# Patient Record
Sex: FEMALE | Race: WHITE | NOT HISPANIC OR LATINO | Employment: OTHER | URBAN - METROPOLITAN AREA
[De-identification: names, ages, dates, MRNs, and addresses within clinical notes are randomized per-mention and may not be internally consistent; named-entity substitution may affect disease eponyms.]

---

## 2017-01-25 ENCOUNTER — OFFICE VISIT (OUTPATIENT)
Dept: FAMILY MEDICINE | Facility: CLINIC | Age: 62
End: 2017-01-25
Payer: COMMERCIAL

## 2017-01-25 VITALS
WEIGHT: 191.81 LBS | HEIGHT: 62 IN | BODY MASS INDEX: 35.3 KG/M2 | SYSTOLIC BLOOD PRESSURE: 129 MMHG | HEART RATE: 73 BPM | DIASTOLIC BLOOD PRESSURE: 79 MMHG

## 2017-01-25 DIAGNOSIS — B02.9 HERPES ZOSTER WITHOUT COMPLICATION: Primary | ICD-10-CM

## 2017-01-25 PROCEDURE — 99213 OFFICE O/P EST LOW 20 MIN: CPT | Mod: S$GLB,,, | Performed by: FAMILY MEDICINE

## 2017-01-25 PROCEDURE — 1159F MED LIST DOCD IN RCRD: CPT | Mod: S$GLB,,, | Performed by: FAMILY MEDICINE

## 2017-01-25 PROCEDURE — 99999 PR PBB SHADOW E&M-EST. PATIENT-LVL II: CPT | Mod: PBBFAC,,, | Performed by: FAMILY MEDICINE

## 2017-01-25 PROCEDURE — 3074F SYST BP LT 130 MM HG: CPT | Mod: S$GLB,,, | Performed by: FAMILY MEDICINE

## 2017-01-25 PROCEDURE — 3078F DIAST BP <80 MM HG: CPT | Mod: S$GLB,,, | Performed by: FAMILY MEDICINE

## 2017-01-25 RX ORDER — FAMCICLOVIR 500 MG/1
500 TABLET ORAL 3 TIMES DAILY
Qty: 21 TABLET | Refills: 0 | Status: SHIPPED | OUTPATIENT
Start: 2017-01-25 | End: 2017-02-01

## 2017-01-25 NOTE — PROGRESS NOTES
The patient presents with tender painful rash rt lateral cw  for the past 3 days    but no fever.  ROS:  General: No fever or sig wt change  HEENT:No other PND eye pain or dc  Respiratory: No cough wheezing  PE: vital signs noted  HEENT: Normocephalic,with no recent trauma,PERRLA,EOMI,conjunctiva injected with no exudate.Nasopharynx is injected and edematous.External otic canal edematous and injected TM dull  Neck:Supple without adenopathy  Chest:Clear bilateral breath sounds, 15cm red rash sl urticarial but pt reports some prev blisters     Heart:Regular rhthym without murmer  Abdomen:Soft, non tender,no masses, no hepatosplenomegaly  Extremeties and Neurologic:Grossly within normal limits     Impression:Poss shingles   Plan: Acyclovir/famvir

## 2017-01-27 DIAGNOSIS — E11.9 TYPE 2 DIABETES MELLITUS WITHOUT COMPLICATION: ICD-10-CM

## 2017-02-20 RX ORDER — ALBUTEROL SULFATE 90 UG/1
2 AEROSOL, METERED RESPIRATORY (INHALATION) EVERY 6 HOURS PRN
Qty: 18 G | Refills: 0 | Status: SHIPPED | OUTPATIENT
Start: 2017-02-20 | End: 2017-11-29 | Stop reason: SDUPTHER

## 2017-03-22 RX ORDER — AMLODIPINE BESYLATE 10 MG/1
TABLET ORAL
Qty: 30 TABLET | Refills: 11 | Status: SHIPPED | OUTPATIENT
Start: 2017-03-22 | End: 2018-04-01 | Stop reason: SDUPTHER

## 2017-03-31 ENCOUNTER — OFFICE VISIT (OUTPATIENT)
Dept: FAMILY MEDICINE | Facility: CLINIC | Age: 62
End: 2017-03-31
Payer: COMMERCIAL

## 2017-03-31 VITALS
DIASTOLIC BLOOD PRESSURE: 60 MMHG | BODY MASS INDEX: 35.3 KG/M2 | SYSTOLIC BLOOD PRESSURE: 101 MMHG | WEIGHT: 191.81 LBS | HEIGHT: 62 IN | HEART RATE: 75 BPM

## 2017-03-31 DIAGNOSIS — M76.61 ACHILLES TENDINITIS OF RIGHT LOWER EXTREMITY: ICD-10-CM

## 2017-03-31 DIAGNOSIS — S83.91XA SPRAIN OF RIGHT KNEE, UNSPECIFIED LIGAMENT, INITIAL ENCOUNTER: ICD-10-CM

## 2017-03-31 DIAGNOSIS — E11.9 TYPE 2 DIABETES MELLITUS WITHOUT COMPLICATION, WITHOUT LONG-TERM CURRENT USE OF INSULIN: ICD-10-CM

## 2017-03-31 DIAGNOSIS — M24.551 HIP CONTRACTURE, RIGHT: Primary | ICD-10-CM

## 2017-03-31 PROCEDURE — 3045F PR MOST RECENT HEMOGLOBIN A1C LEVEL 7.0-9.0%: CPT | Mod: S$GLB,,, | Performed by: FAMILY MEDICINE

## 2017-03-31 PROCEDURE — 99214 OFFICE O/P EST MOD 30 MIN: CPT | Mod: S$GLB,,, | Performed by: FAMILY MEDICINE

## 2017-03-31 PROCEDURE — 3074F SYST BP LT 130 MM HG: CPT | Mod: S$GLB,,, | Performed by: FAMILY MEDICINE

## 2017-03-31 PROCEDURE — 1160F RVW MEDS BY RX/DR IN RCRD: CPT | Mod: S$GLB,,, | Performed by: FAMILY MEDICINE

## 2017-03-31 PROCEDURE — 3060F POS MICROALBUMINURIA REV: CPT | Mod: S$GLB,,, | Performed by: FAMILY MEDICINE

## 2017-03-31 PROCEDURE — 2022F DILAT RTA XM EVC RTNOPTHY: CPT | Mod: S$GLB,,, | Performed by: FAMILY MEDICINE

## 2017-03-31 PROCEDURE — 3078F DIAST BP <80 MM HG: CPT | Mod: S$GLB,,, | Performed by: FAMILY MEDICINE

## 2017-03-31 PROCEDURE — 99999 PR PBB SHADOW E&M-EST. PATIENT-LVL II: CPT | Mod: PBBFAC,,, | Performed by: FAMILY MEDICINE

## 2017-03-31 RX ORDER — GABAPENTIN 300 MG/1
300 CAPSULE ORAL NIGHTLY
Qty: 30 CAPSULE | Refills: 1 | Status: SHIPPED | OUTPATIENT
Start: 2017-03-31 | End: 2017-06-08

## 2017-03-31 RX ORDER — DICLOFENAC SODIUM 75 MG/1
75 TABLET, DELAYED RELEASE ORAL 2 TIMES DAILY
Qty: 60 TABLET | Refills: 2 | Status: SHIPPED | OUTPATIENT
Start: 2017-03-31 | End: 2017-06-08

## 2017-03-31 NOTE — PROGRESS NOTES
The patient presents today to eval recent fall w pain lateral rt hip medial ankle med rt knee and achilles Mobic not helping Also, she has additional complaints of  Fu DM last a1c 7.0    Past Medical History:  Past Medical History:   Diagnosis Date    Allergy     Bronchitis     Hallux abductovalgus     Herniated lumbar intervertebral disc     Hyperlipidemia     Diet controlled    Hypertension     Smoker      Past Surgical History:   Procedure Laterality Date    BACK SURGERY      L4-L5    CARPAL TUNNEL RELEASE      bilateral     SECTION      times 1    CHOLECYSTECTOMY  2010    CYST REMOVAL      right hand    ENDOMETRIAL ABLATION  2009    FOOT FRACTURE SURGERY       Review of patient's allergies indicates:   Allergen Reactions    Latex, natural rubber      Current Outpatient Prescriptions on File Prior to Visit   Medication Sig Dispense Refill    albuterol 90 mcg/actuation inhaler Inhale 2 puffs into the lungs every 6 (six) hours as needed for Wheezing. Rescue 18 g 0    amlodipine (NORVASC) 10 MG tablet TAKE 1 TABLET EVERY DAY 30 tablet 11    meloxicam (MOBIC) 15 MG tablet TAKE 1 TABLET BY MOUTH EVERY DAY 30 tablet 2    metformin (GLUCOPHAGE) 500 MG tablet TAKE 1 TABLET TWICE A DAYWITH FOOD 60 tablet 5     No current facility-administered medications on file prior to visit.      Social History     Social History    Marital status: Single     Spouse name: N/A    Number of children: N/A    Years of education: N/A     Occupational History    Not on file.     Social History Main Topics    Smoking status: Current Every Day Smoker     Packs/day: 0.75     Years: 39.00    Smokeless tobacco: Not on file    Alcohol use No    Drug use: No    Sexual activity: Not on file     Other Topics Concern    Not on file     Social History Narrative     Family History   Problem Relation Age of Onset    COPD Mother     Heart disease Mother     Heart disease Father      MI          ROS:GENERAL: No fever, chills, fatigability or weight loss.  SKIN: No rashes, itching or changes in color or texture of skin.  HEAD: No headaches or recent head trauma.EYES: Visual acuity fine. No photophobia, ocular pain or diplopia.EARS: Denies ear pain, discharge or vertigo.NOSE: No loss of smell, no epistaxis or postnasal drip.MOUTH & THROAT: No hoarseness or change in voice. No excessive gum bleeding.NODES: Denies swollen glands.  CHEST: Denies WHITNEY, cyanosis, wheezing, cough and sputum production.  CARDIOVASCULAR: Denies chest pain, PND, orthopnea or reduced exercise tolerance.  ABDOMEN: Appetite fine. No weight loss. Denies diarrhea, abdominal pain, hematemesis or blood in stool.  URINARY: No flank pain, dysuria or hematuria.  PERIPHERAL VASCULAR: No claudication or cyanosis.  MUSCULOSKELETAL: See above.  NEUROLOGIC: No history of seizures, paralysis, alteration of gait or coordination.  PE:   HEAD: Normocephalic, atraumatic.EYES: PERRL. EOMI.   EARS: TM's intact. Light reflex normal. No retraction or perforation.   NOSE: Mucosa pink. Airway clear.MOUTH & THROAT: No tonsillar enlargement. No pharyngeal erythema or exudate. No stridor.  NODES: No cervical, axillary or inguinal lymph node enlargement.  CHEST: Lungs clear to auscultation.  CARDIOVASCULAR: Normal S1, S2. No rubs, murmurs or gallops.  ABDOMEN: Bowel sounds normal. Not distended. Soft. No tenderness or masses.  MUSCULOSKELETAL: Tender lateral hip medial kneen and achilles     NEUROLOGIC: Cranial Nerves: II-XII grossly intact.  Motor: 5/5 strength major flexors/extensors.  DTR's: Knees, Ankles 2+ and equal bilaterally; downgoing toes.  Sensory: Intact to light touch distally.  Gait & Posture: Normal gait and fine motion. No cerebellar signs.   Protective Sensation (w/ 10 gram monofilament): Intact  Visual Inspection (Evidence of Foot Deformity, Ulceration, Nails Intact, Skin Intact):Normal  Pedal Pulses: Present    Impression:Hip strain  contusion  Achilles strain  DM  HBP  Plan:Lab eval  Rec diet and ex recs  Rev risk elier abd rec Diclofenac  Hold mobic  If ftp add skyla 300 hs   a1c MAB

## 2017-05-16 ENCOUNTER — PATIENT OUTREACH (OUTPATIENT)
Dept: ADMINISTRATIVE | Facility: HOSPITAL | Age: 62
End: 2017-05-16
Payer: COMMERCIAL

## 2017-05-16 NOTE — LETTER
May 16, 2017    Crystal KELLER Armand  [address] [facility] [address]           [facility]  [address]  [facility] [address]  Phone: [phone] Dear Mrs. Acuna:    [facility] is committed to your overall health.  To help you get the most out of each of your visits, we will review your information to make sure you are up to date on all of your recommended tests and/or procedures.      Jose Rubio MD has found that you may be due for   Health Maintenance Due   Topic    TETANUS VACCINE     Pneumococcal PPSV23 (Medium Risk) (1)    Pap Smear     Colonoscopy     Eye Exam       If you have had any of the above done at another facility, please bring the records or information with you so that your record at [facility] will be complete.    If you are currently taking medication, please bring it with you to your appointment for review.    We will be happy to assist you with scheduling any necessary appointments or you may contact the [facility] appointment desk at [phone] to schedule at your convenience.     Thank you for choosing [facility] for your healthcare needs,      If you have any questions or concerns, please don't hesitate to call.    Sincerely,    Zully BUI LPN  Care Coordination Department  [facility]

## 2017-05-25 ENCOUNTER — LAB VISIT (OUTPATIENT)
Dept: LAB | Facility: HOSPITAL | Age: 62
End: 2017-05-25
Attending: FAMILY MEDICINE
Payer: COMMERCIAL

## 2017-05-25 DIAGNOSIS — E11.9 TYPE 2 DIABETES MELLITUS WITHOUT COMPLICATION: ICD-10-CM

## 2017-05-25 LAB
CREAT UR-MCNC: 105 MG/DL
MICROALBUMIN UR DL<=1MG/L-MCNC: 7 UG/ML
MICROALBUMIN/CREATININE RATIO: 6.7 UG/MG

## 2017-05-25 PROCEDURE — 82570 ASSAY OF URINE CREATININE: CPT

## 2017-06-08 ENCOUNTER — OFFICE VISIT (OUTPATIENT)
Dept: FAMILY MEDICINE | Facility: CLINIC | Age: 62
End: 2017-06-08
Payer: COMMERCIAL

## 2017-06-08 VITALS
SYSTOLIC BLOOD PRESSURE: 112 MMHG | BODY MASS INDEX: 34.87 KG/M2 | HEART RATE: 68 BPM | WEIGHT: 189.5 LBS | HEIGHT: 62 IN | DIASTOLIC BLOOD PRESSURE: 62 MMHG

## 2017-06-08 DIAGNOSIS — E78.5 HYPERLIPIDEMIA ASSOCIATED WITH TYPE 2 DIABETES MELLITUS: ICD-10-CM

## 2017-06-08 DIAGNOSIS — E11.69 HYPERLIPIDEMIA ASSOCIATED WITH TYPE 2 DIABETES MELLITUS: ICD-10-CM

## 2017-06-08 DIAGNOSIS — E11.9 TYPE 2 DIABETES MELLITUS WITHOUT COMPLICATION, WITHOUT LONG-TERM CURRENT USE OF INSULIN: Primary | ICD-10-CM

## 2017-06-08 PROCEDURE — 3045F PR MOST RECENT HEMOGLOBIN A1C LEVEL 7.0-9.0%: CPT | Mod: S$GLB,,, | Performed by: FAMILY MEDICINE

## 2017-06-08 PROCEDURE — 99999 PR PBB SHADOW E&M-EST. PATIENT-LVL III: CPT | Mod: PBBFAC,,, | Performed by: FAMILY MEDICINE

## 2017-06-08 PROCEDURE — 99214 OFFICE O/P EST MOD 30 MIN: CPT | Mod: S$GLB,,, | Performed by: FAMILY MEDICINE

## 2017-06-08 RX ORDER — METFORMIN HYDROCHLORIDE 500 MG/1
500 TABLET, EXTENDED RELEASE ORAL
Qty: 30 TABLET | Refills: 11 | Status: SHIPPED | OUTPATIENT
Start: 2017-06-08 | End: 2018-04-05 | Stop reason: SDUPTHER

## 2017-06-08 RX ORDER — ATORVASTATIN CALCIUM 10 MG/1
10 TABLET, FILM COATED ORAL DAILY
Qty: 30 TABLET | Refills: 11 | Status: SHIPPED | OUTPATIENT
Start: 2017-06-08 | End: 2018-04-05 | Stop reason: SDUPTHER

## 2017-06-08 NOTE — PROGRESS NOTES
The patient presents today to Fu DM last a1c 7.9 Had GI side effects w 500 qd and has been taking qod x 2 y.    Past Medical History:  Past Medical History:   Diagnosis Date    Allergy     Bronchitis     Hallux abductovalgus     Herniated lumbar intervertebral disc     Hyperlipidemia     Diet controlled    Hypertension     Smoker      Past Surgical History:   Procedure Laterality Date    BACK SURGERY      L4-L5    CARPAL TUNNEL RELEASE      bilateral     SECTION      times 1    CHOLECYSTECTOMY  2010    CYST REMOVAL      right hand    ENDOMETRIAL ABLATION  2009    FOOT FRACTURE SURGERY       Review of patient's allergies indicates:   Allergen Reactions    Latex, natural rubber      Current Outpatient Prescriptions on File Prior to Visit   Medication Sig Dispense Refill    albuterol 90 mcg/actuation inhaler Inhale 2 puffs into the lungs every 6 (six) hours as needed for Wheezing. Rescue 18 g 0    amlodipine (NORVASC) 10 MG tablet TAKE 1 TABLET EVERY DAY 30 tablet 11    meloxicam (MOBIC) 15 MG tablet TAKE 1 TABLET BY MOUTH EVERY DAY 30 tablet 2    metformin (GLUCOPHAGE) 500 MG tablet TAKE 1 TABLET TWICE A DAYWITH FOOD 60 tablet 5    [DISCONTINUED] diclofenac (VOLTAREN) 75 MG EC tablet Take 1 tablet (75 mg total) by mouth 2 (two) times daily. 60 tablet 2    [DISCONTINUED] gabapentin (NEURONTIN) 300 MG capsule Take 1 capsule (300 mg total) by mouth every evening. 30 capsule 1     No current facility-administered medications on file prior to visit.      Social History     Social History    Marital status: Single     Spouse name: N/A    Number of children: N/A    Years of education: N/A     Occupational History    Not on file.     Social History Main Topics    Smoking status: Current Every Day Smoker     Packs/day: 0.75     Years: 39.00    Smokeless tobacco: Not on file    Alcohol use No    Drug use: No    Sexual activity: Not on file     Other Topics Concern    Not on  file     Social History Narrative    No narrative on file     Family History   Problem Relation Age of Onset    COPD Mother     Heart disease Mother     Heart disease Father      MI         ROS:GENERAL: No fever, chills, fatigability or weight loss.  SKIN: No rashes, itching or changes in color or texture of skin.  HEAD: No headaches or recent head trauma.EYES: Visual acuity fine. No photophobia, ocular pain or diplopia.EARS: Denies ear pain, discharge or vertigo.NOSE: No loss of smell, no epistaxis or postnasal drip.MOUTH & THROAT: No hoarseness or change in voice. No excessive gum bleeding.NODES: Denies swollen glands.  CHEST: Denies WHITNEY, cyanosis, wheezing, cough and sputum production.  CARDIOVASCULAR: Denies chest pain, PND, orthopnea or reduced exercise tolerance.  ABDOMEN: Appetite fine. No weight loss. Denies diarrhea, abdominal pain, hematemesis or blood in stool.  URINARY: No flank pain, dysuria or hematuria.  PERIPHERAL VASCULAR: No claudication or cyanosis.  MUSCULOSKELETAL: See above.  NEUROLOGIC: No history of seizures, paralysis, alteration of gait or coordination.  PE:   HEAD: Normocephalic, atraumatic.EYES: PERRL. EOMI.   EARS: TM's intact. Light reflex normal. No retraction or perforation.   NOSE: Mucosa pink. Airway clear.MOUTH & THROAT: No tonsillar enlargement. No pharyngeal erythema or exudate. No stridor.  NODES: No cervical, axillary or inguinal lymph node enlargement.  CHEST: Lungs clear to auscultation.  CARDIOVASCULAR: Normal S1, S2. No rubs, murmurs or gallops.  ABDOMEN: Bowel sounds normal. Not distended. Soft. No tenderness or masses.  MUSCULOSKELETAL: Tender lateral hip medial kneen and achilles     NEUROLOGIC: Cranial Nerves: II-XII grossly intact.  Motor: 5/5 strength major flexors/extensors.  DTR's: Knees, Ankles 2+ and equal bilaterally; downgoing toes.  Sensory: Intact to light touch distally.  Gait & Posture: Normal gait and fine motion. No cerebellar signs.   Protective  Sensation (w/ 10 gram monofilament): Intact  Visual Inspection (Evidence of Foot Deformity, Ulceration, Nails Intact, Skin Intact):Normal  Pedal Pulses: Present    Impression:   DM  HBP  Plan:Lab eval rev  45 min OV >50% counseling strategy for D.Rev risk ASCVD  Risk >14 % rec statin rev risks/benifits  Rec diet and ex recs     a1c 3m

## 2017-10-05 RX ORDER — MELOXICAM 15 MG/1
TABLET ORAL
Qty: 30 TABLET | Refills: 0 | Status: SHIPPED | OUTPATIENT
Start: 2017-10-05 | End: 2017-11-17 | Stop reason: SDUPTHER

## 2017-10-13 ENCOUNTER — HOSPITAL ENCOUNTER (OUTPATIENT)
Dept: RADIOLOGY | Facility: HOSPITAL | Age: 62
Discharge: HOME | End: 2017-10-13
Attending: FAMILY MEDICINE
Payer: COMMERCIAL

## 2017-10-13 ENCOUNTER — IMMUNIZATION (OUTPATIENT)
Dept: FAMILY MEDICINE | Facility: CLINIC | Age: 62
End: 2017-10-13
Payer: COMMERCIAL

## 2017-10-13 VITALS — BODY MASS INDEX: 34.89 KG/M2 | WEIGHT: 189.63 LBS | HEIGHT: 62 IN

## 2017-10-13 DIAGNOSIS — Z12.31 SCREENING MAMMOGRAM, ENCOUNTER FOR: ICD-10-CM

## 2017-10-13 PROCEDURE — 77063 BREAST TOMOSYNTHESIS BI: CPT | Mod: 26,,, | Performed by: RADIOLOGY

## 2017-10-13 PROCEDURE — 77067 SCR MAMMO BI INCL CAD: CPT | Mod: 26,,, | Performed by: RADIOLOGY

## 2017-10-13 PROCEDURE — 77067 SCR MAMMO BI INCL CAD: CPT | Mod: TC

## 2017-10-13 PROCEDURE — 90686 IIV4 VACC NO PRSV 0.5 ML IM: CPT | Mod: S$GLB,,, | Performed by: FAMILY MEDICINE

## 2017-10-13 PROCEDURE — 90471 IMMUNIZATION ADMIN: CPT | Mod: S$GLB,,, | Performed by: FAMILY MEDICINE

## 2017-11-17 RX ORDER — MELOXICAM 15 MG/1
15 TABLET ORAL DAILY
Qty: 30 TABLET | Refills: 11 | Status: SHIPPED | OUTPATIENT
Start: 2017-11-17 | End: 2018-04-05 | Stop reason: SDUPTHER

## 2017-11-29 ENCOUNTER — OFFICE VISIT (OUTPATIENT)
Dept: FAMILY MEDICINE | Facility: CLINIC | Age: 62
End: 2017-11-29
Payer: COMMERCIAL

## 2017-11-29 VITALS
BODY MASS INDEX: 34.93 KG/M2 | TEMPERATURE: 98 F | HEIGHT: 62 IN | HEART RATE: 74 BPM | DIASTOLIC BLOOD PRESSURE: 73 MMHG | WEIGHT: 189.81 LBS | SYSTOLIC BLOOD PRESSURE: 137 MMHG

## 2017-11-29 DIAGNOSIS — N39.0 ACUTE LOWER UTI (URINARY TRACT INFECTION): Primary | ICD-10-CM

## 2017-11-29 DIAGNOSIS — R30.0 DYSURIA: ICD-10-CM

## 2017-11-29 LAB
BACTERIA #/AREA URNS HPF: ABNORMAL /HPF
BILIRUB UR QL STRIP: NEGATIVE
CLARITY UR: ABNORMAL
COLOR UR: YELLOW
GLUCOSE UR QL STRIP: NEGATIVE
HGB UR QL STRIP: ABNORMAL
KETONES UR QL STRIP: ABNORMAL
LEUKOCYTE ESTERASE UR QL STRIP: ABNORMAL
MICROSCOPIC COMMENT: ABNORMAL
NITRITE UR QL STRIP: POSITIVE
PH UR STRIP: 6 [PH] (ref 5–8)
PROT UR QL STRIP: ABNORMAL
RBC #/AREA URNS HPF: 2 /HPF (ref 0–4)
SP GR UR STRIP: 1.01 (ref 1–1.03)
SQUAMOUS #/AREA URNS HPF: 5 /HPF
URN SPEC COLLECT METH UR: ABNORMAL
WBC #/AREA URNS HPF: >100 /HPF (ref 0–5)

## 2017-11-29 PROCEDURE — 87088 URINE BACTERIA CULTURE: CPT

## 2017-11-29 PROCEDURE — 87186 SC STD MICRODIL/AGAR DIL: CPT

## 2017-11-29 PROCEDURE — 99999 PR PBB SHADOW E&M-EST. PATIENT-LVL IV: CPT | Mod: PBBFAC,,, | Performed by: NURSE PRACTITIONER

## 2017-11-29 PROCEDURE — 81000 URINALYSIS NONAUTO W/SCOPE: CPT | Mod: PO

## 2017-11-29 PROCEDURE — 87077 CULTURE AEROBIC IDENTIFY: CPT

## 2017-11-29 PROCEDURE — 87086 URINE CULTURE/COLONY COUNT: CPT

## 2017-11-29 PROCEDURE — 99213 OFFICE O/P EST LOW 20 MIN: CPT | Mod: S$GLB,,, | Performed by: NURSE PRACTITIONER

## 2017-11-29 RX ORDER — ALBUTEROL SULFATE 90 UG/1
2 AEROSOL, METERED RESPIRATORY (INHALATION) EVERY 6 HOURS PRN
Qty: 18 G | Refills: 0 | Status: SHIPPED | OUTPATIENT
Start: 2017-11-29 | End: 2020-05-01 | Stop reason: SDUPTHER

## 2017-11-29 RX ORDER — SULFAMETHOXAZOLE AND TRIMETHOPRIM 800; 160 MG/1; MG/1
1 TABLET ORAL 2 TIMES DAILY
Qty: 20 TABLET | Refills: 0 | Status: SHIPPED | OUTPATIENT
Start: 2017-11-29 | End: 2017-11-30 | Stop reason: ALTCHOICE

## 2017-11-29 RX ORDER — PHENAZOPYRIDINE HYDROCHLORIDE 100 MG/1
100 TABLET, FILM COATED ORAL 3 TIMES DAILY PRN
Qty: 9 TABLET | Refills: 0 | Status: SHIPPED | OUTPATIENT
Start: 2017-11-29 | End: 2017-12-04 | Stop reason: SDUPTHER

## 2017-11-29 NOTE — PROGRESS NOTES
"Subjective:      Patient ID: Crystal Acuna is a 62 y.o. female.    Chief Complaint: Dysuria    Dysuria    This is a new problem. The current episode started in the past 7 days (4 days). The problem occurs every urination. The problem has been gradually worsening. The quality of the pain is described as burning. The pain is moderate. There has been no fever. Associated symptoms include flank pain, frequency and urgency. Pertinent negatives include no behavior changes, chills, hematuria, hesitancy, bubble bath use, constipation or rash. She has tried nothing for the symptoms. The treatment provided no relief.   Patient also requesting refill of albuterol which she takes as needed for wheezing and shortness of breath.  Review of Systems   Constitutional: Negative for chills, fatigue and fever.   HENT: Negative.    Eyes: Negative.    Respiratory: Negative for cough, shortness of breath and wheezing.    Cardiovascular: Negative for chest pain, palpitations and leg swelling.   Gastrointestinal: Negative.  Negative for constipation.   Endocrine: Negative.    Genitourinary: Positive for dysuria, flank pain, frequency and urgency. Negative for hematuria and hesitancy.   Skin: Negative for rash and wound.   Neurological: Negative for weakness and numbness.   Psychiatric/Behavioral: The patient is not nervous/anxious.        Objective:     /73   Pulse 74   Temp 98.1 °F (36.7 °C) (Oral)   Ht 5' 2" (1.575 m)   Wt 86.1 kg (189 lb 13.1 oz)   BMI 34.72 kg/m²     Physical Exam   Constitutional: She is oriented to person, place, and time. She appears well-developed and well-nourished.   HENT:   Head: Normocephalic.   Eyes: Pupils are equal, round, and reactive to light.   Neck: Normal range of motion.   Cardiovascular: Normal rate, regular rhythm and normal heart sounds.    Pulmonary/Chest: Effort normal and breath sounds normal. No respiratory distress. She has no wheezes. She has no rales.   Abdominal: Soft. Normal " appearance and bowel sounds are normal. She exhibits no distension and no mass. There is no tenderness. There is no rebound, no guarding and no CVA tenderness.   Musculoskeletal: Normal range of motion.   Lymphadenopathy:     She has no cervical adenopathy.   Neurological: She is alert and oriented to person, place, and time.   Skin: Skin is warm and dry. No rash noted.   Psychiatric: She has a normal mood and affect. Her behavior is normal. Judgment and thought content normal.   Vitals reviewed.  U/a in clinic abnormal    Assessment:     1. Acute lower UTI (urinary tract infection)    2. Dysuria        Plan:   Acute lower UTI (urinary tract infection)  -     Urinalysis  -     Urine culture  -     Urinalysis Microscopic  -     sulfamethoxazole-trimethoprim 800-160mg (BACTRIM DS) 800-160 mg Tab; Take 1 tablet by mouth 2 (two) times daily.  Dispense: 20 tablet; Refill: 0    Dysuria  -     Urinalysis  -     Urine culture  -     Urinalysis Microscopic  -     phenazopyridine (PYRIDIUM) 100 MG tablet; Take 1 tablet (100 mg total) by mouth 3 (three) times daily as needed for Pain.  Dispense: 9 tablet; Refill: 0    Other orders  -     albuterol 90 mcg/actuation inhaler; Inhale 2 puffs into the lungs every 6 (six) hours as needed for Wheezing. Rescue  Dispense: 18 g; Refill: 0    Educational handout provided  Report to ER if symptoms worsen  Return if symptoms worsen or fail to improve.

## 2017-11-29 NOTE — PATIENT INSTRUCTIONS

## 2017-11-30 ENCOUNTER — OFFICE VISIT (OUTPATIENT)
Dept: FAMILY MEDICINE | Facility: CLINIC | Age: 62
End: 2017-11-30
Payer: COMMERCIAL

## 2017-11-30 VITALS
SYSTOLIC BLOOD PRESSURE: 114 MMHG | HEART RATE: 85 BPM | WEIGHT: 190.06 LBS | BODY MASS INDEX: 34.98 KG/M2 | HEIGHT: 62 IN | TEMPERATURE: 99 F | DIASTOLIC BLOOD PRESSURE: 65 MMHG

## 2017-11-30 DIAGNOSIS — R50.9 FEVER, UNSPECIFIED FEVER CAUSE: ICD-10-CM

## 2017-11-30 DIAGNOSIS — N39.0 ACUTE UTI (URINARY TRACT INFECTION): Primary | ICD-10-CM

## 2017-11-30 LAB
CTP QC/QA: YES
FLUAV AG NPH QL: NEGATIVE
FLUBV AG NPH QL: NEGATIVE

## 2017-11-30 PROCEDURE — 99213 OFFICE O/P EST LOW 20 MIN: CPT | Mod: 25,S$GLB,, | Performed by: NURSE PRACTITIONER

## 2017-11-30 PROCEDURE — 99999 PR PBB SHADOW E&M-EST. PATIENT-LVL III: CPT | Mod: PBBFAC,,, | Performed by: NURSE PRACTITIONER

## 2017-11-30 PROCEDURE — 87804 INFLUENZA ASSAY W/OPTIC: CPT | Mod: QW,S$GLB,, | Performed by: NURSE PRACTITIONER

## 2017-11-30 RX ORDER — CIPROFLOXACIN 500 MG/1
500 TABLET ORAL 2 TIMES DAILY
Qty: 14 TABLET | Refills: 0 | Status: SHIPPED | OUTPATIENT
Start: 2017-11-30 | End: 2017-12-07

## 2017-11-30 NOTE — PROGRESS NOTES
"Subjective:      Patient ID: Crystal Acuna is a 62 y.o. female.    Chief Complaint: Generalized Body Aches and Fever    Patient was seen and treated for UTI yesterday.  Had sudden onset of fever and body aches in the afternoon after getting home from appointment.  She reports her dysuria, frequency, and urgency has improved some.      Fever    This is a new problem. The current episode started yesterday. The problem has been waxing and waning. The maximum temperature noted was 102 to 102.9 F. The temperature was taken using an oral thermometer. Associated symptoms include coughing (chronic cough, no changes), headaches, muscle aches and urinary pain. Pertinent negatives include no abdominal pain, chest pain, congestion, ear pain, nausea, rash, sore throat or wheezing. She has tried acetaminophen for the symptoms. The treatment provided mild relief.   Risk factors comment:  UTI    Review of Systems   Constitutional: Positive for fatigue and fever. Negative for chills.   HENT: Negative for congestion, ear pain, sinus pain, sinus pressure and sore throat.    Respiratory: Positive for cough (chronic cough, no changes). Negative for shortness of breath and wheezing.    Cardiovascular: Negative for chest pain, palpitations and leg swelling.   Gastrointestinal: Negative for abdominal pain and nausea.   Genitourinary: Positive for dysuria. Negative for difficulty urinating, frequency and urgency.   Musculoskeletal: Positive for myalgias.   Skin: Negative for rash and wound.   Neurological: Positive for headaches. Negative for weakness and numbness.   Psychiatric/Behavioral: The patient is not nervous/anxious.        Objective:     /65   Pulse 85   Temp 99 °F (37.2 °C) (Oral)   Ht 5' 2" (1.575 m)   Wt 86.2 kg (190 lb 0.6 oz)   BMI 34.76 kg/m²     Physical Exam   Constitutional: She is oriented to person, place, and time. She appears well-developed and well-nourished.   HENT:   Head: Normocephalic.   Right " Ear: Tympanic membrane, external ear and ear canal normal.   Left Ear: Tympanic membrane, external ear and ear canal normal.   Nose: Rhinorrhea present. No mucosal edema. Right sinus exhibits frontal sinus tenderness. Right sinus exhibits no maxillary sinus tenderness. Left sinus exhibits frontal sinus tenderness. Left sinus exhibits no maxillary sinus tenderness.   Mouth/Throat: No oropharyngeal exudate, posterior oropharyngeal edema or posterior oropharyngeal erythema.   Eyes: Pupils are equal, round, and reactive to light.   Neck: Normal range of motion. Neck supple.   Cardiovascular: Normal rate, regular rhythm and normal heart sounds.    Pulmonary/Chest: Effort normal and breath sounds normal. No respiratory distress. She has no wheezes. She has no rales.   Abdominal: Soft. Bowel sounds are normal. She exhibits no distension and no mass. There is no tenderness. There is no rebound and no guarding.   Lymphadenopathy:     She has no cervical adenopathy.   Neurological: She is alert and oriented to person, place, and time.   Skin: Skin is warm and dry. No rash noted.   Psychiatric: She has a normal mood and affect. Her behavior is normal. Judgment and thought content normal.   Vitals reviewed.  POCT Influenza A/B negative  Assessment:     1. Acute UTI (urinary tract infection)    2. Fever, unspecified fever cause        Plan:   Acute UTI (urinary tract infection)  -     ciprofloxacin HCl (CIPRO) 500 MG tablet; Take 1 tablet (500 mg total) by mouth 2 (two) times daily.  Dispense: 14 tablet; Refill: 0    Fever, unspecified fever cause  -     POCT Influenza A/B    Increase fluid intake  Report to ER if symptoms worsen    Return if symptoms worsen or fail to improve.

## 2017-11-30 NOTE — LETTER
November 30, 2017      [facility]  [address] [facility] [address]  Phone: [phone]  Fax: [phone]       Patient: Crystal Acuna   YOB: 1955  Date of Visit: 11/30/2017    To Whom It May Concern:    Angelo Acuna  was at [facility] on 11/30/2017. She may return to work/school on 12/3/2017 with no restrictions. If you have any questions or concerns, or if I can be of further assistance, please do not hesitate to contact me.    Sincerely,      Aleksey Cantrell, NP

## 2017-11-30 NOTE — PATIENT INSTRUCTIONS

## 2017-11-30 NOTE — LETTER
November 30, 2017      [facility]  [address] [facility] [address]  Phone: [phone]  Fax: [phone]       Patient: Crystal Acuna   YOB: 1955  Date of Visit: 11/30/2017    To Whom It May Concern:    Angelo Acuna  was at [facility] on 11/29/2017 and 11/30/2017. She may return to work/school on 12/3/2017 with no restrictions. If you have any questions or concerns, or if I can be of further assistance, please do not hesitate to contact me.    Sincerely,      Aleksey Cantrell, NP

## 2017-11-30 NOTE — LETTER
November 30, 2017      [facility]  [address] [facility] [address]  Phone: [phone]  Fax: [phone]       Patient: Crystal Acuna   YOB: 1955  Date of Visit: 11/30/2017    To Whom It May Concern:    Angelo Acuna  was at [facility] on 11/29/2017 and 11/30/2017. She may return to work/school on 11/3/2017 with no restrictions. If you have any questions or concerns, or if I can be of further assistance, please do not hesitate to contact me.    Sincerely,        Aleksey Cantrell, NP

## 2017-12-01 LAB — BACTERIA UR CULT: NORMAL

## 2017-12-04 DIAGNOSIS — R30.0 DYSURIA: ICD-10-CM

## 2017-12-04 RX ORDER — PHENAZOPYRIDINE HYDROCHLORIDE 100 MG/1
100 TABLET, FILM COATED ORAL 3 TIMES DAILY PRN
Qty: 9 TABLET | Refills: 0 | Status: SHIPPED | OUTPATIENT
Start: 2017-12-04 | End: 2017-12-07

## 2017-12-08 DIAGNOSIS — E11.9 TYPE 2 DIABETES MELLITUS WITHOUT COMPLICATION: ICD-10-CM

## 2018-02-26 ENCOUNTER — PATIENT OUTREACH (OUTPATIENT)
Dept: ADMINISTRATIVE | Facility: HOSPITAL | Age: 63
End: 2018-02-26

## 2018-02-26 NOTE — LETTER
February 26, 2018    Crystal Vuongburn  [address]  [facility] [address]           [facility]  [address]  [facility] [address]  Phone: [phone] Dear Mrs. Acuna:    [facility] is committed to your overall health.  To help you get the most out of each of your visits, we will review your information to make sure you are up to date on all of your recommended tests and/or procedures.      Jose Rubio MD has found that you may be due for   Health Maintenance Due   Topic    TETANUS VACCINE     Pneumococcal PPSV23 (Medium Risk) (1)    Pap Smear with HPV Cotest     Colonoscopy     Eye Exam     Zoster Vaccine     Hemoglobin A1c       If you have had any of the above done at another facility, please bring the records or information with you so that your record at [facility] will be complete.    If you are currently taking medication, please bring it with you to your appointment for review.    We will be happy to assist you with scheduling any necessary appointments or you may contact the [facility] appointment desk at [phone] to schedule at your convenience.     Thank you for choosing [facility] for your healthcare needs,    If you have any questions or concerns, please don't hesitate to call.    Sincerely,    ANDRIY Andrea  Care Coordination Department  [facility]  [phone]

## 2018-04-02 RX ORDER — AMLODIPINE BESYLATE 10 MG/1
TABLET ORAL
Qty: 30 TABLET | Refills: 1 | Status: SHIPPED | OUTPATIENT
Start: 2018-04-02 | End: 2018-04-05 | Stop reason: SDUPTHER

## 2018-04-02 NOTE — TELEPHONE ENCOUNTER
I am refilling a requested medication x 1 for the patient and reviewed the chart.  The patient is due for a physical and DM fu .  Please book the patient with NP 2-6 weeks

## 2018-04-04 ENCOUNTER — PATIENT OUTREACH (OUTPATIENT)
Dept: ADMINISTRATIVE | Facility: HOSPITAL | Age: 63
End: 2018-04-04

## 2018-04-05 ENCOUNTER — TELEPHONE (OUTPATIENT)
Dept: FAMILY MEDICINE | Facility: CLINIC | Age: 63
End: 2018-04-05

## 2018-04-05 ENCOUNTER — HOSPITAL ENCOUNTER (OUTPATIENT)
Dept: RADIOLOGY | Facility: HOSPITAL | Age: 63
Discharge: HOME | End: 2018-04-05
Attending: NURSE PRACTITIONER
Payer: COMMERCIAL

## 2018-04-05 ENCOUNTER — OFFICE VISIT (OUTPATIENT)
Dept: FAMILY MEDICINE | Facility: CLINIC | Age: 63
End: 2018-04-05
Payer: COMMERCIAL

## 2018-04-05 VITALS
TEMPERATURE: 98 F | BODY MASS INDEX: 33.98 KG/M2 | SYSTOLIC BLOOD PRESSURE: 118 MMHG | WEIGHT: 184.63 LBS | HEIGHT: 62 IN | HEART RATE: 71 BPM | DIASTOLIC BLOOD PRESSURE: 68 MMHG

## 2018-04-05 DIAGNOSIS — I65.22 STENOSIS OF LEFT CAROTID ARTERY: Primary | ICD-10-CM

## 2018-04-05 DIAGNOSIS — F17.200 SMOKER: ICD-10-CM

## 2018-04-05 DIAGNOSIS — Z76.0 MEDICATION REFILL: ICD-10-CM

## 2018-04-05 DIAGNOSIS — R41.3 MEMORY DISTURBANCE: ICD-10-CM

## 2018-04-05 DIAGNOSIS — N39.0 RECURRENT UTI: ICD-10-CM

## 2018-04-05 DIAGNOSIS — I65.23 ATHEROSCLEROSIS OF BOTH CAROTID ARTERIES: ICD-10-CM

## 2018-04-05 DIAGNOSIS — R01.1 CARDIAC MURMUR: ICD-10-CM

## 2018-04-05 DIAGNOSIS — E78.5 HYPERLIPIDEMIA ASSOCIATED WITH TYPE 2 DIABETES MELLITUS: ICD-10-CM

## 2018-04-05 DIAGNOSIS — E11.69 HYPERLIPIDEMIA ASSOCIATED WITH TYPE 2 DIABETES MELLITUS: ICD-10-CM

## 2018-04-05 DIAGNOSIS — R41.9 DECREASED ALERTNESS: ICD-10-CM

## 2018-04-05 DIAGNOSIS — T14.8XXA MUSCLE STRAIN: ICD-10-CM

## 2018-04-05 DIAGNOSIS — I15.2 HYPERTENSION ASSOCIATED WITH DIABETES: ICD-10-CM

## 2018-04-05 DIAGNOSIS — Z00.00 ANNUAL PHYSICAL EXAM: Primary | ICD-10-CM

## 2018-04-05 DIAGNOSIS — E11.59 HYPERTENSION ASSOCIATED WITH DIABETES: ICD-10-CM

## 2018-04-05 DIAGNOSIS — E11.9 TYPE 2 DIABETES MELLITUS WITHOUT COMPLICATION, WITHOUT LONG-TERM CURRENT USE OF INSULIN: ICD-10-CM

## 2018-04-05 LAB
BACTERIA #/AREA URNS HPF: ABNORMAL /HPF
BILIRUB UR QL STRIP: NEGATIVE
CLARITY UR: CLEAR
COLOR UR: YELLOW
CREAT UR-MCNC: 73 MG/DL
GLUCOSE UR QL STRIP: NEGATIVE
HGB UR QL STRIP: ABNORMAL
KETONES UR QL STRIP: NEGATIVE
LEUKOCYTE ESTERASE UR QL STRIP: ABNORMAL
MICROALBUMIN UR DL<=1MG/L-MCNC: 10 UG/ML
MICROALBUMIN/CREATININE RATIO: 13.7 UG/MG
MICROSCOPIC COMMENT: ABNORMAL
NITRITE UR QL STRIP: NEGATIVE
PH UR STRIP: 6 [PH] (ref 5–8)
PROT UR QL STRIP: NEGATIVE
RBC #/AREA URNS HPF: 2 /HPF (ref 0–4)
SP GR UR STRIP: 1.01 (ref 1–1.03)
SQUAMOUS #/AREA URNS HPF: 15 /HPF
URN SPEC COLLECT METH UR: ABNORMAL
WBC #/AREA URNS HPF: 40 /HPF (ref 0–5)
WBC CLUMPS URNS QL MICRO: ABNORMAL

## 2018-04-05 PROCEDURE — 93880 EXTRACRANIAL BILAT STUDY: CPT | Mod: TC,PO

## 2018-04-05 PROCEDURE — 81000 URINALYSIS NONAUTO W/SCOPE: CPT | Mod: PO

## 2018-04-05 PROCEDURE — 99396 PREV VISIT EST AGE 40-64: CPT | Mod: S$GLB,,, | Performed by: NURSE PRACTITIONER

## 2018-04-05 PROCEDURE — 70450 CT HEAD/BRAIN W/O DYE: CPT | Mod: 26,,, | Performed by: RADIOLOGY

## 2018-04-05 PROCEDURE — 99999 PR PBB SHADOW E&M-EST. PATIENT-LVL V: CPT | Mod: PBBFAC,,, | Performed by: NURSE PRACTITIONER

## 2018-04-05 PROCEDURE — 93880 EXTRACRANIAL BILAT STUDY: CPT | Mod: 26,,, | Performed by: RADIOLOGY

## 2018-04-05 PROCEDURE — 70450 CT HEAD/BRAIN W/O DYE: CPT | Mod: TC,PO

## 2018-04-05 PROCEDURE — 87086 URINE CULTURE/COLONY COUNT: CPT

## 2018-04-05 PROCEDURE — 82043 UR ALBUMIN QUANTITATIVE: CPT

## 2018-04-05 RX ORDER — ATORVASTATIN CALCIUM 10 MG/1
10 TABLET, FILM COATED ORAL DAILY
Qty: 90 TABLET | Refills: 3 | Status: SHIPPED | OUTPATIENT
Start: 2018-04-05 | End: 2019-05-21 | Stop reason: SDUPTHER

## 2018-04-05 RX ORDER — MELOXICAM 15 MG/1
15 TABLET ORAL DAILY
Qty: 90 TABLET | Refills: 3 | Status: SHIPPED | OUTPATIENT
Start: 2018-04-05 | End: 2019-05-07 | Stop reason: SDUPTHER

## 2018-04-05 RX ORDER — NITROFURANTOIN 25; 75 MG/1; MG/1
100 CAPSULE ORAL 2 TIMES DAILY
Qty: 20 CAPSULE | Refills: 0 | Status: SHIPPED | OUTPATIENT
Start: 2018-04-05 | End: 2018-04-15

## 2018-04-05 RX ORDER — METFORMIN HYDROCHLORIDE 500 MG/1
500 TABLET, EXTENDED RELEASE ORAL
Qty: 90 TABLET | Refills: 3 | Status: SHIPPED | OUTPATIENT
Start: 2018-04-05 | End: 2018-09-29 | Stop reason: SDUPTHER

## 2018-04-05 RX ORDER — AMLODIPINE BESYLATE 10 MG/1
10 TABLET ORAL DAILY
Qty: 90 TABLET | Refills: 3 | Status: SHIPPED | OUTPATIENT
Start: 2018-04-05 | End: 2019-04-30 | Stop reason: SDUPTHER

## 2018-04-05 NOTE — PROGRESS NOTES
Subjective:       Patient ID: Crystal Acuna is a 62 y.o. female.    Chief Complaint: Annual Exam  Pt in today for annual exam. HTN well controlled with medication. Type 2 diabetes uncontrolled; states compliance with medication; states BG have been WNL since last hgb A1C. Hyperlipidemia managed with medication. Pt is a smoker; states has tried to quit multiple times (patches, chantix) without success. Pt states allergic to patches and chantix caused nightmares. Declines smoking cessation at present. Pts daughter informed provider that the pt has been experiencing memory disturbances, decreased alertness over the past 2 w; has had recurrent UTIs. Denies any HA, nausea, dizziness, injury, LOC. Labs, eye exam, foot exam due; states will schedule eye exam. Foot exam performed today. Mammogram due in October. Pt has no other complaints today.  Muscle Pain   This is a new (States lifts heavy objects for work) problem. The current episode started 1 to 4 weeks ago. The problem occurs intermittently. The problem has been gradually improving. Pertinent negatives include no abdominal pain, anorexia, arthralgias, change in bowel habit, chest pain, chills, congestion, coughing, diaphoresis, fatigue, fever, headaches, joint swelling, myalgias, nausea, neck pain, numbness, rash, sore throat, swollen glands, urinary symptoms, vertigo, visual change, vomiting or weakness. Associated symptoms comments: Lower chest; recreates pain with movement. The symptoms are aggravated by twisting (lifting). She has tried nothing for the symptoms. The treatment provided no relief.   Urinary Frequency    This is a recurrent problem. The current episode started more than 1 month ago. The problem occurs every urination. The problem has been waxing and waning. The pain is mild. There has been no fever. There is no history of pyelonephritis. Associated symptoms include frequency and urgency. Pertinent negatives include no behavior changes, chills,  discharge, flank pain, hematuria, hesitancy, nausea, possible pregnancy, sweats, vomiting, weight loss, bubble bath use, constipation, rash or withholding. She has tried antibiotics for the symptoms. The treatment provided moderate relief. Her past medical history is significant for diabetes mellitus, hypertension and recurrent UTIs. There is no history of catheterization, diabetes insipidus, genitourinary reflux, kidney stones, a single kidney, STD, urinary stasis or a urological procedure. Requests urology referral     Past Medical History:   Diagnosis Date    Allergy     Bronchitis     Hallux abductovalgus     Herniated lumbar intervertebral disc     Hyperlipidemia     Diet controlled    Hypertension     Smoker      Past Surgical History:   Procedure Laterality Date    BACK SURGERY      L4-L5    CARPAL TUNNEL RELEASE      bilateral     SECTION      times 1    CHOLECYSTECTOMY  2010    CYST REMOVAL      right hand    ENDOMETRIAL ABLATION  2009    FOOT FRACTURE SURGERY       Social History     Social History    Marital status: Single     Spouse name: N/A    Number of children: N/A    Years of education: N/A     Occupational History    Not on file.     Social History Main Topics    Smoking status: Current Every Day Smoker     Packs/day: 0.75     Years: 39.00    Smokeless tobacco: Not on file    Alcohol use No    Drug use: No    Sexual activity: Not on file     Social History Narrative    No narrative on file       Review of Systems   Constitutional: Negative.  Negative for chills, diaphoresis, fatigue, fever and weight loss.   HENT: Negative.  Negative for congestion and sore throat.    Eyes: Negative.    Respiratory: Negative.  Negative for cough.    Cardiovascular: Negative.  Negative for chest pain.   Gastrointestinal: Negative.  Negative for abdominal pain, anorexia, change in bowel habit, constipation, nausea and vomiting.   Endocrine: Negative.    Genitourinary:  Positive for frequency and urgency. Negative for flank pain, hematuria and hesitancy.   Musculoskeletal: Negative.  Negative for arthralgias, joint swelling, myalgias and neck pain.   Skin: Negative.  Negative for rash.   Allergic/Immunologic: Negative.    Neurological: Negative.  Negative for vertigo, weakness, numbness and headaches.   Psychiatric/Behavioral: Negative.        Objective:      Physical Exam   Constitutional: She is oriented to person, place, and time. She appears well-developed and well-nourished.   HENT:   Head: Normocephalic.   Right Ear: External ear normal.   Left Ear: External ear normal.   Nose: Nose normal.   Mouth/Throat: Oropharynx is clear and moist.   Eyes: Conjunctivae are normal. Pupils are equal, round, and reactive to light.   Neck: Normal range of motion. Neck supple. Carotid bruit is present.   Cardiovascular: Normal rate and regular rhythm.    Murmur (Pt unaware) heard.  Pulmonary/Chest: Effort normal and breath sounds normal.   Abdominal: Soft. Bowel sounds are normal. There is no CVA tenderness.   Musculoskeletal: Normal range of motion.        Arms:  Neurological: She is alert and oriented to person, place, and time.   Skin: Skin is warm and dry. Capillary refill takes 2 to 3 seconds.   Psychiatric: She has a normal mood and affect. Her behavior is normal. Judgment and thought content normal.   Nursing note and vitals reviewed.      Assessment:       1. Annual physical exam    2. Type 2 diabetes mellitus without complication, without long-term current use of insulin    3. Hypertension associated with diabetes    4. Hyperlipidemia associated with type 2 diabetes mellitus    5. Recurrent UTI    6. Cardiac murmur    7. Smoker    8. Memory disturbance    9. Decreased alertness    10. Medication refill    11. Muscle strain        Plan:           Crystal was seen today for annual exam.    Diagnoses and all orders for this visit:    Annual physical exam  Type 2 diabetes mellitus  without complication, without long-term current use of insulin  Hypertension associated with diabetes  Hyperlipidemia associated with type 2 diabetes mellitus  -     Lipid panel; Future  -     Comprehensive metabolic panel; Future  -     TSH; Future  -     CBC auto differential; Future  -     MICROALBUMIN / CREATININE RATIO URINE  -     pneumococcal vaccine (PNEUMOVAX 23) 25 mcg/0.5 mL injection; Inject 0.5 mLs into the muscle once.        ASA 81 mg daily        Continue current medications    Recurrent UTI  -     Urinalysis  -     Urine culture  -     Ambulatory referral to Urology  -     Urinalysis Microscopic    Cardiac murmur  -     2D Echo w/ Color Flow Doppler; Future  -     US Carotid Bilateral; Future    Smoker  Memory disturbance  Decreased alertness  -     US Carotid Bilateral; Future  -     CT Head Without Contrast; Future    Muscle strain  Heat to affected area 2-3 x/d  Rest  Avoid lifting/strenuous activity  Declines medication    Medication refill  -     metFORMIN (GLUCOPHAGE-XR) 500 MG 24 hr tablet; Take 1 tablet (500 mg total) by mouth daily with breakfast.  -     meloxicam (MOBIC) 15 MG tablet; Take 1 tablet (15 mg total) by mouth once daily.  -     atorvastatin (LIPITOR) 10 MG tablet; Take 1 tablet (10 mg total) by mouth once daily.  -     amLODIPine (NORVASC) 10 MG tablet; Take 1 tablet (10 mg total) by mouth once daily.    Diabetic foot exam:   Left: Reflexes 2+    Vibratory sensation normal   Proprioception normal   Sharp/dull discrimination normal   Filament test present  Right: Reflexes 2+    Vibratory sensation normal   Proprioception normal   Sharp/dull discrimination normal   Filament test present

## 2018-04-05 NOTE — TELEPHONE ENCOUNTER
----- Message from Ambar Chowdary NP sent at 4/5/2018  4:10 PM CDT -----  Reviewed; moderate atherosclerosis noted to bilateral carotid arteries; left worse than right. Refer to CV for further evaluation.

## 2018-04-06 ENCOUNTER — TELEPHONE (OUTPATIENT)
Dept: FAMILY MEDICINE | Facility: CLINIC | Age: 63
End: 2018-04-06

## 2018-04-06 LAB — BACTERIA UR CULT: NORMAL

## 2018-04-06 NOTE — TELEPHONE ENCOUNTER
"Atherosclerosis information that was supposed to be attached to CT note:    Patient education: Atherosclerosis (The Basics)  View in Turkmen  Written by the doctors and editors at [facility]  What is atherosclerosis? -- Atherosclerosis is a condition in which fatty deposits called "plaques" build up inside the arteries in the body. (Arteries are the blood vessels that carry blood away from the heart out to the body.) Atherosclerosis is the reason most people have a heart attack or a stroke.  Atherosclerosis can affect arteries all over the body. There are different names for atherosclerosis depending on which arteries it affects.  ?Carotid artery disease is a form of atherosclerosis that affects the carotid arteries, which bring blood to the brain (figure 1). This form of atherosclerosis can lead to stroke.  ?Coronary heart disease, also called coronary artery disease, is a form of atherosclerosis that affects the coronary arteries, which bring blood to the heart muscle. This form of atherosclerosis can cause chest pain and lead to heart attack (figure 2).  ?Renal artery stenosis is a form of atherosclerosis that affects the renal arteries, which bring blood to the kidneys. This form of atherosclerosis can cause high blood pressure or lead to kidney disease.  ?Peripheral artery disease is a form of atherosclerosis that affects the arteries that bring blood to the arms and legs (figure 3). People with this condition sometimes have pain, tingling, or numbness in their legs when they walk.  How does atherosclerosis cause heart attacks, strokes, and other problems? -- Atherosclerosis-related plaques can cause problems in 2 ways:  ?Plaques can get too big and reduce blood flow to certain body parts (figure 4). This can cause symptoms (such as pain) in the part of the body that is not getting enough blood.  ?Plaques can break open, or rupture. When that happens, blood clots form inside the artery and block the blood supply " "to tissues past the clot. This is what happens during a stroke or a heart attack (figure 2).  Who is at risk for atherosclerosis? -- A person has a higher chance of getting atherosclerosis if he or she:  ?Has a high cholesterol or triglycerides (triglycerides are a type of fat found in blood)  ?Has high blood pressure   ?Has diabetes   ?Smokes  ?Has an unhealthy diet   ?Is overweight or does very little physical activity   ?Has a mother or father who got atherosclerosis before the age of 50 years   Will I need tests? -- Maybe. Aside from a physical exam, doctors do not typically order tests to check for atherosclerosis in general. Instead, they order tests if they think a patient might have a specific form of atherosclerosis, such as coronary heart disease or peripheral artery disease. The tests for each of these conditions are all very different.  A general test that is often done in people who might have atherosclerosis is called a "lipid profile." This is a blood test that measures the amounts of different forms of fat and cholesterol.  Can the problems caused by atherosclerosis be prevented? -- Yes. To reduce your chances of having a heart attack, stroke, or related problem, do the following:  ?Take the medicines your doctor prescribes to treat high blood pressure, high cholesterol, and to prevent clots.  ?Lose weight (if you are overweight).  ?Choose a diet rich in fruits, vegetables, and low-fat dairy products. Don't eat a lot of meats, sweets, or refined grains.  ?Do something active for at least 30 minutes a day on most days of the week.  ?Quit smoking (if you smoke). Ask your doctor for help.  ?Limit the amount of alcohol you drink. Have no more than 2 drinks a day if you are a man. Have no more than 1 drink a day if you are a woman.    "

## 2018-04-06 NOTE — TELEPHONE ENCOUNTER
----- Message from Ambar Chowdary NP sent at 4/6/2018 12:33 PM CDT -----  Reviewed; CT shows likely chronic microvascular ischemic disease; likely associated with the atherosclerosis. Atherosclerosis information below. Pt started on ASA 81 mg at visit; also taking lipitor. Smoking cessation also recommended. CV referral already in for carotid stenosis/atherosclerosis to bilateral carotid arteries. Mild white matter changes noted; usually occur with age. Mild chronic sinus disease, small left mastoid effusion noted; can see ENT if symptomatic. F/U with PCP as needed.

## 2018-04-09 ENCOUNTER — PATIENT MESSAGE (OUTPATIENT)
Dept: FAMILY MEDICINE | Facility: CLINIC | Age: 63
End: 2018-04-09

## 2018-04-19 ENCOUNTER — OFFICE VISIT (OUTPATIENT)
Dept: VASCULAR SURGERY | Facility: CLINIC | Age: 63
End: 2018-04-19
Payer: COMMERCIAL

## 2018-04-19 VITALS
SYSTOLIC BLOOD PRESSURE: 115 MMHG | HEART RATE: 59 BPM | HEIGHT: 62 IN | BODY MASS INDEX: 34.2 KG/M2 | DIASTOLIC BLOOD PRESSURE: 68 MMHG | WEIGHT: 185.88 LBS

## 2018-04-19 DIAGNOSIS — I65.23 BILATERAL CAROTID ARTERY STENOSIS: Primary | ICD-10-CM

## 2018-04-19 DIAGNOSIS — I77.9 CAROTID DISEASE, BILATERAL: ICD-10-CM

## 2018-04-19 PROCEDURE — 3074F SYST BP LT 130 MM HG: CPT | Mod: CPTII,S$GLB,, | Performed by: THORACIC SURGERY (CARDIOTHORACIC VASCULAR SURGERY)

## 2018-04-19 PROCEDURE — 99203 OFFICE O/P NEW LOW 30 MIN: CPT | Mod: S$GLB,,, | Performed by: THORACIC SURGERY (CARDIOTHORACIC VASCULAR SURGERY)

## 2018-04-19 PROCEDURE — 99999 PR PBB SHADOW E&M-EST. PATIENT-LVL III: CPT | Mod: PBBFAC,,, | Performed by: THORACIC SURGERY (CARDIOTHORACIC VASCULAR SURGERY)

## 2018-04-19 PROCEDURE — 3078F DIAST BP <80 MM HG: CPT | Mod: CPTII,S$GLB,, | Performed by: THORACIC SURGERY (CARDIOTHORACIC VASCULAR SURGERY)

## 2018-04-19 RX ORDER — NAPROXEN SODIUM 220 MG/1
81 TABLET, FILM COATED ORAL DAILY
COMMUNITY
End: 2023-04-21

## 2018-04-19 NOTE — LETTER
April 19, 2018      [place]  [address] [facility] [address]           [facility]  [address] [facility] [address]  Phone: [phone]          Patient: Crystal Acuna   MR Number: [id number]   YOB: 1955   Date of Visit: 4/19/2018       Dear Ambar Chowdary:    Thank you for referring Crystal Acuna to me for evaluation. Attached you will find relevant portions of my assessment and plan of care.    If you have questions, please do not hesitate to call me. I look forward to following Crystal Acuna along with you.    Sincerely,    Joshua Huerta MD    Enclosure  CC:  No Recipients    If you would like to receive this communication electronically, please contact [email or web address] or [phone] to request more information on [employer] access.    For providers and/or their staff who would like to refer a patient to [facility], please contact us through our one-stop-shop provider referral line, Miryam Tapia, at [phone].    If you feel you have received this communication in error or would no longer like to receive these types of communications, please e-mail [email or web address]

## 2018-04-20 NOTE — PROGRESS NOTES
OFFICE NOTE    HISTORY OF PRESENT ILLNESS:  This is a 62-year-old lady with a recent carotid   ultrasound showing 50-75% stenosis in the carotid arteries.  She initially had   confusion and this was ultimately felt to be related to UTI that has improved   and her confusion is largely resolved.  She has diabetes and hypertension.  She   has been a smoker over decades.  She has been trying to quit smoking.  Medicines   are noted.  They are part of the EPIC record.  She has no other pertinent   family or social history at this time.  She denies any coronary artery disease.    PHYSICAL EXAMINATION:  VITAL SIGNS:  Stable.  HEENT:  Pupils are equal, round, reactive to light.  Nose and throat are clear.  NECK:  Supple.  CHEST:  Clear to auscultation.  HEART:  Regular rate and rhythm.  ABDOMEN:  Benign.  EXTREMITIES:  Femoral pulses are equal.  Perfusion to the legs and feet seems to   be satisfactory.    The recent ultrasound is noted with a 50-75% stenosis bilaterally.    I think at this point, she should have a CTA of the neck just to determine the   exact degree of stenoses, and then based on this, make final recommendations.    She seems to be understanding.  We will try to arrange for this in the near   future.      KAREN  dd: 04/19/2018 09:40:40 (CDT)  td: 04/20/2018 00:38:45 (CDT)  Doc ID   #[id number]  Job ID #[id number]    CC:

## 2018-04-26 ENCOUNTER — HOSPITAL ENCOUNTER (OUTPATIENT)
Dept: RADIOLOGY | Facility: HOSPITAL | Age: 63
Discharge: HOME | End: 2018-04-26
Attending: THORACIC SURGERY (CARDIOTHORACIC VASCULAR SURGERY)
Payer: COMMERCIAL

## 2018-04-26 DIAGNOSIS — I65.23 BILATERAL CAROTID ARTERY STENOSIS: ICD-10-CM

## 2018-04-26 PROCEDURE — 70498 CT ANGIOGRAPHY NECK: CPT | Mod: TC,PO

## 2018-04-26 PROCEDURE — 25500020 PHARM REV CODE 255: Mod: PO | Performed by: THORACIC SURGERY (CARDIOTHORACIC VASCULAR SURGERY)

## 2018-04-26 PROCEDURE — 70498 CT ANGIOGRAPHY NECK: CPT | Mod: 26,,, | Performed by: RADIOLOGY

## 2018-04-26 RX ADMIN — IOHEXOL 100 ML: 350 INJECTION, SOLUTION INTRAVENOUS at 09:04

## 2018-07-05 ENCOUNTER — HOSPITAL ENCOUNTER (OUTPATIENT)
Dept: RADIOLOGY | Facility: HOSPITAL | Age: 63
Discharge: HOME | End: 2018-07-05
Attending: ORTHOPAEDIC SURGERY
Payer: COMMERCIAL

## 2018-07-05 DIAGNOSIS — M48.061 LUMBAR SPINAL STENOSIS: ICD-10-CM

## 2018-07-05 PROCEDURE — 72148 MRI LUMBAR SPINE W/O DYE: CPT | Mod: 26,,, | Performed by: RADIOLOGY

## 2018-07-05 PROCEDURE — 72148 MRI LUMBAR SPINE W/O DYE: CPT | Mod: TC,PO

## 2018-08-06 ENCOUNTER — PATIENT MESSAGE (OUTPATIENT)
Dept: FAMILY MEDICINE | Facility: CLINIC | Age: 63
End: 2018-08-06

## 2018-08-06 ENCOUNTER — TELEPHONE (OUTPATIENT)
Dept: FAMILY MEDICINE | Facility: CLINIC | Age: 63
End: 2018-08-06

## 2018-08-06 DIAGNOSIS — R30.0 DYSURIA: ICD-10-CM

## 2018-08-06 DIAGNOSIS — E11.8 TYPE 2 DIABETES MELLITUS WITH COMPLICATION, UNSPECIFIED WHETHER LONG TERM INSULIN USE: Primary | ICD-10-CM

## 2018-08-09 ENCOUNTER — PATIENT MESSAGE (OUTPATIENT)
Dept: FAMILY MEDICINE | Facility: CLINIC | Age: 63
End: 2018-08-09

## 2018-08-10 ENCOUNTER — PATIENT MESSAGE (OUTPATIENT)
Dept: FAMILY MEDICINE | Facility: CLINIC | Age: 63
End: 2018-08-10

## 2018-08-10 RX ORDER — BUPROPION HYDROCHLORIDE 75 MG/1
75 TABLET ORAL 2 TIMES DAILY
Qty: 60 TABLET | Refills: 1 | Status: SHIPPED | OUTPATIENT
Start: 2018-08-10 | End: 2018-11-20

## 2018-08-13 ENCOUNTER — PATIENT MESSAGE (OUTPATIENT)
Dept: FAMILY MEDICINE | Facility: CLINIC | Age: 63
End: 2018-08-13

## 2018-08-15 ENCOUNTER — PATIENT MESSAGE (OUTPATIENT)
Dept: FAMILY MEDICINE | Facility: CLINIC | Age: 63
End: 2018-08-15

## 2018-08-15 NOTE — TELEPHONE ENCOUNTER
RICHARD about this company or their programs. It would not be appropriate for her fu to be monitered by anyone other than a Neurosurgeon or Orthopedic that does the surgery. Before she leaves the state , She might want to consider NS consult w our spine team.

## 2018-09-28 ENCOUNTER — OFFICE VISIT (OUTPATIENT)
Dept: FAMILY MEDICINE | Facility: CLINIC | Age: 63
End: 2018-09-28
Payer: COMMERCIAL

## 2018-09-28 ENCOUNTER — PATIENT MESSAGE (OUTPATIENT)
Dept: FAMILY MEDICINE | Facility: CLINIC | Age: 63
End: 2018-09-28

## 2018-09-28 ENCOUNTER — HOSPITAL ENCOUNTER (OUTPATIENT)
Dept: RADIOLOGY | Facility: HOSPITAL | Age: 63
Discharge: HOME | End: 2018-09-28
Attending: NURSE PRACTITIONER
Payer: COMMERCIAL

## 2018-09-28 VITALS
WEIGHT: 193.63 LBS | BODY MASS INDEX: 35.63 KG/M2 | DIASTOLIC BLOOD PRESSURE: 74 MMHG | TEMPERATURE: 98 F | SYSTOLIC BLOOD PRESSURE: 120 MMHG | HEART RATE: 65 BPM | HEIGHT: 62 IN

## 2018-09-28 DIAGNOSIS — Z01.818 PREOPERATIVE CLEARANCE: Primary | ICD-10-CM

## 2018-09-28 DIAGNOSIS — N39.0 URINARY TRACT INFECTION WITH HEMATURIA, SITE UNSPECIFIED: ICD-10-CM

## 2018-09-28 DIAGNOSIS — Z01.818 PREOPERATIVE CLEARANCE: ICD-10-CM

## 2018-09-28 DIAGNOSIS — R31.9 URINARY TRACT INFECTION WITH HEMATURIA, SITE UNSPECIFIED: ICD-10-CM

## 2018-09-28 DIAGNOSIS — M48.061 SPINAL STENOSIS OF LUMBAR REGION, UNSPECIFIED WHETHER NEUROGENIC CLAUDICATION PRESENT: ICD-10-CM

## 2018-09-28 PROCEDURE — 3008F BODY MASS INDEX DOCD: CPT | Mod: CPTII,S$GLB,, | Performed by: NURSE PRACTITIONER

## 2018-09-28 PROCEDURE — 99999 PR PBB SHADOW E&M-EST. PATIENT-LVL III: CPT | Mod: PBBFAC,,, | Performed by: NURSE PRACTITIONER

## 2018-09-28 PROCEDURE — 71046 X-RAY EXAM CHEST 2 VIEWS: CPT | Mod: TC,PO

## 2018-09-28 PROCEDURE — 3074F SYST BP LT 130 MM HG: CPT | Mod: CPTII,S$GLB,, | Performed by: NURSE PRACTITIONER

## 2018-09-28 PROCEDURE — 3078F DIAST BP <80 MM HG: CPT | Mod: CPTII,S$GLB,, | Performed by: NURSE PRACTITIONER

## 2018-09-28 PROCEDURE — 93005 ELECTROCARDIOGRAM TRACING: CPT | Mod: S$GLB,,, | Performed by: NURSE PRACTITIONER

## 2018-09-28 PROCEDURE — 71046 X-RAY EXAM CHEST 2 VIEWS: CPT | Mod: 26,,, | Performed by: RADIOLOGY

## 2018-09-28 PROCEDURE — 99214 OFFICE O/P EST MOD 30 MIN: CPT | Mod: S$GLB,,, | Performed by: NURSE PRACTITIONER

## 2018-09-28 PROCEDURE — 93010 ELECTROCARDIOGRAM REPORT: CPT | Mod: S$GLB,,, | Performed by: INTERNAL MEDICINE

## 2018-09-28 RX ORDER — CYCLOBENZAPRINE HCL 10 MG
TABLET ORAL
COMMUNITY
Start: 2018-06-27 | End: 2019-05-06 | Stop reason: SDUPTHER

## 2018-09-29 RX ORDER — METFORMIN HYDROCHLORIDE 750 MG/1
750 TABLET, EXTENDED RELEASE ORAL
Qty: 90 TABLET | Refills: 3 | Status: SHIPPED | OUTPATIENT
Start: 2018-09-29 | End: 2019-09-24 | Stop reason: SDUPTHER

## 2018-09-29 NOTE — PROGRESS NOTES
Subjective:       Patient ID: Crystal Acuna is a 63 y.o. female.    Chief Complaint: Pre-op Exam    HPI     She comes into the office for preop clearance.  She is scheduled for L3, L4 laminectomy that will be done by Dr. Titus Whitmore on October 18, 2018.  She is employed by [employer], they require that she see specific physicians for surgical procedures.  She is going to have her surgery performed at [facility] in [place].  Medical history includes hyperlipidemia type 2 diabetes and carotid artery disease.    Preop paperwork is requiring chemistries, CBC, PT, PTT, A1c, type and screen, EKG, chest x-ray     Review of Systems   Constitutional: Negative for fatigue, fever and unexpected weight change.   HENT: Negative for ear pain and sore throat.    Eyes: Negative for pain and visual disturbance.   Respiratory: Negative for cough and shortness of breath.    Cardiovascular: Negative for chest pain and palpitations.   Gastrointestinal: Negative for abdominal pain, diarrhea and vomiting.   Musculoskeletal: Negative for arthralgias and myalgias.   Skin: Negative for color change and rash.   Neurological: Negative for dizziness and headaches.   Psychiatric/Behavioral: Negative for dysphoric mood and sleep disturbance. The patient is not nervous/anxious.        Vitals:    09/28/18 0933   BP: 120/74   Pulse:    Temp:        Objective:     Current Outpatient Medications   Medication Sig Dispense Refill    albuterol 90 mcg/actuation inhaler Inhale 2 puffs into the lungs every 6 (six) hours as needed for Wheezing. Rescue 18 g 0    amLODIPine (NORVASC) 10 MG tablet Take 1 tablet (10 mg total) by mouth once daily. 90 tablet 3    aspirin 81 MG Chew Take 81 mg by mouth once daily.      atorvastatin (LIPITOR) 10 MG tablet Take 1 tablet (10 mg total) by mouth once daily. 90 tablet 3    cyclobenzaprine (FLEXERIL) 10 MG tablet       meloxicam (MOBIC) 15 MG tablet Take 1 tablet (15 mg total) by mouth once daily. 90  tablet 3    metFORMIN (GLUCOPHAGE-XR) 500 MG 24 hr tablet Take 1 tablet (500 mg total) by mouth daily with breakfast. 90 tablet 3    buPROPion (WELLBUTRIN) 75 MG tablet Take 1 tablet (75 mg total) by mouth 2 (two) times daily. 60 tablet 1     No current facility-administered medications for this visit.        Physical Exam   Constitutional: She is oriented to person, place, and time. She appears well-developed and well-nourished. No distress.   HENT:   Head: Normocephalic and atraumatic.   Eyes: EOM are normal. Pupils are equal, round, and reactive to light.   Neck: Normal range of motion. Neck supple.   Cardiovascular: Normal rate and regular rhythm.   Pulmonary/Chest: Effort normal and breath sounds normal.   Musculoskeletal: Normal range of motion.   Neurological: She is alert and oriented to person, place, and time.   Skin: Skin is warm and dry. No rash noted.   Psychiatric: She has a normal mood and affect. Judgment normal.   Nursing note and vitals reviewed.        EKG shows sinus bradycardia with sinus arrhythmia, heart rate 56.  No ST elevation.    Lab Results   Component Value Date    WBC 7.60 09/28/2018    HGB 15.5 09/28/2018    HCT 47.3 09/28/2018     09/28/2018    CHOL 178 04/05/2018    TRIG 111 04/05/2018    HDL 52 04/05/2018    ALT 22 09/28/2018    AST 18 09/28/2018     09/28/2018    K 4.2 09/28/2018     09/28/2018    CREATININE 0.8 09/28/2018    BUN 13 09/28/2018    CO2 29 09/28/2018    TSH 0.753 04/05/2018    INR 1.0 09/28/2018    GLUF 76 01/11/2010    HGBA1C 7.9 (H) 09/28/2018         Assessment:       1. Preoperative clearance    2. Spinal stenosis of lumbar region, unspecified whether neurogenic claudication present        Plan:   Preoperative clearance  -     EKG 12-lead  -     CBC auto differential; Future; Expected date: 09/28/2018  -     Comprehensive metabolic panel; Future; Expected date: 09/28/2018  -     APTT; Future; Expected date: 09/28/2018  -     Protime-INR;  Future; Expected date: 09/28/2018  -     TYPE & SCREEN; Future; Expected date: 09/28/2018  -     X-Ray Chest PA And Lateral; Future; Expected date: 09/28/2018  -     Hemoglobin A1c; Future; Expected date: 09/28/2018  -     Urinalysis    Spinal stenosis of lumbar region, unspecified whether neurogenic claudication present      EKG, chest x-ray, and labs have been reviewed.  Copies will be per minute and fax to Dr. henderson.  Her A1c is elevated, I am adding Januvia and increasing her metformin to 750 mg once daily    I have recommended Accu-Cheks AC and HS along with sliding scale insulin while she is inpatient to supplement elevated glucose readings    She will follow up in 4 weeks along with her glucose readings after starting new medications I have recommended strict diabetic diet      No Follow-up on file.    There are no Patient Instructions on file for this visit.

## 2018-09-30 ENCOUNTER — PATIENT MESSAGE (OUTPATIENT)
Dept: FAMILY MEDICINE | Facility: CLINIC | Age: 63
End: 2018-09-30

## 2018-10-01 RX ORDER — SULFAMETHOXAZOLE AND TRIMETHOPRIM 800; 160 MG/1; MG/1
1 TABLET ORAL 2 TIMES DAILY
Qty: 10 TABLET | Refills: 0 | Status: SHIPPED | OUTPATIENT
Start: 2018-10-01 | End: 2018-10-06

## 2018-10-02 ENCOUNTER — PATIENT MESSAGE (OUTPATIENT)
Dept: FAMILY MEDICINE | Facility: CLINIC | Age: 63
End: 2018-10-02

## 2018-10-09 ENCOUNTER — TELEPHONE (OUTPATIENT)
Dept: FAMILY MEDICINE | Facility: CLINIC | Age: 63
End: 2018-10-09

## 2018-10-09 DIAGNOSIS — N39.0 RECURRENT UTI: Primary | ICD-10-CM

## 2018-10-10 ENCOUNTER — LAB VISIT (OUTPATIENT)
Dept: LAB | Facility: HOSPITAL | Age: 63
End: 2018-10-10
Attending: NURSE PRACTITIONER
Payer: COMMERCIAL

## 2018-10-10 DIAGNOSIS — N39.0 RECURRENT UTI: ICD-10-CM

## 2018-10-10 LAB
BILIRUB UR QL STRIP: NEGATIVE
CLARITY UR: CLEAR
COLOR UR: YELLOW
GLUCOSE UR QL STRIP: NEGATIVE
HGB UR QL STRIP: NEGATIVE
KETONES UR QL STRIP: NEGATIVE
LEUKOCYTE ESTERASE UR QL STRIP: NEGATIVE
NITRITE UR QL STRIP: NEGATIVE
PH UR STRIP: 6 [PH] (ref 5–8)
PROT UR QL STRIP: NEGATIVE
SP GR UR STRIP: 1.02 (ref 1–1.03)
URN SPEC COLLECT METH UR: NORMAL

## 2018-10-10 PROCEDURE — 81002 URINALYSIS NONAUTO W/O SCOPE: CPT | Mod: PO

## 2018-10-15 ENCOUNTER — PATIENT MESSAGE (OUTPATIENT)
Dept: FAMILY MEDICINE | Facility: CLINIC | Age: 63
End: 2018-10-15

## 2018-10-16 ENCOUNTER — PATIENT OUTREACH (OUTPATIENT)
Dept: ADMINISTRATIVE | Facility: HOSPITAL | Age: 63
End: 2018-10-16

## 2018-10-30 ENCOUNTER — HOSPITAL ENCOUNTER (OUTPATIENT)
Dept: RADIOLOGY | Facility: HOSPITAL | Age: 63
Discharge: HOME | End: 2018-10-30
Attending: FAMILY MEDICINE
Payer: COMMERCIAL

## 2018-10-30 ENCOUNTER — OFFICE VISIT (OUTPATIENT)
Dept: FAMILY MEDICINE | Facility: CLINIC | Age: 63
End: 2018-10-30
Payer: COMMERCIAL

## 2018-10-30 VITALS
DIASTOLIC BLOOD PRESSURE: 66 MMHG | HEART RATE: 71 BPM | SYSTOLIC BLOOD PRESSURE: 103 MMHG | WEIGHT: 186 LBS | BODY MASS INDEX: 34.23 KG/M2 | TEMPERATURE: 98 F | HEIGHT: 62 IN

## 2018-10-30 VITALS — BODY MASS INDEX: 35.62 KG/M2 | WEIGHT: 193.56 LBS | HEIGHT: 62 IN

## 2018-10-30 DIAGNOSIS — Z01.818 PREOPERATIVE CLEARANCE: Primary | ICD-10-CM

## 2018-10-30 DIAGNOSIS — M48.061 SPINAL STENOSIS OF LUMBAR REGION, UNSPECIFIED WHETHER NEUROGENIC CLAUDICATION PRESENT: ICD-10-CM

## 2018-10-30 DIAGNOSIS — Z12.31 SCREENING MAMMOGRAM, ENCOUNTER FOR: ICD-10-CM

## 2018-10-30 DIAGNOSIS — E11.9 TYPE 2 DIABETES MELLITUS WITHOUT COMPLICATION, WITHOUT LONG-TERM CURRENT USE OF INSULIN: ICD-10-CM

## 2018-10-30 LAB
BILIRUB UR QL STRIP: NEGATIVE
CLARITY UR: CLEAR
COLOR UR: YELLOW
GLUCOSE UR QL STRIP: NEGATIVE
HGB UR QL STRIP: ABNORMAL
KETONES UR QL STRIP: ABNORMAL
LEUKOCYTE ESTERASE UR QL STRIP: NEGATIVE
NITRITE UR QL STRIP: NEGATIVE
PH UR STRIP: 6 [PH] (ref 5–8)
PROT UR QL STRIP: NEGATIVE
SP GR UR STRIP: 1.02 (ref 1–1.03)
URN SPEC COLLECT METH UR: ABNORMAL

## 2018-10-30 PROCEDURE — 99214 OFFICE O/P EST MOD 30 MIN: CPT | Mod: S$GLB,,, | Performed by: NURSE PRACTITIONER

## 2018-10-30 PROCEDURE — 77063 BREAST TOMOSYNTHESIS BI: CPT | Mod: 26,,, | Performed by: RADIOLOGY

## 2018-10-30 PROCEDURE — 3078F DIAST BP <80 MM HG: CPT | Mod: CPTII,S$GLB,, | Performed by: NURSE PRACTITIONER

## 2018-10-30 PROCEDURE — 3074F SYST BP LT 130 MM HG: CPT | Mod: CPTII,S$GLB,, | Performed by: NURSE PRACTITIONER

## 2018-10-30 PROCEDURE — 81002 URINALYSIS NONAUTO W/O SCOPE: CPT | Mod: PO

## 2018-10-30 PROCEDURE — 99999 PR PBB SHADOW E&M-EST. PATIENT-LVL III: CPT | Mod: PBBFAC,,, | Performed by: NURSE PRACTITIONER

## 2018-10-30 PROCEDURE — 3045F PR MOST RECENT HEMOGLOBIN A1C LEVEL 7.0-9.0%: CPT | Mod: CPTII,S$GLB,, | Performed by: NURSE PRACTITIONER

## 2018-10-30 PROCEDURE — 77063 BREAST TOMOSYNTHESIS BI: CPT | Mod: TC,PO

## 2018-10-30 PROCEDURE — 3008F BODY MASS INDEX DOCD: CPT | Mod: CPTII,S$GLB,, | Performed by: NURSE PRACTITIONER

## 2018-10-30 PROCEDURE — 77067 SCR MAMMO BI INCL CAD: CPT | Mod: TC,PO

## 2018-10-30 PROCEDURE — 77067 SCR MAMMO BI INCL CAD: CPT | Mod: 26,,, | Performed by: RADIOLOGY

## 2018-10-30 NOTE — PROGRESS NOTES
Subjective:       Patient ID: Crystal Acuna is a 63 y.o. female.    Chief Complaint: Follow-up and Pre-op Exam    She comes into the office for diabetic follow-up and preop clearance.  She is scheduled to have L3, L4 laminectomy that will be done by Dr. Titus Whitmore.  She is employed by [employer], they require that she see specific physicians for surgical procedures.  She is going to have her surgery performed at [facility] in [place].      Diabetes   She presents for her follow-up diabetic visit. She has type 2 diabetes mellitus. Her disease course has been improving. There are no hypoglycemic associated symptoms. Pertinent negatives for hypoglycemia include no dizziness, headaches or nervousness/anxiousness. There are no diabetic associated symptoms. Pertinent negatives for diabetes include no chest pain and no fatigue. There are no hypoglycemic complications. Symptoms are stable. There are no diabetic complications. Risk factors for coronary artery disease include diabetes mellitus, post-menopausal, hypertension and dyslipidemia. Current diabetic treatment includes oral agent (dual therapy). She is compliant with treatment all of the time. Her weight is decreasing steadily (7 lb weight loss in the last month). She is following a diabetic diet. She rarely (Due to severe back pain) participates in exercise. Her home blood glucose trend is decreasing rapidly. Her overall blood glucose range is  mg/dl. An ACE inhibitor/angiotensin II receptor blocker is being taken.       Review of Systems   Constitutional: Negative for fatigue, fever and unexpected weight change.   HENT: Negative for ear pain and sore throat.    Eyes: Negative for pain and visual disturbance.   Respiratory: Negative for cough and shortness of breath.    Cardiovascular: Negative for chest pain and palpitations.   Gastrointestinal: Negative for abdominal pain, diarrhea and vomiting.   Musculoskeletal: Negative for arthralgias and  myalgias.   Skin: Negative for color change and rash.   Neurological: Negative for dizziness and headaches.   Psychiatric/Behavioral: Negative for dysphoric mood and sleep disturbance. The patient is not nervous/anxious.        Vitals:    10/30/18 0800   BP: 103/66   Pulse: 71   Temp: 97.8 °F (36.6 °C)       Objective:     Current Outpatient Medications   Medication Sig Dispense Refill    albuterol 90 mcg/actuation inhaler Inhale 2 puffs into the lungs every 6 (six) hours as needed for Wheezing. Rescue 18 g 0    amLODIPine (NORVASC) 10 MG tablet Take 1 tablet (10 mg total) by mouth once daily. 90 tablet 3    aspirin 81 MG Chew Take 81 mg by mouth once daily.      atorvastatin (LIPITOR) 10 MG tablet Take 1 tablet (10 mg total) by mouth once daily. 90 tablet 3    metFORMIN (GLUCOPHAGE-XR) 750 MG 24 hr tablet Take 1 tablet (750 mg total) by mouth daily with breakfast. 90 tablet 3    SITagliptin (JANUVIA) 100 MG Tab Take 1 tablet (100 mg total) by mouth once daily. 90 tablet 3    buPROPion (WELLBUTRIN) 75 MG tablet Take 1 tablet (75 mg total) by mouth 2 (two) times daily. 60 tablet 1    cyclobenzaprine (FLEXERIL) 10 MG tablet       meloxicam (MOBIC) 15 MG tablet Take 1 tablet (15 mg total) by mouth once daily. 90 tablet 3     No current facility-administered medications for this visit.        Physical Exam   Constitutional: She is oriented to person, place, and time. She appears well-developed and well-nourished. No distress.   HENT:   Head: Normocephalic and atraumatic.   Eyes: EOM are normal. Pupils are equal, round, and reactive to light.   Neck: Normal range of motion. Neck supple.   Cardiovascular: Normal rate and regular rhythm.   Pulmonary/Chest: Effort normal and breath sounds normal.   Musculoskeletal: Normal range of motion.   Neurological: She is alert and oriented to person, place, and time.   Skin: Skin is warm and dry. No rash noted.   Psychiatric: She has a normal mood and affect. Judgment  normal.   Nursing note and vitals reviewed.      Assessment:       1. Preoperative clearance    2. Type 2 diabetes mellitus without complication, without long-term current use of insulin    3. Spinal stenosis of lumbar region, unspecified whether neurogenic claudication present        Plan:   Preoperative clearance  -     Urinalysis    Type 2 diabetes mellitus without complication, without long-term current use of insulin  -     Hemoglobin A1c; Future; Expected date: 10/30/2018    Spinal stenosis of lumbar region, unspecified whether neurogenic claudication present        No Follow-up on file.    There are no Patient Instructions on file for this visit.

## 2018-11-02 ENCOUNTER — TELEPHONE (OUTPATIENT)
Dept: FAMILY MEDICINE | Facility: CLINIC | Age: 63
End: 2018-11-02

## 2018-11-02 NOTE — TELEPHONE ENCOUNTER
----- Message from Genie Simpson sent at 11/2/2018  4:07 PM CDT -----  Contact: Dr.Day Pablo Collier  Only one sheet of the pt pre op clearance was received, please refax the complete pre op clearance to [phone], the pt surgery is on 11/8/18, can be reached at [phone]///[id number]

## 2018-11-02 NOTE — TELEPHONE ENCOUNTER
----- Message from Linda Mason NP sent at 11/2/2018  1:45 PM CDT -----  faxed to [phone] Mayra Rangel.

## 2018-11-15 ENCOUNTER — PATIENT MESSAGE (OUTPATIENT)
Dept: FAMILY MEDICINE | Facility: CLINIC | Age: 63
End: 2018-11-15

## 2018-11-20 ENCOUNTER — OFFICE VISIT (OUTPATIENT)
Dept: FAMILY MEDICINE | Facility: CLINIC | Age: 63
End: 2018-11-20
Payer: COMMERCIAL

## 2018-11-20 VITALS
SYSTOLIC BLOOD PRESSURE: 100 MMHG | DIASTOLIC BLOOD PRESSURE: 57 MMHG | TEMPERATURE: 98 F | HEIGHT: 62 IN | HEART RATE: 78 BPM | BODY MASS INDEX: 34.38 KG/M2 | WEIGHT: 186.81 LBS

## 2018-11-20 DIAGNOSIS — Z48.02 VISIT FOR SUTURE REMOVAL: Primary | ICD-10-CM

## 2018-11-20 DIAGNOSIS — Z98.890 STATUS POST LAMINECTOMY: ICD-10-CM

## 2018-11-20 PROCEDURE — 3008F BODY MASS INDEX DOCD: CPT | Mod: CPTII,S$GLB,, | Performed by: NURSE PRACTITIONER

## 2018-11-20 PROCEDURE — 3074F SYST BP LT 130 MM HG: CPT | Mod: CPTII,S$GLB,, | Performed by: NURSE PRACTITIONER

## 2018-11-20 PROCEDURE — 99999 PR PBB SHADOW E&M-EST. PATIENT-LVL III: CPT | Mod: PBBFAC,,, | Performed by: NURSE PRACTITIONER

## 2018-11-20 PROCEDURE — 3078F DIAST BP <80 MM HG: CPT | Mod: CPTII,S$GLB,, | Performed by: NURSE PRACTITIONER

## 2018-11-20 PROCEDURE — 99213 OFFICE O/P EST LOW 20 MIN: CPT | Mod: S$GLB,,, | Performed by: NURSE PRACTITIONER

## 2018-11-20 RX ORDER — DOCUSATE SODIUM 100 MG
CAPSULE ORAL
Refills: 0 | COMMUNITY
Start: 2018-11-09 | End: 2019-01-21

## 2018-11-20 RX ORDER — CYCLOBENZAPRINE HCL 5 MG
TABLET ORAL
Refills: 0 | COMMUNITY
Start: 2018-11-09 | End: 2019-03-25

## 2018-12-03 ENCOUNTER — PATIENT MESSAGE (OUTPATIENT)
Dept: FAMILY MEDICINE | Facility: CLINIC | Age: 63
End: 2018-12-03

## 2018-12-03 DIAGNOSIS — Z98.890 STATUS POST LAMINECTOMY: Primary | ICD-10-CM

## 2018-12-03 NOTE — PROGRESS NOTES
Subjective:       Patient ID: Crystal Acuna is a 63 y.o. female.    Chief Complaint: Suture / Staple Removal    Suture / Staple Removal   Treated in ED: 11/12/18 placed during laminectomy surgery done at [facility] in [facility]. She tried nothing since the wound repair. The maximum temperature noted was less than 100.4 F. The temperature was taken using an oral thermometer. There has been no drainage from the wound. There is no redness present. There is no swelling present. The pain has improved.       Review of Systems   Constitutional: Negative for fatigue, fever and unexpected weight change.   HENT: Negative for ear pain and sore throat.    Eyes: Negative for pain and visual disturbance.   Respiratory: Negative for cough and shortness of breath.    Cardiovascular: Negative for chest pain and palpitations.   Gastrointestinal: Negative for abdominal pain, diarrhea and vomiting.   Musculoskeletal: Negative for arthralgias and myalgias.   Skin: Negative for color change and rash.   Neurological: Negative for dizziness and headaches.   Psychiatric/Behavioral: Negative for dysphoric mood and sleep disturbance. The patient is not nervous/anxious.        Vitals:    11/20/18 0935   BP: (!) 100/57   Pulse: 78   Temp: 97.6 °F (36.4 °C)       Objective:     Current Outpatient Medications   Medication Sig Dispense Refill    albuterol 90 mcg/actuation inhaler Inhale 2 puffs into the lungs every 6 (six) hours as needed for Wheezing. Rescue 18 g 0    amLODIPine (NORVASC) 10 MG tablet Take 1 tablet (10 mg total) by mouth once daily. 90 tablet 3    aspirin 81 MG Chew Take 81 mg by mouth once daily.      atorvastatin (LIPITOR) 10 MG tablet Take 1 tablet (10 mg total) by mouth once daily. 90 tablet 3    cyclobenzaprine (FLEXERIL) 10 MG tablet       cyclobenzaprine (FLEXERIL) 5 MG tablet TK 1 T PO TID FOR 7 DAYS  0    metFORMIN (GLUCOPHAGE-XR) 750 MG 24 hr tablet Take 1 tablet (750 mg total) by mouth daily  with breakfast. 90 tablet 3    SITagliptin (JANUVIA) 100 MG Tab Take 1 tablet (100 mg total) by mouth once daily. 90 tablet 3    STOOL SOFTENER 100 mg capsule TK ONE C PO BID FOR 14 DAYS  0    meloxicam (MOBIC) 15 MG tablet Take 1 tablet (15 mg total) by mouth once daily. 90 tablet 3     No current facility-administered medications for this visit.        Physical Exam   Constitutional: She is oriented to person, place, and time. She appears well-developed and well-nourished. No distress.   HENT:   Head: Normocephalic and atraumatic.   Eyes: EOM are normal. Pupils are equal, round, and reactive to light.   Neck: Normal range of motion. Neck supple.   Cardiovascular: Normal rate and regular rhythm.   Pulmonary/Chest: Effort normal and breath sounds normal.   Musculoskeletal: Normal range of motion.        Back:    Healing midline incision, edges are well-approximated, mild erythema.  No sign of infection, no drainage. There is 1 continuous suture noted.   Neurological: She is alert and oriented to person, place, and time.   Skin: Skin is warm and dry. No rash noted.   Psychiatric: She has a normal mood and affect. Judgment normal.   Nursing note and vitals reviewed.      Sutures removed, patient tolerated well.  Area is healing without sign of infection.    Assessment:       1. Visit for suture removal    2. Status post laminectomy        Plan:   Visit for suture removal    Status post laminectomy     She will continue light activity until 4-6 weeks after she is postop then she will start physical therapy.    No Follow-up on file.    There are no Patient Instructions on file for this visit.

## 2018-12-03 NOTE — TELEPHONE ENCOUNTER
Pt is requesting Physical therapy referral at [facility], [address] [place] [id number]. Phone #[phone]. Pending and routing orders as requested.

## 2018-12-17 ENCOUNTER — PATIENT OUTREACH (OUTPATIENT)
Dept: ADMINISTRATIVE | Facility: HOSPITAL | Age: 63
End: 2018-12-17

## 2018-12-17 NOTE — PROGRESS NOTES
Spoke w/ pt regarding scheduling Pap smear, pt states she just had back surgery and is recuperating from that, she will call back sometime in the future to schedule.

## 2019-01-21 ENCOUNTER — OFFICE VISIT (OUTPATIENT)
Dept: FAMILY MEDICINE | Facility: CLINIC | Age: 64
End: 2019-01-21
Payer: COMMERCIAL

## 2019-01-21 VITALS
WEIGHT: 194.63 LBS | HEART RATE: 62 BPM | TEMPERATURE: 98 F | DIASTOLIC BLOOD PRESSURE: 70 MMHG | HEIGHT: 62 IN | SYSTOLIC BLOOD PRESSURE: 125 MMHG | BODY MASS INDEX: 35.81 KG/M2

## 2019-01-21 DIAGNOSIS — M62.81 MUSCLE WEAKNESS: ICD-10-CM

## 2019-01-21 DIAGNOSIS — Z98.890 STATUS POST LUMBAR LAMINECTOMY: Primary | ICD-10-CM

## 2019-01-21 DIAGNOSIS — G89.29 CHRONIC MIDLINE LOW BACK PAIN WITHOUT SCIATICA: ICD-10-CM

## 2019-01-21 DIAGNOSIS — M54.50 CHRONIC MIDLINE LOW BACK PAIN WITHOUT SCIATICA: ICD-10-CM

## 2019-01-21 PROCEDURE — 3078F DIAST BP <80 MM HG: CPT | Mod: CPTII,S$GLB,, | Performed by: NURSE PRACTITIONER

## 2019-01-21 PROCEDURE — 3008F PR BODY MASS INDEX (BMI) DOCUMENTED: ICD-10-PCS | Mod: CPTII,S$GLB,, | Performed by: NURSE PRACTITIONER

## 2019-01-21 PROCEDURE — 3074F PR MOST RECENT SYSTOLIC BLOOD PRESSURE < 130 MM HG: ICD-10-PCS | Mod: CPTII,S$GLB,, | Performed by: NURSE PRACTITIONER

## 2019-01-21 PROCEDURE — 3008F BODY MASS INDEX DOCD: CPT | Mod: CPTII,S$GLB,, | Performed by: NURSE PRACTITIONER

## 2019-01-21 PROCEDURE — 3078F PR MOST RECENT DIASTOLIC BLOOD PRESSURE < 80 MM HG: ICD-10-PCS | Mod: CPTII,S$GLB,, | Performed by: NURSE PRACTITIONER

## 2019-01-21 PROCEDURE — 99999 PR PBB SHADOW E&M-EST. PATIENT-LVL III: ICD-10-PCS | Mod: PBBFAC,,, | Performed by: NURSE PRACTITIONER

## 2019-01-21 PROCEDURE — 99213 OFFICE O/P EST LOW 20 MIN: CPT | Mod: S$GLB,,, | Performed by: NURSE PRACTITIONER

## 2019-01-21 PROCEDURE — 99999 PR PBB SHADOW E&M-EST. PATIENT-LVL III: CPT | Mod: PBBFAC,,, | Performed by: NURSE PRACTITIONER

## 2019-01-21 PROCEDURE — 3074F SYST BP LT 130 MM HG: CPT | Mod: CPTII,S$GLB,, | Performed by: NURSE PRACTITIONER

## 2019-01-21 PROCEDURE — 99213 PR OFFICE/OUTPT VISIT, EST, LEVL III, 20-29 MIN: ICD-10-PCS | Mod: S$GLB,,, | Performed by: NURSE PRACTITIONER

## 2019-01-21 NOTE — PROGRESS NOTES
Subjective:       Patient ID: Crystal Acuna is a 63 y.o. female.    Chief Complaint: Discuss extended leave    HPI     She comes in for follow up of laminectomy. She has lumbar laminectomy in November 2018, she has been out of work and has completed physical therapy. She is employed at [employer] and job duties consist of lifting up to 50 lbs. She continues to have weakness, she is unable to walk long distances without having some pain and muscle weakness. She is not able to return to work because she cannot perform her job duties. She stopped physical therapy at the end of the year because her deductible started over in January.     Review of Systems   Constitutional: Negative for fatigue, fever and unexpected weight change.   HENT: Negative for ear pain and sore throat.    Eyes: Negative for pain and visual disturbance.   Respiratory: Negative for cough and shortness of breath.    Cardiovascular: Negative for chest pain and palpitations.   Gastrointestinal: Negative for abdominal pain, diarrhea and vomiting.   Musculoskeletal: Positive for back pain. Negative for arthralgias and myalgias.   Skin: Negative for color change and rash.   Neurological: Positive for weakness. Negative for dizziness and headaches.   Psychiatric/Behavioral: Negative for dysphoric mood and sleep disturbance. The patient is not nervous/anxious.        Vitals:    01/21/19 1012   BP: 125/70   Pulse: 62   Temp: 97.9 °F (36.6 °C)       Objective:     Current Outpatient Medications   Medication Sig Dispense Refill    albuterol 90 mcg/actuation inhaler Inhale 2 puffs into the lungs every 6 (six) hours as needed for Wheezing. Rescue 18 g 0    amLODIPine (NORVASC) 10 MG tablet Take 1 tablet (10 mg total) by mouth once daily. 90 tablet 3    aspirin 81 MG Chew Take 81 mg by mouth once daily.      atorvastatin (LIPITOR) 10 MG tablet Take 1 tablet (10 mg total) by mouth once daily. 90 tablet 3    meloxicam (MOBIC) 15 MG tablet Take 1 tablet (15  mg total) by mouth once daily. 90 tablet 3    metFORMIN (GLUCOPHAGE-XR) 750 MG 24 hr tablet Take 1 tablet (750 mg total) by mouth daily with breakfast. 90 tablet 3    SITagliptin (JANUVIA) 100 MG Tab Take 1 tablet (100 mg total) by mouth once daily. 90 tablet 3    cyclobenzaprine (FLEXERIL) 10 MG tablet       cyclobenzaprine (FLEXERIL) 5 MG tablet TK 1 T PO TID FOR 7 DAYS  0     No current facility-administered medications for this visit.        Physical Exam   Constitutional: She is oriented to person, place, and time. She appears well-developed and well-nourished. No distress.   HENT:   Head: Normocephalic and atraumatic.   Eyes: EOM are normal. Pupils are equal, round, and reactive to light.   Neck: Normal range of motion. Neck supple.   Cardiovascular: Normal rate and regular rhythm.   Pulmonary/Chest: Effort normal and breath sounds normal.   Musculoskeletal:        Lumbar back: She exhibits decreased range of motion. She exhibits no tenderness and no bony tenderness.   Neurological: She is alert and oriented to person, place, and time.   Skin: Skin is warm and dry. No rash noted.   Psychiatric: She has a normal mood and affect. Judgment normal.   Nursing note and vitals reviewed.      Assessment:       1. Status post lumbar laminectomy    2. Chronic midline low back pain without sciatica    3. 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 She will continue physical therapy and plan to return to work in 3 months.    Follow-up if symptoms worsen or fail to improve.    There are no Patient Instructions on file for this visit.

## 2019-01-28 ENCOUNTER — PATIENT MESSAGE (OUTPATIENT)
Dept: FAMILY MEDICINE | Facility: CLINIC | Age: 64
End: 2019-01-28

## 2019-02-19 ENCOUNTER — OFFICE VISIT (OUTPATIENT)
Dept: FAMILY MEDICINE | Facility: CLINIC | Age: 64
End: 2019-02-19
Payer: COMMERCIAL

## 2019-02-19 VITALS
HEIGHT: 62 IN | WEIGHT: 198.63 LBS | TEMPERATURE: 98 F | SYSTOLIC BLOOD PRESSURE: 110 MMHG | HEART RATE: 71 BPM | BODY MASS INDEX: 36.55 KG/M2 | DIASTOLIC BLOOD PRESSURE: 63 MMHG

## 2019-02-19 DIAGNOSIS — Z98.890 STATUS POST LAMINECTOMY: ICD-10-CM

## 2019-02-19 DIAGNOSIS — M54.50 CHRONIC MIDLINE LOW BACK PAIN WITHOUT SCIATICA: Primary | ICD-10-CM

## 2019-02-19 DIAGNOSIS — G89.29 CHRONIC MIDLINE LOW BACK PAIN WITHOUT SCIATICA: Primary | ICD-10-CM

## 2019-02-19 PROCEDURE — 3008F BODY MASS INDEX DOCD: CPT | Mod: CPTII,S$GLB,, | Performed by: NURSE PRACTITIONER

## 2019-02-19 PROCEDURE — 3008F PR BODY MASS INDEX (BMI) DOCUMENTED: ICD-10-PCS | Mod: CPTII,S$GLB,, | Performed by: NURSE PRACTITIONER

## 2019-02-19 PROCEDURE — 99214 PR OFFICE/OUTPT VISIT, EST, LEVL IV, 30-39 MIN: ICD-10-PCS | Mod: S$GLB,,, | Performed by: NURSE PRACTITIONER

## 2019-02-19 PROCEDURE — 3078F PR MOST RECENT DIASTOLIC BLOOD PRESSURE < 80 MM HG: ICD-10-PCS | Mod: CPTII,S$GLB,, | Performed by: NURSE PRACTITIONER

## 2019-02-19 PROCEDURE — 3074F SYST BP LT 130 MM HG: CPT | Mod: CPTII,S$GLB,, | Performed by: NURSE PRACTITIONER

## 2019-02-19 PROCEDURE — 3074F PR MOST RECENT SYSTOLIC BLOOD PRESSURE < 130 MM HG: ICD-10-PCS | Mod: CPTII,S$GLB,, | Performed by: NURSE PRACTITIONER

## 2019-02-19 PROCEDURE — 99999 PR PBB SHADOW E&M-EST. PATIENT-LVL III: CPT | Mod: PBBFAC,,, | Performed by: NURSE PRACTITIONER

## 2019-02-19 PROCEDURE — 99999 PR PBB SHADOW E&M-EST. PATIENT-LVL III: ICD-10-PCS | Mod: PBBFAC,,, | Performed by: NURSE PRACTITIONER

## 2019-02-19 PROCEDURE — 3078F DIAST BP <80 MM HG: CPT | Mod: CPTII,S$GLB,, | Performed by: NURSE PRACTITIONER

## 2019-02-19 PROCEDURE — 99214 OFFICE O/P EST MOD 30 MIN: CPT | Mod: S$GLB,,, | Performed by: NURSE PRACTITIONER

## 2019-02-19 NOTE — PROGRESS NOTES
Subjective:       Patient ID: Crystal Acuna is a 63 y.o. female.    Chief Complaint: Follow-up    HPI     She comes into the office today for re-evaluation low back pain.  She had laminectomy performed and has been having physical therapy since her last visit.  She reports that she has lost her job.  She is not regaining strength, flexibility, and she is not improving and pain. She is unable to perform her job duties which require lifting, twisting, bending, and standing for extended periods of time.  She states that she was told that she would be unable to be placed in a different position due to her chronic pain.    Review of Systems   Constitutional: Negative for fatigue, fever and unexpected weight change.   HENT: Negative for ear pain and sore throat.    Eyes: Negative for pain and visual disturbance.   Respiratory: Negative for cough and shortness of breath.    Cardiovascular: Negative for chest pain and palpitations.   Gastrointestinal: Negative for abdominal pain, diarrhea and vomiting.   Musculoskeletal: Positive for back pain and gait problem. Negative for arthralgias and myalgias.   Skin: Negative for color change and rash.   Neurological: Negative for dizziness and headaches.   Psychiatric/Behavioral: Negative for dysphoric mood and sleep disturbance. The patient is not nervous/anxious.        Vitals:    02/19/19 1000   BP: 110/63   Pulse: 71   Temp: 97.6 °F (36.4 °C)       Objective:     Current Outpatient Medications   Medication Sig Dispense Refill    albuterol 90 mcg/actuation inhaler Inhale 2 puffs into the lungs every 6 (six) hours as needed for Wheezing. Rescue 18 g 0    amLODIPine (NORVASC) 10 MG tablet Take 1 tablet (10 mg total) by mouth once daily. 90 tablet 3    aspirin 81 MG Chew Take 81 mg by mouth once daily.      atorvastatin (LIPITOR) 10 MG tablet Take 1 tablet (10 mg total) by mouth once daily. 90 tablet 3    cyclobenzaprine (FLEXERIL) 10 MG tablet       cyclobenzaprine  (FLEXERIL) 5 MG tablet TK 1 T PO TID FOR 7 DAYS  0    meloxicam (MOBIC) 15 MG tablet Take 1 tablet (15 mg total) by mouth once daily. 90 tablet 3    metFORMIN (GLUCOPHAGE-XR) 750 MG 24 hr tablet Take 1 tablet (750 mg total) by mouth daily with breakfast. 90 tablet 3    SITagliptin (JANUVIA) 100 MG Tab Take 1 tablet (100 mg total) by mouth once daily. 90 tablet 3     No current facility-administered medications for this visit.        Physical Exam   Constitutional: She is oriented to person, place, and time. She appears well-developed and well-nourished. No distress.   HENT:   Head: Normocephalic and atraumatic.   Eyes: EOM are normal. Pupils are equal, round, and reactive to light.   Neck: Normal range of motion. Neck supple.   Cardiovascular: Normal rate and regular rhythm.   Pulmonary/Chest: Effort normal and breath sounds normal.   Musculoskeletal:        Lumbar back: She exhibits decreased range of motion and bony tenderness.   Neurological: She is alert and oriented to person, place, and time.   Skin: Skin is warm and dry. No rash noted.   Psychiatric: She has a normal mood and affect. Judgment normal.   Nursing note and vitals reviewed.            Assessment:       1. Chronic midline low back pain without sciatica    2. Status post laminectomy        Plan:   Chronic midline low back pain without sciatica    Status post laminectomy    continue PT  Paperwork completed- see above    No Follow-up on file.    There are no Patient Instructions on file for this visit.

## 2019-03-25 ENCOUNTER — OFFICE VISIT (OUTPATIENT)
Dept: FAMILY MEDICINE | Facility: CLINIC | Age: 64
End: 2019-03-25
Payer: COMMERCIAL

## 2019-03-25 VITALS
TEMPERATURE: 98 F | HEART RATE: 82 BPM | SYSTOLIC BLOOD PRESSURE: 120 MMHG | HEIGHT: 62 IN | DIASTOLIC BLOOD PRESSURE: 74 MMHG | WEIGHT: 202.38 LBS | BODY MASS INDEX: 37.24 KG/M2

## 2019-03-25 DIAGNOSIS — Z98.890 STATUS POST LAMINECTOMY: ICD-10-CM

## 2019-03-25 DIAGNOSIS — M54.50 CHRONIC MIDLINE LOW BACK PAIN WITHOUT SCIATICA: Primary | ICD-10-CM

## 2019-03-25 DIAGNOSIS — R53.1 WEAKNESS: ICD-10-CM

## 2019-03-25 DIAGNOSIS — G89.29 CHRONIC MIDLINE LOW BACK PAIN WITHOUT SCIATICA: Primary | ICD-10-CM

## 2019-03-25 PROCEDURE — 3008F BODY MASS INDEX DOCD: CPT | Mod: CPTII,S$GLB,, | Performed by: NURSE PRACTITIONER

## 2019-03-25 PROCEDURE — 3078F DIAST BP <80 MM HG: CPT | Mod: CPTII,S$GLB,, | Performed by: NURSE PRACTITIONER

## 2019-03-25 PROCEDURE — 3078F PR MOST RECENT DIASTOLIC BLOOD PRESSURE < 80 MM HG: ICD-10-PCS | Mod: CPTII,S$GLB,, | Performed by: NURSE PRACTITIONER

## 2019-03-25 PROCEDURE — 3074F PR MOST RECENT SYSTOLIC BLOOD PRESSURE < 130 MM HG: ICD-10-PCS | Mod: CPTII,S$GLB,, | Performed by: NURSE PRACTITIONER

## 2019-03-25 PROCEDURE — 99999 PR PBB SHADOW E&M-EST. PATIENT-LVL IV: CPT | Mod: PBBFAC,,, | Performed by: NURSE PRACTITIONER

## 2019-03-25 PROCEDURE — 99999 PR PBB SHADOW E&M-EST. PATIENT-LVL IV: ICD-10-PCS | Mod: PBBFAC,,, | Performed by: NURSE PRACTITIONER

## 2019-03-25 PROCEDURE — 99214 PR OFFICE/OUTPT VISIT, EST, LEVL IV, 30-39 MIN: ICD-10-PCS | Mod: S$GLB,,, | Performed by: NURSE PRACTITIONER

## 2019-03-25 PROCEDURE — 99214 OFFICE O/P EST MOD 30 MIN: CPT | Mod: S$GLB,,, | Performed by: NURSE PRACTITIONER

## 2019-03-25 PROCEDURE — 3008F PR BODY MASS INDEX (BMI) DOCUMENTED: ICD-10-PCS | Mod: CPTII,S$GLB,, | Performed by: NURSE PRACTITIONER

## 2019-03-25 PROCEDURE — 3074F SYST BP LT 130 MM HG: CPT | Mod: CPTII,S$GLB,, | Performed by: NURSE PRACTITIONER

## 2019-03-25 NOTE — PROGRESS NOTES
Subjective:       Patient ID: Crystal Acuna is a 63 y.o. female.    Chief Complaint: Follow-up    HPI    She comes in for follow up of chronic back pain. She is status post laminectomy. Continues to have lower back pain with weakness. She has had little improvement with physical therapy. She does report improvement in numbness to legs following surgery. She is worried about not being able to return to work before running out of sick leave/ medical leave. She is on a fixed income and it has been a finacial burden to pay for physical therapy. She is able to stand or ambulate for up to 30 minutes, longer than that she has worsening pain. Pain increases with forward flexion. Unable to lift over 10 lbs, unable to climb. Limited twisting.       Review of Systems   Constitutional: Positive for activity change and fatigue. Negative for fever and unexpected weight change.   HENT: Negative for ear pain and sore throat.    Eyes: Negative for pain and visual disturbance.   Respiratory: Negative for cough and shortness of breath.    Cardiovascular: Negative for chest pain and palpitations.   Gastrointestinal: Negative for abdominal pain, diarrhea and vomiting.   Musculoskeletal: Positive for arthralgias, back pain, gait problem and myalgias.   Skin: Negative for color change and rash.   Neurological: Positive for weakness. Negative for dizziness and headaches.   Psychiatric/Behavioral: Negative for dysphoric mood and sleep disturbance. The patient is not nervous/anxious.        Vitals:    03/25/19 0955   BP: 120/74   Pulse: 82   Temp: 98 °F (36.7 °C)       Objective:     Current Outpatient Medications   Medication Sig Dispense Refill    albuterol 90 mcg/actuation inhaler Inhale 2 puffs into the lungs every 6 (six) hours as needed for Wheezing. Rescue 18 g 0    amLODIPine (NORVASC) 10 MG tablet Take 1 tablet (10 mg total) by mouth once daily. 90 tablet 3    aspirin 81 MG Chew Take 81 mg by mouth once daily.       atorvastatin (LIPITOR) 10 MG tablet Take 1 tablet (10 mg total) by mouth once daily. 90 tablet 3    cyclobenzaprine (FLEXERIL) 10 MG tablet       meloxicam (MOBIC) 15 MG tablet Take 1 tablet (15 mg total) by mouth once daily. 90 tablet 3    metFORMIN (GLUCOPHAGE-XR) 750 MG 24 hr tablet Take 1 tablet (750 mg total) by mouth daily with breakfast. 90 tablet 3    SITagliptin (JANUVIA) 100 MG Tab Take 1 tablet (100 mg total) by mouth once daily. 90 tablet 3     No current facility-administered medications for this visit.        Physical Exam   Constitutional: She is oriented to person, place, and time. She appears well-developed and well-nourished. No distress.   HENT:   Head: Normocephalic and atraumatic.   Eyes: Pupils are equal, round, and reactive to light. EOM are normal.   Neck: Normal range of motion. Neck supple.   Cardiovascular: Normal rate and regular rhythm.   Pulmonary/Chest: Effort normal and breath sounds normal.   Musculoskeletal:        Lumbar back: She exhibits decreased range of motion (limited twisting motion and forward flexion).   See PT notes for full evaluation of physical abilities   Neurological: She is alert and oriented to person, place, and time.   Skin: Skin is warm and dry. No rash noted.   Psychiatric: She has a normal mood and affect. Judgment normal.   Nursing note and vitals reviewed.                        Assessment:       1. Chronic midline low back pain without sciatica    2. Status post laminectomy    3. Weakness        Plan:   Chronic midline low back pain without sciatica  -     Ambulatory referral to Neurosurgery    Status post laminectomy  -     Ambulatory referral to Neurosurgery    Weakness    recommend follow up with neurosurgery, since she had the surgery done in [place] I am going to refer to someone local for evaluation to determine if she needs any additional treatment at this time due to limited improvement after surgery and PT. She is going to continue to go to PT  until she is out of approved visits. Encourage water therapy to improve strength.     She is not interested in pain management at this time.   Unable to approve return to work at this time, paperwork will be completed to reflect inability to perform job duties.       Follow up in about 3 months (around 6/25/2019), or if symptoms worsen or fail to improve.    There are no Patient Instructions on file for this visit.

## 2019-04-30 RX ORDER — AMLODIPINE BESYLATE 10 MG/1
10 TABLET ORAL DAILY
Qty: 90 TABLET | Refills: 3 | Status: SHIPPED | OUTPATIENT
Start: 2019-04-30 | End: 2020-01-23 | Stop reason: SDUPTHER

## 2019-04-30 RX ORDER — AMLODIPINE BESYLATE 10 MG/1
TABLET ORAL
Qty: 90 TABLET | Refills: 3 | OUTPATIENT
Start: 2019-04-30

## 2019-05-06 ENCOUNTER — PATIENT MESSAGE (OUTPATIENT)
Dept: FAMILY MEDICINE | Facility: CLINIC | Age: 64
End: 2019-05-06

## 2019-05-06 RX ORDER — CYCLOBENZAPRINE HCL 10 MG
10 TABLET ORAL 3 TIMES DAILY PRN
Qty: 60 TABLET | Refills: 5 | Status: SHIPPED | OUTPATIENT
Start: 2019-05-06 | End: 2021-04-14 | Stop reason: SDUPTHER

## 2019-05-06 NOTE — PROGRESS NOTES
She was referred to Dr. Barros, neuro surgery.  She was told that he would be unable to see her since she is within 1 year of her surgical procedure since it was performed by another surgeon. If symptoms do not improve, she will need to return to the surgeon that treated her in [place].

## 2019-05-08 RX ORDER — MELOXICAM 15 MG/1
TABLET ORAL
Qty: 90 TABLET | Refills: 3 | Status: SHIPPED | OUTPATIENT
Start: 2019-05-08 | End: 2020-07-14 | Stop reason: SDUPTHER

## 2019-05-14 DIAGNOSIS — Z12.11 COLON CANCER SCREENING: ICD-10-CM

## 2019-05-22 RX ORDER — ATORVASTATIN CALCIUM 10 MG/1
TABLET, FILM COATED ORAL
Qty: 90 TABLET | Refills: 3 | Status: SHIPPED | OUTPATIENT
Start: 2019-05-22 | End: 2020-05-01 | Stop reason: SDUPTHER

## 2019-05-23 NOTE — PROGRESS NOTES
Subjective:       Patient ID: Crystal Acuna is a 63 y.o. female.    Chief Complaint: Follow-up   She comes in for routine annual exam with fasting labs.  She has already had her diabetic eye exam done, we are going to get a copy from Dr. Maria A Tobin.    Diabetes   She presents for her follow-up diabetic visit. She has type 2 diabetes mellitus. Her disease course has been stable. There are no hypoglycemic associated symptoms. Pertinent negatives for hypoglycemia include no dizziness, headaches or nervousness/anxiousness. Associated symptoms include weakness. Pertinent negatives for diabetes include no chest pain and no fatigue. There are no hypoglycemic complications. Symptoms are stable. There are no diabetic complications. Current diabetic treatment includes oral agent (dual therapy). She is compliant with treatment all of the time. Her weight is stable. She is following a generally healthy diet. She participates in exercise intermittently. There is no change in her home blood glucose trend. An ACE inhibitor/angiotensin II receptor blocker is not being taken.   Hyperlipidemia   This is a chronic problem. The current episode started more than 1 year ago. The problem is controlled. Associated symptoms include leg pain. Pertinent negatives include no chest pain, myalgias or shortness of breath. Current antihyperlipidemic treatment includes statins.   Back Pain   This is a chronic problem. The current episode started more than 1 year ago. The problem occurs constantly. The problem is unchanged. The pain is present in the lumbar spine. The quality of the pain is described as aching and shooting. Associated symptoms include leg pain and weakness. Pertinent negatives include no abdominal pain, chest pain, fever or headaches. She has tried analgesics, heat, NSAIDs, muscle relaxant, walking, bed rest and home exercises (Lumbar fusion, physical therapy) for the symptoms. The treatment provided no relief.   Knee  Injury   This is a new problem. The current episode started in the past 7 days. The problem occurs constantly. The problem has been unchanged. Associated symptoms include arthralgias and weakness. Pertinent negatives include no abdominal pain, chest pain, coughing, fatigue, fever, headaches, myalgias, rash, sore throat or vomiting. She has tried acetaminophen for the symptoms. The treatment provided no relief.       Review of Systems   Constitutional: Negative for fatigue, fever and unexpected weight change.   HENT: Negative for ear pain and sore throat.    Eyes: Negative for pain and visual disturbance.   Respiratory: Negative for cough and shortness of breath.    Cardiovascular: Negative for chest pain and palpitations.   Gastrointestinal: Negative for abdominal pain, diarrhea and vomiting.   Musculoskeletal: Positive for arthralgias, back pain and gait problem. Negative for myalgias.   Skin: Negative for color change and rash.   Neurological: Positive for weakness. Negative for dizziness and headaches.   Psychiatric/Behavioral: Negative for dysphoric mood and sleep disturbance. The patient is not nervous/anxious.        Vitals:    05/24/19 0903   BP: 126/72   Pulse: 62   Temp: 97.7 °F (36.5 °C)       Objective:     Current Outpatient Medications   Medication Sig Dispense Refill    albuterol 90 mcg/actuation inhaler Inhale 2 puffs into the lungs every 6 (six) hours as needed for Wheezing. Rescue 18 g 0    amLODIPine (NORVASC) 10 MG tablet Take 1 tablet (10 mg total) by mouth once daily. 90 tablet 3    aspirin 81 MG Chew Take 81 mg by mouth once daily.      atorvastatin (LIPITOR) 10 MG tablet TAKE 1 TABLET BY MOUTH ONCE DAILY 90 tablet 3    cyclobenzaprine (FLEXERIL) 10 MG tablet Take 1 tablet (10 mg total) by mouth 3 (three) times daily as needed for Muscle spasms. 60 tablet 5    meloxicam (MOBIC) 15 MG tablet TAKE 1 TABLET BY MOUTH ONCE DAILY 90 tablet 3    metFORMIN (GLUCOPHAGE-XR) 750 MG 24 hr tablet  Take 1 tablet (750 mg total) by mouth daily with breakfast. 90 tablet 3    SITagliptin (JANUVIA) 100 MG Tab Take 1 tablet (100 mg total) by mouth once daily. 90 tablet 3     No current facility-administered medications for this visit.        Physical Exam   Constitutional: She is oriented to person, place, and time. She appears well-developed and well-nourished. No distress.   HENT:   Head: Normocephalic and atraumatic.   Eyes: Pupils are equal, round, and reactive to light. EOM are normal.   Neck: Normal range of motion. Neck supple.   Cardiovascular: Normal rate and regular rhythm.   Pulmonary/Chest: Effort normal and breath sounds normal.   Musculoskeletal:        Right knee: She exhibits decreased range of motion. Tenderness found. MCL tenderness noted.        Lumbar back: She exhibits decreased range of motion, tenderness and bony tenderness.   Neurological: She is alert and oriented to person, place, and time.   Skin: Skin is warm and dry. No rash noted.   Psychiatric: She has a normal mood and affect. Judgment normal.   Nursing note and vitals reviewed.      Assessment:       1. Annual physical exam    2. Type 2 diabetes mellitus without complication, without long-term current use of insulin    3. Hyperlipidemia associated with type 2 diabetes mellitus    4. Arthralgia, unspecified joint    5. Injury of right knee, initial encounter        Plan:   Annual physical exam  -     Comprehensive metabolic panel; Future; Expected date: 05/24/2019  -     Lipid panel; Future; Expected date: 05/24/2019  -     Hemoglobin A1c; Future; Expected date: 05/24/2019  -     Rheumatoid factor; Future; Expected date: 05/24/2019    Type 2 diabetes mellitus without complication, without long-term current use of insulin  -     Comprehensive metabolic panel; Future; Expected date: 05/24/2019  -     Lipid panel; Future; Expected date: 05/24/2019  -     Hemoglobin A1c; Future; Expected date: 05/24/2019  -     Rheumatoid factor; Future;  Expected date: 05/24/2019    Hyperlipidemia associated with type 2 diabetes mellitus  -     Comprehensive metabolic panel; Future; Expected date: 05/24/2019  -     Lipid panel; Future; Expected date: 05/24/2019  -     Hemoglobin A1c; Future; Expected date: 05/24/2019  -     Rheumatoid factor; Future; Expected date: 05/24/2019    Arthralgia, unspecified joint    Injury of right knee, initial encounter  -     X-Ray Knee Complete 4 Or More Views Right; Future; Expected date: 05/24/2019        No follow-ups on file.    There are no Patient Instructions on file for this visit.

## 2019-05-24 ENCOUNTER — HOSPITAL ENCOUNTER (OUTPATIENT)
Dept: RADIOLOGY | Facility: HOSPITAL | Age: 64
Discharge: HOME | End: 2019-05-24
Attending: NURSE PRACTITIONER
Payer: COMMERCIAL

## 2019-05-24 ENCOUNTER — OFFICE VISIT (OUTPATIENT)
Dept: FAMILY MEDICINE | Facility: CLINIC | Age: 64
End: 2019-05-24
Payer: COMMERCIAL

## 2019-05-24 ENCOUNTER — PATIENT OUTREACH (OUTPATIENT)
Dept: ADMINISTRATIVE | Facility: HOSPITAL | Age: 64
End: 2019-05-24

## 2019-05-24 VITALS
HEART RATE: 62 BPM | SYSTOLIC BLOOD PRESSURE: 126 MMHG | HEIGHT: 62 IN | BODY MASS INDEX: 37.17 KG/M2 | WEIGHT: 202 LBS | DIASTOLIC BLOOD PRESSURE: 72 MMHG | TEMPERATURE: 98 F

## 2019-05-24 DIAGNOSIS — Z00.00 ANNUAL PHYSICAL EXAM: Primary | ICD-10-CM

## 2019-05-24 DIAGNOSIS — E11.69 HYPERLIPIDEMIA ASSOCIATED WITH TYPE 2 DIABETES MELLITUS: ICD-10-CM

## 2019-05-24 DIAGNOSIS — E78.5 HYPERLIPIDEMIA ASSOCIATED WITH TYPE 2 DIABETES MELLITUS: ICD-10-CM

## 2019-05-24 DIAGNOSIS — S89.91XA INJURY OF RIGHT KNEE, INITIAL ENCOUNTER: ICD-10-CM

## 2019-05-24 DIAGNOSIS — E11.9 TYPE 2 DIABETES MELLITUS WITHOUT COMPLICATION, WITHOUT LONG-TERM CURRENT USE OF INSULIN: ICD-10-CM

## 2019-05-24 DIAGNOSIS — M25.50 ARTHRALGIA, UNSPECIFIED JOINT: ICD-10-CM

## 2019-05-24 LAB
ALBUMIN/CREAT UR: 6.7 UG/MG (ref 0–30)
CREAT UR-MCNC: 120 MG/DL (ref 15–325)
MICROALBUMIN UR DL<=1MG/L-MCNC: 8 UG/ML

## 2019-05-24 PROCEDURE — 99999 PR PBB SHADOW E&M-EST. PATIENT-LVL III: CPT | Mod: PBBFAC,,, | Performed by: NURSE PRACTITIONER

## 2019-05-24 PROCEDURE — 3078F PR MOST RECENT DIASTOLIC BLOOD PRESSURE < 80 MM HG: ICD-10-PCS | Mod: CPTII,S$GLB,, | Performed by: NURSE PRACTITIONER

## 2019-05-24 PROCEDURE — 73562 X-RAY EXAM OF KNEE 3: CPT | Mod: 26,59,RT, | Performed by: RADIOLOGY

## 2019-05-24 PROCEDURE — 3078F DIAST BP <80 MM HG: CPT | Mod: CPTII,S$GLB,, | Performed by: NURSE PRACTITIONER

## 2019-05-24 PROCEDURE — 99999 PR PBB SHADOW E&M-EST. PATIENT-LVL III: ICD-10-PCS | Mod: PBBFAC,,, | Performed by: NURSE PRACTITIONER

## 2019-05-24 PROCEDURE — 82043 UR ALBUMIN QUANTITATIVE: CPT

## 2019-05-24 PROCEDURE — 3044F PR MOST RECENT HEMOGLOBIN A1C LEVEL <7.0%: ICD-10-PCS | Mod: CPTII,S$GLB,, | Performed by: NURSE PRACTITIONER

## 2019-05-24 PROCEDURE — 3074F PR MOST RECENT SYSTOLIC BLOOD PRESSURE < 130 MM HG: ICD-10-PCS | Mod: CPTII,S$GLB,, | Performed by: NURSE PRACTITIONER

## 2019-05-24 PROCEDURE — 73564 XR KNEE ORTHO RIGHT WITH FLEXION: ICD-10-PCS | Mod: 26,RT,, | Performed by: RADIOLOGY

## 2019-05-24 PROCEDURE — 73562 X-RAY EXAM OF KNEE 3: CPT | Mod: TC,PO,RT

## 2019-05-24 PROCEDURE — 73562 XR KNEE ORTHO RIGHT WITH FLEXION: ICD-10-PCS | Mod: 26,59,RT, | Performed by: RADIOLOGY

## 2019-05-24 PROCEDURE — 73564 X-RAY EXAM KNEE 4 OR MORE: CPT | Mod: 26,RT,, | Performed by: RADIOLOGY

## 2019-05-24 PROCEDURE — 3044F HG A1C LEVEL LT 7.0%: CPT | Mod: CPTII,S$GLB,, | Performed by: NURSE PRACTITIONER

## 2019-05-24 PROCEDURE — 3074F SYST BP LT 130 MM HG: CPT | Mod: CPTII,S$GLB,, | Performed by: NURSE PRACTITIONER

## 2019-05-24 PROCEDURE — 99396 PREV VISIT EST AGE 40-64: CPT | Mod: 25,S$GLB,, | Performed by: NURSE PRACTITIONER

## 2019-05-24 PROCEDURE — 99396 PR PREVENTIVE VISIT,EST,40-64: ICD-10-PCS | Mod: 25,S$GLB,, | Performed by: NURSE PRACTITIONER

## 2019-05-28 DIAGNOSIS — S89.91XA INJURY OF RIGHT KNEE, INITIAL ENCOUNTER: Primary | ICD-10-CM

## 2019-05-29 ENCOUNTER — TELEPHONE (OUTPATIENT)
Dept: FAMILY MEDICINE | Facility: CLINIC | Age: 64
End: 2019-05-29

## 2019-05-29 NOTE — TELEPHONE ENCOUNTER
----- Message from Linda Mason NP sent at 5/28/2019  5:08 PM CDT -----  Please fax referral to DR Wilburn with copy of xray results

## 2019-06-06 ENCOUNTER — PATIENT OUTREACH (OUTPATIENT)
Dept: ADMINISTRATIVE | Facility: HOSPITAL | Age: 64
End: 2019-06-06

## 2019-06-06 NOTE — PROGRESS NOTES
I have attempted to contact patient to schedule dm eye exam. Patient stated that she had her eye exam 3- at Dr. Maria A Tobin office. Report requested.

## 2019-08-06 ENCOUNTER — PATIENT OUTREACH (OUTPATIENT)
Dept: ADMINISTRATIVE | Facility: HOSPITAL | Age: 64
End: 2019-08-06

## 2019-08-08 ENCOUNTER — PATIENT MESSAGE (OUTPATIENT)
Dept: FAMILY MEDICINE | Facility: CLINIC | Age: 64
End: 2019-08-08

## 2019-08-08 NOTE — TELEPHONE ENCOUNTER
Who does she need to get copies of medical records from, can you direct Fariha so that we can get this taken care of ASAP

## 2019-09-01 ENCOUNTER — OFFICE VISIT (OUTPATIENT)
Dept: URGENT CARE | Facility: CLINIC | Age: 64
End: 2019-09-01
Payer: COMMERCIAL

## 2019-09-01 VITALS
RESPIRATION RATE: 16 BRPM | HEIGHT: 62 IN | HEART RATE: 69 BPM | DIASTOLIC BLOOD PRESSURE: 74 MMHG | WEIGHT: 202 LBS | BODY MASS INDEX: 37.17 KG/M2 | TEMPERATURE: 97 F | OXYGEN SATURATION: 99 % | SYSTOLIC BLOOD PRESSURE: 135 MMHG

## 2019-09-01 DIAGNOSIS — R35.0 FREQUENCY OF URINATION: ICD-10-CM

## 2019-09-01 DIAGNOSIS — N39.0 URINARY TRACT INFECTION WITH HEMATURIA, SITE UNSPECIFIED: Primary | ICD-10-CM

## 2019-09-01 DIAGNOSIS — R31.9 URINARY TRACT INFECTION WITH HEMATURIA, SITE UNSPECIFIED: Primary | ICD-10-CM

## 2019-09-01 LAB
BILIRUB UR QL STRIP: NEGATIVE
GLUCOSE UR QL STRIP: POSITIVE
KETONES UR QL STRIP: NEGATIVE
LEUKOCYTE ESTERASE UR QL STRIP: NEGATIVE
PH, POC UA: 5.5 (ref 5–8)
POC BLOOD, URINE: POSITIVE
POC NITRATES, URINE: NEGATIVE
PROT UR QL STRIP: NEGATIVE
SP GR UR STRIP: 1.02 (ref 1–1.03)
UROBILINOGEN UR STRIP-ACNC: NORMAL (ref 0.1–1.1)

## 2019-09-01 PROCEDURE — 3078F PR MOST RECENT DIASTOLIC BLOOD PRESSURE < 80 MM HG: ICD-10-PCS | Mod: CPTII,S$GLB,, | Performed by: PHYSICIAN ASSISTANT

## 2019-09-01 PROCEDURE — 3075F PR MOST RECENT SYSTOLIC BLOOD PRESS GE 130-139MM HG: ICD-10-PCS | Mod: CPTII,S$GLB,, | Performed by: PHYSICIAN ASSISTANT

## 2019-09-01 PROCEDURE — 3078F DIAST BP <80 MM HG: CPT | Mod: CPTII,S$GLB,, | Performed by: PHYSICIAN ASSISTANT

## 2019-09-01 PROCEDURE — 99214 OFFICE O/P EST MOD 30 MIN: CPT | Mod: S$GLB,,, | Performed by: PHYSICIAN ASSISTANT

## 2019-09-01 PROCEDURE — 3008F BODY MASS INDEX DOCD: CPT | Mod: CPTII,S$GLB,, | Performed by: PHYSICIAN ASSISTANT

## 2019-09-01 PROCEDURE — 81003 URINALYSIS AUTO W/O SCOPE: CPT | Mod: QW,S$GLB,, | Performed by: PHYSICIAN ASSISTANT

## 2019-09-01 PROCEDURE — 3075F SYST BP GE 130 - 139MM HG: CPT | Mod: CPTII,S$GLB,, | Performed by: PHYSICIAN ASSISTANT

## 2019-09-01 PROCEDURE — 99214 PR OFFICE/OUTPT VISIT, EST, LEVL IV, 30-39 MIN: ICD-10-PCS | Mod: S$GLB,,, | Performed by: PHYSICIAN ASSISTANT

## 2019-09-01 PROCEDURE — 81003 POCT URINALYSIS, DIPSTICK, AUTOMATED, W/O SCOPE: ICD-10-PCS | Mod: QW,S$GLB,, | Performed by: PHYSICIAN ASSISTANT

## 2019-09-01 PROCEDURE — 3008F PR BODY MASS INDEX (BMI) DOCUMENTED: ICD-10-PCS | Mod: CPTII,S$GLB,, | Performed by: PHYSICIAN ASSISTANT

## 2019-09-01 RX ORDER — PHENAZOPYRIDINE HYDROCHLORIDE 100 MG/1
100 TABLET, FILM COATED ORAL 3 TIMES DAILY PRN
Qty: 20 TABLET | Refills: 0 | Status: SHIPPED | OUTPATIENT
Start: 2019-09-01 | End: 2020-08-31

## 2019-09-01 RX ORDER — CIPROFLOXACIN 500 MG/1
500 TABLET ORAL 2 TIMES DAILY
Qty: 14 TABLET | Refills: 0 | Status: SHIPPED | OUTPATIENT
Start: 2019-09-01 | End: 2019-09-08

## 2019-09-01 NOTE — PATIENT INSTRUCTIONS

## 2019-09-01 NOTE — PROGRESS NOTES
"Subjective:       Patient ID: Crystal Acuna is a 63 y.o. female.    Vitals:  height is 5' 2" (1.575 m) and weight is 91.6 kg (202 lb). Her oral temperature is 97 °F (36.1 °C). Her blood pressure is 135/74 and her pulse is 69. Her respiration is 16 and oxygen saturation is 99%.     Chief Complaint: Urinary Tract Infection    New problem pt states yesterday started with frequency, urgency, dysuria. Denies blood  In urine. No fever, nausea, vomiting.     Urinary Tract Infection    This is a new problem. The current episode started acute onset. The problem has been gradually worsening. The quality of the pain is described as aching and burning. The pain is at a severity of 3/10. The pain is mild. There has been no fever. The fever has been present for less than 1 day. She is not sexually active. There is a history of pyelonephritis. Associated symptoms include frequency. Pertinent negatives include no chills, hematuria, nausea, urgency, vomiting or rash. She has tried nothing for the symptoms. The treatment provided no relief.       Constitution: Negative for chills and fever.   Neck: Negative for painful lymph nodes.   Gastrointestinal: Negative for abdominal pain, nausea and vomiting.   Genitourinary: Positive for frequency. Negative for dysuria, urgency, urine decreased, hematuria, history of kidney stones, painful menstruation, irregular menstruation, missed menses, heavy menstrual bleeding, ovarian cysts, genital trauma, vaginal pain, vaginal discharge, vaginal bleeding, vaginal odor, painful intercourse, genital sore, painful ejaculation and pelvic pain.   Musculoskeletal: Negative for back pain.   Skin: Negative for rash and lesion.   Hematologic/Lymphatic: Negative for swollen lymph nodes.       Objective:      Physical Exam   Constitutional: She is oriented to person, place, and time. She appears well-developed and well-nourished.   HENT:   Head: Normocephalic and atraumatic.   Right Ear: External ear " normal.   Left Ear: External ear normal.   Nose: Nose normal. No nasal deformity. No epistaxis.   Mouth/Throat: Oropharynx is clear and moist and mucous membranes are normal.   Eyes: Conjunctivae and lids are normal.   Neck: Trachea normal, normal range of motion and phonation normal. Neck supple.   Cardiovascular: Normal rate, regular rhythm, normal heart sounds and normal pulses.   Pulmonary/Chest: Effort normal and breath sounds normal.   Abdominal: Soft. Normal appearance. She exhibits no distension and no mass. There is tenderness in the suprapubic area. There is no CVA tenderness.   Neurological: She is alert and oriented to person, place, and time.   Skin: Skin is warm, dry and intact.   Psychiatric: She has a normal mood and affect. Her speech is normal and behavior is normal. Cognition and memory are normal.   Nursing note and vitals reviewed.      Assessment:       1. Urinary tract infection with hematuria, site unspecified    2. Frequency of urination        Plan:         Urinary tract infection with hematuria, site unspecified    Frequency of urination  -     POCT Urinalysis, Dipstick, Automated, W/O Scope  -     Culture, Urine  Results for orders placed or performed in visit on 05/24/19   Comprehensive metabolic panel   Result Value Ref Range    Sodium 141 136 - 145 mmol/L    Potassium 4.4 3.5 - 5.1 mmol/L    Chloride 102 95 - 110 mmol/L    CO2 28 23 - 29 mmol/L    Glucose 158 (H) 70 - 110 mg/dL    BUN, Bld 16 8 - 23 mg/dL    Creatinine 0.8 0.5 - 1.4 mg/dL    Calcium 10.2 8.7 - 10.5 mg/dL    Total Protein 7.8 6.0 - 8.4 g/dL    Albumin 3.8 3.5 - 5.2 g/dL    Total Bilirubin 0.4 0.1 - 1.0 mg/dL    Alkaline Phosphatase 77 55 - 135 U/L    AST 31 10 - 40 U/L    ALT 47 (H) 10 - 44 U/L    Anion Gap 11 8 - 16 mmol/L    eGFR if African American >60.0 >60 mL/min/1.73 m^2    eGFR if non African American >60.0 >60 mL/min/1.73 m^2   Lipid panel   Result Value Ref Range    Cholesterol 183 120 - 199 mg/dL     Triglycerides 117 30 - 150 mg/dL    HDL 53 40 - 75 mg/dL    LDL Cholesterol 106.6 63.0 - 159.0 mg/dL    Hdl/Cholesterol Ratio 29.0 20.0 - 50.0 %    Total Cholesterol/HDL Ratio 3.5 2.0 - 5.0    Non-HDL Cholesterol 130 mg/dL   Hemoglobin A1c   Result Value Ref Range    Hemoglobin A1C 8.1 (H) 4.0 - 5.6 %    Estimated Avg Glucose 186 (H) 68 - 131 mg/dL   Rheumatoid factor   Result Value Ref Range    Rheumatoid Factor <10.0 0.0 - 15.0 IU/mL       Other orders  -     ciprofloxacin HCl (CIPRO) 500 MG tablet; Take 1 tablet (500 mg total) by mouth 2 (two) times daily. for 7 days  Dispense: 14 tablet; Refill: 0  -     phenazopyridine (PYRIDIUM) 100 MG tablet; Take 1 tablet (100 mg total) by mouth 3 (three) times daily as needed for Pain.  Dispense: 20 tablet; Refill: 0    Patient has follow up next week with evaluation a1c. Discussed limiting caffeine, chocolate. Increase fluid intake.    You must understand that you've received an Urgent Care treatment only and that you may be released before all your medical problems are known or treated. You, the patient, will arrange for follow up care as instructed.  Follow up with your PCP or specialty clinic as directed in the next 1-2 weeks if not improved or as needed.  You can call [phone] to schedule an appointment with the appropriate provider.  If your condition worsens we recommend that you receive another evaluation at the emergency room immediately or contact your primary medical clinics after hours call service to discuss your concerns.  Please return here or go to the Emergency Department for any concerns or worsening of condition.

## 2019-09-03 ENCOUNTER — LAB VISIT (OUTPATIENT)
Dept: LAB | Facility: HOSPITAL | Age: 64
End: 2019-09-03
Attending: FAMILY MEDICINE
Payer: COMMERCIAL

## 2019-09-03 DIAGNOSIS — E11.8 TYPE 2 DIABETES MELLITUS WITH COMPLICATION, UNSPECIFIED WHETHER LONG TERM INSULIN USE: ICD-10-CM

## 2019-09-03 LAB
ESTIMATED AVG GLUCOSE: 174 MG/DL (ref 68–131)
HBA1C MFR BLD HPLC: 7.7 % (ref 4–5.6)

## 2019-09-03 PROCEDURE — 36415 COLL VENOUS BLD VENIPUNCTURE: CPT | Mod: PO

## 2019-09-03 PROCEDURE — 83036 HEMOGLOBIN GLYCOSYLATED A1C: CPT

## 2019-09-04 DIAGNOSIS — E11.9 TYPE 2 DIABETES MELLITUS WITHOUT COMPLICATION, WITHOUT LONG-TERM CURRENT USE OF INSULIN: Primary | ICD-10-CM

## 2019-09-07 LAB
BACTERIA UR CULT: ABNORMAL
BACTERIA UR CULT: ABNORMAL
OTHER ANTIBIOTIC SUSC ISLT: ABNORMAL

## 2019-09-08 ENCOUNTER — TELEPHONE (OUTPATIENT)
Dept: URGENT CARE | Facility: CLINIC | Age: 64
End: 2019-09-08

## 2019-09-09 ENCOUNTER — TELEPHONE (OUTPATIENT)
Dept: URGENT CARE | Facility: CLINIC | Age: 64
End: 2019-09-09

## 2019-09-09 NOTE — TELEPHONE ENCOUNTER
Pt called back said spoke with Anupama said she is feeling better.     Left call back message for pt. Second attempt from clinic.

## 2019-09-09 NOTE — TELEPHONE ENCOUNTER
----- Message from Felicia Byrne NP sent at 9/7/2019  9:11 AM CDT -----  Urine culture positive - treated appropriately with cipro at time of visit. See how the patient is feeling- she should finish out the antibiotics. Advise her to f/u with primary care or urgent care if symptoms persist.

## 2019-09-24 RX ORDER — METFORMIN HYDROCHLORIDE 750 MG/1
750 TABLET, EXTENDED RELEASE ORAL
Qty: 90 TABLET | Refills: 3 | Status: SHIPPED | OUTPATIENT
Start: 2019-09-24 | End: 2019-09-28 | Stop reason: SDUPTHER

## 2019-09-24 RX ORDER — METFORMIN HYDROCHLORIDE 750 MG/1
TABLET, EXTENDED RELEASE ORAL
Qty: 90 TABLET | Refills: 3 | Status: SHIPPED | OUTPATIENT
Start: 2019-09-24 | End: 2019-09-28 | Stop reason: SDUPTHER

## 2019-09-24 NOTE — TELEPHONE ENCOUNTER
----- Message from Sarahi Rapp sent at 9/24/2019 10:25 AM CDT -----  Contact: [facility]/ Jan  [facility] is calling for a  New refill RX metFORMIN (GLUCOPHAGE-XR) 750 MG 24 hr tablet    Pt takes Once a day/  90 day supply    [facility] [address] [place] [address] - [address] [place] [id number]  Phone: [phone] Fax: [phone]

## 2019-09-28 RX ORDER — METFORMIN HYDROCHLORIDE 750 MG/1
750 TABLET, EXTENDED RELEASE ORAL
Qty: 90 TABLET | Refills: 0 | Status: SHIPPED | OUTPATIENT
Start: 2019-09-28 | End: 2020-01-17 | Stop reason: SDUPTHER

## 2019-09-28 NOTE — TELEPHONE ENCOUNTER
The patient requested medicine refills and I did refill it once.    Health Maintenance Due   Topic Date Due    TETANUS VACCINE  09/09/1975    Pap Smear with HPV Cotest  09/09/1978    Colonoscopy  09/09/2007    Mammogram  10/30/2019     BP Readings from Last 3 Encounters:   09/01/19 135/74   05/24/19 126/72   03/25/19 120/74

## 2019-10-07 ENCOUNTER — IMMUNIZATION (OUTPATIENT)
Dept: FAMILY MEDICINE | Facility: CLINIC | Age: 64
End: 2019-10-07
Payer: COMMERCIAL

## 2019-10-07 ENCOUNTER — TELEPHONE (OUTPATIENT)
Dept: FAMILY MEDICINE | Facility: CLINIC | Age: 64
End: 2019-10-07

## 2019-10-07 DIAGNOSIS — Z23 IMMUNIZATION DUE: Primary | ICD-10-CM

## 2019-10-07 PROCEDURE — 90471 FLU VACCINE (QUAD) GREATER THAN OR EQUAL TO 3YO PRESERVATIVE FREE IM: ICD-10-PCS | Mod: S$GLB,,, | Performed by: FAMILY MEDICINE

## 2019-10-07 PROCEDURE — 90471 IMMUNIZATION ADMIN: CPT | Mod: S$GLB,,, | Performed by: FAMILY MEDICINE

## 2019-10-07 PROCEDURE — 90686 FLU VACCINE (QUAD) GREATER THAN OR EQUAL TO 3YO PRESERVATIVE FREE IM: ICD-10-PCS | Mod: S$GLB,,, | Performed by: FAMILY MEDICINE

## 2019-10-07 PROCEDURE — 90686 IIV4 VACC NO PRSV 0.5 ML IM: CPT | Mod: S$GLB,,, | Performed by: FAMILY MEDICINE

## 2019-10-08 ENCOUNTER — PATIENT MESSAGE (OUTPATIENT)
Dept: FAMILY MEDICINE | Facility: CLINIC | Age: 64
End: 2019-10-08

## 2019-10-31 ENCOUNTER — HOSPITAL ENCOUNTER (OUTPATIENT)
Dept: RADIOLOGY | Facility: HOSPITAL | Age: 64
Discharge: HOME | End: 2019-10-31
Attending: NURSE PRACTITIONER
Payer: COMMERCIAL

## 2019-10-31 VITALS — BODY MASS INDEX: 37.16 KG/M2 | HEIGHT: 62 IN | WEIGHT: 201.94 LBS

## 2019-10-31 DIAGNOSIS — Z12.31 SCREENING MAMMOGRAM, ENCOUNTER FOR: ICD-10-CM

## 2019-10-31 PROCEDURE — 77067 SCR MAMMO BI INCL CAD: CPT | Mod: 26,,, | Performed by: RADIOLOGY

## 2019-10-31 PROCEDURE — 77063 MAMMO DIGITAL SCREENING BILAT WITH TOMOSYNTHESIS_CAD: ICD-10-PCS | Mod: 26,,, | Performed by: RADIOLOGY

## 2019-10-31 PROCEDURE — 77063 BREAST TOMOSYNTHESIS BI: CPT | Mod: 26,,, | Performed by: RADIOLOGY

## 2019-10-31 PROCEDURE — 77067 MAMMO DIGITAL SCREENING BILAT WITH TOMOSYNTHESIS_CAD: ICD-10-PCS | Mod: 26,,, | Performed by: RADIOLOGY

## 2019-10-31 PROCEDURE — 77067 SCR MAMMO BI INCL CAD: CPT | Mod: TC,PO

## 2019-12-02 ENCOUNTER — OFFICE VISIT (OUTPATIENT)
Dept: FAMILY MEDICINE | Facility: CLINIC | Age: 64
End: 2019-12-02
Payer: MEDICAID

## 2019-12-02 ENCOUNTER — LAB VISIT (OUTPATIENT)
Dept: LAB | Facility: HOSPITAL | Age: 64
End: 2019-12-02
Attending: FAMILY MEDICINE
Payer: MEDICAID

## 2019-12-02 VITALS
WEIGHT: 202.38 LBS | TEMPERATURE: 98 F | BODY MASS INDEX: 37.24 KG/M2 | HEIGHT: 62 IN | DIASTOLIC BLOOD PRESSURE: 79 MMHG | HEART RATE: 88 BPM | SYSTOLIC BLOOD PRESSURE: 119 MMHG

## 2019-12-02 DIAGNOSIS — G89.29 CHRONIC PAIN OF RIGHT KNEE: ICD-10-CM

## 2019-12-02 DIAGNOSIS — M25.561 CHRONIC PAIN OF RIGHT KNEE: ICD-10-CM

## 2019-12-02 DIAGNOSIS — E11.9 TYPE 2 DIABETES MELLITUS WITHOUT COMPLICATION, WITHOUT LONG-TERM CURRENT USE OF INSULIN: ICD-10-CM

## 2019-12-02 DIAGNOSIS — M79.672 LEFT FOOT PAIN: Primary | ICD-10-CM

## 2019-12-02 PROCEDURE — 99214 OFFICE O/P EST MOD 30 MIN: CPT | Mod: S$PBB,,, | Performed by: NURSE PRACTITIONER

## 2019-12-02 PROCEDURE — 36415 COLL VENOUS BLD VENIPUNCTURE: CPT | Mod: PO

## 2019-12-02 PROCEDURE — 99213 OFFICE O/P EST LOW 20 MIN: CPT | Mod: PBBFAC,PO | Performed by: NURSE PRACTITIONER

## 2019-12-02 PROCEDURE — 99214 PR OFFICE/OUTPT VISIT, EST, LEVL IV, 30-39 MIN: ICD-10-PCS | Mod: S$PBB,,, | Performed by: NURSE PRACTITIONER

## 2019-12-02 PROCEDURE — 99999 PR PBB SHADOW E&M-EST. PATIENT-LVL III: CPT | Mod: PBBFAC,,, | Performed by: NURSE PRACTITIONER

## 2019-12-02 PROCEDURE — 83036 HEMOGLOBIN GLYCOSYLATED A1C: CPT

## 2019-12-02 PROCEDURE — 99999 PR PBB SHADOW E&M-EST. PATIENT-LVL III: ICD-10-PCS | Mod: PBBFAC,,, | Performed by: NURSE PRACTITIONER

## 2019-12-02 NOTE — PROGRESS NOTES
Subjective:       Patient ID: Crystal Acuna is a 64 y.o. female.    Chief Complaint: Foot Injury (something stuck in foot)    Foot Injury    The incident occurred 3 to 5 days ago. The incident occurred at home. Injury mechanism: felt like she stepped on glass. The pain is present in the left foot. The quality of the pain is described as aching. The pain is mild. The symptoms are aggravated by weight bearing and palpation.   Knee Pain    The incident occurred more than 1 week ago. There was no injury mechanism. The pain is present in the right knee. The quality of the pain is described as aching and shooting. The pain is at a severity of 6/10. The symptoms are aggravated by palpation, weight bearing and movement. She has tried acetaminophen and NSAIDs for the symptoms. The treatment provided no relief.       Review of Systems   Constitutional: Negative for fatigue, fever and unexpected weight change.   HENT: Negative for ear pain and sore throat.    Eyes: Negative for pain and visual disturbance.   Respiratory: Negative for cough and shortness of breath.    Cardiovascular: Negative for chest pain and palpitations.   Gastrointestinal: Negative for abdominal pain, diarrhea and vomiting.   Musculoskeletal: Positive for arthralgias and gait problem. Negative for myalgias.   Skin: Negative for color change and rash.   Neurological: Negative for dizziness and headaches.   Psychiatric/Behavioral: Negative for dysphoric mood and sleep disturbance. The patient is not nervous/anxious.        Vitals:    12/02/19 1413   BP: 119/79   Pulse: 88   Temp: 97.7 °F (36.5 °C)       Objective:     Current Outpatient Medications   Medication Sig Dispense Refill    amLODIPine (NORVASC) 10 MG tablet Take 1 tablet (10 mg total) by mouth once daily. 90 tablet 3    aspirin 81 MG Chew Take 81 mg by mouth once daily.      atorvastatin (LIPITOR) 10 MG tablet TAKE 1 TABLET BY MOUTH ONCE DAILY 90 tablet 3    cyclobenzaprine (FLEXERIL) 10  MG tablet Take 1 tablet (10 mg total) by mouth 3 (three) times daily as needed for Muscle spasms. 60 tablet 5    meloxicam (MOBIC) 15 MG tablet TAKE 1 TABLET BY MOUTH ONCE DAILY 90 tablet 3    metFORMIN (GLUCOPHAGE-XR) 750 MG 24 hr tablet Take 1 tablet (750 mg total) by mouth daily with breakfast. 90 tablet 0    SITagliptin (JANUVIA) 100 MG Tab Take 1 tablet (100 mg total) by mouth once daily. 90 tablet 3    albuterol 90 mcg/actuation inhaler Inhale 2 puffs into the lungs every 6 (six) hours as needed for Wheezing. Rescue 18 g 0    phenazopyridine (PYRIDIUM) 100 MG tablet Take 1 tablet (100 mg total) by mouth 3 (three) times daily as needed for Pain. (Patient not taking: Reported on 12/2/2019) 20 tablet 0     No current facility-administered medications for this visit.        Physical Exam   Constitutional: She is oriented to person, place, and time. She appears well-developed and well-nourished. No distress.   HENT:   Head: Normocephalic and atraumatic.   Eyes: Pupils are equal, round, and reactive to light. EOM are normal.   Neck: Normal range of motion. Neck supple.   Cardiovascular: Normal rate and regular rhythm.   Pulses:       Dorsalis pedis pulses are 2+ on the left side.   Pulmonary/Chest: Effort normal and breath sounds normal.   Musculoskeletal:        Right knee: She exhibits decreased range of motion and deformity ( posterior aspect of knee with small mass, likely a cyst). Tenderness (posterior) found.   Feet:   Left Foot:   Protective Sensation: 4 sites tested. 4 sites sensed.   Skin Integrity: Negative for ulcer, blister, skin breakdown or erythema.   Neurological: She is alert and oriented to person, place, and time.   Skin: Skin is warm and dry. No rash noted.   Psychiatric: She has a normal mood and affect. Judgment normal.   Nursing note and vitals reviewed.      Assessment:       1. Left foot pain    2. Chronic pain of right knee        Plan:   Left foot pain  -     Ambulatory referral to  Podiatry    Chronic pain of right knee  -     MRI Knee Without Contrast Right; Future; Expected date: 12/02/2019        No follow-ups on file.    There are no Patient Instructions on file for this visit.

## 2019-12-03 LAB
ESTIMATED AVG GLUCOSE: 217 MG/DL (ref 68–131)
HBA1C MFR BLD HPLC: 9.2 % (ref 4–5.6)

## 2019-12-04 ENCOUNTER — TELEPHONE (OUTPATIENT)
Dept: FAMILY MEDICINE | Facility: CLINIC | Age: 64
End: 2019-12-04

## 2019-12-04 DIAGNOSIS — G89.29 CHRONIC PAIN OF RIGHT KNEE: Primary | ICD-10-CM

## 2019-12-04 DIAGNOSIS — M25.561 CHRONIC PAIN OF RIGHT KNEE: Primary | ICD-10-CM

## 2019-12-05 ENCOUNTER — TELEPHONE (OUTPATIENT)
Dept: FAMILY MEDICINE | Facility: CLINIC | Age: 64
End: 2019-12-05

## 2019-12-05 ENCOUNTER — PATIENT MESSAGE (OUTPATIENT)
Dept: FAMILY MEDICINE | Facility: CLINIC | Age: 64
End: 2019-12-05

## 2019-12-05 DIAGNOSIS — M79.672 LEFT FOOT PAIN: Primary | ICD-10-CM

## 2019-12-05 DIAGNOSIS — M25.561 CHRONIC PAIN OF RIGHT KNEE: ICD-10-CM

## 2019-12-05 DIAGNOSIS — G89.29 CHRONIC PAIN OF RIGHT KNEE: ICD-10-CM

## 2019-12-12 ENCOUNTER — HOSPITAL ENCOUNTER (OUTPATIENT)
Dept: RADIOLOGY | Facility: HOSPITAL | Age: 64
Discharge: HOME | End: 2019-12-12
Attending: NURSE PRACTITIONER
Payer: MEDICAID

## 2019-12-12 DIAGNOSIS — G89.29 CHRONIC PAIN OF RIGHT KNEE: ICD-10-CM

## 2019-12-12 DIAGNOSIS — M25.561 CHRONIC PAIN OF RIGHT KNEE: ICD-10-CM

## 2019-12-12 PROCEDURE — 73721 MRI JNT OF LWR EXTRE W/O DYE: CPT | Mod: TC,PO,RT

## 2019-12-12 PROCEDURE — 73721 MRI JNT OF LWR EXTRE W/O DYE: CPT | Mod: 26,RT,, | Performed by: RADIOLOGY

## 2019-12-12 PROCEDURE — 73721 MRI KNEE WITHOUT CONTRAST RIGHT: ICD-10-PCS | Mod: 26,RT,, | Performed by: RADIOLOGY

## 2019-12-20 ENCOUNTER — PATIENT MESSAGE (OUTPATIENT)
Dept: FAMILY MEDICINE | Facility: CLINIC | Age: 64
End: 2019-12-20

## 2019-12-20 DIAGNOSIS — G89.29 CHRONIC PAIN OF RIGHT KNEE: Primary | ICD-10-CM

## 2019-12-20 DIAGNOSIS — M25.561 CHRONIC PAIN OF RIGHT KNEE: Primary | ICD-10-CM

## 2020-01-10 ENCOUNTER — PATIENT OUTREACH (OUTPATIENT)
Dept: ADMINISTRATIVE | Facility: HOSPITAL | Age: 65
End: 2020-01-10

## 2020-01-16 NOTE — PROGRESS NOTES
Subjective:       Patient ID: Crystal Acuna is a 64 y.o. female.    Chief Complaint: Follow-up    Back Pain   This is a chronic problem. The problem occurs constantly. The problem has been gradually worsening since onset. The pain is present in the lumbar spine. The quality of the pain is described as aching and shooting. The pain is moderate. The symptoms are aggravated by position. Risk factors include obesity. She has tried analgesics, bed rest, heat, ice, muscle relaxant, NSAIDs and home exercises (PT, lumbar surgery) for the symptoms.   Diabetes   She presents for her follow-up diabetic visit. She has type 2 diabetes mellitus. Her disease course has been worsening. There are no hypoglycemic associated symptoms. Pertinent negatives for hypoglycemia include no dizziness or nervousness/anxiousness. There are no hypoglycemic complications. There are no diabetic complications. Risk factors for coronary artery disease include diabetes mellitus, hypertension, post-menopausal, sedentary lifestyle and obesity. Current diabetic treatment includes oral agent (dual therapy). She is compliant with treatment most of the time. Her weight is stable. She is following a generally healthy diet. She never participates in exercise. An ACE inhibitor/angiotensin II receptor blocker is being taken.       Review of Systems   Constitutional: Negative for unexpected weight change.   HENT: Negative for ear pain.    Eyes: Negative for pain and visual disturbance.   Respiratory: Negative for shortness of breath.    Cardiovascular: Negative for palpitations.   Gastrointestinal: Negative for diarrhea.   Musculoskeletal: Positive for back pain.   Skin: Negative for color change.   Neurological: Negative for dizziness.   Psychiatric/Behavioral: Negative for dysphoric mood and sleep disturbance. The patient is not nervous/anxious.        Vitals:    01/17/20 0859   BP: (!) 152/88   Pulse: 80   Temp: 97.6 °F (36.4 °C)       Objective:      Current Outpatient Medications   Medication Sig Dispense Refill    amLODIPine (NORVASC) 10 MG tablet Take 1 tablet (10 mg total) by mouth once daily. 90 tablet 3    aspirin 81 MG Chew Take 81 mg by mouth once daily.      atorvastatin (LIPITOR) 10 MG tablet TAKE 1 TABLET BY MOUTH ONCE DAILY 90 tablet 3    cyclobenzaprine (FLEXERIL) 10 MG tablet Take 1 tablet (10 mg total) by mouth 3 (three) times daily as needed for Muscle spasms. 60 tablet 5    meloxicam (MOBIC) 15 MG tablet TAKE 1 TABLET BY MOUTH ONCE DAILY 90 tablet 3    metFORMIN (GLUCOPHAGE-XR) 750 MG 24 hr tablet Take 1 tablet (750 mg total) by mouth 2 (two) times daily with meals. 180 tablet 3    SITagliptin (JANUVIA) 100 MG Tab Take 1 tablet (100 mg total) by mouth once daily. 90 tablet 3    albuterol 90 mcg/actuation inhaler Inhale 2 puffs into the lungs every 6 (six) hours as needed for Wheezing. Rescue 18 g 0    phenazopyridine (PYRIDIUM) 100 MG tablet Take 1 tablet (100 mg total) by mouth 3 (three) times daily as needed for Pain. (Patient not taking: Reported on 1/17/2020) 20 tablet 0     No current facility-administered medications for this visit.        Physical Exam   Constitutional: She is oriented to person, place, and time. She appears well-developed and well-nourished. No distress.   HENT:   Head: Normocephalic and atraumatic.   Eyes: Pupils are equal, round, and reactive to light. EOM are normal.   Neck: Normal range of motion. Neck supple.   Cardiovascular: Normal rate and regular rhythm.   Pulmonary/Chest: Effort normal and breath sounds normal.   Musculoskeletal:        Lumbar back: She exhibits decreased range of motion, tenderness, bony tenderness and pain.   Neurological: She is alert and oriented to person, place, and time.   Skin: Skin is warm and dry. No rash noted.   Psychiatric: She has a normal mood and affect. Judgment normal.   Nursing note and vitals reviewed.      Assessment:       1. Chronic midline low back pain  without sciatica    2. Status post laminectomy        Plan:   Chronic midline low back pain without sciatica  -     Ambulatory referral to Neurosurgery    Status post laminectomy  -     Ambulatory referral to Neurosurgery    Other orders  -     metFORMIN (GLUCOPHAGE-XR) 750 MG 24 hr tablet; Take 1 tablet (750 mg total) by mouth 2 (two) times daily with meals.  Dispense: 180 tablet; Refill: 3        No follow-ups on file.    There are no Patient Instructions on file for this visit.

## 2020-01-17 ENCOUNTER — OFFICE VISIT (OUTPATIENT)
Dept: FAMILY MEDICINE | Facility: CLINIC | Age: 65
End: 2020-01-17
Payer: MEDICAID

## 2020-01-17 VITALS
BODY MASS INDEX: 36.59 KG/M2 | HEIGHT: 62 IN | TEMPERATURE: 98 F | DIASTOLIC BLOOD PRESSURE: 88 MMHG | HEART RATE: 80 BPM | SYSTOLIC BLOOD PRESSURE: 152 MMHG | WEIGHT: 198.81 LBS

## 2020-01-17 DIAGNOSIS — G89.29 CHRONIC MIDLINE LOW BACK PAIN WITHOUT SCIATICA: Primary | ICD-10-CM

## 2020-01-17 DIAGNOSIS — Z98.890 STATUS POST LAMINECTOMY: ICD-10-CM

## 2020-01-17 DIAGNOSIS — M54.50 CHRONIC MIDLINE LOW BACK PAIN WITHOUT SCIATICA: Primary | ICD-10-CM

## 2020-01-17 PROCEDURE — 99999 PR PBB SHADOW E&M-EST. PATIENT-LVL III: CPT | Mod: PBBFAC,,, | Performed by: NURSE PRACTITIONER

## 2020-01-17 PROCEDURE — 99999 PR PBB SHADOW E&M-EST. PATIENT-LVL III: ICD-10-PCS | Mod: PBBFAC,,, | Performed by: NURSE PRACTITIONER

## 2020-01-17 PROCEDURE — 99213 OFFICE O/P EST LOW 20 MIN: CPT | Mod: PBBFAC,PO | Performed by: NURSE PRACTITIONER

## 2020-01-17 PROCEDURE — 99214 OFFICE O/P EST MOD 30 MIN: CPT | Mod: S$PBB,,, | Performed by: NURSE PRACTITIONER

## 2020-01-17 PROCEDURE — 99214 PR OFFICE/OUTPT VISIT, EST, LEVL IV, 30-39 MIN: ICD-10-PCS | Mod: S$PBB,,, | Performed by: NURSE PRACTITIONER

## 2020-01-17 RX ORDER — METFORMIN HYDROCHLORIDE 750 MG/1
750 TABLET, EXTENDED RELEASE ORAL 2 TIMES DAILY WITH MEALS
Qty: 180 TABLET | Refills: 3 | Status: SHIPPED | OUTPATIENT
Start: 2020-01-17 | End: 2020-05-01 | Stop reason: SDUPTHER

## 2020-01-23 ENCOUNTER — PATIENT MESSAGE (OUTPATIENT)
Dept: FAMILY MEDICINE | Facility: CLINIC | Age: 65
End: 2020-01-23

## 2020-01-23 RX ORDER — AMLODIPINE BESYLATE 10 MG/1
10 TABLET ORAL DAILY
Qty: 90 TABLET | Refills: 3 | Status: SHIPPED | OUTPATIENT
Start: 2020-01-23 | End: 2020-01-24 | Stop reason: SDUPTHER

## 2020-01-24 RX ORDER — AMLODIPINE BESYLATE 10 MG/1
10 TABLET ORAL DAILY
Qty: 90 TABLET | Refills: 3 | Status: CANCELLED | OUTPATIENT
Start: 2020-01-24

## 2020-01-24 RX ORDER — AMLODIPINE BESYLATE 10 MG/1
10 TABLET ORAL DAILY
Qty: 90 TABLET | Refills: 3 | Status: SHIPPED | OUTPATIENT
Start: 2020-01-24 | End: 2020-04-13 | Stop reason: SDUPTHER

## 2020-01-29 ENCOUNTER — PATIENT MESSAGE (OUTPATIENT)
Dept: FAMILY MEDICINE | Facility: CLINIC | Age: 65
End: 2020-01-29

## 2020-01-29 DIAGNOSIS — M54.16 LUMBAR RADICULOPATHY: ICD-10-CM

## 2020-02-14 ENCOUNTER — HOSPITAL ENCOUNTER (OUTPATIENT)
Dept: RADIOLOGY | Facility: HOSPITAL | Age: 65
Discharge: HOME | End: 2020-02-14
Attending: NURSE PRACTITIONER
Payer: MEDICAID

## 2020-02-14 DIAGNOSIS — M54.16 LUMBAR RADICULOPATHY: ICD-10-CM

## 2020-02-14 PROCEDURE — 72148 MRI LUMBAR SPINE W/O DYE: CPT | Mod: TC,PO

## 2020-02-14 PROCEDURE — 72148 MRI LUMBAR SPINE WITHOUT CONTRAST: ICD-10-PCS | Mod: 26,,, | Performed by: RADIOLOGY

## 2020-02-14 PROCEDURE — 72148 MRI LUMBAR SPINE W/O DYE: CPT | Mod: 26,,, | Performed by: RADIOLOGY

## 2020-02-17 ENCOUNTER — PATIENT MESSAGE (OUTPATIENT)
Dept: FAMILY MEDICINE | Facility: CLINIC | Age: 65
End: 2020-02-17

## 2020-02-18 ENCOUNTER — TELEPHONE (OUTPATIENT)
Dept: FAMILY MEDICINE | Facility: CLINIC | Age: 65
End: 2020-02-18

## 2020-02-18 ENCOUNTER — PATIENT MESSAGE (OUTPATIENT)
Dept: FAMILY MEDICINE | Facility: CLINIC | Age: 65
End: 2020-02-18

## 2020-02-18 ENCOUNTER — PATIENT OUTREACH (OUTPATIENT)
Dept: ADMINISTRATIVE | Facility: HOSPITAL | Age: 65
End: 2020-02-18

## 2020-02-18 DIAGNOSIS — E11.9 TYPE 2 DIABETES MELLITUS WITHOUT COMPLICATION, WITHOUT LONG-TERM CURRENT USE OF INSULIN: Primary | ICD-10-CM

## 2020-02-18 NOTE — TELEPHONE ENCOUNTER
Paperwork completed for [facility] patient assistance program for januvia refills    Please print a med list and give her paperwork to Fariha

## 2020-03-02 ENCOUNTER — PATIENT MESSAGE (OUTPATIENT)
Dept: FAMILY MEDICINE | Facility: CLINIC | Age: 65
End: 2020-03-02

## 2020-03-02 DIAGNOSIS — R53.83 FATIGUE, UNSPECIFIED TYPE: Primary | ICD-10-CM

## 2020-03-03 ENCOUNTER — TELEPHONE (OUTPATIENT)
Dept: FAMILY MEDICINE | Facility: CLINIC | Age: 65
End: 2020-03-03

## 2020-03-04 ENCOUNTER — LAB VISIT (OUTPATIENT)
Dept: LAB | Facility: HOSPITAL | Age: 65
End: 2020-03-04
Attending: NURSE PRACTITIONER
Payer: MEDICAID

## 2020-03-04 DIAGNOSIS — R53.83 FATIGUE, UNSPECIFIED TYPE: ICD-10-CM

## 2020-03-04 DIAGNOSIS — E11.9 TYPE 2 DIABETES MELLITUS WITHOUT COMPLICATION, WITHOUT LONG-TERM CURRENT USE OF INSULIN: ICD-10-CM

## 2020-03-04 LAB
ESTIMATED AVG GLUCOSE: 169 MG/DL (ref 68–131)
HBA1C MFR BLD HPLC: 7.5 % (ref 4–5.6)
TSH SERPL DL<=0.005 MIU/L-ACNC: 2.23 UIU/ML (ref 0.4–4)

## 2020-03-04 PROCEDURE — 84443 ASSAY THYROID STIM HORMONE: CPT

## 2020-03-04 PROCEDURE — 83036 HEMOGLOBIN GLYCOSYLATED A1C: CPT

## 2020-03-04 PROCEDURE — 36415 COLL VENOUS BLD VENIPUNCTURE: CPT | Mod: PO

## 2020-03-05 ENCOUNTER — TELEPHONE (OUTPATIENT)
Dept: FAMILY MEDICINE | Facility: CLINIC | Age: 65
End: 2020-03-05

## 2020-03-05 NOTE — TELEPHONE ENCOUNTER
Spoke with Fariha (pt's daughter) about changing pcp, will change to Dr Sow ist available appt to [facility] care. Chayito informed to make appt.

## 2020-03-05 NOTE — TELEPHONE ENCOUNTER
----- Message from Dahlia Valenzuela sent at 3/5/2020  8:38 AM CST -----  Contact: PT's daughter-Fariha   Type:  Sooner Apoointment Request    Caller is requesting a sooner appointment.  Caller declined first available appointment listed below.  Caller will not accept being placed on the waitlist and is requesting a message be sent to doctor.  Name of Caller: PT's daughter-Fariha   When is the first available appointment? N/a   Symptoms: Est Care/ prior patient of  & Linda Mason  Would the patient rather a call back or a response via [facility]? Call back   Best Call Back Number: [phone]  Additional Information: n/a

## 2020-03-23 ENCOUNTER — PATIENT MESSAGE (OUTPATIENT)
Dept: FAMILY MEDICINE | Facility: CLINIC | Age: 65
End: 2020-03-23

## 2020-04-13 RX ORDER — AMLODIPINE BESYLATE 10 MG/1
10 TABLET ORAL DAILY
Qty: 90 TABLET | Refills: 0 | Status: SHIPPED | OUTPATIENT
Start: 2020-04-13 | End: 2020-05-01 | Stop reason: SDUPTHER

## 2020-04-22 ENCOUNTER — TELEPHONE (OUTPATIENT)
Dept: FAMILY MEDICINE | Facility: CLINIC | Age: 65
End: 2020-04-22

## 2020-04-22 DIAGNOSIS — Z79.899 ENCOUNTER FOR LONG-TERM (CURRENT) USE OF MEDICATIONS: ICD-10-CM

## 2020-04-22 DIAGNOSIS — Z00.01 ENCOUNTER FOR GENERAL ADULT MEDICAL EXAMINATION WITH ABNORMAL FINDINGS: Primary | ICD-10-CM

## 2020-04-22 DIAGNOSIS — Z13.220 ENCOUNTER FOR LIPID SCREENING FOR CARDIOVASCULAR DISEASE: ICD-10-CM

## 2020-04-22 DIAGNOSIS — Z13.6 ENCOUNTER FOR LIPID SCREENING FOR CARDIOVASCULAR DISEASE: ICD-10-CM

## 2020-04-22 NOTE — TELEPHONE ENCOUNTER
Patient wants to est care with you, she is due for labs in June, can you please sign orders so I can schedule her labs and her est care appointment with you for June.

## 2020-05-01 ENCOUNTER — OFFICE VISIT (OUTPATIENT)
Dept: FAMILY MEDICINE | Facility: CLINIC | Age: 65
End: 2020-05-01
Payer: MEDICAID

## 2020-05-01 VITALS
WEIGHT: 198.38 LBS | HEART RATE: 78 BPM | HEIGHT: 62 IN | SYSTOLIC BLOOD PRESSURE: 132 MMHG | TEMPERATURE: 97 F | DIASTOLIC BLOOD PRESSURE: 84 MMHG | BODY MASS INDEX: 36.5 KG/M2

## 2020-05-01 DIAGNOSIS — Z79.899 ENCOUNTER FOR LONG-TERM (CURRENT) USE OF MEDICATIONS: ICD-10-CM

## 2020-05-01 DIAGNOSIS — E11.9 TYPE 2 DIABETES MELLITUS WITHOUT COMPLICATION, WITHOUT LONG-TERM CURRENT USE OF INSULIN: ICD-10-CM

## 2020-05-01 DIAGNOSIS — Z00.00 ANNUAL PHYSICAL EXAM: Primary | ICD-10-CM

## 2020-05-01 LAB
ALBUMIN/CREAT UR: 9.4 UG/MG (ref 0–30)
CREAT UR-MCNC: 117 MG/DL (ref 15–325)
MICROALBUMIN UR DL<=1MG/L-MCNC: 11 UG/ML

## 2020-05-01 PROCEDURE — 99213 OFFICE O/P EST LOW 20 MIN: CPT | Mod: PBBFAC,PO | Performed by: FAMILY MEDICINE

## 2020-05-01 PROCEDURE — 99999 PR PBB SHADOW E&M-EST. PATIENT-LVL III: ICD-10-PCS | Mod: PBBFAC,,, | Performed by: FAMILY MEDICINE

## 2020-05-01 PROCEDURE — 99396 PREV VISIT EST AGE 40-64: CPT | Mod: S$PBB,,, | Performed by: FAMILY MEDICINE

## 2020-05-01 PROCEDURE — 82043 UR ALBUMIN QUANTITATIVE: CPT

## 2020-05-01 PROCEDURE — 99396 PR PREVENTIVE VISIT,EST,40-64: ICD-10-PCS | Mod: S$PBB,,, | Performed by: FAMILY MEDICINE

## 2020-05-01 PROCEDURE — 99999 PR PBB SHADOW E&M-EST. PATIENT-LVL III: CPT | Mod: PBBFAC,,, | Performed by: FAMILY MEDICINE

## 2020-05-01 RX ORDER — GABAPENTIN 300 MG/1
300 CAPSULE ORAL NIGHTLY
Qty: 30 CAPSULE | Refills: 5 | Status: ON HOLD | OUTPATIENT
Start: 2020-05-01 | End: 2021-04-27 | Stop reason: HOSPADM

## 2020-05-01 RX ORDER — ALBUTEROL SULFATE 90 UG/1
2 AEROSOL, METERED RESPIRATORY (INHALATION) EVERY 6 HOURS PRN
Qty: 18 G | Refills: 0 | Status: SHIPPED | OUTPATIENT
Start: 2020-05-01 | End: 2021-11-11 | Stop reason: SDUPTHER

## 2020-05-01 RX ORDER — AMLODIPINE BESYLATE 10 MG/1
10 TABLET ORAL DAILY
Qty: 90 TABLET | Refills: 0 | Status: SHIPPED | OUTPATIENT
Start: 2020-05-01 | End: 2020-07-14 | Stop reason: SDUPTHER

## 2020-05-01 RX ORDER — ATORVASTATIN CALCIUM 10 MG/1
10 TABLET, FILM COATED ORAL DAILY
Qty: 90 TABLET | Refills: 3 | Status: SHIPPED | OUTPATIENT
Start: 2020-05-01 | End: 2021-03-30 | Stop reason: SDUPTHER

## 2020-05-01 RX ORDER — METFORMIN HYDROCHLORIDE 750 MG/1
750 TABLET, EXTENDED RELEASE ORAL 2 TIMES DAILY WITH MEALS
Qty: 180 TABLET | Refills: 3 | Status: SHIPPED | OUTPATIENT
Start: 2020-05-01 | End: 2020-05-01

## 2020-05-01 RX ORDER — FLUOROMETHOLONE 1 MG/ML
SUSPENSION/ DROPS OPHTHALMIC
COMMUNITY
Start: 2020-03-04

## 2020-05-01 NOTE — PATIENT INSTRUCTIONS
Follow up in about 1 year (around 5/1/2021), or if symptoms worsen or fail to improve, for Annual Wellness Exam.     If no improvement in symptoms or symptoms worsen, please be advised to call MD, follow-up at clinic and/or go to ER if becomes severe.    Eh Sow M.D.        We Offer TELEHEALTH & Same Day Appointments!   Book your Telehealth appointment with me through my nurse or   Clinic appointments on [employer]!    [address] [place] [address]    Office: [phone]   FAX: [phone]    Check out my Facebook Page and Follow Me at: [email or web address]    Check out my website at [employer] by clicking on: [email or web address]    To Schedule appointments online, go to [email or web address]: [email or web address]

## 2020-05-01 NOTE — PROGRESS NOTES
This note is specifically for wellness visit performed today.     WELLNESS EXAM      Patient ID: Crystal Acuna is a 64 y.o. female with  has a past medical history of Allergy, Bronchitis, Hallux abductovalgus, Herniated lumbar intervertebral disc, Hyperlipidemia, Hypertension, and Smoker.     Chief Complaint:  Encounter for wellness exam    Well Adult Physical: Patient here for a comprehensive physical exam.The patient reports chronic problems.  Also needing medications refilled  Do you take any herbs or supplements that were not prescribed by a doctor? no   Are you taking calcium supplements? no   Are you taking aspirin daily?  Yes   History:  Recurrent UTIs  OBGYN:  Requesting records  LMP: No LMP recorded. Patient has had an ablation.   MMG:  Ordered  Colonoscopy:  Patient is due    Health Maintenance Topics with due status: Not Due       Topic Last Completion Date    Lipid Panel 2019    Mammogram 10/31/2019    Foot Exam 2019    Eye Exam 2020    Hemoglobin A1c 2020    Low Dose Statin 2020            ==============================================  History reviewed.   Health Maintenance Due   Topic Date Due    TETANUS VACCINE  1975    Pap Smear with HPV Cotest  1978    Colonoscopy  2007    Urine Microalbumin  2020       Past Medical History:  Past Medical History:   Diagnosis Date    Allergy     Bronchitis     Hallux abductovalgus     Herniated lumbar intervertebral disc     Hyperlipidemia     Diet controlled    Hypertension     Smoker      Past Surgical History:   Procedure Laterality Date    BACK SURGERY      L4-L5    CARPAL TUNNEL RELEASE      bilateral     SECTION      times 1    CHOLECYSTECTOMY  2010    CYST REMOVAL      right hand    ENDOMETRIAL ABLATION  2009    FOOT FRACTURE SURGERY       Review of patient's allergies indicates:   Allergen Reactions    Metformin     Latex, natural rubber      Current  Outpatient Medications on File Prior to Visit   Medication Sig Dispense Refill    aspirin 81 MG Chew Take 81 mg by mouth once daily.      cyclobenzaprine (FLEXERIL) 10 MG tablet Take 1 tablet (10 mg total) by mouth 3 (three) times daily as needed for Muscle spasms. 60 tablet 5    fluorometholone 0.1% (FML) 0.1 % DrpS INSTILL 1 DROP INTO EACH EYE THREE TIMES DAILY AS DIRECTED      meloxicam (MOBIC) 15 MG tablet TAKE 1 TABLET BY MOUTH ONCE DAILY 90 tablet 3    [DISCONTINUED] albuterol 90 mcg/actuation inhaler Inhale 2 puffs into the lungs every 6 (six) hours as needed for Wheezing. Rescue 18 g 0    [DISCONTINUED] amLODIPine (NORVASC) 10 MG tablet Take 1 tablet (10 mg total) by mouth once daily. 90 tablet 0    [DISCONTINUED] atorvastatin (LIPITOR) 10 MG tablet TAKE 1 TABLET BY MOUTH ONCE DAILY 90 tablet 3    [DISCONTINUED] metFORMIN (GLUCOPHAGE-XR) 750 MG 24 hr tablet Take 1 tablet (750 mg total) by mouth 2 (two) times daily with meals. 180 tablet 3    [DISCONTINUED] SITagliptin (JANUVIA) 100 MG Tab Take 1 tablet (100 mg total) by mouth once daily. 90 tablet 3    phenazopyridine (PYRIDIUM) 100 MG tablet Take 1 tablet (100 mg total) by mouth 3 (three) times daily as needed for Pain. (Patient not taking: Reported on 1/17/2020) 20 tablet 0     No current facility-administered medications on file prior to visit.      Social History     Socioeconomic History    Marital status: Single     Spouse name: Not on file    Number of children: Not on file    Years of education: Not on file    Highest education level: Not on file   Occupational History    Not on file   Social Needs    Financial resource strain: Not on file    Food insecurity:     Worry: Not on file     Inability: Not on file    Transportation needs:     Medical: Not on file     Non-medical: Not on file   Tobacco Use    Smoking status: Former Smoker     Packs/day: 0.75     Years: 39.00     Pack years: 29.25     Types: Cigarettes    Smokeless  tobacco: Never Used   Substance and Sexual Activity    Alcohol use: No    Drug use: No    Sexual activity: Not on file   Lifestyle    Physical activity:     Days per week: 5 days     Minutes per session: 30 min    Stress: Only a little   Relationships    Social connections:     Talks on phone: More than three times a week     Gets together: More than three times a week     Attends Hoahao[employer] service: More than 4 times per year     Active member of club or organization: No     Attends meetings of clubs or organizations: Never     Relationship status:    Other Topics Concern    Not on file   Social History Narrative    Not on file     Family History   Problem Relation Age of Onset    COPD Mother     Heart disease Mother     Heart disease Father         MI    Breast cancer Maternal Aunt           Review of Systems   Constitutional: Negative for chills, fatigue, fever and unexpected weight change.   HENT: Negative for ear pain and sore throat.    Eyes: Negative for redness and visual disturbance.   Respiratory: Negative for cough and shortness of breath.    Cardiovascular: Negative for chest pain and palpitations.   Gastrointestinal: Negative for nausea and vomiting.   Musculoskeletal: Negative for arthralgias and myalgias.   Skin: Negative for rash and wound.   Neurological: Negative for weakness and headaches.         Objective:    Nursing note and vitals reviewed.  Vitals:    05/01/20 1024   BP: 132/84   Pulse: 78   Temp: 97.1 °F (36.2 °C)     Body mass index is 36.29 kg/m².     Physical Exam   Constitutional: SHE is oriented to person, place, and time. She appears well-developed and well-nourished. No distress.   HENT: WNL  Head: Normocephalic and atraumatic.   Eyes: Pupils are equal, round, and reactive to light. EOM are normal.   Neck: Normal range of motion. Neck supple.   Cardiovascular: Normal rate, regular rhythm, normal heart sounds and intact distal pulses.   No murmur  heard.  Pulmonary/Chest: Effort normal and breath sounds normal. No respiratory distress. She has no wheezes.   Musculoskeletal: Normal range of motion. She exhibits no edema.   Neurological: She is alert and oriented to person, place, and time. No cranial nerve deficit.   Skin: Skin is warm and dry. Capillary refill takes less than 2 seconds.   Psychiatric: She has a normal mood and affect. Her behavior is normal.           Assessment / Plan:      1.  ANNUAL WELLNESS EXAM -patient here for annual wellness exam.  Labs ordered.  Health maintenance was reviewed and ordered.  Medications were reviewed and reconciled.    Complete history and physical was completed today.  Complete and thorough medication reconciliation was performed.  Discussed risks and benefits of medications.  Advised patient on orders and health maintenance.  We discussed old records and old labs if available.  Will request any records not available through epic.  Continue current medications listed on your summary sheet.    All questions were answered. Patient had no further concerns. Advised of Wellness plan. Follow up in 1 year for ANNUAL WELLNESS EXAM    Orders Placed This Encounter   Procedures    CBC Without Differential     Standing Status:   Standing     Number of Occurrences:   99     Standing Expiration Date:   6/30/2021    TSH     Standing Status:   Standing     Number of Occurrences:   99     Standing Expiration Date:   6/30/2021    Comprehensive metabolic panel     Standing Status:   Standing     Number of Occurrences:   99     Standing Expiration Date:   4/26/2040    Hemoglobin A1c     Standing Status:   Standing     Number of Occurrences:   99     Standing Expiration Date:   4/26/2040    Lipid panel     Standing Status:   Standing     Number of Occurrences:   99     Standing Expiration Date:   4/26/2040    MICROALBUMIN / CREATININE RATIO URINE           Order Specific Question:   Specimen Source     Answer:   Urine        Medications Ordered This Encounter   Medications    albuterol (PROVENTIL/VENTOLIN HFA) 90 mcg/actuation inhaler     Sig: Inhale 2 puffs into the lungs every 6 (six) hours as needed for Wheezing. Rescue     Dispense:  18 g     Refill:  0    amLODIPine (NORVASC) 10 MG tablet     Sig: Take 1 tablet (10 mg total) by mouth once daily.     Dispense:  90 tablet     Refill:  0     Please consider 90 day supplies to promote better adherence    atorvastatin (LIPITOR) 10 MG tablet     Sig: Take 1 tablet (10 mg total) by mouth once daily.     Dispense:  90 tablet     Refill:  3    gabapentin (NEURONTIN) 300 MG capsule     Sig: Take 1 capsule (300 mg total) by mouth every evening.     Dispense:  30 capsule     Refill:  5    semaglutide (OZEMPIC) 0.25 mg or 0.5 mg(2 mg/1.5 mL) PnIj     Sig: Inject 0.25 mg into the skin once a week for 30 days, THEN 0.5 mg once a week.     Dispense:  1 Syringe     Refill:  1    SITagliptin (JANUVIA) 100 MG Tab     Sig: Take 1 tablet (100 mg total) by mouth once daily.     Dispense:  90 tablet     Refill:  3          Eh Sow MD

## 2020-05-12 ENCOUNTER — PATIENT MESSAGE (OUTPATIENT)
Dept: FAMILY MEDICINE | Facility: CLINIC | Age: 65
End: 2020-05-12

## 2020-05-12 NOTE — TELEPHONE ENCOUNTER
Patient states that she has received a letter stating that medicaid will no longer pay for her ozempic.  Please advise.

## 2020-06-26 ENCOUNTER — TELEPHONE (OUTPATIENT)
Dept: FAMILY MEDICINE | Facility: CLINIC | Age: 65
End: 2020-06-26

## 2020-06-26 NOTE — TELEPHONE ENCOUNTER
----- Message from Dahlia Valenzuela sent at 6/26/2020  4:33 PM CDT -----  Regarding: [employer] medicaid  He is calling in regards to getting clarification on a prior authorization being denied for semaglutide (OZEMPIC) 0.25 mg or 0.5 mg(2 mg/1.5 mL) Adrian , please advise ph#[phone]

## 2020-07-14 DIAGNOSIS — Z00.00 ANNUAL PHYSICAL EXAM: ICD-10-CM

## 2020-07-14 DIAGNOSIS — Z79.899 ENCOUNTER FOR LONG-TERM (CURRENT) USE OF MEDICATIONS: ICD-10-CM

## 2020-07-14 RX ORDER — AMLODIPINE BESYLATE 10 MG/1
10 TABLET ORAL DAILY
Qty: 90 TABLET | Refills: 3 | Status: SHIPPED | OUTPATIENT
Start: 2020-07-14 | End: 2021-07-15

## 2020-07-14 RX ORDER — MELOXICAM 15 MG/1
15 TABLET ORAL DAILY
Qty: 90 TABLET | Refills: 3 | Status: SHIPPED | OUTPATIENT
Start: 2020-07-14 | End: 2022-08-26 | Stop reason: SDUPTHER

## 2020-07-29 RX ORDER — METFORMIN HYDROCHLORIDE 750 MG/1
TABLET, EXTENDED RELEASE ORAL
COMMUNITY
Start: 2020-06-30 | End: 2021-05-06

## 2020-08-03 ENCOUNTER — LAB VISIT (OUTPATIENT)
Dept: LAB | Facility: HOSPITAL | Age: 65
End: 2020-08-03
Attending: FAMILY MEDICINE
Payer: MEDICAID

## 2020-08-03 DIAGNOSIS — E11.9 TYPE 2 DIABETES MELLITUS WITHOUT COMPLICATION, WITHOUT LONG-TERM CURRENT USE OF INSULIN: ICD-10-CM

## 2020-08-03 DIAGNOSIS — Z00.00 ANNUAL PHYSICAL EXAM: ICD-10-CM

## 2020-08-03 DIAGNOSIS — Z79.899 ENCOUNTER FOR LONG-TERM (CURRENT) USE OF MEDICATIONS: ICD-10-CM

## 2020-08-03 LAB
ALBUMIN SERPL BCP-MCNC: 3.8 G/DL (ref 3.5–5.2)
ALP SERPL-CCNC: 54 U/L (ref 55–135)
ALT SERPL W/O P-5'-P-CCNC: 36 U/L (ref 10–44)
ANION GAP SERPL CALC-SCNC: 10 MMOL/L (ref 8–16)
AST SERPL-CCNC: 21 U/L (ref 10–40)
BILIRUB SERPL-MCNC: 0.3 MG/DL (ref 0.1–1)
BUN SERPL-MCNC: 13 MG/DL (ref 8–23)
CALCIUM SERPL-MCNC: 9.5 MG/DL (ref 8.7–10.5)
CHLORIDE SERPL-SCNC: 106 MMOL/L (ref 95–110)
CHOLEST SERPL-MCNC: 158 MG/DL (ref 120–199)
CHOLEST/HDLC SERPL: 3.2 {RATIO} (ref 2–5)
CO2 SERPL-SCNC: 26 MMOL/L (ref 23–29)
CREAT SERPL-MCNC: 0.7 MG/DL (ref 0.5–1.4)
ERYTHROCYTE [DISTWIDTH] IN BLOOD BY AUTOMATED COUNT: 14.3 % (ref 11.5–14.5)
EST. GFR  (AFRICAN AMERICAN): >60 ML/MIN/1.73 M^2
EST. GFR  (NON AFRICAN AMERICAN): >60 ML/MIN/1.73 M^2
GLUCOSE SERPL-MCNC: 128 MG/DL (ref 70–110)
HCT VFR BLD AUTO: 49.1 % (ref 37–48.5)
HDLC SERPL-MCNC: 49 MG/DL (ref 40–75)
HDLC SERPL: 31 % (ref 20–50)
HGB BLD-MCNC: 15.4 G/DL (ref 12–16)
LDLC SERPL CALC-MCNC: 89.4 MG/DL (ref 63–159)
MCH RBC QN AUTO: 29.8 PG (ref 27–31)
MCHC RBC AUTO-ENTMCNC: 31.4 G/DL (ref 32–36)
MCV RBC AUTO: 95 FL (ref 82–98)
NONHDLC SERPL-MCNC: 109 MG/DL
PLATELET # BLD AUTO: 183 K/UL (ref 150–350)
PMV BLD AUTO: 12.5 FL (ref 9.2–12.9)
POTASSIUM SERPL-SCNC: 4.3 MMOL/L (ref 3.5–5.1)
PROT SERPL-MCNC: 7.2 G/DL (ref 6–8.4)
RBC # BLD AUTO: 5.17 M/UL (ref 4–5.4)
SODIUM SERPL-SCNC: 142 MMOL/L (ref 136–145)
TRIGL SERPL-MCNC: 98 MG/DL (ref 30–150)
TSH SERPL DL<=0.005 MIU/L-ACNC: 1.22 UIU/ML (ref 0.4–4)
WBC # BLD AUTO: 7.38 K/UL (ref 3.9–12.7)

## 2020-08-03 PROCEDURE — 80061 LIPID PANEL: CPT

## 2020-08-03 PROCEDURE — 84443 ASSAY THYROID STIM HORMONE: CPT

## 2020-08-03 PROCEDURE — 36415 COLL VENOUS BLD VENIPUNCTURE: CPT | Mod: PO

## 2020-08-03 PROCEDURE — 80053 COMPREHEN METABOLIC PANEL: CPT

## 2020-08-03 PROCEDURE — 85027 COMPLETE CBC AUTOMATED: CPT

## 2020-08-03 PROCEDURE — 83036 HEMOGLOBIN GLYCOSYLATED A1C: CPT

## 2020-08-04 LAB
ESTIMATED AVG GLUCOSE: 157 MG/DL (ref 68–131)
HBA1C MFR BLD HPLC: 7.1 % (ref 4–5.6)

## 2020-08-04 NOTE — PROGRESS NOTES
#CALL THE PATIENT# to discuss results/see if they have questions and document verification. Make FU appt if needed.    #Pend me the orders in my interpretation below.#    I have sent a message to them with the interpretation (see below).  See if they have any questions and make a follow-up appointment if not already schedule and if needed.    Also please address any outstanding health maintenance that may be due: HIV Screening due on 09/09/1972  TETANUS VACCINE due on 09/09/1975  Pneumococcal Vaccine (Medium Risk)(1 of 1 - PPSV23) due on 09/09/1976  Cervical Cancer Screening due on 09/09/1978  Shingles Vaccine(1 of 2) due on 09/09/2007  Colorectal Cancer Screening due on 09/09/2007  ====================================    My interpretation that was sent to them through [employer]:    Crystal, I have reviewed your recent blood work.     Your complete blood count is stable and within normal limits.    Your metabolic panel which shows your glucose, kidney function, electrolytes, and liver function is stable and within normal limits.   Thyroid studies are normal.   Your cholesterol is at goal.  Continue current medication regimen.  Your hemoglobin A1c is 7.1 which is improved from previous 7.5.  Continue current medication regimen.  This test is gold standard screening test for diabetes.  It is a measures 3 months of your average blood sugar.    Repeat A1c in six months.  Please address care gap since see if patient would like to update her Pap smear and colon cancer screening

## 2020-08-21 DIAGNOSIS — Z12.11 COLON CANCER SCREENING: ICD-10-CM

## 2020-09-22 ENCOUNTER — PATIENT MESSAGE (OUTPATIENT)
Dept: FAMILY MEDICINE | Facility: CLINIC | Age: 65
End: 2020-09-22

## 2020-09-24 ENCOUNTER — PATIENT MESSAGE (OUTPATIENT)
Dept: FAMILY MEDICINE | Facility: CLINIC | Age: 65
End: 2020-09-24

## 2020-09-24 DIAGNOSIS — Z23 NEED FOR PNEUMOCOCCAL VACCINE: Primary | ICD-10-CM

## 2020-09-24 NOTE — TELEPHONE ENCOUNTER
Patient is requesting a pneumonia shot on 09/29/2020 at the time of her scheduled flu shot appointment, Please advise on which pneumonia vaccine is needed.

## 2020-09-29 ENCOUNTER — CLINICAL SUPPORT (OUTPATIENT)
Dept: FAMILY MEDICINE | Facility: CLINIC | Age: 65
End: 2020-09-29
Payer: MEDICAID

## 2020-09-29 PROCEDURE — 99999 PR PBB SHADOW E&M-EST. PATIENT-LVL II: CPT | Mod: PBBFAC,,,

## 2020-09-29 PROCEDURE — 99212 OFFICE O/P EST SF 10 MIN: CPT | Mod: PBBFAC,PO

## 2020-09-29 PROCEDURE — 99999 PR PBB SHADOW E&M-EST. PATIENT-LVL II: ICD-10-PCS | Mod: PBBFAC,,,

## 2020-09-29 PROCEDURE — 90471 IMMUNIZATION ADMIN: CPT | Mod: PBBFAC,PO

## 2020-09-29 PROCEDURE — 90472 IMMUNIZATION ADMIN EACH ADD: CPT | Mod: PBBFAC,PO

## 2020-09-29 NOTE — PROGRESS NOTES
Patient given pneumonia 23 per Md orders.  Niki advised to wait in waiting room 15 min immunization time, patient verbalzied understanding of this, patient tolerated well

## 2020-10-05 ENCOUNTER — PATIENT MESSAGE (OUTPATIENT)
Dept: ADMINISTRATIVE | Facility: HOSPITAL | Age: 65
End: 2020-10-05

## 2020-10-06 ENCOUNTER — PATIENT MESSAGE (OUTPATIENT)
Dept: ADMINISTRATIVE | Facility: HOSPITAL | Age: 65
End: 2020-10-06

## 2020-10-30 ENCOUNTER — PATIENT MESSAGE (OUTPATIENT)
Dept: ADMINISTRATIVE | Facility: HOSPITAL | Age: 65
End: 2020-10-30

## 2020-11-06 ENCOUNTER — TELEPHONE (OUTPATIENT)
Dept: ADMINISTRATIVE | Facility: HOSPITAL | Age: 65
End: 2020-11-06

## 2020-11-18 ENCOUNTER — OFFICE VISIT (OUTPATIENT)
Dept: FAMILY MEDICINE | Facility: CLINIC | Age: 65
End: 2020-11-18
Payer: MEDICAID

## 2020-11-18 ENCOUNTER — HOSPITAL ENCOUNTER (OUTPATIENT)
Dept: RADIOLOGY | Facility: HOSPITAL | Age: 65
Discharge: HOME | End: 2020-11-18
Attending: FAMILY MEDICINE
Payer: MEDICAID

## 2020-11-18 VITALS
BODY MASS INDEX: 34.04 KG/M2 | SYSTOLIC BLOOD PRESSURE: 133 MMHG | WEIGHT: 185 LBS | DIASTOLIC BLOOD PRESSURE: 78 MMHG | HEIGHT: 62 IN | HEART RATE: 81 BPM

## 2020-11-18 DIAGNOSIS — R10.31 RIGHT LOWER QUADRANT ABDOMINAL PAIN: Primary | ICD-10-CM

## 2020-11-18 DIAGNOSIS — R10.31 RIGHT LOWER QUADRANT ABDOMINAL PAIN: ICD-10-CM

## 2020-11-18 PROCEDURE — 99213 OFFICE O/P EST LOW 20 MIN: CPT | Mod: S$PBB,,, | Performed by: FAMILY MEDICINE

## 2020-11-18 PROCEDURE — 99999 PR PBB SHADOW E&M-EST. PATIENT-LVL III: CPT | Mod: PBBFAC,,, | Performed by: FAMILY MEDICINE

## 2020-11-18 PROCEDURE — 99999 PR PBB SHADOW E&M-EST. PATIENT-LVL III: ICD-10-PCS | Mod: PBBFAC,,, | Performed by: FAMILY MEDICINE

## 2020-11-18 PROCEDURE — 99213 PR OFFICE/OUTPT VISIT, EST, LEVL III, 20-29 MIN: ICD-10-PCS | Mod: S$PBB,,, | Performed by: FAMILY MEDICINE

## 2020-11-18 PROCEDURE — 74018 RADEX ABDOMEN 1 VIEW: CPT | Mod: TC,PO

## 2020-11-18 PROCEDURE — 74018 RADEX ABDOMEN 1 VIEW: CPT | Mod: 26,,, | Performed by: RADIOLOGY

## 2020-11-18 PROCEDURE — 74018 XR ABDOMEN AP 1 VIEW: ICD-10-PCS | Mod: 26,,, | Performed by: RADIOLOGY

## 2020-11-18 PROCEDURE — 99213 OFFICE O/P EST LOW 20 MIN: CPT | Mod: PBBFAC,25,PO | Performed by: FAMILY MEDICINE

## 2020-11-18 NOTE — PROGRESS NOTES
The patient presents with a history of RLQ pain night before last for 4 hours, spontaneously resolved. Recurred again last night for a few hours and no pain since.No dysuria/ frequency,has some rt flank pain, no vomiting or fever.   Past Medical History:  Past Medical History:   Diagnosis Date    Allergy     Bronchitis     Hallux abductovalgus     Herniated lumbar intervertebral disc     Hyperlipidemia     Diet controlled    Hypertension     Smoker      Past Surgical History:   Procedure Laterality Date    BACK SURGERY      L4-L5    CARPAL TUNNEL RELEASE      bilateral     SECTION      times 1    CHOLECYSTECTOMY  2010    CYST REMOVAL      right hand    ENDOMETRIAL ABLATION  2009    FOOT FRACTURE SURGERY       Review of patient's allergies indicates:   Allergen Reactions    Metformin     Latex, natural rubber      Current Outpatient Medications on File Prior to Visit   Medication Sig Dispense Refill    albuterol (PROVENTIL/VENTOLIN HFA) 90 mcg/actuation inhaler Inhale 2 puffs into the lungs every 6 (six) hours as needed for Wheezing. Rescue 18 g 0    amLODIPine (NORVASC) 10 MG tablet Take 1 tablet (10 mg total) by mouth once daily. 90 tablet 3    aspirin 81 MG Chew Take 81 mg by mouth once daily.      atorvastatin (LIPITOR) 10 MG tablet Take 1 tablet (10 mg total) by mouth once daily. 90 tablet 3    cyclobenzaprine (FLEXERIL) 10 MG tablet Take 1 tablet (10 mg total) by mouth 3 (three) times daily as needed for Muscle spasms. 60 tablet 5    meloxicam (MOBIC) 15 MG tablet Take 1 tablet (15 mg total) by mouth once daily. 90 tablet 3    metFORMIN (GLUCOPHAGE-XR) 750 MG XR 24hr tablet       SITagliptin (JANUVIA) 100 MG Tab Take 1 tablet (100 mg total) by mouth once daily. 90 tablet 3    fluorometholone 0.1% (FML) 0.1 % DrpS INSTILL 1 DROP INTO EACH EYE THREE TIMES DAILY AS DIRECTED      gabapentin (NEURONTIN) 300 MG capsule Take 1 capsule (300 mg total) by mouth every evening.  (Patient not taking: Reported on 11/18/2020) 30 capsule 5     No current facility-administered medications on file prior to visit.      Social History     Socioeconomic History    Marital status: Single     Spouse name: Not on file    Number of children: Not on file    Years of education: Not on file    Highest education level: Not on file   Occupational History    Not on file   Social Needs    Financial resource strain: Not on file    Food insecurity     Worry: Not on file     Inability: Not on file    Transportation needs     Medical: Not on file     Non-medical: Not on file   Tobacco Use    Smoking status: Former Smoker     Packs/day: 0.75     Years: 39.00     Pack years: 29.25     Types: Cigarettes    Smokeless tobacco: Never Used   Substance and Sexual Activity    Alcohol use: No    Drug use: No    Sexual activity: Not on file   Lifestyle    Physical activity     Days per week: 5 days     Minutes per session: 30 min    Stress: Only a little   Relationships    Social connections     Talks on phone: More than three times a week     Gets together: More than three times a week     Attends Orthodox service: More than 4 times per year     Active member of club or organization: No     Attends meetings of clubs or organizations: Never     Relationship status:    Other Topics Concern    Not on file   Social History Narrative    Not on file     Family History   Problem Relation Age of Onset    COPD Mother     Heart disease Mother     Heart disease Father         MI    Breast cancer Maternal Aunt        ROS: Denies weight loss  Respiratory/CV: No cough dyspnea or chest pain  GI:No nausea vomiting diarrhea  :Frequency dysuria  SKIN:No rashes or change in texture    PE Vital signs noted  Heent: Normocephalic,with no recent trauma,PERRLA,EOMI,conjunctiva clear with no exudate.Nasopharynx is not injected .Otic canal.TMs are not affected.Pharynx is normal.  Neck:Supple without  adenopathy  Chest:Clear bilateral breath sounds normal  Heart:Regular rhthym without murmer  Abdomen:Soft, no tenderness,no rebound,no masses, no hepatosplenomegaly,no flank tenderness  Extremeties and Neurologic:Grossly within normal limits     Impression: Likely nephrolithiasis     Plan:     UA  KUB

## 2020-12-10 ENCOUNTER — PATIENT OUTREACH (OUTPATIENT)
Dept: ADMINISTRATIVE | Facility: HOSPITAL | Age: 65
End: 2020-12-10

## 2020-12-10 DIAGNOSIS — Z12.11 ENCOUNTER FOR COLORECTAL CANCER SCREENING: Primary | ICD-10-CM

## 2020-12-10 DIAGNOSIS — Z12.12 ENCOUNTER FOR COLORECTAL CANCER SCREENING: Primary | ICD-10-CM

## 2020-12-10 NOTE — PROGRESS NOTES
Working colonoscopy report; Searched Care Everywhere, [employer]. I called pt to check if she is interested in colo guard. Pt would like colo guard mailed out to her. I have sent message to SASHA to get colo guard mailed to pt.

## 2020-12-16 ENCOUNTER — HOSPITAL ENCOUNTER (OUTPATIENT)
Dept: RADIOLOGY | Facility: HOSPITAL | Age: 65
Discharge: HOME | End: 2020-12-16
Attending: FAMILY MEDICINE
Payer: MEDICAID

## 2020-12-16 VITALS — HEIGHT: 62 IN | WEIGHT: 184.94 LBS | BODY MASS INDEX: 34.03 KG/M2

## 2020-12-16 DIAGNOSIS — Z12.31 SCREENING MAMMOGRAM, ENCOUNTER FOR: ICD-10-CM

## 2020-12-16 PROCEDURE — 77067 SCR MAMMO BI INCL CAD: CPT | Mod: 26,,, | Performed by: RADIOLOGY

## 2020-12-16 PROCEDURE — 77067 SCR MAMMO BI INCL CAD: CPT | Mod: TC,PO

## 2020-12-16 PROCEDURE — 77063 BREAST TOMOSYNTHESIS BI: CPT | Mod: 26,,, | Performed by: RADIOLOGY

## 2020-12-16 PROCEDURE — 77067 MAMMO DIGITAL SCREENING BILAT WITH TOMO: ICD-10-PCS | Mod: 26,,, | Performed by: RADIOLOGY

## 2020-12-16 PROCEDURE — 77063 MAMMO DIGITAL SCREENING BILAT WITH TOMO: ICD-10-PCS | Mod: 26,,, | Performed by: RADIOLOGY

## 2020-12-18 NOTE — PROGRESS NOTES
Normal mammogram, repeat in 1 year, result released through [facility].  Please call the patient if not enrolled with my chart.

## 2021-01-20 ENCOUNTER — PATIENT MESSAGE (OUTPATIENT)
Dept: FAMILY MEDICINE | Facility: CLINIC | Age: 66
End: 2021-01-20

## 2021-01-20 ENCOUNTER — TELEPHONE (OUTPATIENT)
Dept: FAMILY MEDICINE | Facility: CLINIC | Age: 66
End: 2021-01-20

## 2021-01-23 ENCOUNTER — TELEPHONE (OUTPATIENT)
Dept: FAMILY MEDICINE | Facility: CLINIC | Age: 66
End: 2021-01-23

## 2021-02-03 ENCOUNTER — LAB VISIT (OUTPATIENT)
Dept: LAB | Facility: HOSPITAL | Age: 66
End: 2021-02-03
Attending: FAMILY MEDICINE
Payer: MEDICAID

## 2021-02-03 DIAGNOSIS — Z00.00 ANNUAL PHYSICAL EXAM: ICD-10-CM

## 2021-02-03 DIAGNOSIS — E11.9 TYPE 2 DIABETES MELLITUS WITHOUT COMPLICATION, WITHOUT LONG-TERM CURRENT USE OF INSULIN: ICD-10-CM

## 2021-02-03 DIAGNOSIS — Z79.899 ENCOUNTER FOR LONG-TERM (CURRENT) USE OF MEDICATIONS: ICD-10-CM

## 2021-02-03 PROCEDURE — 83036 HEMOGLOBIN GLYCOSYLATED A1C: CPT

## 2021-02-03 PROCEDURE — 36415 COLL VENOUS BLD VENIPUNCTURE: CPT | Mod: PO

## 2021-02-04 LAB
ESTIMATED AVG GLUCOSE: 140 MG/DL (ref 68–131)
HBA1C MFR BLD: 6.5 % (ref 4–5.6)

## 2021-03-29 ENCOUNTER — TELEPHONE (OUTPATIENT)
Dept: FAMILY MEDICINE | Facility: CLINIC | Age: 66
End: 2021-03-29

## 2021-03-29 DIAGNOSIS — R19.5 POSITIVE COLORECTAL CANCER SCREENING USING COLOGUARD TEST: ICD-10-CM

## 2021-03-29 DIAGNOSIS — Z12.11 COLON CANCER SCREENING: Primary | ICD-10-CM

## 2021-03-29 RX ORDER — SODIUM, POTASSIUM,MAG SULFATES 17.5-3.13G
1 SOLUTION, RECONSTITUTED, ORAL ORAL DAILY
Qty: 1 KIT | Refills: 0 | Status: SHIPPED | OUTPATIENT
Start: 2021-03-29 | End: 2021-03-31

## 2021-03-30 ENCOUNTER — TELEPHONE (OUTPATIENT)
Dept: ENDOSCOPY | Facility: HOSPITAL | Age: 66
End: 2021-03-30

## 2021-03-30 ENCOUNTER — PATIENT MESSAGE (OUTPATIENT)
Dept: ENDOSCOPY | Facility: HOSPITAL | Age: 66
End: 2021-03-30

## 2021-03-30 DIAGNOSIS — Z12.11 COLON CANCER SCREENING: Primary | ICD-10-CM

## 2021-04-12 ENCOUNTER — OFFICE VISIT (OUTPATIENT)
Dept: FAMILY MEDICINE | Facility: CLINIC | Age: 66
End: 2021-04-12
Payer: MEDICAID

## 2021-04-12 ENCOUNTER — HOSPITAL ENCOUNTER (OUTPATIENT)
Dept: RADIOLOGY | Facility: HOSPITAL | Age: 66
Discharge: HOME | End: 2021-04-12
Attending: FAMILY MEDICINE
Payer: MEDICAID

## 2021-04-12 VITALS
WEIGHT: 179 LBS | DIASTOLIC BLOOD PRESSURE: 81 MMHG | SYSTOLIC BLOOD PRESSURE: 137 MMHG | HEART RATE: 98 BPM | HEIGHT: 62 IN | TEMPERATURE: 97 F | RESPIRATION RATE: 16 BRPM | BODY MASS INDEX: 32.94 KG/M2

## 2021-04-12 DIAGNOSIS — M54.2 NECK PAIN: Primary | ICD-10-CM

## 2021-04-12 DIAGNOSIS — M65.20 CALCIFIC TENDONITIS: ICD-10-CM

## 2021-04-12 DIAGNOSIS — M50.30 DDD (DEGENERATIVE DISC DISEASE), CERVICAL: ICD-10-CM

## 2021-04-12 DIAGNOSIS — M25.511 ACUTE PAIN OF RIGHT SHOULDER: ICD-10-CM

## 2021-04-12 DIAGNOSIS — M54.2 NECK PAIN: ICD-10-CM

## 2021-04-12 PROCEDURE — 99214 OFFICE O/P EST MOD 30 MIN: CPT | Mod: PBBFAC,25,PO | Performed by: FAMILY MEDICINE

## 2021-04-12 PROCEDURE — 72050 X-RAY EXAM NECK SPINE 4/5VWS: CPT | Mod: 26,,, | Performed by: RADIOLOGY

## 2021-04-12 PROCEDURE — 99999 PR PBB SHADOW E&M-EST. PATIENT-LVL IV: ICD-10-PCS | Mod: PBBFAC,,, | Performed by: FAMILY MEDICINE

## 2021-04-12 PROCEDURE — 72050 XR CERVICAL SPINE COMPLETE 5 VIEW: ICD-10-PCS | Mod: 26,,, | Performed by: RADIOLOGY

## 2021-04-12 PROCEDURE — 73030 X-RAY EXAM OF SHOULDER: CPT | Mod: TC,PO,RT

## 2021-04-12 PROCEDURE — 72050 X-RAY EXAM NECK SPINE 4/5VWS: CPT | Mod: TC,PO

## 2021-04-12 PROCEDURE — 73030 XR SHOULDER COMPLETE 2 OR MORE VIEWS RIGHT: ICD-10-PCS | Mod: 26,RT,, | Performed by: RADIOLOGY

## 2021-04-12 PROCEDURE — 99214 OFFICE O/P EST MOD 30 MIN: CPT | Mod: S$PBB,,, | Performed by: FAMILY MEDICINE

## 2021-04-12 PROCEDURE — 99999 PR PBB SHADOW E&M-EST. PATIENT-LVL IV: CPT | Mod: PBBFAC,,, | Performed by: FAMILY MEDICINE

## 2021-04-12 PROCEDURE — 99214 PR OFFICE/OUTPT VISIT, EST, LEVL IV, 30-39 MIN: ICD-10-PCS | Mod: S$PBB,,, | Performed by: FAMILY MEDICINE

## 2021-04-12 PROCEDURE — 73030 X-RAY EXAM OF SHOULDER: CPT | Mod: 26,RT,, | Performed by: RADIOLOGY

## 2021-04-12 RX ORDER — TRAMADOL HYDROCHLORIDE 50 MG/1
50 TABLET ORAL EVERY 6 HOURS PRN
Qty: 30 TABLET | Refills: 0 | Status: SHIPPED | OUTPATIENT
Start: 2021-04-12 | End: 2021-04-22

## 2021-04-14 ENCOUNTER — PATIENT MESSAGE (OUTPATIENT)
Dept: ENDOSCOPY | Facility: HOSPITAL | Age: 66
End: 2021-04-14

## 2021-04-14 RX ORDER — CYCLOBENZAPRINE HCL 10 MG
10 TABLET ORAL 3 TIMES DAILY PRN
Qty: 60 TABLET | Refills: 0 | Status: SHIPPED | OUTPATIENT
Start: 2021-04-14 | End: 2021-05-06

## 2021-04-16 ENCOUNTER — PATIENT MESSAGE (OUTPATIENT)
Dept: ENDOSCOPY | Facility: HOSPITAL | Age: 66
End: 2021-04-16

## 2021-04-16 ENCOUNTER — OFFICE VISIT (OUTPATIENT)
Dept: FAMILY MEDICINE | Facility: CLINIC | Age: 66
End: 2021-04-16
Payer: MEDICAID

## 2021-04-16 VITALS
HEIGHT: 62 IN | TEMPERATURE: 97 F | RESPIRATION RATE: 14 BRPM | WEIGHT: 176.81 LBS | HEART RATE: 84 BPM | SYSTOLIC BLOOD PRESSURE: 113 MMHG | BODY MASS INDEX: 32.54 KG/M2 | DIASTOLIC BLOOD PRESSURE: 68 MMHG | OXYGEN SATURATION: 94 %

## 2021-04-16 DIAGNOSIS — E11.9 TYPE 2 DIABETES MELLITUS WITHOUT COMPLICATION, WITHOUT LONG-TERM CURRENT USE OF INSULIN: Chronic | ICD-10-CM

## 2021-04-16 DIAGNOSIS — M25.511 ACUTE PAIN OF RIGHT SHOULDER: ICD-10-CM

## 2021-04-16 DIAGNOSIS — M65.20 CALCIFIC TENDONITIS: Primary | ICD-10-CM

## 2021-04-16 PROCEDURE — 99999 PR PBB SHADOW E&M-EST. PATIENT-LVL IV: ICD-10-PCS | Mod: PBBFAC,,, | Performed by: FAMILY MEDICINE

## 2021-04-16 PROCEDURE — 99999 PR PBB SHADOW E&M-EST. PATIENT-LVL IV: CPT | Mod: PBBFAC,,, | Performed by: FAMILY MEDICINE

## 2021-04-16 PROCEDURE — 99214 PR OFFICE/OUTPT VISIT, EST, LEVL IV, 30-39 MIN: ICD-10-PCS | Mod: S$PBB,,, | Performed by: FAMILY MEDICINE

## 2021-04-16 PROCEDURE — 96372 THER/PROPH/DIAG INJ SC/IM: CPT | Mod: PBBFAC,PO

## 2021-04-16 PROCEDURE — 99214 OFFICE O/P EST MOD 30 MIN: CPT | Mod: S$PBB,,, | Performed by: FAMILY MEDICINE

## 2021-04-16 PROCEDURE — 99214 OFFICE O/P EST MOD 30 MIN: CPT | Mod: PBBFAC,PO | Performed by: FAMILY MEDICINE

## 2021-04-16 RX ORDER — BETAMETHASONE SODIUM PHOSPHATE AND BETAMETHASONE ACETATE 3; 3 MG/ML; MG/ML
6 INJECTION, SUSPENSION INTRA-ARTICULAR; INTRALESIONAL; INTRAMUSCULAR; SOFT TISSUE
Status: COMPLETED | OUTPATIENT
Start: 2021-04-16 | End: 2021-04-16

## 2021-04-16 RX ORDER — KETOROLAC TROMETHAMINE 10 MG/1
10 TABLET, FILM COATED ORAL EVERY 6 HOURS
Qty: 20 TABLET | Refills: 0 | Status: SHIPPED | OUTPATIENT
Start: 2021-04-16 | End: 2021-04-21

## 2021-04-16 RX ADMIN — BETAMETHASONE ACETATE AND BETAMETHASONE SODIUM PHOSPHATE 6 MG: 3; 3 INJECTION, SUSPENSION INTRA-ARTICULAR; INTRALESIONAL; INTRAMUSCULAR; SOFT TISSUE at 02:04

## 2021-04-24 ENCOUNTER — CLINICAL SUPPORT (OUTPATIENT)
Dept: FAMILY MEDICINE | Facility: CLINIC | Age: 66
End: 2021-04-24
Payer: MEDICAID

## 2021-04-24 DIAGNOSIS — Z12.11 COLON CANCER SCREENING: ICD-10-CM

## 2021-04-24 PROCEDURE — U0003 INFECTIOUS AGENT DETECTION BY NUCLEIC ACID (DNA OR RNA); SEVERE ACUTE RESPIRATORY SYNDROME CORONAVIRUS 2 (SARS-COV-2) (CORONAVIRUS DISEASE [COVID-19]), AMPLIFIED PROBE TECHNIQUE, MAKING USE OF HIGH THROUGHPUT TECHNOLOGIES AS DESCRIBED BY CMS-2020-01-R: HCPCS | Performed by: FAMILY MEDICINE

## 2021-04-24 PROCEDURE — U0005 INFEC AGEN DETEC AMPLI PROBE: HCPCS | Performed by: FAMILY MEDICINE

## 2021-04-25 LAB — SARS-COV-2 RNA RESP QL NAA+PROBE: NOT DETECTED

## 2021-04-27 ENCOUNTER — HOSPITAL ENCOUNTER (OUTPATIENT)
Facility: HOSPITAL | Age: 66
Discharge: HOME | End: 2021-04-27
Attending: FAMILY MEDICINE | Admitting: FAMILY MEDICINE
Payer: MEDICAID

## 2021-04-27 ENCOUNTER — ANESTHESIA EVENT (OUTPATIENT)
Dept: ENDOSCOPY | Facility: HOSPITAL | Age: 66
End: 2021-04-27
Payer: MEDICAID

## 2021-04-27 ENCOUNTER — ANESTHESIA (OUTPATIENT)
Dept: ENDOSCOPY | Facility: HOSPITAL | Age: 66
End: 2021-04-27
Payer: MEDICAID

## 2021-04-27 DIAGNOSIS — K57.30 DIVERTICULOSIS OF COLON: ICD-10-CM

## 2021-04-27 DIAGNOSIS — K63.5 POLYP OF COLON, UNSPECIFIED PART OF COLON, UNSPECIFIED TYPE: ICD-10-CM

## 2021-04-27 DIAGNOSIS — Z83.719 FAMILY HISTORY OF COLONIC POLYPS: ICD-10-CM

## 2021-04-27 DIAGNOSIS — Z12.11 COLON CANCER SCREENING: Primary | ICD-10-CM

## 2021-04-27 LAB — POCT GLUCOSE: 153 MG/DL (ref 70–110)

## 2021-04-27 PROCEDURE — 0811 HC ANESTHESIA EA ADD 15 MINS: Performed by: FAMILY MEDICINE

## 2021-04-27 PROCEDURE — 27201089 HC SNARE, DISP (ANY): Performed by: FAMILY MEDICINE

## 2021-04-27 PROCEDURE — 45385 PR COLONOSCOPY,REMV LESN,SNARE: ICD-10-PCS | Mod: ,,, | Performed by: FAMILY MEDICINE

## 2021-04-27 PROCEDURE — 00811 ANES LWR INTST NDSC NOS: CPT | Performed by: FAMILY MEDICINE

## 2021-04-27 PROCEDURE — 37000009 HC ANESTHESIA EA ADD 15 MINS: Performed by: FAMILY MEDICINE

## 2021-04-27 PROCEDURE — 88305 TISSUE EXAM BY PATHOLOGIST: CPT | Mod: 59 | Performed by: PATHOLOGY

## 2021-04-27 PROCEDURE — 88305 TISSUE EXAM BY PATHOLOGIST: ICD-10-PCS | Mod: 26,,, | Performed by: PATHOLOGY

## 2021-04-27 PROCEDURE — 45385 COLONOSCOPY W/LESION REMOVAL: CPT | Performed by: FAMILY MEDICINE

## 2021-04-27 PROCEDURE — 88305 TISSUE EXAM BY PATHOLOGIST: CPT | Mod: 26,,, | Performed by: PATHOLOGY

## 2021-04-27 PROCEDURE — 25000003 PHARM REV CODE 250: Performed by: NURSE ANESTHETIST, CERTIFIED REGISTERED

## 2021-04-27 PROCEDURE — 82962 GLUCOSE BLOOD TEST: CPT | Performed by: FAMILY MEDICINE

## 2021-04-27 PROCEDURE — 37000008 HC ANESTHESIA 1ST 15 MINUTES: Performed by: FAMILY MEDICINE

## 2021-04-27 PROCEDURE — 0811 HC ANESTHESIA 1ST 15 MINUTES: Performed by: FAMILY MEDICINE

## 2021-04-27 PROCEDURE — 63600175 PHARM REV CODE 636 W HCPCS: Performed by: NURSE ANESTHETIST, CERTIFIED REGISTERED

## 2021-04-27 PROCEDURE — 45385 COLONOSCOPY W/LESION REMOVAL: CPT | Mod: ,,, | Performed by: FAMILY MEDICINE

## 2021-04-27 RX ORDER — SODIUM CHLORIDE, SODIUM LACTATE, POTASSIUM CHLORIDE, CALCIUM CHLORIDE 600; 310; 30; 20 MG/100ML; MG/100ML; MG/100ML; MG/100ML
INJECTION, SOLUTION INTRAVENOUS CONTINUOUS
Status: DISCONTINUED | OUTPATIENT
Start: 2021-04-27 | End: 2021-04-27 | Stop reason: HOSPADM

## 2021-04-27 RX ORDER — LIDOCAINE HYDROCHLORIDE 10 MG/ML
INJECTION, SOLUTION EPIDURAL; INFILTRATION; INTRACAUDAL; PERINEURAL
Status: DISCONTINUED | OUTPATIENT
Start: 2021-04-27 | End: 2021-04-27

## 2021-04-27 RX ORDER — PROPOFOL 10 MG/ML
VIAL (ML) INTRAVENOUS
Status: DISCONTINUED | OUTPATIENT
Start: 2021-04-27 | End: 2021-04-27

## 2021-04-27 RX ORDER — SODIUM CHLORIDE, SODIUM LACTATE, POTASSIUM CHLORIDE, CALCIUM CHLORIDE 600; 310; 30; 20 MG/100ML; MG/100ML; MG/100ML; MG/100ML
INJECTION, SOLUTION INTRAVENOUS CONTINUOUS PRN
Status: DISCONTINUED | OUTPATIENT
Start: 2021-04-27 | End: 2021-04-27

## 2021-04-27 RX ADMIN — PROPOFOL 30 MG: 10 INJECTION, EMULSION INTRAVENOUS at 07:04

## 2021-04-27 RX ADMIN — PROPOFOL 40 MG: 10 INJECTION, EMULSION INTRAVENOUS at 07:04

## 2021-04-27 RX ADMIN — PROPOFOL 70 MG: 10 INJECTION, EMULSION INTRAVENOUS at 07:04

## 2021-04-27 RX ADMIN — LIDOCAINE HYDROCHLORIDE 50 MG: 10 INJECTION, SOLUTION EPIDURAL; INFILTRATION; INTRACAUDAL; PERINEURAL at 07:04

## 2021-04-27 RX ADMIN — SODIUM CHLORIDE, SODIUM LACTATE, POTASSIUM CHLORIDE, AND CALCIUM CHLORIDE: 600; 310; 30; 20 INJECTION, SOLUTION INTRAVENOUS at 06:04

## 2021-04-28 VITALS
RESPIRATION RATE: 18 BRPM | BODY MASS INDEX: 31.52 KG/M2 | DIASTOLIC BLOOD PRESSURE: 67 MMHG | SYSTOLIC BLOOD PRESSURE: 95 MMHG | TEMPERATURE: 98 F | OXYGEN SATURATION: 96 % | HEART RATE: 78 BPM | WEIGHT: 171.31 LBS | HEIGHT: 62 IN

## 2021-04-30 LAB
FINAL PATHOLOGIC DIAGNOSIS: NORMAL
GROSS: NORMAL
Lab: NORMAL

## 2021-05-06 ENCOUNTER — OFFICE VISIT (OUTPATIENT)
Dept: FAMILY MEDICINE | Facility: CLINIC | Age: 66
End: 2021-05-06
Payer: MEDICAID

## 2021-05-06 VITALS
HEIGHT: 62 IN | BODY MASS INDEX: 32.2 KG/M2 | TEMPERATURE: 98 F | WEIGHT: 175 LBS | OXYGEN SATURATION: 95 % | HEART RATE: 77 BPM | DIASTOLIC BLOOD PRESSURE: 67 MMHG | RESPIRATION RATE: 12 BRPM | SYSTOLIC BLOOD PRESSURE: 110 MMHG

## 2021-05-06 DIAGNOSIS — E11.65 TYPE 2 DIABETES MELLITUS WITH HYPERGLYCEMIA, WITHOUT LONG-TERM CURRENT USE OF INSULIN: ICD-10-CM

## 2021-05-06 DIAGNOSIS — Z00.00 WELL ADULT EXAM: Primary | ICD-10-CM

## 2021-05-06 DIAGNOSIS — Z79.899 ENCOUNTER FOR LONG-TERM (CURRENT) USE OF MEDICATIONS: ICD-10-CM

## 2021-05-06 DIAGNOSIS — Z13.6 ENCOUNTER FOR LIPID SCREENING FOR CARDIOVASCULAR DISEASE: ICD-10-CM

## 2021-05-06 DIAGNOSIS — Z13.220 ENCOUNTER FOR LIPID SCREENING FOR CARDIOVASCULAR DISEASE: ICD-10-CM

## 2021-05-06 PROCEDURE — 99214 OFFICE O/P EST MOD 30 MIN: CPT | Mod: PBBFAC,PO | Performed by: FAMILY MEDICINE

## 2021-05-06 PROCEDURE — 99999 PR PBB SHADOW E&M-EST. PATIENT-LVL IV: ICD-10-PCS | Mod: PBBFAC,,, | Performed by: FAMILY MEDICINE

## 2021-05-06 PROCEDURE — 99396 PREV VISIT EST AGE 40-64: CPT | Mod: S$PBB,,, | Performed by: FAMILY MEDICINE

## 2021-05-06 PROCEDURE — 99999 PR PBB SHADOW E&M-EST. PATIENT-LVL IV: CPT | Mod: PBBFAC,,, | Performed by: FAMILY MEDICINE

## 2021-05-06 PROCEDURE — 99396 PR PREVENTIVE VISIT,EST,40-64: ICD-10-PCS | Mod: S$PBB,,, | Performed by: FAMILY MEDICINE

## 2021-05-06 RX ORDER — SEMAGLUTIDE 1.34 MG/ML
INJECTION, SOLUTION SUBCUTANEOUS
Qty: 2 PEN | Refills: 5 | Status: SHIPPED | OUTPATIENT
Start: 2021-05-06 | End: 2021-07-06 | Stop reason: ALTCHOICE

## 2021-05-12 ENCOUNTER — TELEPHONE (OUTPATIENT)
Dept: FAMILY MEDICINE | Facility: CLINIC | Age: 66
End: 2021-05-12

## 2021-05-13 ENCOUNTER — PATIENT MESSAGE (OUTPATIENT)
Dept: FAMILY MEDICINE | Facility: CLINIC | Age: 66
End: 2021-05-13

## 2021-06-25 LAB
LEFT EYE DM RETINOPATHY: NEGATIVE
RIGHT EYE DM RETINOPATHY: NEGATIVE

## 2021-07-04 ENCOUNTER — PATIENT MESSAGE (OUTPATIENT)
Dept: FAMILY MEDICINE | Facility: CLINIC | Age: 66
End: 2021-07-04

## 2021-07-04 DIAGNOSIS — E11.65 TYPE 2 DIABETES MELLITUS WITH HYPERGLYCEMIA, WITHOUT LONG-TERM CURRENT USE OF INSULIN: Primary | ICD-10-CM

## 2021-07-06 ENCOUNTER — TELEPHONE (OUTPATIENT)
Dept: FAMILY MEDICINE | Facility: CLINIC | Age: 66
End: 2021-07-06

## 2021-07-06 ENCOUNTER — PATIENT OUTREACH (OUTPATIENT)
Dept: ADMINISTRATIVE | Facility: HOSPITAL | Age: 66
End: 2021-07-06

## 2021-07-06 DIAGNOSIS — E11.65 TYPE 2 DIABETES MELLITUS WITH HYPERGLYCEMIA, WITHOUT LONG-TERM CURRENT USE OF INSULIN: Primary | ICD-10-CM

## 2021-07-06 RX ORDER — DAPAGLIFLOZIN 5 MG/1
5 TABLET, FILM COATED ORAL DAILY
Qty: 30 TABLET | Refills: 1 | Status: SHIPPED | OUTPATIENT
Start: 2021-07-06 | End: 2021-09-02

## 2021-07-06 RX ORDER — DULAGLUTIDE 0.75 MG/.5ML
0.75 INJECTION, SOLUTION SUBCUTANEOUS
Qty: 1 PEN | Refills: 0 | Status: SHIPPED | OUTPATIENT
Start: 2021-07-06 | End: 2021-07-06 | Stop reason: ALTCHOICE

## 2021-07-07 ENCOUNTER — PATIENT MESSAGE (OUTPATIENT)
Dept: FAMILY MEDICINE | Facility: CLINIC | Age: 66
End: 2021-07-07

## 2021-07-07 DIAGNOSIS — R10.9 FLANK PAIN: Primary | ICD-10-CM

## 2021-07-08 ENCOUNTER — PATIENT MESSAGE (OUTPATIENT)
Dept: FAMILY MEDICINE | Facility: CLINIC | Age: 66
End: 2021-07-08

## 2021-07-08 ENCOUNTER — LAB VISIT (OUTPATIENT)
Dept: LAB | Facility: HOSPITAL | Age: 66
End: 2021-07-08
Attending: FAMILY MEDICINE
Payer: MEDICARE

## 2021-07-08 DIAGNOSIS — N39.0 URINARY TRACT INFECTION WITHOUT HEMATURIA, SITE UNSPECIFIED: Primary | ICD-10-CM

## 2021-07-08 DIAGNOSIS — R10.9 FLANK PAIN: ICD-10-CM

## 2021-07-08 LAB
BACTERIA #/AREA URNS HPF: ABNORMAL /HPF
BILIRUB UR QL STRIP: NEGATIVE
CLARITY UR: CLEAR
COLOR UR: YELLOW
GLUCOSE UR QL STRIP: NEGATIVE
HGB UR QL STRIP: ABNORMAL
KETONES UR QL STRIP: NEGATIVE
LEUKOCYTE ESTERASE UR QL STRIP: ABNORMAL
MICROSCOPIC COMMENT: ABNORMAL
NITRITE UR QL STRIP: POSITIVE
PH UR STRIP: 6 [PH] (ref 5–8)
PROT UR QL STRIP: NEGATIVE
RBC #/AREA URNS HPF: 1 /HPF (ref 0–4)
SP GR UR STRIP: 1.02 (ref 1–1.03)
SQUAMOUS #/AREA URNS HPF: 5 /HPF
URN SPEC COLLECT METH UR: ABNORMAL
WBC #/AREA URNS HPF: 30 /HPF (ref 0–5)

## 2021-07-08 PROCEDURE — 87077 CULTURE AEROBIC IDENTIFY: CPT | Performed by: FAMILY MEDICINE

## 2021-07-08 PROCEDURE — 87088 URINE BACTERIA CULTURE: CPT | Performed by: FAMILY MEDICINE

## 2021-07-08 PROCEDURE — 87186 SC STD MICRODIL/AGAR DIL: CPT | Performed by: FAMILY MEDICINE

## 2021-07-08 PROCEDURE — 81000 URINALYSIS NONAUTO W/SCOPE: CPT | Mod: PO | Performed by: FAMILY MEDICINE

## 2021-07-08 PROCEDURE — 87086 URINE CULTURE/COLONY COUNT: CPT | Performed by: FAMILY MEDICINE

## 2021-07-08 RX ORDER — CIPROFLOXACIN 500 MG/1
500 TABLET ORAL 2 TIMES DAILY
Qty: 14 TABLET | Refills: 0 | Status: SHIPPED | OUTPATIENT
Start: 2021-07-08 | End: 2021-07-15

## 2021-07-12 LAB — BACTERIA UR CULT: ABNORMAL

## 2021-07-15 ENCOUNTER — PATIENT MESSAGE (OUTPATIENT)
Dept: FAMILY MEDICINE | Facility: CLINIC | Age: 66
End: 2021-07-15

## 2021-07-15 DIAGNOSIS — Z00.00 ANNUAL PHYSICAL EXAM: ICD-10-CM

## 2021-07-15 DIAGNOSIS — Z79.899 ENCOUNTER FOR LONG-TERM (CURRENT) USE OF MEDICATIONS: ICD-10-CM

## 2021-07-15 RX ORDER — AMLODIPINE BESYLATE 10 MG/1
TABLET ORAL
Qty: 90 TABLET | Refills: 4 | Status: SHIPPED | OUTPATIENT
Start: 2021-07-15 | End: 2022-10-07

## 2021-07-15 RX ORDER — AMLODIPINE BESYLATE 10 MG/1
10 TABLET ORAL DAILY
Qty: 90 TABLET | Refills: 4 | Status: SHIPPED | OUTPATIENT
Start: 2021-07-15 | End: 2021-11-11

## 2021-08-01 ENCOUNTER — PATIENT MESSAGE (OUTPATIENT)
Dept: FAMILY MEDICINE | Facility: CLINIC | Age: 66
End: 2021-08-01

## 2021-08-02 DIAGNOSIS — B37.9 YEAST INFECTION: ICD-10-CM

## 2021-08-02 DIAGNOSIS — Z79.899 ENCOUNTER FOR LONG-TERM (CURRENT) USE OF MEDICATIONS: Primary | ICD-10-CM

## 2021-08-02 RX ORDER — FLUCONAZOLE 150 MG/1
150 TABLET ORAL
Qty: 3 TABLET | Refills: 0 | Status: SHIPPED | OUTPATIENT
Start: 2021-08-02 | End: 2021-08-09 | Stop reason: SDUPTHER

## 2021-08-02 RX ORDER — FLUCONAZOLE 150 MG/1
150 TABLET ORAL DAILY
Qty: 1 TABLET | Refills: 0 | Status: CANCELLED | OUTPATIENT
Start: 2021-08-02 | End: 2021-08-03

## 2021-08-05 ENCOUNTER — LAB VISIT (OUTPATIENT)
Dept: LAB | Facility: HOSPITAL | Age: 66
End: 2021-08-05
Attending: FAMILY MEDICINE
Payer: MEDICARE

## 2021-08-05 DIAGNOSIS — Z00.00 ANNUAL PHYSICAL EXAM: ICD-10-CM

## 2021-08-05 DIAGNOSIS — E11.9 TYPE 2 DIABETES MELLITUS WITHOUT COMPLICATION, WITHOUT LONG-TERM CURRENT USE OF INSULIN: ICD-10-CM

## 2021-08-05 DIAGNOSIS — Z79.899 ENCOUNTER FOR LONG-TERM (CURRENT) USE OF MEDICATIONS: ICD-10-CM

## 2021-08-05 LAB
ALBUMIN SERPL BCP-MCNC: 3.6 G/DL (ref 3.5–5.2)
ALP SERPL-CCNC: 67 U/L (ref 55–135)
ALT SERPL W/O P-5'-P-CCNC: 17 U/L (ref 10–44)
ANION GAP SERPL CALC-SCNC: 7 MMOL/L (ref 8–16)
AST SERPL-CCNC: 14 U/L (ref 10–40)
BILIRUB SERPL-MCNC: 0.5 MG/DL (ref 0.1–1)
BUN SERPL-MCNC: 16 MG/DL (ref 8–23)
CALCIUM SERPL-MCNC: 9.7 MG/DL (ref 8.7–10.5)
CHLORIDE SERPL-SCNC: 104 MMOL/L (ref 95–110)
CHOLEST SERPL-MCNC: 171 MG/DL (ref 120–199)
CHOLEST/HDLC SERPL: 3.5 {RATIO} (ref 2–5)
CO2 SERPL-SCNC: 30 MMOL/L (ref 23–29)
CREAT SERPL-MCNC: 0.7 MG/DL (ref 0.5–1.4)
ERYTHROCYTE [DISTWIDTH] IN BLOOD BY AUTOMATED COUNT: 14.9 % (ref 11.5–14.5)
EST. GFR  (AFRICAN AMERICAN): >60 ML/MIN/1.73 M^2
EST. GFR  (NON AFRICAN AMERICAN): >60 ML/MIN/1.73 M^2
ESTIMATED AVG GLUCOSE: 148 MG/DL (ref 68–131)
GLUCOSE SERPL-MCNC: 162 MG/DL (ref 70–110)
HBA1C MFR BLD: 6.8 % (ref 4–5.6)
HCT VFR BLD AUTO: 48.7 % (ref 37–48.5)
HDLC SERPL-MCNC: 49 MG/DL (ref 40–75)
HDLC SERPL: 28.7 % (ref 20–50)
HGB BLD-MCNC: 15.6 G/DL (ref 12–16)
LDLC SERPL CALC-MCNC: 99.4 MG/DL (ref 63–159)
MCH RBC QN AUTO: 29.5 PG (ref 27–31)
MCHC RBC AUTO-ENTMCNC: 32 G/DL (ref 32–36)
MCV RBC AUTO: 92 FL (ref 82–98)
NONHDLC SERPL-MCNC: 122 MG/DL
PLATELET # BLD AUTO: 186 K/UL (ref 150–450)
PMV BLD AUTO: 12.2 FL (ref 9.2–12.9)
POTASSIUM SERPL-SCNC: 3.8 MMOL/L (ref 3.5–5.1)
PROT SERPL-MCNC: 7.1 G/DL (ref 6–8.4)
RBC # BLD AUTO: 5.29 M/UL (ref 4–5.4)
SODIUM SERPL-SCNC: 141 MMOL/L (ref 136–145)
TRIGL SERPL-MCNC: 113 MG/DL (ref 30–150)
TSH SERPL DL<=0.005 MIU/L-ACNC: 1.53 UIU/ML (ref 0.4–4)
WBC # BLD AUTO: 6.99 K/UL (ref 3.9–12.7)

## 2021-08-05 PROCEDURE — 80061 LIPID PANEL: CPT | Performed by: FAMILY MEDICINE

## 2021-08-05 PROCEDURE — 80053 COMPREHEN METABOLIC PANEL: CPT | Performed by: FAMILY MEDICINE

## 2021-08-05 PROCEDURE — 36415 COLL VENOUS BLD VENIPUNCTURE: CPT | Mod: PO | Performed by: FAMILY MEDICINE

## 2021-08-05 PROCEDURE — 84443 ASSAY THYROID STIM HORMONE: CPT | Performed by: FAMILY MEDICINE

## 2021-08-05 PROCEDURE — 83036 HEMOGLOBIN GLYCOSYLATED A1C: CPT | Performed by: FAMILY MEDICINE

## 2021-08-05 PROCEDURE — 85027 COMPLETE CBC AUTOMATED: CPT | Performed by: FAMILY MEDICINE

## 2021-08-09 ENCOUNTER — PATIENT MESSAGE (OUTPATIENT)
Dept: FAMILY MEDICINE | Facility: CLINIC | Age: 66
End: 2021-08-09

## 2021-08-09 ENCOUNTER — OFFICE VISIT (OUTPATIENT)
Dept: FAMILY MEDICINE | Facility: CLINIC | Age: 66
End: 2021-08-09
Payer: MEDICARE

## 2021-08-09 DIAGNOSIS — B37.9 YEAST INFECTION: ICD-10-CM

## 2021-08-09 DIAGNOSIS — L02.91 ABSCESS: ICD-10-CM

## 2021-08-09 PROCEDURE — 1160F RVW MEDS BY RX/DR IN RCRD: CPT | Mod: CPTII,95,, | Performed by: NURSE PRACTITIONER

## 2021-08-09 PROCEDURE — 1159F PR MEDICATION LIST DOCUMENTED IN MEDICAL RECORD: ICD-10-PCS | Mod: CPTII,95,, | Performed by: NURSE PRACTITIONER

## 2021-08-09 PROCEDURE — 1159F MED LIST DOCD IN RCRD: CPT | Mod: CPTII,95,, | Performed by: NURSE PRACTITIONER

## 2021-08-09 PROCEDURE — 99213 PR OFFICE/OUTPT VISIT, EST, LEVL III, 20-29 MIN: ICD-10-PCS | Mod: 95,,, | Performed by: NURSE PRACTITIONER

## 2021-08-09 PROCEDURE — 3044F PR MOST RECENT HEMOGLOBIN A1C LEVEL <7.0%: ICD-10-PCS | Mod: CPTII,95,, | Performed by: NURSE PRACTITIONER

## 2021-08-09 PROCEDURE — 99213 OFFICE O/P EST LOW 20 MIN: CPT | Mod: 95,,, | Performed by: NURSE PRACTITIONER

## 2021-08-09 PROCEDURE — 1160F PR REVIEW ALL MEDS BY PRESCRIBER/CLIN PHARMACIST DOCUMENTED: ICD-10-PCS | Mod: CPTII,95,, | Performed by: NURSE PRACTITIONER

## 2021-08-09 PROCEDURE — 3044F HG A1C LEVEL LT 7.0%: CPT | Mod: CPTII,95,, | Performed by: NURSE PRACTITIONER

## 2021-08-09 RX ORDER — SULFAMETHOXAZOLE AND TRIMETHOPRIM 800; 160 MG/1; MG/1
1 TABLET ORAL 2 TIMES DAILY
Qty: 20 TABLET | Refills: 0 | Status: SHIPPED | OUTPATIENT
Start: 2021-08-09 | End: 2021-08-19

## 2021-08-09 RX ORDER — FLUCONAZOLE 150 MG/1
150 TABLET ORAL
Qty: 3 TABLET | Refills: 0 | Status: SHIPPED | OUTPATIENT
Start: 2021-08-09 | End: 2021-08-16

## 2021-08-09 RX ORDER — MUPIROCIN 20 MG/G
OINTMENT TOPICAL 3 TIMES DAILY
Qty: 22 G | Refills: 2 | Status: SHIPPED | OUTPATIENT
Start: 2021-08-09 | End: 2022-09-13 | Stop reason: SDUPTHER

## 2021-08-20 ENCOUNTER — PATIENT MESSAGE (OUTPATIENT)
Dept: FAMILY MEDICINE | Facility: CLINIC | Age: 66
End: 2021-08-20

## 2021-08-20 DIAGNOSIS — E11.65 TYPE 2 DIABETES MELLITUS WITH HYPERGLYCEMIA, WITHOUT LONG-TERM CURRENT USE OF INSULIN: Primary | ICD-10-CM

## 2021-08-21 RX ORDER — INSULIN PUMP SYRINGE, 3 ML
EACH MISCELLANEOUS
Qty: 1 EACH | Refills: 0 | Status: SHIPPED | OUTPATIENT
Start: 2021-08-21 | End: 2022-07-14 | Stop reason: SDUPTHER

## 2021-08-21 RX ORDER — LANCETS
EACH MISCELLANEOUS
Qty: 200 EACH | Refills: 99 | Status: SHIPPED | OUTPATIENT
Start: 2021-08-21 | End: 2022-07-14 | Stop reason: SDUPTHER

## 2021-08-24 DIAGNOSIS — E11.65 TYPE 2 DIABETES MELLITUS WITH HYPERGLYCEMIA, WITHOUT LONG-TERM CURRENT USE OF INSULIN: ICD-10-CM

## 2021-09-02 ENCOUNTER — PATIENT MESSAGE (OUTPATIENT)
Dept: FAMILY MEDICINE | Facility: CLINIC | Age: 66
End: 2021-09-02

## 2021-09-02 ENCOUNTER — TELEPHONE (OUTPATIENT)
Dept: ADMINISTRATIVE | Facility: HOSPITAL | Age: 66
End: 2021-09-02

## 2021-09-21 ENCOUNTER — PATIENT MESSAGE (OUTPATIENT)
Dept: FAMILY MEDICINE | Facility: CLINIC | Age: 66
End: 2021-09-21

## 2021-09-21 DIAGNOSIS — E11.65 TYPE 2 DIABETES MELLITUS WITH HYPERGLYCEMIA, WITHOUT LONG-TERM CURRENT USE OF INSULIN: ICD-10-CM

## 2021-09-21 RX ORDER — DAPAGLIFLOZIN 5 MG/1
TABLET, FILM COATED ORAL
Qty: 90 TABLET | Refills: 4 | Status: SHIPPED | OUTPATIENT
Start: 2021-09-21 | End: 2021-09-23 | Stop reason: SDUPTHER

## 2021-09-23 DIAGNOSIS — E11.65 TYPE 2 DIABETES MELLITUS WITH HYPERGLYCEMIA, WITHOUT LONG-TERM CURRENT USE OF INSULIN: ICD-10-CM

## 2021-09-23 RX ORDER — DAPAGLIFLOZIN 5 MG/1
5 TABLET, FILM COATED ORAL DAILY
Qty: 90 TABLET | Refills: 4 | Status: SHIPPED | OUTPATIENT
Start: 2021-09-23 | End: 2021-11-12 | Stop reason: SINTOL

## 2021-10-07 ENCOUNTER — IMMUNIZATION (OUTPATIENT)
Dept: FAMILY MEDICINE | Facility: CLINIC | Age: 66
End: 2021-10-07
Payer: MEDICARE

## 2021-10-07 PROCEDURE — 90686 IIV4 VACC NO PRSV 0.5 ML IM: CPT | Mod: S$GLB,,, | Performed by: FAMILY MEDICINE

## 2021-10-07 PROCEDURE — G0008 ADMIN INFLUENZA VIRUS VAC: HCPCS | Mod: S$GLB,,, | Performed by: FAMILY MEDICINE

## 2021-10-07 PROCEDURE — G0008 FLU VACCINE (QUAD) GREATER THAN OR EQUAL TO 3YO PRESERVATIVE FREE IM: ICD-10-PCS | Mod: S$GLB,,, | Performed by: FAMILY MEDICINE

## 2021-10-07 PROCEDURE — 90686 FLU VACCINE (QUAD) GREATER THAN OR EQUAL TO 3YO PRESERVATIVE FREE IM: ICD-10-PCS | Mod: S$GLB,,, | Performed by: FAMILY MEDICINE

## 2021-10-12 ENCOUNTER — OFFICE VISIT (OUTPATIENT)
Dept: FAMILY MEDICINE | Facility: CLINIC | Age: 66
End: 2021-10-12
Payer: MEDICARE

## 2021-10-12 VITALS
WEIGHT: 175.5 LBS | HEART RATE: 67 BPM | BODY MASS INDEX: 32.3 KG/M2 | TEMPERATURE: 98 F | HEIGHT: 62 IN | DIASTOLIC BLOOD PRESSURE: 72 MMHG | OXYGEN SATURATION: 97 % | SYSTOLIC BLOOD PRESSURE: 122 MMHG

## 2021-10-12 DIAGNOSIS — N39.0 URINARY TRACT INFECTION WITHOUT HEMATURIA, SITE UNSPECIFIED: ICD-10-CM

## 2021-10-12 DIAGNOSIS — R30.0 DYSURIA: ICD-10-CM

## 2021-10-12 LAB
BACTERIA #/AREA URNS HPF: NORMAL /HPF
BILIRUB UR QL STRIP: NEGATIVE
CLARITY UR: CLEAR
COLOR UR: YELLOW
GLUCOSE UR QL STRIP: ABNORMAL
HGB UR QL STRIP: ABNORMAL
KETONES UR QL STRIP: NEGATIVE
LEUKOCYTE ESTERASE UR QL STRIP: ABNORMAL
MICROSCOPIC COMMENT: NORMAL
NITRITE UR QL STRIP: NEGATIVE
PH UR STRIP: 6 [PH] (ref 5–8)
PROT UR QL STRIP: NEGATIVE
RBC #/AREA URNS HPF: 1 /HPF (ref 0–4)
SP GR UR STRIP: 1.02 (ref 1–1.03)
SQUAMOUS #/AREA URNS HPF: 2 /HPF
TRI-PHOS CRY URNS QL MICRO: NORMAL
URN SPEC COLLECT METH UR: ABNORMAL
WBC #/AREA URNS HPF: 3 /HPF (ref 0–5)
YEAST URNS QL MICRO: NORMAL

## 2021-10-12 PROCEDURE — 1159F PR MEDICATION LIST DOCUMENTED IN MEDICAL RECORD: ICD-10-PCS | Mod: CPTII,S$GLB,, | Performed by: NURSE PRACTITIONER

## 2021-10-12 PROCEDURE — 3008F PR BODY MASS INDEX (BMI) DOCUMENTED: ICD-10-PCS | Mod: CPTII,S$GLB,, | Performed by: NURSE PRACTITIONER

## 2021-10-12 PROCEDURE — 1160F PR REVIEW ALL MEDS BY PRESCRIBER/CLIN PHARMACIST DOCUMENTED: ICD-10-PCS | Mod: CPTII,S$GLB,, | Performed by: NURSE PRACTITIONER

## 2021-10-12 PROCEDURE — 3044F HG A1C LEVEL LT 7.0%: CPT | Mod: CPTII,S$GLB,, | Performed by: NURSE PRACTITIONER

## 2021-10-12 PROCEDURE — 99213 PR OFFICE/OUTPT VISIT, EST, LEVL III, 20-29 MIN: ICD-10-PCS | Mod: S$GLB,,, | Performed by: NURSE PRACTITIONER

## 2021-10-12 PROCEDURE — 3074F SYST BP LT 130 MM HG: CPT | Mod: CPTII,S$GLB,, | Performed by: NURSE PRACTITIONER

## 2021-10-12 PROCEDURE — 3008F BODY MASS INDEX DOCD: CPT | Mod: CPTII,S$GLB,, | Performed by: NURSE PRACTITIONER

## 2021-10-12 PROCEDURE — 1160F RVW MEDS BY RX/DR IN RCRD: CPT | Mod: CPTII,S$GLB,, | Performed by: NURSE PRACTITIONER

## 2021-10-12 PROCEDURE — 3078F DIAST BP <80 MM HG: CPT | Mod: CPTII,S$GLB,, | Performed by: NURSE PRACTITIONER

## 2021-10-12 PROCEDURE — 3078F PR MOST RECENT DIASTOLIC BLOOD PRESSURE < 80 MM HG: ICD-10-PCS | Mod: CPTII,S$GLB,, | Performed by: NURSE PRACTITIONER

## 2021-10-12 PROCEDURE — 99999 PR PBB SHADOW E&M-EST. PATIENT-LVL IV: CPT | Mod: PBBFAC,,, | Performed by: NURSE PRACTITIONER

## 2021-10-12 PROCEDURE — 99999 PR PBB SHADOW E&M-EST. PATIENT-LVL IV: ICD-10-PCS | Mod: PBBFAC,,, | Performed by: NURSE PRACTITIONER

## 2021-10-12 PROCEDURE — 1159F MED LIST DOCD IN RCRD: CPT | Mod: CPTII,S$GLB,, | Performed by: NURSE PRACTITIONER

## 2021-10-12 PROCEDURE — 3044F PR MOST RECENT HEMOGLOBIN A1C LEVEL <7.0%: ICD-10-PCS | Mod: CPTII,S$GLB,, | Performed by: NURSE PRACTITIONER

## 2021-10-12 PROCEDURE — 99213 OFFICE O/P EST LOW 20 MIN: CPT | Mod: S$GLB,,, | Performed by: NURSE PRACTITIONER

## 2021-10-12 PROCEDURE — 81000 URINALYSIS NONAUTO W/SCOPE: CPT | Mod: PO | Performed by: NURSE PRACTITIONER

## 2021-10-12 PROCEDURE — 3074F PR MOST RECENT SYSTOLIC BLOOD PRESSURE < 130 MM HG: ICD-10-PCS | Mod: CPTII,S$GLB,, | Performed by: NURSE PRACTITIONER

## 2021-10-12 RX ORDER — SULFAMETHOXAZOLE AND TRIMETHOPRIM 800; 160 MG/1; MG/1
1 TABLET ORAL 2 TIMES DAILY
Qty: 10 TABLET | Refills: 0 | Status: SHIPPED | OUTPATIENT
Start: 2021-10-12 | End: 2021-10-17

## 2021-10-12 RX ORDER — FLUCONAZOLE 200 MG/1
200 TABLET ORAL DAILY
Qty: 2 TABLET | Refills: 0 | Status: SHIPPED | OUTPATIENT
Start: 2021-10-12 | End: 2021-11-11

## 2021-11-04 ENCOUNTER — PATIENT MESSAGE (OUTPATIENT)
Dept: FAMILY MEDICINE | Facility: CLINIC | Age: 66
End: 2021-11-04
Payer: MEDICARE

## 2021-11-11 ENCOUNTER — LAB VISIT (OUTPATIENT)
Dept: LAB | Facility: HOSPITAL | Age: 66
End: 2021-11-11
Attending: FAMILY MEDICINE
Payer: MEDICARE

## 2021-11-11 ENCOUNTER — OFFICE VISIT (OUTPATIENT)
Dept: FAMILY MEDICINE | Facility: CLINIC | Age: 66
End: 2021-11-11
Payer: MEDICARE

## 2021-11-11 VITALS
WEIGHT: 174.69 LBS | HEART RATE: 75 BPM | HEIGHT: 62 IN | SYSTOLIC BLOOD PRESSURE: 117 MMHG | TEMPERATURE: 97 F | BODY MASS INDEX: 32.15 KG/M2 | OXYGEN SATURATION: 96 % | DIASTOLIC BLOOD PRESSURE: 66 MMHG

## 2021-11-11 DIAGNOSIS — A49.9 BACTERIAL INFECTION: ICD-10-CM

## 2021-11-11 DIAGNOSIS — I15.2 HYPERTENSION ASSOCIATED WITH DIABETES: ICD-10-CM

## 2021-11-11 DIAGNOSIS — Z00.00 ANNUAL PHYSICAL EXAM: ICD-10-CM

## 2021-11-11 DIAGNOSIS — Z79.899 ENCOUNTER FOR LONG-TERM (CURRENT) USE OF MEDICATIONS: ICD-10-CM

## 2021-11-11 DIAGNOSIS — E11.9 TYPE 2 DIABETES MELLITUS WITHOUT COMPLICATION, WITHOUT LONG-TERM CURRENT USE OF INSULIN: ICD-10-CM

## 2021-11-11 DIAGNOSIS — E11.59 HYPERTENSION ASSOCIATED WITH DIABETES: ICD-10-CM

## 2021-11-11 DIAGNOSIS — B37.9 YEAST INFECTION: ICD-10-CM

## 2021-11-11 DIAGNOSIS — E11.65 TYPE 2 DIABETES MELLITUS WITH HYPERGLYCEMIA, WITHOUT LONG-TERM CURRENT USE OF INSULIN: Primary | ICD-10-CM

## 2021-11-11 LAB
ALBUMIN SERPL BCP-MCNC: 3.6 G/DL (ref 3.5–5.2)
ALP SERPL-CCNC: 66 U/L (ref 55–135)
ALT SERPL W/O P-5'-P-CCNC: 17 U/L (ref 10–44)
ANION GAP SERPL CALC-SCNC: 10 MMOL/L (ref 8–16)
AST SERPL-CCNC: 15 U/L (ref 10–40)
BILIRUB SERPL-MCNC: 0.3 MG/DL (ref 0.1–1)
BUN SERPL-MCNC: 14 MG/DL (ref 8–23)
CALCIUM SERPL-MCNC: 9.9 MG/DL (ref 8.7–10.5)
CHLORIDE SERPL-SCNC: 103 MMOL/L (ref 95–110)
CO2 SERPL-SCNC: 26 MMOL/L (ref 23–29)
CREAT SERPL-MCNC: 0.8 MG/DL (ref 0.5–1.4)
ERYTHROCYTE [DISTWIDTH] IN BLOOD BY AUTOMATED COUNT: 15.1 % (ref 11.5–14.5)
EST. GFR  (AFRICAN AMERICAN): >60 ML/MIN/1.73 M^2
EST. GFR  (NON AFRICAN AMERICAN): >60 ML/MIN/1.73 M^2
ESTIMATED AVG GLUCOSE: 154 MG/DL (ref 68–131)
GLUCOSE SERPL-MCNC: 110 MG/DL (ref 70–110)
HBA1C MFR BLD: 7 % (ref 4–5.6)
HCT VFR BLD AUTO: 52.3 % (ref 37–48.5)
HGB BLD-MCNC: 16.8 G/DL (ref 12–16)
MCH RBC QN AUTO: 30.1 PG (ref 27–31)
MCHC RBC AUTO-ENTMCNC: 32.1 G/DL (ref 32–36)
MCV RBC AUTO: 94 FL (ref 82–98)
PLATELET # BLD AUTO: 176 K/UL (ref 150–450)
PMV BLD AUTO: 12 FL (ref 9.2–12.9)
POTASSIUM SERPL-SCNC: 4.4 MMOL/L (ref 3.5–5.1)
PROT SERPL-MCNC: 7.8 G/DL (ref 6–8.4)
RBC # BLD AUTO: 5.58 M/UL (ref 4–5.4)
SODIUM SERPL-SCNC: 139 MMOL/L (ref 136–145)
WBC # BLD AUTO: 6.17 K/UL (ref 3.9–12.7)

## 2021-11-11 PROCEDURE — 3066F NEPHROPATHY DOC TX: CPT | Mod: CPTII,S$GLB,, | Performed by: FAMILY MEDICINE

## 2021-11-11 PROCEDURE — 3061F NEG MICROALBUMINURIA REV: CPT | Mod: CPTII,S$GLB,, | Performed by: FAMILY MEDICINE

## 2021-11-11 PROCEDURE — 99999 PR PBB SHADOW E&M-EST. PATIENT-LVL IV: CPT | Mod: PBBFAC,,, | Performed by: FAMILY MEDICINE

## 2021-11-11 PROCEDURE — 3066F PR DOCUMENTATION OF TREATMENT FOR NEPHROPATHY: ICD-10-PCS | Mod: CPTII,S$GLB,, | Performed by: FAMILY MEDICINE

## 2021-11-11 PROCEDURE — 85027 COMPLETE CBC AUTOMATED: CPT | Performed by: FAMILY MEDICINE

## 2021-11-11 PROCEDURE — 3061F PR NEG MICROALBUMINURIA RESULT DOCUMENTED/REVIEW: ICD-10-PCS | Mod: CPTII,S$GLB,, | Performed by: FAMILY MEDICINE

## 2021-11-11 PROCEDURE — 3074F SYST BP LT 130 MM HG: CPT | Mod: CPTII,S$GLB,, | Performed by: FAMILY MEDICINE

## 2021-11-11 PROCEDURE — 1160F RVW MEDS BY RX/DR IN RCRD: CPT | Mod: CPTII,S$GLB,, | Performed by: FAMILY MEDICINE

## 2021-11-11 PROCEDURE — 3074F PR MOST RECENT SYSTOLIC BLOOD PRESSURE < 130 MM HG: ICD-10-PCS | Mod: CPTII,S$GLB,, | Performed by: FAMILY MEDICINE

## 2021-11-11 PROCEDURE — 3008F PR BODY MASS INDEX (BMI) DOCUMENTED: ICD-10-PCS | Mod: CPTII,S$GLB,, | Performed by: FAMILY MEDICINE

## 2021-11-11 PROCEDURE — 83036 HEMOGLOBIN GLYCOSYLATED A1C: CPT | Performed by: FAMILY MEDICINE

## 2021-11-11 PROCEDURE — 87081 CULTURE SCREEN ONLY: CPT | Performed by: FAMILY MEDICINE

## 2021-11-11 PROCEDURE — 3008F BODY MASS INDEX DOCD: CPT | Mod: CPTII,S$GLB,, | Performed by: FAMILY MEDICINE

## 2021-11-11 PROCEDURE — 1159F PR MEDICATION LIST DOCUMENTED IN MEDICAL RECORD: ICD-10-PCS | Mod: CPTII,S$GLB,, | Performed by: FAMILY MEDICINE

## 2021-11-11 PROCEDURE — 1160F PR REVIEW ALL MEDS BY PRESCRIBER/CLIN PHARMACIST DOCUMENTED: ICD-10-PCS | Mod: CPTII,S$GLB,, | Performed by: FAMILY MEDICINE

## 2021-11-11 PROCEDURE — 99214 PR OFFICE/OUTPT VISIT, EST, LEVL IV, 30-39 MIN: ICD-10-PCS | Mod: S$GLB,,, | Performed by: FAMILY MEDICINE

## 2021-11-11 PROCEDURE — 36415 COLL VENOUS BLD VENIPUNCTURE: CPT | Mod: PO | Performed by: FAMILY MEDICINE

## 2021-11-11 PROCEDURE — 80053 COMPREHEN METABOLIC PANEL: CPT | Performed by: FAMILY MEDICINE

## 2021-11-11 PROCEDURE — 3051F HG A1C>EQUAL 7.0%<8.0%: CPT | Mod: CPTII,S$GLB,, | Performed by: FAMILY MEDICINE

## 2021-11-11 PROCEDURE — 99214 OFFICE O/P EST MOD 30 MIN: CPT | Mod: S$GLB,,, | Performed by: FAMILY MEDICINE

## 2021-11-11 PROCEDURE — 3078F PR MOST RECENT DIASTOLIC BLOOD PRESSURE < 80 MM HG: ICD-10-PCS | Mod: CPTII,S$GLB,, | Performed by: FAMILY MEDICINE

## 2021-11-11 PROCEDURE — 3051F PR MOST RECENT HEMOGLOBIN A1C LEVEL 7.0 - < 8.0%: ICD-10-PCS | Mod: CPTII,S$GLB,, | Performed by: FAMILY MEDICINE

## 2021-11-11 PROCEDURE — 1159F MED LIST DOCD IN RCRD: CPT | Mod: CPTII,S$GLB,, | Performed by: FAMILY MEDICINE

## 2021-11-11 PROCEDURE — 3078F DIAST BP <80 MM HG: CPT | Mod: CPTII,S$GLB,, | Performed by: FAMILY MEDICINE

## 2021-11-11 PROCEDURE — 99999 PR PBB SHADOW E&M-EST. PATIENT-LVL IV: ICD-10-PCS | Mod: PBBFAC,,, | Performed by: FAMILY MEDICINE

## 2021-11-11 RX ORDER — PREDNISONE 20 MG/1
20 TABLET ORAL DAILY
Qty: 5 TABLET | Refills: 0 | Status: SHIPPED | OUTPATIENT
Start: 2021-11-11 | End: 2021-11-16

## 2021-11-11 RX ORDER — FLUCONAZOLE 150 MG/1
150 TABLET ORAL
Qty: 3 TABLET | Refills: 1 | Status: SHIPPED | OUTPATIENT
Start: 2021-11-11 | End: 2021-11-27

## 2021-11-11 RX ORDER — ALBUTEROL SULFATE 90 UG/1
2 AEROSOL, METERED RESPIRATORY (INHALATION) EVERY 6 HOURS PRN
Qty: 17 G | Refills: 11 | Status: SHIPPED | OUTPATIENT
Start: 2021-11-11 | End: 2022-07-10 | Stop reason: SDUPTHER

## 2021-11-11 RX ORDER — DOXYCYCLINE 100 MG/1
100 CAPSULE ORAL EVERY 12 HOURS
Qty: 20 CAPSULE | Refills: 0 | Status: SHIPPED | OUTPATIENT
Start: 2021-11-11 | End: 2022-03-11

## 2021-11-11 RX ORDER — SULFAMETHOXAZOLE AND TRIMETHOPRIM 800; 160 MG/1; MG/1
1 TABLET ORAL 2 TIMES DAILY
COMMUNITY
Start: 2021-11-08 | End: 2021-11-12

## 2021-11-12 ENCOUNTER — PATIENT MESSAGE (OUTPATIENT)
Dept: FAMILY MEDICINE | Facility: CLINIC | Age: 66
End: 2021-11-12
Payer: MEDICARE

## 2021-11-12 PROBLEM — I15.2 HYPERTENSION ASSOCIATED WITH DIABETES: Chronic | Status: ACTIVE | Noted: 2021-11-11

## 2021-11-12 PROBLEM — E11.59 HYPERTENSION ASSOCIATED WITH DIABETES: Chronic | Status: ACTIVE | Noted: 2021-11-11

## 2021-11-13 LAB — MRSA SPEC QL CULT: NORMAL

## 2021-12-06 ENCOUNTER — LAB VISIT (OUTPATIENT)
Dept: LAB | Facility: HOSPITAL | Age: 66
End: 2021-12-06
Attending: FAMILY MEDICINE
Payer: MEDICARE

## 2021-12-06 ENCOUNTER — OFFICE VISIT (OUTPATIENT)
Dept: FAMILY MEDICINE | Facility: CLINIC | Age: 66
End: 2021-12-06
Payer: MEDICARE

## 2021-12-06 ENCOUNTER — HOSPITAL ENCOUNTER (OUTPATIENT)
Dept: RADIOLOGY | Facility: HOSPITAL | Age: 66
Discharge: HOME | End: 2021-12-06
Attending: NURSE PRACTITIONER
Payer: MEDICARE

## 2021-12-06 VITALS
WEIGHT: 174 LBS | DIASTOLIC BLOOD PRESSURE: 60 MMHG | TEMPERATURE: 98 F | HEART RATE: 68 BPM | BODY MASS INDEX: 32.02 KG/M2 | HEIGHT: 62 IN | SYSTOLIC BLOOD PRESSURE: 123 MMHG

## 2021-12-06 DIAGNOSIS — A49.9 BACTERIAL INFECTION: Primary | ICD-10-CM

## 2021-12-06 DIAGNOSIS — Z79.899 ENCOUNTER FOR LONG-TERM (CURRENT) USE OF MEDICATIONS: ICD-10-CM

## 2021-12-06 DIAGNOSIS — Z00.00 ANNUAL PHYSICAL EXAM: ICD-10-CM

## 2021-12-06 DIAGNOSIS — R79.89 ABNORMAL CBC: Primary | ICD-10-CM

## 2021-12-06 DIAGNOSIS — Z01.818 PRE-OPERATIVE CLEARANCE: ICD-10-CM

## 2021-12-06 LAB
ERYTHROCYTE [DISTWIDTH] IN BLOOD BY AUTOMATED COUNT: 14.9 % (ref 11.5–14.5)
HCT VFR BLD AUTO: 52.1 % (ref 37–48.5)
HGB BLD-MCNC: 16.7 G/DL (ref 12–16)
MCH RBC QN AUTO: 29.9 PG (ref 27–31)
MCHC RBC AUTO-ENTMCNC: 32.1 G/DL (ref 32–36)
MCV RBC AUTO: 93 FL (ref 82–98)
PLATELET # BLD AUTO: 163 K/UL (ref 150–450)
PMV BLD AUTO: 11.8 FL (ref 9.2–12.9)
RBC # BLD AUTO: 5.58 M/UL (ref 4–5.4)
WBC # BLD AUTO: 9.62 K/UL (ref 3.9–12.7)

## 2021-12-06 PROCEDURE — 71046 XR CHEST PA AND LATERAL: ICD-10-PCS | Mod: 26,,, | Performed by: RADIOLOGY

## 2021-12-06 PROCEDURE — 3066F PR DOCUMENTATION OF TREATMENT FOR NEPHROPATHY: ICD-10-PCS | Mod: CPTII,S$GLB,, | Performed by: NURSE PRACTITIONER

## 2021-12-06 PROCEDURE — 99213 OFFICE O/P EST LOW 20 MIN: CPT | Mod: S$GLB,,, | Performed by: NURSE PRACTITIONER

## 2021-12-06 PROCEDURE — 3061F PR NEG MICROALBUMINURIA RESULT DOCUMENTED/REVIEW: ICD-10-PCS | Mod: CPTII,S$GLB,, | Performed by: NURSE PRACTITIONER

## 2021-12-06 PROCEDURE — 99213 PR OFFICE/OUTPT VISIT, EST, LEVL III, 20-29 MIN: ICD-10-PCS | Mod: S$GLB,,, | Performed by: NURSE PRACTITIONER

## 2021-12-06 PROCEDURE — 36415 COLL VENOUS BLD VENIPUNCTURE: CPT | Mod: PO | Performed by: FAMILY MEDICINE

## 2021-12-06 PROCEDURE — 99999 PR PBB SHADOW E&M-EST. PATIENT-LVL V: ICD-10-PCS | Mod: PBBFAC,,, | Performed by: NURSE PRACTITIONER

## 2021-12-06 PROCEDURE — 99999 PR PBB SHADOW E&M-EST. PATIENT-LVL V: CPT | Mod: PBBFAC,,, | Performed by: NURSE PRACTITIONER

## 2021-12-06 PROCEDURE — 3061F NEG MICROALBUMINURIA REV: CPT | Mod: CPTII,S$GLB,, | Performed by: NURSE PRACTITIONER

## 2021-12-06 PROCEDURE — 85027 COMPLETE CBC AUTOMATED: CPT | Performed by: FAMILY MEDICINE

## 2021-12-06 PROCEDURE — 3066F NEPHROPATHY DOC TX: CPT | Mod: CPTII,S$GLB,, | Performed by: NURSE PRACTITIONER

## 2021-12-06 PROCEDURE — 71046 X-RAY EXAM CHEST 2 VIEWS: CPT | Mod: 26,,, | Performed by: RADIOLOGY

## 2021-12-06 PROCEDURE — 71046 X-RAY EXAM CHEST 2 VIEWS: CPT | Mod: TC,PO

## 2021-12-06 RX ORDER — OFLOXACIN 3 MG/ML
SOLUTION/ DROPS OPHTHALMIC
COMMUNITY
Start: 2021-12-02 | End: 2023-11-30

## 2021-12-06 RX ORDER — BROMFENAC SODIUM 0.7 MG/ML
SOLUTION/ DROPS OPHTHALMIC
COMMUNITY
Start: 2021-12-02 | End: 2023-11-30

## 2021-12-06 RX ORDER — SULFAMETHOXAZOLE AND TRIMETHOPRIM 800; 160 MG/1; MG/1
1 TABLET ORAL 2 TIMES DAILY
Qty: 20 TABLET | Refills: 0 | Status: SHIPPED | OUTPATIENT
Start: 2021-12-06 | End: 2021-12-06

## 2021-12-07 ENCOUNTER — PATIENT MESSAGE (OUTPATIENT)
Dept: FAMILY MEDICINE | Facility: CLINIC | Age: 66
End: 2021-12-07
Payer: MEDICARE

## 2021-12-08 ENCOUNTER — OFFICE VISIT (OUTPATIENT)
Dept: DERMATOLOGY | Facility: CLINIC | Age: 66
End: 2021-12-08
Payer: MEDICARE

## 2021-12-08 DIAGNOSIS — A49.9 BACTERIAL INFECTION: ICD-10-CM

## 2021-12-08 PROCEDURE — 99203 OFFICE O/P NEW LOW 30 MIN: CPT | Mod: S$GLB,,, | Performed by: DERMATOLOGY

## 2021-12-08 PROCEDURE — 99999 PR PBB SHADOW E&M-EST. PATIENT-LVL III: ICD-10-PCS | Mod: PBBFAC,,, | Performed by: DERMATOLOGY

## 2021-12-08 PROCEDURE — 99203 PR OFFICE/OUTPT VISIT, NEW, LEVL III, 30-44 MIN: ICD-10-PCS | Mod: S$GLB,,, | Performed by: DERMATOLOGY

## 2021-12-08 PROCEDURE — 3061F PR NEG MICROALBUMINURIA RESULT DOCUMENTED/REVIEW: ICD-10-PCS | Mod: CPTII,S$GLB,, | Performed by: DERMATOLOGY

## 2021-12-08 PROCEDURE — 3066F NEPHROPATHY DOC TX: CPT | Mod: CPTII,S$GLB,, | Performed by: DERMATOLOGY

## 2021-12-08 PROCEDURE — 99999 PR PBB SHADOW E&M-EST. PATIENT-LVL III: CPT | Mod: PBBFAC,,, | Performed by: DERMATOLOGY

## 2021-12-08 PROCEDURE — 3061F NEG MICROALBUMINURIA REV: CPT | Mod: CPTII,S$GLB,, | Performed by: DERMATOLOGY

## 2021-12-08 PROCEDURE — 3066F PR DOCUMENTATION OF TREATMENT FOR NEPHROPATHY: ICD-10-PCS | Mod: CPTII,S$GLB,, | Performed by: DERMATOLOGY

## 2021-12-16 ENCOUNTER — PATIENT MESSAGE (OUTPATIENT)
Dept: DERMATOLOGY | Facility: CLINIC | Age: 66
End: 2021-12-16
Payer: MEDICARE

## 2021-12-16 DIAGNOSIS — J34.89 LESION OF NOSE: Primary | ICD-10-CM

## 2021-12-16 RX ORDER — DOXYCYCLINE 100 MG/1
100 CAPSULE ORAL 2 TIMES DAILY
Qty: 14 CAPSULE | Refills: 0 | Status: SHIPPED | OUTPATIENT
Start: 2021-12-16 | End: 2021-12-23

## 2021-12-28 ENCOUNTER — PATIENT MESSAGE (OUTPATIENT)
Dept: FAMILY MEDICINE | Facility: CLINIC | Age: 66
End: 2021-12-28
Payer: MEDICARE

## 2022-01-13 ENCOUNTER — HOSPITAL ENCOUNTER (OUTPATIENT)
Dept: RADIOLOGY | Facility: HOSPITAL | Age: 67
Discharge: HOME | End: 2022-01-13
Attending: FAMILY MEDICINE
Payer: MEDICARE

## 2022-01-13 ENCOUNTER — OFFICE VISIT (OUTPATIENT)
Dept: HEMATOLOGY/ONCOLOGY | Facility: CLINIC | Age: 67
End: 2022-01-13
Payer: MEDICARE

## 2022-01-13 ENCOUNTER — PATIENT MESSAGE (OUTPATIENT)
Dept: HEMATOLOGY/ONCOLOGY | Facility: CLINIC | Age: 67
End: 2022-01-13

## 2022-01-13 ENCOUNTER — LAB VISIT (OUTPATIENT)
Dept: LAB | Facility: HOSPITAL | Age: 67
End: 2022-01-13
Attending: STUDENT IN AN ORGANIZED HEALTH CARE EDUCATION/TRAINING PROGRAM
Payer: MEDICARE

## 2022-01-13 VITALS
OXYGEN SATURATION: 94 % | BODY MASS INDEX: 32.66 KG/M2 | HEIGHT: 62 IN | HEART RATE: 73 BPM | TEMPERATURE: 97 F | RESPIRATION RATE: 17 BRPM | SYSTOLIC BLOOD PRESSURE: 111 MMHG | WEIGHT: 177.5 LBS | DIASTOLIC BLOOD PRESSURE: 67 MMHG

## 2022-01-13 VITALS — BODY MASS INDEX: 32.01 KG/M2 | WEIGHT: 173.94 LBS | HEIGHT: 62 IN

## 2022-01-13 DIAGNOSIS — R79.89 ABNORMAL CBC: ICD-10-CM

## 2022-01-13 DIAGNOSIS — D75.1 ERYTHROCYTOSIS: ICD-10-CM

## 2022-01-13 DIAGNOSIS — Z22.322 MRSA CARRIER: ICD-10-CM

## 2022-01-13 DIAGNOSIS — Z12.31 SCREENING MAMMOGRAM, ENCOUNTER FOR: ICD-10-CM

## 2022-01-13 DIAGNOSIS — K63.5 POLYP OF COLON, UNSPECIFIED PART OF COLON, UNSPECIFIED TYPE: ICD-10-CM

## 2022-01-13 DIAGNOSIS — D75.1 ERYTHROCYTOSIS: Primary | ICD-10-CM

## 2022-01-13 LAB
BASOPHILS # BLD AUTO: 0.03 K/UL (ref 0–0.2)
BASOPHILS NFR BLD: 0.3 % (ref 0–1.9)
DIFFERENTIAL METHOD: ABNORMAL
EOSINOPHIL # BLD AUTO: 0.2 K/UL (ref 0–0.5)
EOSINOPHIL NFR BLD: 2.6 % (ref 0–8)
ERYTHROCYTE [DISTWIDTH] IN BLOOD BY AUTOMATED COUNT: 14.6 % (ref 11.5–14.5)
HCT VFR BLD AUTO: 47.8 % (ref 37–48.5)
HGB BLD-MCNC: 16 G/DL (ref 12–16)
IMM GRANULOCYTES # BLD AUTO: 0.02 K/UL (ref 0–0.04)
IMM GRANULOCYTES NFR BLD AUTO: 0.2 % (ref 0–0.5)
LYMPHOCYTES # BLD AUTO: 3.1 K/UL (ref 1–4.8)
LYMPHOCYTES NFR BLD: 35.7 % (ref 18–48)
MCH RBC QN AUTO: 29.6 PG (ref 27–31)
MCHC RBC AUTO-ENTMCNC: 33.5 G/DL (ref 32–36)
MCV RBC AUTO: 89 FL (ref 82–98)
MONOCYTES # BLD AUTO: 0.5 K/UL (ref 0.3–1)
MONOCYTES NFR BLD: 6.2 % (ref 4–15)
NEUTROPHILS # BLD AUTO: 4.8 K/UL (ref 1.8–7.7)
NEUTROPHILS NFR BLD: 55 % (ref 38–73)
NRBC BLD-RTO: 0 /100 WBC
PLATELET # BLD AUTO: 179 K/UL (ref 150–450)
PMV BLD AUTO: 10.3 FL (ref 9.2–12.9)
RBC # BLD AUTO: 5.4 M/UL (ref 4–5.4)
WBC # BLD AUTO: 8.75 K/UL (ref 3.9–12.7)

## 2022-01-13 PROCEDURE — 99204 OFFICE O/P NEW MOD 45 MIN: CPT | Mod: S$GLB,,, | Performed by: STUDENT IN AN ORGANIZED HEALTH CARE EDUCATION/TRAINING PROGRAM

## 2022-01-13 PROCEDURE — 3008F PR BODY MASS INDEX (BMI) DOCUMENTED: ICD-10-PCS | Mod: CPTII,S$GLB,, | Performed by: STUDENT IN AN ORGANIZED HEALTH CARE EDUCATION/TRAINING PROGRAM

## 2022-01-13 PROCEDURE — 77063 BREAST TOMOSYNTHESIS BI: CPT | Mod: 26,,, | Performed by: RADIOLOGY

## 2022-01-13 PROCEDURE — 77067 SCR MAMMO BI INCL CAD: CPT | Mod: 26,,, | Performed by: RADIOLOGY

## 2022-01-13 PROCEDURE — 81270 JAK2 GENE: CPT | Performed by: STUDENT IN AN ORGANIZED HEALTH CARE EDUCATION/TRAINING PROGRAM

## 2022-01-13 PROCEDURE — 99999 PR PBB SHADOW E&M-EST. PATIENT-LVL IV: CPT | Mod: PBBFAC,,, | Performed by: STUDENT IN AN ORGANIZED HEALTH CARE EDUCATION/TRAINING PROGRAM

## 2022-01-13 PROCEDURE — 81339 MPL GENE SEQ ALYS EXON 10: CPT | Performed by: STUDENT IN AN ORGANIZED HEALTH CARE EDUCATION/TRAINING PROGRAM

## 2022-01-13 PROCEDURE — 77063 MAMMO DIGITAL SCREENING BILAT WITH TOMO: ICD-10-PCS | Mod: 26,,, | Performed by: RADIOLOGY

## 2022-01-13 PROCEDURE — 3078F DIAST BP <80 MM HG: CPT | Mod: CPTII,S$GLB,, | Performed by: STUDENT IN AN ORGANIZED HEALTH CARE EDUCATION/TRAINING PROGRAM

## 2022-01-13 PROCEDURE — 1159F MED LIST DOCD IN RCRD: CPT | Mod: CPTII,S$GLB,, | Performed by: STUDENT IN AN ORGANIZED HEALTH CARE EDUCATION/TRAINING PROGRAM

## 2022-01-13 PROCEDURE — 3074F PR MOST RECENT SYSTOLIC BLOOD PRESSURE < 130 MM HG: ICD-10-PCS | Mod: CPTII,S$GLB,, | Performed by: STUDENT IN AN ORGANIZED HEALTH CARE EDUCATION/TRAINING PROGRAM

## 2022-01-13 PROCEDURE — 81403 MOPATH PROCEDURE LEVEL 4: CPT | Performed by: STUDENT IN AN ORGANIZED HEALTH CARE EDUCATION/TRAINING PROGRAM

## 2022-01-13 PROCEDURE — 99999 PR PBB SHADOW E&M-EST. PATIENT-LVL IV: ICD-10-PCS | Mod: PBBFAC,,, | Performed by: STUDENT IN AN ORGANIZED HEALTH CARE EDUCATION/TRAINING PROGRAM

## 2022-01-13 PROCEDURE — 99204 PR OFFICE/OUTPT VISIT, NEW, LEVL IV, 45-59 MIN: ICD-10-PCS | Mod: S$GLB,,, | Performed by: STUDENT IN AN ORGANIZED HEALTH CARE EDUCATION/TRAINING PROGRAM

## 2022-01-13 PROCEDURE — 36415 COLL VENOUS BLD VENIPUNCTURE: CPT | Mod: PN | Performed by: STUDENT IN AN ORGANIZED HEALTH CARE EDUCATION/TRAINING PROGRAM

## 2022-01-13 PROCEDURE — 85025 COMPLETE CBC W/AUTO DIFF WBC: CPT | Mod: PN | Performed by: STUDENT IN AN ORGANIZED HEALTH CARE EDUCATION/TRAINING PROGRAM

## 2022-01-13 PROCEDURE — 77067 SCR MAMMO BI INCL CAD: CPT | Mod: TC,PO

## 2022-01-13 PROCEDURE — 77067 MAMMO DIGITAL SCREENING BILAT WITH TOMO: ICD-10-PCS | Mod: 26,,, | Performed by: RADIOLOGY

## 2022-01-13 PROCEDURE — 77063 BREAST TOMOSYNTHESIS BI: CPT | Mod: TC,PO

## 2022-01-13 PROCEDURE — 3074F SYST BP LT 130 MM HG: CPT | Mod: CPTII,S$GLB,, | Performed by: STUDENT IN AN ORGANIZED HEALTH CARE EDUCATION/TRAINING PROGRAM

## 2022-01-13 PROCEDURE — 1159F PR MEDICATION LIST DOCUMENTED IN MEDICAL RECORD: ICD-10-PCS | Mod: CPTII,S$GLB,, | Performed by: STUDENT IN AN ORGANIZED HEALTH CARE EDUCATION/TRAINING PROGRAM

## 2022-01-13 PROCEDURE — 81219 CALR GENE COM VARIANTS: CPT | Performed by: STUDENT IN AN ORGANIZED HEALTH CARE EDUCATION/TRAINING PROGRAM

## 2022-01-13 PROCEDURE — 3008F BODY MASS INDEX DOCD: CPT | Mod: CPTII,S$GLB,, | Performed by: STUDENT IN AN ORGANIZED HEALTH CARE EDUCATION/TRAINING PROGRAM

## 2022-01-13 PROCEDURE — 82668 ASSAY OF ERYTHROPOIETIN: CPT | Performed by: STUDENT IN AN ORGANIZED HEALTH CARE EDUCATION/TRAINING PROGRAM

## 2022-01-13 PROCEDURE — 3078F PR MOST RECENT DIASTOLIC BLOOD PRESSURE < 80 MM HG: ICD-10-PCS | Mod: CPTII,S$GLB,, | Performed by: STUDENT IN AN ORGANIZED HEALTH CARE EDUCATION/TRAINING PROGRAM

## 2022-01-13 RX ORDER — DAPAGLIFLOZIN 5 MG/1
5 TABLET, FILM COATED ORAL DAILY
COMMUNITY
Start: 2021-12-27 | End: 2022-12-01

## 2022-01-13 RX ORDER — SULFAMETHOXAZOLE AND TRIMETHOPRIM 800; 160 MG/1; MG/1
TABLET ORAL
COMMUNITY
Start: 2021-12-06 | End: 2022-02-24 | Stop reason: SDUPTHER

## 2022-01-13 NOTE — PROGRESS NOTES
"Subjective:                                                                                    Consult note   Patient ID:    Name: Crystal Acuna  : 1955  MRN: [id number]    HPI:   Crystal Acuna is a 66 y.o. female presents for evaluation of Erythrocytosis    Mrs Acuna was referred to us for abnormal CBC mainly her elevated RBC/Hb. On further discussion with patient it seems that she had a 40 years of smoking, quit 2-3 years, does have a significant history of snoring and waking up in the middle of the night, possible Sleep apnea. Denies any history of COPD/Asthma, no new medication over the counter, no headaches, no itching, no chest pain or SOB. Noticed around 2 weeks ago after her cataract surgery had an episode of feeling "weird" headaches but it resolved after couple of mins.  No night sweats or weight loss and appetite is good. Family history significant for liver cancer in grandfather and breast cancer in Aunt     Past Medical History:   Diagnosis Date    Allergy     Bronchitis     Family history of colonic polyps 2021    Three of her siblings have had colon polyps.    Hallux abductovalgus     Herniated lumbar intervertebral disc     Hyperlipidemia     Diet controlled    Hypertension     Smoker        Past Surgical History:   Procedure Laterality Date    BACK SURGERY      L4-L5    CARPAL TUNNEL RELEASE      bilateral     SECTION      times 1    CHOLECYSTECTOMY  2010    COLONOSCOPY N/A 2021    Procedure: COLONOSCOPY;  Surgeon: Kevin Goff MD;  Location: [facility];  Service: Endoscopy;  Laterality: N/A;    CYST REMOVAL      right hand    ENDOMETRIAL ABLATION  2009    FOOT FRACTURE SURGERY         Family History   Problem Relation Age of Onset    COPD Mother     Heart disease Mother     Heart disease Father         MI    Breast cancer Maternal Aunt        Social History     Socioeconomic History    Marital status: Single   Tobacco Use    " "Smoking status: Former Smoker     Packs/day: 0.75     Years: 39.00     Pack years: 29.25     Types: Cigarettes    Smokeless tobacco: Never Used    Tobacco comment: quit 2 yrs ago   Substance and Sexual Activity    Alcohol use: No    Drug use: No       Review of patient's allergies indicates:   Allergen Reactions    Metformin     Farxiga [dapagliflozin]      Yeast infection      Latex, natural rubber        Review of Systems   Constitutional: Positive for malaise/fatigue. Negative for decreased appetite, fever and night sweats.   HENT: Negative for nosebleeds and sore throat.    Eyes: Negative for blurred vision, double vision, pain and visual disturbance.   Cardiovascular: Negative for chest pain, dyspnea on exertion and leg swelling.   Respiratory: Negative for cough and shortness of breath.    Hematologic/Lymphatic: Negative for adenopathy and bleeding problem. Does not bruise/bleed easily.   Skin: Negative for rash.   Musculoskeletal: Negative for back pain and joint swelling.   Gastrointestinal: Negative for abdominal pain, diarrhea, melena, nausea and vomiting.   Genitourinary: Negative for frequency, hematuria and non-menstrual bleeding.   Neurological: Negative for dizziness, focal weakness, headaches and weakness.   Psychiatric/Behavioral: The patient is not nervous/anxious.           Objective:     Vitals:    01/13/22 1240   BP: 111/67   BP Location: Left arm   Patient Position: Sitting   BP Method: Medium (Automatic)   Pulse: 73   Resp: 17   Temp: 97.1 °F (36.2 °C)   TempSrc: Temporal   SpO2: (!) 94%   Weight: 80.5 kg (177 lb 7.5 oz)   Height: 5' 2" (1.575 m)        Physical Exam  Constitutional:       General: She is not in acute distress.     Appearance: Normal appearance. She is normal weight.   HENT:      Head: Normocephalic and atraumatic.   Eyes:      General: No scleral icterus.     Conjunctiva/sclera: Conjunctivae normal.   Cardiovascular:      Rate and Rhythm: Normal rate and regular " rhythm.      Heart sounds: Normal heart sounds.   Pulmonary:      Effort: Pulmonary effort is normal.      Breath sounds: Normal breath sounds. No wheezing.   Chest:      Chest wall: No tenderness.   Abdominal:      General: Abdomen is flat. Bowel sounds are normal. There is no distension.      Palpations: Abdomen is soft. There is no mass.   Musculoskeletal:         General: No swelling or tenderness.   Lymphadenopathy:      Cervical: No cervical adenopathy.   Skin:     General: Skin is warm.      Coloration: Skin is not jaundiced or pale.   Neurological:      General: No focal deficit present.      Mental Status: She is oriented to person, place, and time.   Psychiatric:         Mood and Affect: Mood normal.         Behavior: Behavior normal.             Current Outpatient Medications on File Prior to Visit   Medication Sig    albuterol (PROVENTIL/VENTOLIN HFA) 90 mcg/actuation inhaler Inhale 2 puffs into the lungs every 6 (six) hours as needed for Wheezing. Rescue    amLODIPine (NORVASC) 10 MG tablet Take 1 tablet by mouth once daily    aspirin 81 MG Chew Take 81 mg by mouth once daily.    atorvastatin (LIPITOR) 10 MG tablet Take 1 tablet by mouth once daily    blood sugar diagnostic Strp To check BG 2 times daily, to use with insurance preferred meter    blood-glucose meter kit To check BG 2 times daily, to use with insurance preferred meter    fluorometholone 0.1% (FML) 0.1 % DrpS INSTILL 1 DROP INTO EACH EYE THREE TIMES DAILY AS DIRECTED    lancets Misc To check BG 2 times daily, to use with insurance preferred meter    meloxicam (MOBIC) 15 MG tablet Take 1 tablet (15 mg total) by mouth once daily.    mupirocin (BACTROBAN) 2 % ointment Apply topically 3 (three) times daily.    ofloxacin (OCUFLOX) 0.3 % ophthalmic solution     PROLENSA 0.07 % Drop SMARTSIG:In Eye(s)    SITagliptin (JANUVIA) 100 MG Tab Take 1 tablet (100 mg total) by mouth once daily.    sulfamethoxazole-trimethoprim 800-160mg  (BACTRIM DS) 800-160 mg Tab     doxycycline (VIBRAMYCIN) 100 MG Cap Take 1 capsule (100 mg total) by mouth every 12 (twelve) hours. (Patient not taking: No sig reported)    FARXIGA 5 mg Tab tablet Take 5 mg by mouth once daily.     No current facility-administered medications on file prior to visit.     CBC:  Lab Results   Component Value Date    WBC 8.75 01/13/2022    HGB 16.0 01/13/2022    HCT 47.8 01/13/2022    MCV 89 01/13/2022     01/13/2022     CMP:  Sodium   Date Value Ref Range Status   11/11/2021 139 136 - 145 mmol/L Final     Potassium   Date Value Ref Range Status   11/11/2021 4.4 3.5 - 5.1 mmol/L Final     Chloride   Date Value Ref Range Status   11/11/2021 103 95 - 110 mmol/L Final     CO2   Date Value Ref Range Status   11/11/2021 26 23 - 29 mmol/L Final     Glucose   Date Value Ref Range Status   11/11/2021 110 70 - 110 mg/dL Final     BUN   Date Value Ref Range Status   11/11/2021 14 8 - 23 mg/dL Final     Creatinine   Date Value Ref Range Status   11/11/2021 0.8 0.5 - 1.4 mg/dL Final     Calcium   Date Value Ref Range Status   11/11/2021 9.9 8.7 - 10.5 mg/dL Final     Total Protein   Date Value Ref Range Status   11/11/2021 7.8 6.0 - 8.4 g/dL Final     Albumin   Date Value Ref Range Status   11/11/2021 3.6 3.5 - 5.2 g/dL Final     Total Bilirubin   Date Value Ref Range Status   11/11/2021 0.3 0.1 - 1.0 mg/dL Final     Comment:     For infants and newborns, interpretation of results should be based  on gestational age, weight and in agreement with clinical  observations.    Premature Infant recommended reference ranges:  Up to 24 hours.............<8.0 mg/dL  Up to 48 hours............<12.0 mg/dL  3-5 days..................<15.0 mg/dL  6-29 days.................<15.0 mg/dL       Alkaline Phosphatase   Date Value Ref Range Status   11/11/2021 66 55 - 135 U/L Final     AST ([facility])   Date Value Ref Range Status   03/28/2016 20 14 - 36 U/L Final     AST   Date Value Ref Range Status    11/11/2021 15 10 - 40 U/L Final     ALT   Date Value Ref Range Status   11/11/2021 17 10 - 44 U/L Final     Anion Gap   Date Value Ref Range Status   11/11/2021 10 8 - 16 mmol/L Final     eGFR if    Date Value Ref Range Status   11/11/2021 >60.0 >60 mL/min/1.73 m^2 Final     eGFR if non    Date Value Ref Range Status   11/11/2021 >60.0 >60 mL/min/1.73 m^2 Final     Comment:     Calculation used to obtain the estimated glomerular filtration  rate (eGFR) is the CKD-EPI equation.          No results found for: IRON, TIBC, FERRITIN, SATURATEDIRO    No results found for: WWSSIMYV72,No results found for: FOLATE    Mammo Digital Screening Bilat w/ Roshan  Narrative: Result:   Mammo Digital Screening Bilat w/ Roshan     History:  Patient is 66 y.o. and is seen for a screening mammogram.    Films Compared:  Compared to: 10/31/2019 Mammo Digital Screening Bilat w/ Roshan     Findings:  This procedure was performed using tomosynthesis. Computer-aided detection   was utilized in the interpretation of this examination.  The breasts have scattered areas of fibroglandular density.     Left  There are calcifications in a grouped distribution seen in the upper outer   quadrant of the left breast in the middle depth.     Right  There is no evidence of suspicious masses, calcifications, or other   abnormal findings in the right breast.  Impression: Left  Calcifications: Left breast calcifications at the upper outer middle   position. Assessment: 0 - Incomplete. Special Views: Magnification View is   recommended.     Right  There is no mammographic evidence of malignancy in the right breast.    BI-RADS Category:   Overall: 0 - Incomplete: Needs Additional Imaging Evaluation     Recommendation:  Diagnostic mammogram including magnification views is recommended.    Your estimated lifetime risk of breast cancer (to age 85) based on   Tyrer-Cuzick risk assessment model is Tyrer-Cuzick: 4.32 %. According to   the  American Cancer Society, patients with a lifetime breast cancer risk   of 20% or higher might benefit from supplemental screening tests.       All pertinent labs and imaging reviewed.    Assessment:       1. Erythrocytosis    2. Abnormal CBC    3. Polyp of colon, unspecified part of colon, unspecified type    4. MRSA carrier      # Erythrocytosis: Secondary vs Primary  - will need erythropoietin to different between primary and secondary  - possible due to underlying sleep apnea or lung damage from smoking   - testing for MPN with JAk2/ELENI/MPL mutations   - if negative patient will likely need to get a sleep study for further investigation of her oxygen as well as possible PFT's   - Hct trending between 50's and 48%, patient has no symptoms of erythrocytosis and she is already on ASA for other reasons   - cont with monitoring no need for phlebotomy     # Screening for cancer:  - Colonoscopy 2021; polyps, mammogram schedule this month     # MRSA carrier: no abnormal cell noted on her CBC w diff, less likely due to any abnormalities in her blood     Plan:     Erythrocytosis  -     CBC w/ DIFF; Future; Expected date: 01/13/2022  -     MPN (JAK2 V617F)WITH REFLEX TO CALR,MPL; Future; Expected date: 01/13/2022  -     JAK2 Exon 12 And Other Non-V617 Mutation Detection, Bld; Future; Expected date: 01/13/2022  -     Erythropoietin; Future; Expected date: 01/13/2022    Abnormal CBC  -     Ambulatory referral/consult to Hematology / Oncology  -     CBC w/ DIFF; Future; Expected date: 01/13/2022  -     MPN (JAK2 V617F)WITH REFLEX TO CALR,MPL; Future; Expected date: 01/13/2022  -     JAK2 Exon 12 And Other Non-V617 Mutation Detection, Bld; Future; Expected date: 01/13/2022  -     Erythropoietin; Future; Expected date: 01/13/2022    Polyp of colon, unspecified part of colon, unspecified type    MRSA carrier       Patient queried and all questions were answered.      F/u in 6 weeks to go over the results     Signed:  Naila  MD Millicent  Hematology and Oncology  [facility]

## 2022-01-14 PROBLEM — D75.1 ERYTHROCYTOSIS: Status: ACTIVE | Noted: 2022-01-14

## 2022-01-14 PROBLEM — Z22.322 MRSA CARRIER: Status: ACTIVE | Noted: 2022-01-14

## 2022-01-14 PROBLEM — R79.89 ABNORMAL CBC: Status: ACTIVE | Noted: 2022-01-14

## 2022-01-14 NOTE — PROGRESS NOTES
CALL patient with results and Document verification.  Schedule follow-up if needed.  [phone]  Screening mammogram was reviewed by radiology.  There is an area in the left breast that is not well seen and they are requesting a another test with diagnostic mammogram and certain views.  The breast cancer screening nurse will be in touch with you about setting up these test.  Please let me know if you have any further questions or concerns.

## 2022-01-17 LAB — EPO SERPL-ACNC: 11.3 MIU/ML (ref 2.6–18.5)

## 2022-01-19 LAB
JAK2 EXON 12 MUTATION DETECTION BLOOD: NORMAL
PATH REPORT.FINAL DX SPEC: NORMAL

## 2022-01-21 LAB
MPNR  FINAL DIAGNOSIS: NORMAL
MPNR  SPECIMEN TYPE: NORMAL
MPNR RESULT: NORMAL

## 2022-01-26 ENCOUNTER — HOSPITAL ENCOUNTER (OUTPATIENT)
Dept: RADIOLOGY | Facility: HOSPITAL | Age: 67
Discharge: HOME | End: 2022-01-26
Attending: FAMILY MEDICINE
Payer: MEDICARE

## 2022-01-26 DIAGNOSIS — R92.8 ABNORMAL MAMMOGRAM: ICD-10-CM

## 2022-01-26 PROCEDURE — 77065 DX MAMMO INCL CAD UNI: CPT | Mod: 26,LT,, | Performed by: RADIOLOGY

## 2022-01-26 PROCEDURE — 77065 MAMMO DIGITAL DIAGNOSTIC LEFT: ICD-10-PCS | Mod: 26,LT,, | Performed by: RADIOLOGY

## 2022-01-26 PROCEDURE — 77065 DX MAMMO INCL CAD UNI: CPT | Mod: TC,PO,LT

## 2022-01-27 NOTE — PROGRESS NOTES
++CALL patient with results and Document verification.  Schedule follow-up if needed.  [phone]  Diagnostic mammogram of the left breast is abnormal.  Biopsy of the area is recommended.  The breast cancer screening nurse will be in touch about setting this up for you.  Please let me know if you do not hear from anyone in the next few days.

## 2022-01-28 ENCOUNTER — TELEPHONE (OUTPATIENT)
Dept: HEMATOLOGY/ONCOLOGY | Facility: CLINIC | Age: 67
End: 2022-01-28
Payer: MEDICARE

## 2022-01-28 DIAGNOSIS — R79.89 ABNORMAL CBC: Primary | ICD-10-CM

## 2022-01-28 DIAGNOSIS — D75.1 ERYTHROCYTOSIS: ICD-10-CM

## 2022-01-28 NOTE — TELEPHONE ENCOUNTER
----- Message from Naila Ricks MD sent at 1/28/2022  3:49 PM CST -----  Please call patient with normal blood results and lets add CBC w diff to her blood work aba on 2/11 prior her next aba with me     Thanks!

## 2022-01-31 ENCOUNTER — PATIENT OUTREACH (OUTPATIENT)
Dept: ADMINISTRATIVE | Facility: OTHER | Age: 67
End: 2022-01-31
Payer: MEDICARE

## 2022-02-01 ENCOUNTER — OFFICE VISIT (OUTPATIENT)
Dept: SURGERY | Facility: CLINIC | Age: 67
End: 2022-02-01
Payer: MEDICARE

## 2022-02-01 VITALS
TEMPERATURE: 98 F | WEIGHT: 177.25 LBS | BODY MASS INDEX: 32.62 KG/M2 | HEIGHT: 62 IN | HEART RATE: 77 BPM | DIASTOLIC BLOOD PRESSURE: 78 MMHG | SYSTOLIC BLOOD PRESSURE: 110 MMHG

## 2022-02-01 DIAGNOSIS — R92.8 ABNORMAL MAMMOGRAM OF LEFT BREAST: Primary | ICD-10-CM

## 2022-02-01 PROCEDURE — 99999 PR PBB SHADOW E&M-EST. PATIENT-LVL III: CPT | Mod: PBBFAC,,, | Performed by: SURGERY

## 2022-02-01 PROCEDURE — 99203 PR OFFICE/OUTPT VISIT, NEW, LEVL III, 30-44 MIN: ICD-10-PCS | Mod: S$GLB,,, | Performed by: SURGERY

## 2022-02-01 PROCEDURE — 3008F PR BODY MASS INDEX (BMI) DOCUMENTED: ICD-10-PCS | Mod: CPTII,S$GLB,, | Performed by: SURGERY

## 2022-02-01 PROCEDURE — 3078F DIAST BP <80 MM HG: CPT | Mod: CPTII,S$GLB,, | Performed by: SURGERY

## 2022-02-01 PROCEDURE — 3078F PR MOST RECENT DIASTOLIC BLOOD PRESSURE < 80 MM HG: ICD-10-PCS | Mod: CPTII,S$GLB,, | Performed by: SURGERY

## 2022-02-01 PROCEDURE — 3074F PR MOST RECENT SYSTOLIC BLOOD PRESSURE < 130 MM HG: ICD-10-PCS | Mod: CPTII,S$GLB,, | Performed by: SURGERY

## 2022-02-01 PROCEDURE — 99203 OFFICE O/P NEW LOW 30 MIN: CPT | Mod: S$GLB,,, | Performed by: SURGERY

## 2022-02-01 PROCEDURE — 99999 PR PBB SHADOW E&M-EST. PATIENT-LVL III: ICD-10-PCS | Mod: PBBFAC,,, | Performed by: SURGERY

## 2022-02-01 PROCEDURE — 3008F BODY MASS INDEX DOCD: CPT | Mod: CPTII,S$GLB,, | Performed by: SURGERY

## 2022-02-01 PROCEDURE — 3074F SYST BP LT 130 MM HG: CPT | Mod: CPTII,S$GLB,, | Performed by: SURGERY

## 2022-02-01 NOTE — PROGRESS NOTES
[facility] -   General Surgery History & Physical    SUBJECTIVE:     History of Present Illness:  Patient is a 66 y.o. female presents with abnormal findings on routine screening mammogram. She had a normal mammogram about a year ago. She is not up to date on paps.    Family history includes maternal aunt who had breast CA. No family history of ovarian or colorectal cancer.    Chief Complaint   Patient presents with    Consult     Abnormal mammogram       Review of patient's allergies indicates:   Allergen Reactions    Metformin     Farxiga [dapagliflozin]      Yeast infection      Latex, natural rubber        Current Outpatient Medications   Medication Sig Dispense Refill    albuterol (PROVENTIL/VENTOLIN HFA) 90 mcg/actuation inhaler Inhale 2 puffs into the lungs every 6 (six) hours as needed for Wheezing. Rescue 17 g 11    amLODIPine (NORVASC) 10 MG tablet Take 1 tablet by mouth once daily 90 tablet 4    aspirin 81 MG Chew Take 81 mg by mouth once daily.      atorvastatin (LIPITOR) 10 MG tablet Take 1 tablet by mouth once daily 90 tablet 4    blood sugar diagnostic Strp To check BG 2 times daily, to use with insurance preferred meter 200 each 5    blood-glucose meter kit To check BG 2 times daily, to use with insurance preferred meter 1 each 0    fluorometholone 0.1% (FML) 0.1 % DrpS INSTILL 1 DROP INTO EACH EYE THREE TIMES DAILY AS DIRECTED      lancets Misc To check BG 2 times daily, to use with insurance preferred meter 200 each 99    meloxicam (MOBIC) 15 MG tablet Take 1 tablet (15 mg total) by mouth once daily. 90 tablet 3    mupirocin (BACTROBAN) 2 % ointment Apply topically 3 (three) times daily. 22 g 2    PROLENSA 0.07 % Drop SMARTSIG:In Eye(s)      SITagliptin (JANUVIA) 100 MG Tab Take 1 tablet (100 mg total) by mouth once daily. 90 tablet 4    sulfamethoxazole-trimethoprim 800-160mg (BACTRIM DS) 800-160 mg Tab       doxycycline (VIBRAMYCIN) 100 MG Cap Take 1 capsule (100  mg total) by mouth every 12 (twelve) hours. (Patient not taking: Reported on 2022) 20 capsule 0    FARXIGA 5 mg Tab tablet Take 5 mg by mouth once daily.      ofloxacin (OCUFLOX) 0.3 % ophthalmic solution        No current facility-administered medications for this visit.       Past Medical History:   Diagnosis Date    Allergy     Bronchitis     Family history of colonic polyps 2021    Three of her siblings have had colon polyps.    Hallux abductovalgus     Herniated lumbar intervertebral disc     Hyperlipidemia     Diet controlled    Hypertension     Smoker      Past Surgical History:   Procedure Laterality Date    BACK SURGERY      L4-L5    CARPAL TUNNEL RELEASE      bilateral     SECTION      times 1    CHOLECYSTECTOMY  2010    COLONOSCOPY N/A 2021    Procedure: COLONOSCOPY;  Surgeon: Kevin Goff MD;  Location: [facility];  Service: Endoscopy;  Laterality: N/A;    CYST REMOVAL      right hand    ENDOMETRIAL ABLATION  2009    FOOT FRACTURE SURGERY       Family History   Problem Relation Age of Onset    COPD Mother     Heart disease Mother     Heart disease Father         MI    Breast cancer Maternal Aunt      Social History     Tobacco Use    Smoking status: Former Smoker     Packs/day: 0.75     Years: 39.00     Pack years: 29.25     Types: Cigarettes    Smokeless tobacco: Never Used    Tobacco comment: quit 2 yrs ago   Substance Use Topics    Alcohol use: No    Drug use: No        Review of Systems:  Review of Systems   Constitutional: Negative for fever and unexpected weight change.   HENT: Negative for trouble swallowing.    Respiratory: Negative for cough and shortness of breath.    Cardiovascular: Negative for chest pain.   Gastrointestinal: Negative for abdominal pain, blood in stool, constipation, diarrhea, nausea and vomiting.   Genitourinary: Negative for difficulty urinating, dysuria and hematuria.       OBJECTIVE:     Vital Signs (Most  "Recent)  Temp: 98 °F (36.7 °C) (02/01/22 1253)  Pulse: 77 (02/01/22 1253)  BP: 110/78 (02/01/22 1253)  5' 2" (1.575 m)  80.4 kg (177 lb 4 oz)     Physical Exam:  Physical Exam  Vitals and nursing note reviewed.   Constitutional:       General: She is not in acute distress.     Appearance: Normal appearance. She is not ill-appearing, toxic-appearing or diaphoretic.   HENT:      Head: Normocephalic and atraumatic.      Right Ear: External ear normal.      Left Ear: External ear normal.      Nose: Nose normal. No congestion or rhinorrhea.      Mouth/Throat:      Mouth: Mucous membranes are moist.      Pharynx: Oropharynx is clear.   Eyes:      General: No scleral icterus.        Right eye: No discharge.         Left eye: No discharge.      Extraocular Movements: Extraocular movements intact.      Conjunctiva/sclera: Conjunctivae normal.      Pupils: Pupils are equal, round, and reactive to light.   Cardiovascular:      Rate and Rhythm: Normal rate and regular rhythm.   Pulmonary:      Effort: Pulmonary effort is normal. No respiratory distress.      Breath sounds: No stridor.   Chest:      Comments: No palpable masses or lymphadenopathy  Abdominal:      General: There is no distension.      Palpations: Abdomen is soft.      Tenderness: There is no abdominal tenderness. There is no guarding or rebound.   Musculoskeletal:         General: No deformity. Normal range of motion.      Cervical back: Normal range of motion and neck supple. No rigidity.   Skin:     General: Skin is warm and dry.      Capillary Refill: Capillary refill takes less than 2 seconds.      Coloration: Skin is not jaundiced.      Findings: No rash.   Neurological:      General: No focal deficit present.      Mental Status: She is alert and oriented to person, place, and time. Mental status is at baseline.      Cranial Nerves: No cranial nerve deficit.      Coordination: Coordination normal.      Gait: Gait normal.   Psychiatric:         Mood and " Affect: Mood normal.         Behavior: Behavior normal.         Thought Content: Thought content normal.         Judgment: Judgment normal.         Laboratory      Diagnostic Results:  Result:   Mammo Digital Diagnostic Left     History:  Patient is 66 y.o. and is seen for diagnostic imaging.     Films Compared:  Compared to: 01/13/2022 Mammo Digital Screening Bilat w/ Roshan and 12/16/2020 Mammo Digital Screening Bilat w/ Roshan     Findings:   Computer-aided detection was utilized in the interpretation of this examination.  The left breast has scattered areas of fibroglandular density.     There are coarse heterogeneous calcifications in a grouped distribution seen in the outer central region of the left breast in the middle depth.      Impression:  Left  Calcifications: Left breast calcifications at the outer central middle position. Assessment: 4 - Suspicious finding. Biopsy is recommended.      BI-RADS Category:   Overall: 4 - Suspicious     Recommendation:  Stereotactic Biopsy is recommended.       ASSESSMENT/PLAN:     Crystal was seen today for consult.    Diagnoses and all orders for this visit:    Abnormal mammogram of left breast  -     Mammo Breast Stereotactic Biopsy Left; Future  -     Mammo Digital Diagnostic Post Procedure_Mammo Guide_Clip_Needle Loc only; Future  -     US Breast Biopsy with Imaging 1st site Right; Future         Will order core needle biopsy for tissue diagnosis. Will await results.    Virgie Yuan,   General Surgery  [facility]  2/1/2022    I spent a total of 30 minutes on the day of the visit.This includes face to face time and non-face to face time preparing to see the patient (eg, review of tests), obtaining and/or reviewing separately obtained history, documenting clinical information in the electronic or other health record, independently interpreting results and communicating results to the patient/family/caregiver, or care coordinator.

## 2022-02-01 NOTE — PROGRESS NOTES
Health Maintenance Due   Topic Date Due    HIV Screening  Never done    TETANUS VACCINE  Never done    Cervical Cancer Screening  Never done    Shingles Vaccine (1 of 2) Never done    Foot Exam  12/02/2020     Updates were requested from care everywhere.  Chart was reviewed for overdue Proactive [facility]ters ([facility]) topics (CRS, Breast Cancer Screening, Eye exam)  Health Maintenance has been updated.  LINKS immunization registry triggered.  Immunizations were reconciled.

## 2022-02-04 ENCOUNTER — HOSPITAL ENCOUNTER (OUTPATIENT)
Dept: RADIOLOGY | Facility: HOSPITAL | Age: 67
Discharge: HOME | End: 2022-02-04
Attending: SURGERY
Payer: MEDICARE

## 2022-02-04 DIAGNOSIS — R92.8 ABNORMAL MAMMOGRAM OF LEFT BREAST: ICD-10-CM

## 2022-02-04 PROCEDURE — 19081 BX BREAST 1ST LESION STRTCTC: CPT | Mod: LT

## 2022-02-04 PROCEDURE — 88305 TISSUE EXAM BY PATHOLOGIST: CPT | Performed by: PATHOLOGY

## 2022-02-04 PROCEDURE — 88305 TISSUE EXAM BY PATHOLOGIST: CPT | Mod: 26,,, | Performed by: PATHOLOGY

## 2022-02-04 PROCEDURE — 19081 BX BREAST 1ST LESION STRTCTC: CPT | Mod: LT,,, | Performed by: RADIOLOGY

## 2022-02-04 PROCEDURE — 88305 TISSUE EXAM BY PATHOLOGIST: ICD-10-PCS | Mod: 26,,, | Performed by: PATHOLOGY

## 2022-02-04 PROCEDURE — 19081 MAMMO BREAST STEREOTACTIC BREAST BIOPSY LEFT: ICD-10-PCS | Mod: LT,,, | Performed by: RADIOLOGY

## 2022-02-07 LAB
FINAL PATHOLOGIC DIAGNOSIS: NORMAL
GROSS: NORMAL
Lab: NORMAL

## 2022-02-08 ENCOUNTER — TELEPHONE (OUTPATIENT)
Dept: SURGERY | Facility: HOSPITAL | Age: 67
End: 2022-02-08
Payer: MEDICARE

## 2022-02-08 NOTE — PROGRESS NOTES
1st check to see if patient has seen the results.  If not then  CALL patient with results and Document verification.  Schedule follow-up if needed.  [phone]  Breast biopsy is benign.  No cancer.  Please follow-up with me and gynecology in six months.

## 2022-02-08 NOTE — TELEPHONE ENCOUNTER
Updated patient on biopsy results which are benign. Recommend close follow up with her PCP and gynecologist in 6 months.     Virgie Yuan, DO  General Surgery  [facility]  2/8/2022

## 2022-02-11 ENCOUNTER — LAB VISIT (OUTPATIENT)
Dept: LAB | Facility: HOSPITAL | Age: 67
End: 2022-02-11
Attending: FAMILY MEDICINE
Payer: MEDICARE

## 2022-02-11 DIAGNOSIS — E11.9 TYPE 2 DIABETES MELLITUS WITHOUT COMPLICATION, WITHOUT LONG-TERM CURRENT USE OF INSULIN: ICD-10-CM

## 2022-02-11 DIAGNOSIS — D75.1 ERYTHROCYTOSIS: ICD-10-CM

## 2022-02-11 DIAGNOSIS — Z00.00 ANNUAL PHYSICAL EXAM: ICD-10-CM

## 2022-02-11 DIAGNOSIS — Z79.899 ENCOUNTER FOR LONG-TERM (CURRENT) USE OF MEDICATIONS: ICD-10-CM

## 2022-02-11 LAB
BASOPHILS # BLD AUTO: 0.03 K/UL (ref 0–0.2)
BASOPHILS NFR BLD: 0.3 % (ref 0–1.9)
DIFFERENTIAL METHOD: ABNORMAL
EOSINOPHIL # BLD AUTO: 0.2 K/UL (ref 0–0.5)
EOSINOPHIL NFR BLD: 2.7 % (ref 0–8)
ERYTHROCYTE [DISTWIDTH] IN BLOOD BY AUTOMATED COUNT: 14.5 % (ref 11.5–14.5)
HCT VFR BLD AUTO: 50.1 % (ref 37–48.5)
HGB BLD-MCNC: 16.5 G/DL (ref 12–16)
IMM GRANULOCYTES # BLD AUTO: 0.03 K/UL (ref 0–0.04)
IMM GRANULOCYTES NFR BLD AUTO: 0.3 % (ref 0–0.5)
LYMPHOCYTES # BLD AUTO: 3.4 K/UL (ref 1–4.8)
LYMPHOCYTES NFR BLD: 38.3 % (ref 18–48)
MCH RBC QN AUTO: 29.7 PG (ref 27–31)
MCHC RBC AUTO-ENTMCNC: 32.9 G/DL (ref 32–36)
MCV RBC AUTO: 90 FL (ref 82–98)
MONOCYTES # BLD AUTO: 0.6 K/UL (ref 0.3–1)
MONOCYTES NFR BLD: 6.7 % (ref 4–15)
NEUTROPHILS # BLD AUTO: 4.7 K/UL (ref 1.8–7.7)
NEUTROPHILS NFR BLD: 52 % (ref 38–73)
NRBC BLD-RTO: 0 /100 WBC
PLATELET # BLD AUTO: 190 K/UL (ref 150–450)
PMV BLD AUTO: 10.6 FL (ref 9.2–12.9)
RBC # BLD AUTO: 5.55 M/UL (ref 4–5.4)
WBC # BLD AUTO: 8.99 K/UL (ref 3.9–12.7)

## 2022-02-11 PROCEDURE — 36415 COLL VENOUS BLD VENIPUNCTURE: CPT | Mod: PO | Performed by: STUDENT IN AN ORGANIZED HEALTH CARE EDUCATION/TRAINING PROGRAM

## 2022-02-11 PROCEDURE — 83036 HEMOGLOBIN GLYCOSYLATED A1C: CPT | Performed by: FAMILY MEDICINE

## 2022-02-11 PROCEDURE — 85025 COMPLETE CBC W/AUTO DIFF WBC: CPT | Mod: PO | Performed by: STUDENT IN AN ORGANIZED HEALTH CARE EDUCATION/TRAINING PROGRAM

## 2022-02-12 LAB
ESTIMATED AVG GLUCOSE: 157 MG/DL (ref 68–131)
HBA1C MFR BLD: 7.1 % (ref 4–5.6)

## 2022-02-13 NOTE — PROGRESS NOTES
Make follow-up lab appointment per recommendation below.  Check to see if patient has seen the results through my chart.  If not then,  #CALL THE PATIENT# to discuss results/see if they have questions and document verification of contact. Make F/U appt if needed. [phone]    #My interpretation that was sent to them through [employer]:  Crystal, I have reviewed your recent blood work.     Your complete blood count is abnormal, follow-up with Hematology as scheduled.    Your hemoglobin A1c is stable.  Repeat A1c in three months.  This test is gold standard screening test for diabetes.  It is a measures 3 months of your average blood sugar.  =========================  Also please address any outstanding health maintenance that may be due: HIV Screening Never done  TETANUS VACCINE Never done  Cervical Cancer Screening Never done  Shingles Vaccine(1 of 2) Never done  Foot Exam due on 12/02/2020  COVID-19 Vaccine(3 - Booster for Moderna series) due on 03/01/2022

## 2022-02-14 LAB
LEFT EYE DM RETINOPATHY: NEGATIVE
RIGHT EYE DM RETINOPATHY: NEGATIVE

## 2022-02-24 ENCOUNTER — OFFICE VISIT (OUTPATIENT)
Dept: HEMATOLOGY/ONCOLOGY | Facility: CLINIC | Age: 67
End: 2022-02-24
Payer: MEDICARE

## 2022-02-24 ENCOUNTER — TELEPHONE (OUTPATIENT)
Dept: SLEEP MEDICINE | Facility: CLINIC | Age: 67
End: 2022-02-24
Payer: MEDICARE

## 2022-02-24 ENCOUNTER — PATIENT OUTREACH (OUTPATIENT)
Dept: ADMINISTRATIVE | Facility: HOSPITAL | Age: 67
End: 2022-02-24
Payer: MEDICARE

## 2022-02-24 ENCOUNTER — PATIENT MESSAGE (OUTPATIENT)
Dept: HEMATOLOGY/ONCOLOGY | Facility: CLINIC | Age: 67
End: 2022-02-24

## 2022-02-24 ENCOUNTER — LAB VISIT (OUTPATIENT)
Dept: LAB | Facility: HOSPITAL | Age: 67
End: 2022-02-24
Payer: MEDICARE

## 2022-02-24 VITALS
HEIGHT: 62 IN | SYSTOLIC BLOOD PRESSURE: 150 MMHG | BODY MASS INDEX: 32.22 KG/M2 | TEMPERATURE: 98 F | WEIGHT: 175.06 LBS | OXYGEN SATURATION: 95 % | DIASTOLIC BLOOD PRESSURE: 80 MMHG | HEART RATE: 75 BPM | RESPIRATION RATE: 18 BRPM

## 2022-02-24 DIAGNOSIS — I15.2 HYPERTENSION ASSOCIATED WITH DIABETES: ICD-10-CM

## 2022-02-24 DIAGNOSIS — R06.83 SNORING: ICD-10-CM

## 2022-02-24 DIAGNOSIS — R79.89 ABNORMAL CBC: ICD-10-CM

## 2022-02-24 DIAGNOSIS — R31.9 HEMATURIA, UNSPECIFIED TYPE: ICD-10-CM

## 2022-02-24 DIAGNOSIS — D75.1 ERYTHROCYTOSIS: Primary | ICD-10-CM

## 2022-02-24 DIAGNOSIS — R29.818 SUSPECTED SLEEP APNEA: ICD-10-CM

## 2022-02-24 DIAGNOSIS — R31.9 HEMATURIA, UNSPECIFIED TYPE: Primary | ICD-10-CM

## 2022-02-24 DIAGNOSIS — E11.59 HYPERTENSION ASSOCIATED WITH DIABETES: ICD-10-CM

## 2022-02-24 DIAGNOSIS — D75.1 ERYTHROCYTOSIS: ICD-10-CM

## 2022-02-24 DIAGNOSIS — E66.2 MORBID (SEVERE) OBESITY WITH ALVEOLAR HYPOVENTILATION: ICD-10-CM

## 2022-02-24 DIAGNOSIS — A49.9 BACTERIAL INFECTION: Primary | ICD-10-CM

## 2022-02-24 LAB
ALBUMIN SERPL BCP-MCNC: 3.6 G/DL (ref 3.5–5.2)
ALP SERPL-CCNC: 64 U/L (ref 55–135)
ALT SERPL W/O P-5'-P-CCNC: 13 U/L (ref 10–44)
ANION GAP SERPL CALC-SCNC: 11 MMOL/L (ref 8–16)
AST SERPL-CCNC: 11 U/L (ref 10–40)
BASOPHILS # BLD AUTO: 0.03 K/UL (ref 0–0.2)
BASOPHILS NFR BLD: 0.3 % (ref 0–1.9)
BILIRUB SERPL-MCNC: 0.4 MG/DL (ref 0.1–1)
BILIRUB UR QL STRIP: NEGATIVE
BUN SERPL-MCNC: 10 MG/DL (ref 8–23)
CALCIUM SERPL-MCNC: 9.7 MG/DL (ref 8.7–10.5)
CHLORIDE SERPL-SCNC: 104 MMOL/L (ref 95–110)
CLARITY UR: CLEAR
CO2 SERPL-SCNC: 28 MMOL/L (ref 23–29)
COLOR UR: YELLOW
CREAT SERPL-MCNC: 0.7 MG/DL (ref 0.5–1.4)
DIFFERENTIAL METHOD: NORMAL
EOSINOPHIL # BLD AUTO: 0.2 K/UL (ref 0–0.5)
EOSINOPHIL NFR BLD: 1.7 % (ref 0–8)
ERYTHROCYTE [DISTWIDTH] IN BLOOD BY AUTOMATED COUNT: 14.3 % (ref 11.5–14.5)
EST. GFR  (AFRICAN AMERICAN): >60 ML/MIN/1.73 M^2
EST. GFR  (NON AFRICAN AMERICAN): >60 ML/MIN/1.73 M^2
GLUCOSE SERPL-MCNC: 103 MG/DL (ref 70–110)
GLUCOSE UR QL STRIP: NEGATIVE
HCT VFR BLD AUTO: 47.1 % (ref 37–48.5)
HGB BLD-MCNC: 15.8 G/DL (ref 12–16)
HGB UR QL STRIP: ABNORMAL
IGA SERPL-MCNC: 329 MG/DL (ref 40–350)
IGG SERPL-MCNC: 1118 MG/DL (ref 650–1600)
IGM SERPL-MCNC: 55 MG/DL (ref 50–300)
IMM GRANULOCYTES # BLD AUTO: 0.03 K/UL (ref 0–0.04)
IMM GRANULOCYTES NFR BLD AUTO: 0.3 % (ref 0–0.5)
KETONES UR QL STRIP: NEGATIVE
LEUKOCYTE ESTERASE UR QL STRIP: NEGATIVE
LYMPHOCYTES # BLD AUTO: 3.3 K/UL (ref 1–4.8)
LYMPHOCYTES NFR BLD: 34.5 % (ref 18–48)
MCH RBC QN AUTO: 30.2 PG (ref 27–31)
MCHC RBC AUTO-ENTMCNC: 33.5 G/DL (ref 32–36)
MCV RBC AUTO: 90 FL (ref 82–98)
MONOCYTES # BLD AUTO: 0.7 K/UL (ref 0.3–1)
MONOCYTES NFR BLD: 7.1 % (ref 4–15)
NEUTROPHILS # BLD AUTO: 5.3 K/UL (ref 1.8–7.7)
NEUTROPHILS NFR BLD: 56.1 % (ref 38–73)
NITRITE UR QL STRIP: NEGATIVE
NRBC BLD-RTO: 0 /100 WBC
PH UR STRIP: 6 [PH] (ref 5–8)
PLATELET # BLD AUTO: 176 K/UL (ref 150–450)
PMV BLD AUTO: 10.9 FL (ref 9.2–12.9)
POTASSIUM SERPL-SCNC: 4.1 MMOL/L (ref 3.5–5.1)
PROT SERPL-MCNC: 7.5 G/DL (ref 6–8.4)
PROT UR QL STRIP: NEGATIVE
RBC # BLD AUTO: 5.24 M/UL (ref 4–5.4)
SODIUM SERPL-SCNC: 143 MMOL/L (ref 136–145)
SP GR UR STRIP: 1.01 (ref 1–1.03)
URN SPEC COLLECT METH UR: ABNORMAL
WBC # BLD AUTO: 9.44 K/UL (ref 3.9–12.7)

## 2022-02-24 PROCEDURE — 81003 URINALYSIS AUTO W/O SCOPE: CPT | Mod: PN | Performed by: STUDENT IN AN ORGANIZED HEALTH CARE EDUCATION/TRAINING PROGRAM

## 2022-02-24 PROCEDURE — 82784 ASSAY IGA/IGD/IGG/IGM EACH: CPT | Performed by: STUDENT IN AN ORGANIZED HEALTH CARE EDUCATION/TRAINING PROGRAM

## 2022-02-24 PROCEDURE — 3077F SYST BP >= 140 MM HG: CPT | Mod: CPTII,S$GLB,, | Performed by: STUDENT IN AN ORGANIZED HEALTH CARE EDUCATION/TRAINING PROGRAM

## 2022-02-24 PROCEDURE — 3008F PR BODY MASS INDEX (BMI) DOCUMENTED: ICD-10-PCS | Mod: CPTII,S$GLB,, | Performed by: STUDENT IN AN ORGANIZED HEALTH CARE EDUCATION/TRAINING PROGRAM

## 2022-02-24 PROCEDURE — 3079F DIAST BP 80-89 MM HG: CPT | Mod: CPTII,S$GLB,, | Performed by: STUDENT IN AN ORGANIZED HEALTH CARE EDUCATION/TRAINING PROGRAM

## 2022-02-24 PROCEDURE — 99999 PR PBB SHADOW E&M-EST. PATIENT-LVL IV: ICD-10-PCS | Mod: PBBFAC,,, | Performed by: STUDENT IN AN ORGANIZED HEALTH CARE EDUCATION/TRAINING PROGRAM

## 2022-02-24 PROCEDURE — 99214 PR OFFICE/OUTPT VISIT, EST, LEVL IV, 30-39 MIN: ICD-10-PCS | Mod: S$GLB,,, | Performed by: STUDENT IN AN ORGANIZED HEALTH CARE EDUCATION/TRAINING PROGRAM

## 2022-02-24 PROCEDURE — 3051F HG A1C>EQUAL 7.0%<8.0%: CPT | Mod: CPTII,S$GLB,, | Performed by: STUDENT IN AN ORGANIZED HEALTH CARE EDUCATION/TRAINING PROGRAM

## 2022-02-24 PROCEDURE — 99999 PR PBB SHADOW E&M-EST. PATIENT-LVL IV: CPT | Mod: PBBFAC,,, | Performed by: STUDENT IN AN ORGANIZED HEALTH CARE EDUCATION/TRAINING PROGRAM

## 2022-02-24 PROCEDURE — 3077F PR MOST RECENT SYSTOLIC BLOOD PRESSURE >= 140 MM HG: ICD-10-PCS | Mod: CPTII,S$GLB,, | Performed by: STUDENT IN AN ORGANIZED HEALTH CARE EDUCATION/TRAINING PROGRAM

## 2022-02-24 PROCEDURE — 1159F PR MEDICATION LIST DOCUMENTED IN MEDICAL RECORD: ICD-10-PCS | Mod: CPTII,S$GLB,, | Performed by: STUDENT IN AN ORGANIZED HEALTH CARE EDUCATION/TRAINING PROGRAM

## 2022-02-24 PROCEDURE — 85025 COMPLETE CBC W/AUTO DIFF WBC: CPT | Mod: PN | Performed by: STUDENT IN AN ORGANIZED HEALTH CARE EDUCATION/TRAINING PROGRAM

## 2022-02-24 PROCEDURE — 3079F PR MOST RECENT DIASTOLIC BLOOD PRESSURE 80-89 MM HG: ICD-10-PCS | Mod: CPTII,S$GLB,, | Performed by: STUDENT IN AN ORGANIZED HEALTH CARE EDUCATION/TRAINING PROGRAM

## 2022-02-24 PROCEDURE — 80053 COMPREHEN METABOLIC PANEL: CPT | Mod: PN | Performed by: STUDENT IN AN ORGANIZED HEALTH CARE EDUCATION/TRAINING PROGRAM

## 2022-02-24 PROCEDURE — 1159F MED LIST DOCD IN RCRD: CPT | Mod: CPTII,S$GLB,, | Performed by: STUDENT IN AN ORGANIZED HEALTH CARE EDUCATION/TRAINING PROGRAM

## 2022-02-24 PROCEDURE — 99214 OFFICE O/P EST MOD 30 MIN: CPT | Mod: S$GLB,,, | Performed by: STUDENT IN AN ORGANIZED HEALTH CARE EDUCATION/TRAINING PROGRAM

## 2022-02-24 PROCEDURE — 82787 IGG 1 2 3 OR 4 EACH: CPT | Performed by: STUDENT IN AN ORGANIZED HEALTH CARE EDUCATION/TRAINING PROGRAM

## 2022-02-24 PROCEDURE — 3051F PR MOST RECENT HEMOGLOBIN A1C LEVEL 7.0 - < 8.0%: ICD-10-PCS | Mod: CPTII,S$GLB,, | Performed by: STUDENT IN AN ORGANIZED HEALTH CARE EDUCATION/TRAINING PROGRAM

## 2022-02-24 PROCEDURE — 3008F BODY MASS INDEX DOCD: CPT | Mod: CPTII,S$GLB,, | Performed by: STUDENT IN AN ORGANIZED HEALTH CARE EDUCATION/TRAINING PROGRAM

## 2022-02-24 RX ORDER — SULFAMETHOXAZOLE AND TRIMETHOPRIM 800; 160 MG/1; MG/1
1 TABLET ORAL 2 TIMES DAILY
Qty: 14 TABLET | Refills: 0 | Status: SHIPPED | OUTPATIENT
Start: 2022-02-24 | End: 2022-03-11

## 2022-02-24 RX ORDER — LOTEPREDNOL ETABONATE 10 MG/ML
1 SUSPENSION TOPICAL 2 TIMES DAILY
COMMUNITY
Start: 2021-12-02 | End: 2023-11-30

## 2022-02-24 RX ORDER — LIFITEGRAST 50 MG/ML
SOLUTION/ DROPS OPHTHALMIC
COMMUNITY
Start: 2022-02-14 | End: 2023-11-30

## 2022-02-24 RX ORDER — AMOXICILLIN AND CLAVULANATE POTASSIUM 500; 125 MG/1; 1/1
TABLET, FILM COATED ORAL
COMMUNITY
Start: 2021-12-28 | End: 2022-03-11

## 2022-02-24 NOTE — PROGRESS NOTES
"Subjective:     Patient ID:    Name: Crystal Acuna  : 1955  MRN: [id number]    HPI:   Crystal Acuna is a 66 y.o. female presents for evaluation of Erthrocytosis    Mrs Acuna was referred to us for abnormal CBC mainly her elevated RBC/Hb. On further discussion with patient it seems that she had a 40 years of smoking, quit 2-3 years, does have a significant history of snoring and waking up in the middle of the night, possible Sleep apnea. Denies any history of COPD/Asthma, no new medication over the counter, no headaches, no itching, no chest pain or SOB. Noticed around 2 weeks ago after her cataract surgery had an episode of feeling "weird" headaches but it resolved after couple of mins.  No night sweats or weight loss and appetite is good. Family history significant for liver cancer in grandfather and breast cancer in Aunt     Since her last clinic visit, patient seems to have another MRSA infection in her ear and around her left eye.     Past Medical History:   Diagnosis Date    Allergy     Bronchitis     Family history of colonic polyps 2021    Three of her siblings have had colon polyps.    Hallux abductovalgus     Herniated lumbar intervertebral disc     Hyperlipidemia     Diet controlled    Hypertension     Smoker        Past Surgical History:   Procedure Laterality Date    BACK SURGERY      L4-L5    CARPAL TUNNEL RELEASE      bilateral     SECTION      times 1    CHOLECYSTECTOMY  2010    COLONOSCOPY N/A 2021    Procedure: COLONOSCOPY;  Surgeon: Kevin Goff MD;  Location: [facility];  Service: Endoscopy;  Laterality: N/A;    CYST REMOVAL      right hand    ENDOMETRIAL ABLATION  2009    FOOT FRACTURE SURGERY         Family History   Problem Relation Age of Onset    COPD Mother     Heart disease Mother     Heart disease Father         MI    Breast cancer Maternal Aunt        Social History     Socioeconomic History    Marital status: Single " "  Tobacco Use    Smoking status: Former Smoker     Packs/day: 0.75     Years: 39.00     Pack years: 29.25     Types: Cigarettes    Smokeless tobacco: Never Used    Tobacco comment: quit 2 yrs ago   Substance and Sexual Activity    Alcohol use: No    Drug use: No       Review of patient's allergies indicates:   Allergen Reactions    Metformin     Farxiga [dapagliflozin]      Yeast infection      Latex, natural rubber        Review of Systems   Constitutional: Positive for malaise/fatigue. Negative for decreased appetite, fever and night sweats.   HENT: Negative for nosebleeds and sore throat.    Eyes: Negative for blurred vision, double vision, pain and visual disturbance.   Cardiovascular: Negative for chest pain, dyspnea on exertion and leg swelling.   Respiratory: Negative for cough and shortness of breath.    Hematologic/Lymphatic: Negative for adenopathy and bleeding problem. Does not bruise/bleed easily.   Skin: Negative for rash.   Musculoskeletal: Negative for back pain and joint swelling.   Gastrointestinal: Negative for abdominal pain, diarrhea, melena, nausea and vomiting.   Genitourinary: Negative for frequency, hematuria and non-menstrual bleeding.   Neurological: Negative for dizziness, focal weakness, headaches and weakness.   Psychiatric/Behavioral: The patient is not nervous/anxious.           Objective:     Vitals:    02/24/22 1132   BP: (!) 150/80   BP Location: Right arm   Patient Position: Sitting   BP Method: Medium (Manual)   Pulse: 75   Resp: 18   Temp: 98 °F (36.7 °C)   TempSrc: Oral   SpO2: 95%   Weight: 79.4 kg (175 lb 0.7 oz)   Height: 5' 2" (1.575 m)        Physical Exam  Constitutional:       General: She is not in acute distress.     Appearance: Normal appearance. She is normal weight.   HENT:      Head: Normocephalic and atraumatic.   Eyes:      General: No scleral icterus.     Conjunctiva/sclera: Conjunctivae normal.   Cardiovascular:      Rate and Rhythm: Normal rate and " regular rhythm.      Heart sounds: Normal heart sounds.   Pulmonary:      Effort: Pulmonary effort is normal.      Breath sounds: Normal breath sounds. No wheezing.   Chest:      Chest wall: No tenderness.   Abdominal:      General: Abdomen is flat. Bowel sounds are normal. There is no distension.      Palpations: Abdomen is soft. There is no mass.   Musculoskeletal:         General: No swelling or tenderness.   Lymphadenopathy:      Cervical: No cervical adenopathy.   Skin:     General: Skin is warm.      Coloration: Skin is not jaundiced or pale.   Neurological:      General: No focal deficit present.      Mental Status: She is oriented to person, place, and time.   Psychiatric:         Mood and Affect: Mood normal.         Behavior: Behavior normal.             Current Outpatient Medications on File Prior to Visit   Medication Sig    albuterol (PROVENTIL/VENTOLIN HFA) 90 mcg/actuation inhaler Inhale 2 puffs into the lungs every 6 (six) hours as needed for Wheezing. Rescue    amLODIPine (NORVASC) 10 MG tablet Take 1 tablet by mouth once daily    aspirin 81 MG Chew Take 81 mg by mouth once daily.    atorvastatin (LIPITOR) 10 MG tablet Take 1 tablet by mouth once daily    blood sugar diagnostic Strp To check BG 2 times daily, to use with insurance preferred meter    blood-glucose meter kit To check BG 2 times daily, to use with insurance preferred meter    FARXIGA 5 mg Tab tablet Take 5 mg by mouth once daily.    fluorometholone 0.1% (FML) 0.1 % DrpS INSTILL 1 DROP INTO EACH EYE THREE TIMES DAILY AS DIRECTED    INVELTYS 1 % DrpS Place 1 drop into both eyes 2 (two) times daily.    lancets Misc To check BG 2 times daily, to use with insurance preferred meter    meloxicam (MOBIC) 15 MG tablet Take 1 tablet (15 mg total) by mouth once daily.    mupirocin (BACTROBAN) 2 % ointment Apply topically 3 (three) times daily.    ofloxacin (OCUFLOX) 0.3 % ophthalmic solution     PROLENSA 0.07 % Drop SMARTSIG:In  Eye(s)    SITagliptin (JANUVIA) 100 MG Tab Take 1 tablet (100 mg total) by mouth once daily.    XIIDRA 5 % Dpet INSTILL 1 DROP INTO EACH EYE TWICE DAILY AS DIRECTED    AUGMENTIN 500-125 mg Tab SMARTSI Tablet(s) By Mouth Every 12 Hours    doxycycline (VIBRAMYCIN) 100 MG Cap Take 1 capsule (100 mg total) by mouth every 12 (twelve) hours. (Patient not taking: No sig reported)     No current facility-administered medications on file prior to visit.     CBC:  Lab Results   Component Value Date    WBC 9.44 2022    HGB 15.8 2022    HCT 47.1 2022    MCV 90 2022     2022     CMP:  Sodium   Date Value Ref Range Status   2022 143 136 - 145 mmol/L Final     Potassium   Date Value Ref Range Status   2022 4.1 3.5 - 5.1 mmol/L Final     Chloride   Date Value Ref Range Status   2022 104 95 - 110 mmol/L Final     CO2   Date Value Ref Range Status   2022 28 23 - 29 mmol/L Final     Glucose   Date Value Ref Range Status   2022 103 70 - 110 mg/dL Final     BUN   Date Value Ref Range Status   2022 10 8 - 23 mg/dL Final     Creatinine   Date Value Ref Range Status   2022 0.7 0.5 - 1.4 mg/dL Final     Calcium   Date Value Ref Range Status   2022 9.7 8.7 - 10.5 mg/dL Final     Total Protein   Date Value Ref Range Status   2022 7.5 6.0 - 8.4 g/dL Final     Albumin   Date Value Ref Range Status   2022 3.6 3.5 - 5.2 g/dL Final     Total Bilirubin   Date Value Ref Range Status   2022 0.4 0.1 - 1.0 mg/dL Final     Comment:     For infants and newborns, interpretation of results should be based  on gestational age, weight and in agreement with clinical  observations.    Premature Infant recommended reference ranges:  Up to 24 hours.............<8.0 mg/dL  Up to 48 hours............<12.0 mg/dL  3-5 days..................<15.0 mg/dL  6-29 days.................<15.0 mg/dL       Alkaline Phosphatase   Date Value Ref Range Status   2022  64 55 - 135 U/L Final     AST ([place])   Date Value Ref Range Status   03/28/2016 20 14 - 36 U/L Final     AST   Date Value Ref Range Status   02/24/2022 11 10 - 40 U/L Final     ALT   Date Value Ref Range Status   02/24/2022 13 10 - 44 U/L Final     Anion Gap   Date Value Ref Range Status   02/24/2022 11 8 - 16 mmol/L Final     eGFR if    Date Value Ref Range Status   02/24/2022 >60 >60 mL/min/1.73 m^2 Final     eGFR if non    Date Value Ref Range Status   02/24/2022 >60 >60 mL/min/1.73 m^2 Final     Comment:     Calculation used to obtain the estimated glomerular filtration  rate (eGFR) is the CKD-EPI equation.          No results found for: IRON, TIBC, FERRITIN, SATURATEDIRO    No results found for: DUJFLTXT73,No results found for: FOLATE    Mammo Breast Stereotactic Biopsy Left  Addendum: Pathology is concordant with imaging.  Narrative: Result:   Mammo Breast Stereotactic Biopsy Left    The patient was seated upright at the stereotactic unit for the biopsy   with the left breast in lateral compression. The calcifications of   interest were localized and targeted utilizing digital spot mammography   with tomosynthesis and stereotaxis.    After antiseptic preparation the skin puncture site was infiltrated. Deep   local anesthesia about the biopsy site was administered. A skin incision   was made with an 11 blade. A 9-gauge vacuum-assisted automated core biopsy   needle was inserted to the computer determined depth, and stereotactic   images showed satisfactory relationship of the needle position to the   target calcifications. 4 tissue cores were obtained. Digital specimen   radiography showed representative calcifications within the cores. An   hourglass shaped tissue marker clip was then deployed at the biopsy site.    Hemostasis was achieved and appropriate post-procedural dressing was   applied.     The patient tolerated the procedure well, and there was no evidence of    immediate complication. The patient was given verbal and written post   procedural instructions prior to release from the department.      The tissue cores were submitted to surgical pathology in formalin for   histologic analysis.     Two view unilateral digital mammogram was obtained post procedure, and   this demonstrates that the tissue marker clip is positioned 1 cm lateral   to the sampled calcifications. A few residual calcifications are present   at the biopsy site which should be used to target any subsequent   localization procedure.  Impression:  Stereotactic-guided biopsy of left breast calcifications was   performed with placement of an hourglass shaped tissue marker clip.   Pathology pending.       All pertinent labs and imaging reviewed.    Assessment:       1. Erythrocytosis    2. Abnormal CBC    3. Hematuria, unspecified type      # Erythrocytosis: Secondary vs Primary  - Erythropoietin normal, MPN with JAk2/ELENI/MPL mutations negative   - possible due to underlying sleep apnea or lung damage from smoking   - PCP to schedule sleep study for further investigation if she needs a CPAP will monitor improvement in the blood count with the use   - Hct trending between 50's and 48%, patient has no symptoms of erythrocytosis and she is already on ASA for other reasons   - cont with monitoring no need for phlebotomy     # Screening for cancer:  - Colonoscopy 2021; polyps, mammogram; left breast calcification s/p biopsy on 2/24/22; negative      # 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   - no abnormal cell noted on her CBC w diff, less likely due to any abnormalities in her blood  - checking Immunoglobulin levels and subclass for IgG, with possible recommending to see ID      Plan:     Erythrocytosis  -     Immunoglobulins (IgG, IgA, IgM) Quantitative; Future; Expected date: 02/24/2022  -     IGG 1, 2, 3, AND 4; Future; Expected date: 02/24/2022  -     CBC w/ DIFF; Future; Expected date: 02/24/2022  -     CMP;  Future; Expected date: 02/24/2022  -     Urinalysis    Abnormal CBC  -     Immunoglobulins (IgG, IgA, IgM) Quantitative; Future; Expected date: 02/24/2022  -     IGG 1, 2, 3, AND 4; Future; Expected date: 02/24/2022  -     CBC w/ DIFF; Future; Expected date: 02/24/2022  -     CMP; Future; Expected date: 02/24/2022  -     Urinalysis    Hematuria, unspecified type  -     Immunoglobulins (IgG, IgA, IgM) Quantitative; Future; Expected date: 02/24/2022  -     IGG 1, 2, 3, AND 4; Future; Expected date: 02/24/2022  -     CBC w/ DIFF; Future; Expected date: 02/24/2022  -     CMP; Future; Expected date: 02/24/2022  -     Urinalysis       Patient queried and all questions were answered.      F/u in 2 months with CBC prior     Signed:  Naila Ricks MD  Hematology and Oncology  [facility]

## 2022-02-24 NOTE — PROGRESS NOTES
Spoke with patient about medications sent to pharmacy and assisted with scheduling Infectious Disease appointment.  Also discussed orders that were entered for Home Sleep Study.

## 2022-02-24 NOTE — PROGRESS NOTES
I have signed for the following orders AND/OR meds.  Please call the patient and ask the patient to schedule the testing AND/OR inform about any medications that were sent.      Orders Placed This Encounter   Procedures    Home Sleep Studies     Standing Status:   Future     Standing Expiration Date:   2/24/2023       Medications Ordered This Encounter   Medications    sulfamethoxazole-trimethoprim 800-160mg (BACTRIM DS) 800-160 mg Tab     Sig: Take 1 tablet by mouth 2 (two) times daily.     Dispense:  14 tablet     Refill:  0

## 2022-02-25 ENCOUNTER — TELEPHONE (OUTPATIENT)
Dept: INFECTIOUS DISEASES | Facility: CLINIC | Age: 67
End: 2022-02-25

## 2022-02-25 ENCOUNTER — OFFICE VISIT (OUTPATIENT)
Dept: INFECTIOUS DISEASES | Facility: CLINIC | Age: 67
End: 2022-02-25
Payer: MEDICARE

## 2022-02-25 DIAGNOSIS — E11.65 TYPE 2 DIABETES MELLITUS WITH HYPERGLYCEMIA, WITHOUT LONG-TERM CURRENT USE OF INSULIN: Chronic | ICD-10-CM

## 2022-02-25 DIAGNOSIS — A49.9 BACTERIAL INFECTION: ICD-10-CM

## 2022-02-25 DIAGNOSIS — E11.59 HYPERTENSION ASSOCIATED WITH DIABETES: Chronic | ICD-10-CM

## 2022-02-25 DIAGNOSIS — I15.2 HYPERTENSION ASSOCIATED WITH DIABETES: Chronic | ICD-10-CM

## 2022-02-25 PROCEDURE — 3051F HG A1C>EQUAL 7.0%<8.0%: CPT | Mod: CPTII,95,, | Performed by: INTERNAL MEDICINE

## 2022-02-25 PROCEDURE — 3051F PR MOST RECENT HEMOGLOBIN A1C LEVEL 7.0 - < 8.0%: ICD-10-PCS | Mod: CPTII,95,, | Performed by: INTERNAL MEDICINE

## 2022-02-25 PROCEDURE — 99203 OFFICE O/P NEW LOW 30 MIN: CPT | Mod: 95,,, | Performed by: INTERNAL MEDICINE

## 2022-02-25 PROCEDURE — 99203 PR OFFICE/OUTPT VISIT, NEW, LEVL III, 30-44 MIN: ICD-10-PCS | Mod: 95,,, | Performed by: INTERNAL MEDICINE

## 2022-02-25 RX ORDER — LINEZOLID 600 MG/1
600 TABLET, FILM COATED ORAL 2 TIMES DAILY
Qty: 28 TABLET | Refills: 0 | Status: SHIPPED | OUTPATIENT
Start: 2022-02-25 | End: 2022-03-07

## 2022-02-25 NOTE — ASSESSMENT & PLAN NOTE
Will use 10 days of Zyvox.  She was given a coupon for zyvox.  Was also advised to use Hibiclens after bathing   Recent blood work -cbc -no leucocytosis and normal immunoglobulin.

## 2022-02-25 NOTE — PROGRESS NOTES
Subjective:          This is a virtual visit .  Location- [place]   Patient ID: Crystal Acuna is a 66 y.o. female.    Chief Complaint: Recurrent staph infections   HPI     66 year old woman with history of recurrent staph infection -since 2021.  Previous therapy -Bactrim/Augmentin .  The lesions- left eye, nose and ear.  She is not febrile.  No family contact has the same problem.  She has been sitting in bleach water   Lab test -  Serum IGG -normal   Past Medical History:   Diagnosis Date    Allergy     Bronchitis     Family history of colonic polyps 2021    Three of her siblings have had colon polyps.    Hallux abductovalgus     Herniated lumbar intervertebral disc     Hyperlipidemia     Diet controlled    Hypertension     Smoker      Past Surgical History:   Procedure Laterality Date    BACK SURGERY      L4-L5    CARPAL TUNNEL RELEASE      bilateral     SECTION      times 1    CHOLECYSTECTOMY  2010    COLONOSCOPY N/A 2021    Procedure: COLONOSCOPY;  Surgeon: Kevin Goff MD;  Location: [facility];  Service: Endoscopy;  Laterality: N/A;    CYST REMOVAL      right hand    ENDOMETRIAL ABLATION  2009    FOOT FRACTURE SURGERY       Family History   Problem Relation Age of Onset    COPD Mother     Heart disease Mother     Heart disease Father         MI    Breast cancer Maternal Aunt      Social History     Socioeconomic History    Marital status: Single   Tobacco Use    Smoking status: Former Smoker     Packs/day: 0.75     Years: 39.00     Pack years: 29.25     Types: Cigarettes    Smokeless tobacco: Never Used    Tobacco comment: quit 2 yrs ago   Substance and Sexual Activity    Alcohol use: No    Drug use: No     Review of Systems   Constitutional: Positive for activity change. Negative for appetite change and chills.       Objective:      Physical Exam    Assessment:         1. Bacterial infection  Ambulatory referral/consult to Infectious Disease    2. Type 2 diabetes mellitus with hyperglycemia, without long-term current use of insulin     3. Hypertension associated with diabetes         Plan:       Problem List Items Addressed This Visit     Type 2 diabetes mellitus with hyperglycemia, without long-term current use of insulin (Chronic)     Diabetic diet , follow PCP           Hypertension associated with diabetes (Chronic)     Follow PCP           Bacterial infection     Will use 10 days of Zyvox.  She was given a coupon for zyvox.  Was also advised to use Hibiclens after bathing   Recent blood work -cbc -no leucocytosis and normal immunoglobulin.

## 2022-02-25 NOTE — TELEPHONE ENCOUNTER
----- Message from Allyson Chavez sent at 2/25/2022 10:36 AM CST -----  Contact: [facility] linezolid (ZYVOX) 600 mg Tab  Pharmacy is calling to clarify an RX.  RX name:  linezolid (ZYVOX) 600 mg Tab  What do they need to clarify:  directions, gave her 14 days supply but only 10 day directions  Comments:

## 2022-02-28 LAB
IGG1 SER-MCNC: 686 MG/DL (ref 382–929)
IGG2 SER-MCNC: 207 MG/DL (ref 242–700)
IGG3 SER-MCNC: 41 MG/DL (ref 22–176)
IGG4 SER-MCNC: 152 MG/DL (ref 4–86)

## 2022-03-04 ENCOUNTER — TELEPHONE (OUTPATIENT)
Dept: SLEEP MEDICINE | Facility: HOSPITAL | Age: 67
End: 2022-03-04

## 2022-03-05 ENCOUNTER — PATIENT MESSAGE (OUTPATIENT)
Dept: FAMILY MEDICINE | Facility: CLINIC | Age: 67
End: 2022-03-05
Payer: MEDICARE

## 2022-03-05 DIAGNOSIS — B37.0 THRUSH: Primary | ICD-10-CM

## 2022-03-06 RX ORDER — NYSTATIN 100000 [USP'U]/ML
4 SUSPENSION ORAL
Qty: 160 ML | Refills: 0 | Status: SHIPPED | OUTPATIENT
Start: 2022-03-06 | End: 2022-03-16

## 2022-03-06 RX ORDER — FLUCONAZOLE 150 MG/1
150 TABLET ORAL
Qty: 3 TABLET | Refills: 0 | Status: SHIPPED | OUTPATIENT
Start: 2022-03-06 | End: 2022-03-13

## 2022-03-06 NOTE — TELEPHONE ENCOUNTER
I received your message which was reviewed along with the the medication list and allergies that we have below.  Please review it for accuracy to make sure that we have the most recent records on your history.     Based on this, the following orders were placed AND/OR medicines were sent in.     No orders of the defined types were placed in this encounter.      Medications written and sent at this time include:  Medications Ordered This Encounter   Medications    fluconazole (DIFLUCAN) 150 MG Tab     Sig: Take 1 tablet (150 mg total) by mouth every 72 hours. for 3 doses     Dispense:  3 tablet     Refill:  0    nystatin (MYCOSTATIN) 100,000 unit/mL suspension     Sig: Take 4 mLs (400,000 Units total) by mouth after meals as needed (thrush).     Dispense:  160 mL     Refill:  0       Your pharmacy(ies) of choice at this time on record include the list below and any medications would have been sent to the one at the top.    [facility] [address] [place] [address]  Phone: [phone] Fax: [phone]      Thank you for choosing us as your healthcare provider!  Dr. Eh Sow    ALLERGY LIST  Review of patient's allergies indicates:   Allergen Reactions    Metformin     Farxiga [dapagliflozin]      Yeast infection      Latex, natural rubber        MEDICATION LIST  Current Outpatient Medications on File Prior to Visit   Medication Sig Dispense Refill    albuterol (PROVENTIL/VENTOLIN HFA) 90 mcg/actuation inhaler Inhale 2 puffs into the lungs every 6 (six) hours as needed for Wheezing. Rescue 17 g 11    amLODIPine (NORVASC) 10 MG tablet Take 1 tablet by mouth once daily 90 tablet 4    aspirin 81 MG Chew Take 81 mg by mouth once daily.      atorvastatin (LIPITOR) 10 MG tablet Take 1 tablet by mouth once daily 90 tablet 4    AUGMENTIN 500-125 mg Tab SMARTSI Tablet(s) By Mouth Every 12 Hours      blood sugar diagnostic Strp To check BG 2 times  daily, to use with insurance preferred meter 200 each 5    blood-glucose meter kit To check BG 2 times daily, to use with insurance preferred meter 1 each 0    doxycycline (VIBRAMYCIN) 100 MG Cap Take 1 capsule (100 mg total) by mouth every 12 (twelve) hours. (Patient not taking: No sig reported) 20 capsule 0    FARXIGA 5 mg Tab tablet Take 5 mg by mouth once daily.      fluorometholone 0.1% (FML) 0.1 % DrpS INSTILL 1 DROP INTO EACH EYE THREE TIMES DAILY AS DIRECTED      INVELTYS 1 % DrpS Place 1 drop into both eyes 2 (two) times daily.      lancets Misc To check BG 2 times daily, to use with insurance preferred meter 200 each 99    linezolid (ZYVOX) 600 mg Tab Take 1 tablet (600 mg total) by mouth 2 (two) times daily. for 10 days 28 tablet 0    meloxicam (MOBIC) 15 MG tablet Take 1 tablet (15 mg total) by mouth once daily. 90 tablet 3    mupirocin (BACTROBAN) 2 % ointment Apply topically 3 (three) times daily. 22 g 2    ofloxacin (OCUFLOX) 0.3 % ophthalmic solution       PROLENSA 0.07 % Drop SMARTSIG:In Eye(s)      SITagliptin (JANUVIA) 100 MG Tab Take 1 tablet (100 mg total) by mouth once daily. 90 tablet 4    sulfamethoxazole-trimethoprim 800-160mg (BACTRIM DS) 800-160 mg Tab Take 1 tablet by mouth 2 (two) times daily. 14 tablet 0    XIIDRA 5 % Dpet INSTILL 1 DROP INTO EACH EYE TWICE DAILY AS DIRECTED       No current facility-administered medications on file prior to visit.       HEALTH MAINTENANCE THAT IS OVERDUE OR NEEDS TO BE UPDATED ON OUR CHART IS LISTED BELOW.  IF YOU HAVE HAD IT DONE ELSEWHERE, PLEASE SEND US DATES AND RECORDS IF YOU HAVE THEM TO MAKE YOUR CHART ACCURATE.  IF YOU HAVE NOT HAD THESE DONE AND ARE READY FOR US TO SCHEDULE THEM, PLEASE SEND US A MESSAGE.  Health Maintenance Due   Topic Date Due    Cervical Cancer Screening  Never done    HIV Screening  Never done    TETANUS VACCINE  Never done    Shingles Vaccine (1 of 2) Never done    Foot Exam  12/02/2020    COVID-19  Vaccine (3 - Booster for Moderna series) 02/01/2022       DISCLAIMER: This note was compiled by using a speech recognition dictation system and therefore please be aware that typographical / speech recognition errors can and do occur.  Please contact me if you see any errors specifically.    Eh Sow MD  We Offer Telehealth & Same Day Appointments!   Book your Telehealth appointment with me through my nurse or   Clinic appointments on [employer]!  [phone]     Check out my Facebook Page and Follow Me at: CLICK HERE    Check out my website at [facility] by clicking on: CLICK HERE    To Schedule appointments online, go to [employer]: CLICK HERE     Location: [email or web address]    [address] [place] [address]    FAX: [phone]

## 2022-03-09 ENCOUNTER — PROCEDURE VISIT (OUTPATIENT)
Dept: SLEEP MEDICINE | Facility: CLINIC | Age: 67
End: 2022-03-09
Payer: MEDICARE

## 2022-03-09 DIAGNOSIS — E11.59 HYPERTENSION ASSOCIATED WITH DIABETES: ICD-10-CM

## 2022-03-09 DIAGNOSIS — I15.2 HYPERTENSION ASSOCIATED WITH DIABETES: ICD-10-CM

## 2022-03-09 DIAGNOSIS — R29.818 SUSPECTED SLEEP APNEA: ICD-10-CM

## 2022-03-09 DIAGNOSIS — E66.2 MORBID (SEVERE) OBESITY WITH ALVEOLAR HYPOVENTILATION: ICD-10-CM

## 2022-03-09 DIAGNOSIS — R06.83 SNORING: ICD-10-CM

## 2022-03-09 PROCEDURE — 95800 SLP STDY UNATTENDED: CPT

## 2022-03-09 NOTE — Clinical Note
1 night study MODERATE OBSTRUCTIVE SLEEP APNEA with overall AHI 21.7/hr ( 120 events): night #1 Oxygen desaturation: 82%. SpO2 between 80% to 84% for 10 min. SpO2 was below 90%: 69% time Patient snored 98% time above 50 . Heart rate range: 57 bpm - 103 bpm REC's: Please refer to sleep disorders clinic Therapy with APAP at 4-20 cm WP using mask of choice with heated humidification is an option. Weight loss/management. with regular exercise per direction of physician. Avoid drowsy driving. Follow up in sleep clinic to maximize adherence and ensure resolution of symptoms.

## 2022-03-10 ENCOUNTER — PATIENT MESSAGE (OUTPATIENT)
Dept: FAMILY MEDICINE | Facility: CLINIC | Age: 67
End: 2022-03-10
Payer: MEDICARE

## 2022-03-10 PROCEDURE — 95806 SLEEP STUDY UNATT&RESP EFFT: CPT | Mod: 26,52,S$GLB, | Performed by: INTERNAL MEDICINE

## 2022-03-10 PROCEDURE — 95806 PR SLEEP STUDY, UNATTENDED, SIMUL RECORD HR/O2 SAT/RESP FLOW/RESP EFFT: ICD-10-PCS | Mod: 26,52,S$GLB, | Performed by: INTERNAL MEDICINE

## 2022-03-10 NOTE — PROCEDURES
Home Sleep Studies    Date/Time: 3/9/2022 8:00 AM  Performed by: Oliver Mederos MD  Authorized by: Eh Sow MD       1 night study  MODERATE OBSTRUCTIVE SLEEP APNEA with overall AHI 21.7/hr ( 120 events): night #1  Oxygen desaturation: 82%. SpO2 between 80% to 84% for 10 min.  SpO2 was below 90%: 69% time  Patient snored 98% time above 50 .  Heart rate range: 57 bpm - 103 bpm  REC's:  Please refer to sleep disorders clinic  Therapy with APAP at 4-20 cm WP using mask of choice with heated humidification is an option.  Weight loss/management. with regular exercise per direction of physician.  Avoid drowsy driving.  Follow up in sleep clinic to maximize adherence and ensure resolution of symptoms.

## 2022-03-10 NOTE — PROGRESS NOTES
1st check to see if patient has seen the results.  If not then  CALL patient with results and Document verification.  Schedule follow-up if needed.  [phone]  Home sleep study confirm sleep apnea that is moderate.  Treatment with auto PAP is recommended verses referral to Sleep Disorders Clinic.  Please let me know which treatment you would like to start with.

## 2022-03-11 ENCOUNTER — OFFICE VISIT (OUTPATIENT)
Dept: FAMILY MEDICINE | Facility: CLINIC | Age: 67
End: 2022-03-11
Payer: MEDICARE

## 2022-03-11 DIAGNOSIS — G47.33 OSA (OBSTRUCTIVE SLEEP APNEA): Primary | ICD-10-CM

## 2022-03-11 PROCEDURE — 1159F PR MEDICATION LIST DOCUMENTED IN MEDICAL RECORD: ICD-10-PCS | Mod: CPTII,95,, | Performed by: FAMILY MEDICINE

## 2022-03-11 PROCEDURE — 1159F MED LIST DOCD IN RCRD: CPT | Mod: CPTII,95,, | Performed by: FAMILY MEDICINE

## 2022-03-11 PROCEDURE — 99442 PR PHYSICIAN TELEPHONE EVALUATION 11-20 MIN: ICD-10-PCS | Mod: 95,,, | Performed by: FAMILY MEDICINE

## 2022-03-11 PROCEDURE — 3051F HG A1C>EQUAL 7.0%<8.0%: CPT | Mod: CPTII,95,, | Performed by: FAMILY MEDICINE

## 2022-03-11 PROCEDURE — 3051F PR MOST RECENT HEMOGLOBIN A1C LEVEL 7.0 - < 8.0%: ICD-10-PCS | Mod: CPTII,95,, | Performed by: FAMILY MEDICINE

## 2022-03-11 PROCEDURE — 1160F RVW MEDS BY RX/DR IN RCRD: CPT | Mod: CPTII,95,, | Performed by: FAMILY MEDICINE

## 2022-03-11 PROCEDURE — 1160F PR REVIEW ALL MEDS BY PRESCRIBER/CLIN PHARMACIST DOCUMENTED: ICD-10-PCS | Mod: CPTII,95,, | Performed by: FAMILY MEDICINE

## 2022-03-11 PROCEDURE — 99442 PR PHYSICIAN TELEPHONE EVALUATION 11-20 MIN: CPT | Mod: 95,,, | Performed by: FAMILY MEDICINE

## 2022-03-11 NOTE — ASSESSMENT & PLAN NOTE
Patient prefers that the machine be sent to [facility].  Discussed results of sleep study.  Patient will proceed with auto PAP.  She will follow-up with sleep medicine if no improvement or having any issues.  Discussed condition course.

## 2022-03-11 NOTE — PROGRESS NOTES
Established Patient - Audio Only Telehealth Visit     The patient location is:  Patient's home in [place]  The chief complaint leading to consultation is:  KENYETTA results  Visit type: Virtual visit with audio only (telephone)  Total time spent with patient:  See MDM       The reason for the audio only service rather than synchronous audio and video virtual visit was related to technical difficulties or patient preference/necessity.     Each patient to whom I provide medical services by telemedicine is:  (1) informed of the relationship between the physician and patient and the respective role of any other health care provider with respect to management of the patient; and (2) notified that they may decline to receive medical services by telemedicine and may withdraw from such care at any time. Patient verbally consented to receive this service via voice-only telephone call.  PLAN:      Problem List Items Addressed This Visit     KENYETTA (obstructive sleep apnea) - Primary     Patient prefers that the machine be sent to [facility].  Discussed results of sleep study.  Patient will proceed with auto PAP.  She will follow-up with sleep medicine if no improvement or having any issues.  Discussed condition course.           Relevant Orders    CPAP FOR HOME USE        Future Appointments     Date Provider Specialty Appt Notes    3/16/2022 Smita Roque PA-C Pulmonology new pateint-kenyetta    5/18/2022 Naila Ricks MD Hematology and Oncology 2 mth f/u with labs on 02/24 5/30/2022 Eh Sow MD Family Medicine annual         Medication Management for assessment above:   Medication List with Changes/Refills   Current Medications    ALBUTEROL (PROVENTIL/VENTOLIN HFA) 90 MCG/ACTUATION INHALER    Inhale 2 puffs into the lungs every 6 (six) hours as needed for Wheezing. Rescue    AMLODIPINE (NORVASC) 10 MG TABLET    Take 1 tablet by mouth once daily    ASPIRIN 81 MG CHEW    Take 81 mg by mouth once daily.     ATORVASTATIN (LIPITOR) 10 MG TABLET    Take 1 tablet by mouth once daily    BLOOD SUGAR DIAGNOSTIC STRP    To check BG 2 times daily, to use with insurance preferred meter    BLOOD-GLUCOSE METER KIT    To check BG 2 times daily, to use with insurance preferred meter    FARXIGA 5 MG TAB TABLET    Take 5 mg by mouth once daily.    FLUCONAZOLE (DIFLUCAN) 150 MG TAB    Take 1 tablet (150 mg total) by mouth every 72 hours. for 3 doses    FLUOROMETHOLONE 0.1% (FML) 0.1 % DRPS    INSTILL 1 DROP INTO EACH EYE THREE TIMES DAILY AS DIRECTED    INVELTYS 1 % DRPS    Place 1 drop into both eyes 2 (two) times daily.    LANCETS MISC    To check BG 2 times daily, to use with insurance preferred meter    MELOXICAM (MOBIC) 15 MG TABLET    Take 1 tablet (15 mg total) by mouth once daily.    MUPIROCIN (BACTROBAN) 2 % OINTMENT    Apply topically 3 (three) times daily.    NYSTATIN (MYCOSTATIN) 100,000 UNIT/ML SUSPENSION    Take 4 mLs (400,000 Units total) by mouth after meals as needed (thrush).    OFLOXACIN (OCUFLOX) 0.3 % OPHTHALMIC SOLUTION        PROLENSA 0.07 % DROP    SMARTSIG:In Eye(s)    SITAGLIPTIN (JANUVIA) 100 MG TAB    Take 1 tablet (100 mg total) by mouth once daily.    XIIDRA 5 % DPET    INSTILL 1 DROP INTO EACH EYE TWICE DAILY AS DIRECTED   Discontinued Medications    AUGMENTIN 500-125 MG TAB    SMARTSI Tablet(s) By Mouth Every 12 Hours    DOXYCYCLINE (VIBRAMYCIN) 100 MG CAP    Take 1 capsule (100 mg total) by mouth every 12 (twelve) hours.    SULFAMETHOXAZOLE-TRIMETHOPRIM 800-160MG (BACTRIM DS) 800-160 MG TAB    Take 1 tablet by mouth 2 (two) times daily.       Eh Sow M.D.  ==========================================================================  Subjective:   Patient ID: Crystal Acuna is a 66 y.o. female.  has a past medical history of Allergy, Bronchitis, Family history of colonic polyps (2021), Hallux abductovalgus, Herniated lumbar intervertebral disc, Hyperlipidemia, Hypertension, and  Smoker.   Chief Complaint: Sleep Apnea      Problem List Items Addressed This Visit     KENYETTA (obstructive sleep apnea) - Primary    Overview     March 2022:  New problem.  Patient was recently diagnosed with sleep apnea.  She has never worn a CPAP device.  Patient reports she is willing to try a device and wear it every night.    Home Sleep Studies     Date/Time: 3/9/2022 8:00 AM  Performed by: Oliver Mederos MD  Authorized by: Eh Sow MD        1 night study  MODERATE OBSTRUCTIVE SLEEP APNEA with overall AHI 21.7/hr ( 120 events): night #1  Oxygen desaturation: 82%. SpO2 between 80% to 84% for 10 min.  SpO2 was below 90%: 69% time  Patient snored 98% time above 50 .  Heart rate range: 57 bpm - 103 bpm  REC's:  Please refer to sleep disorders clinic  Therapy with APAP at 4-20 cm WP using mask of choice with heated humidification is an option.  Weight loss/management. with regular exercise per direction of physician.  Avoid drowsy driving.  Follow up in sleep clinic to maximize adherence and ensure resolution of symptoms.           Current Assessment & Plan     Patient prefers that the machine be sent to [facility].  Discussed results of sleep study.  Patient will proceed with auto PAP.  She will follow-up with sleep medicine if no improvement or having any issues.  Discussed condition course.                  Review of patient's allergies indicates:   Allergen Reactions    Metformin     Farxiga [dapagliflozin]      Yeast infection      Latex, natural rubber      Current Outpatient Medications   Medication Instructions    albuterol (PROVENTIL/VENTOLIN HFA) 90 mcg/actuation inhaler 2 puffs, Inhalation, Every 6 hours PRN, Rescue    amLODIPine (NORVASC) 10 MG tablet Take 1 tablet by mouth once daily    aspirin 81 mg, Oral, Daily    atorvastatin (LIPITOR) 10 MG tablet Take 1 tablet by mouth once daily    blood sugar diagnostic Strp To check BG 2 times daily, to use with insurance  preferred meter    blood-glucose meter kit To check BG 2 times daily, to use with insurance preferred meter    FARXIGA 5 mg, Oral, Daily    fluconazole (DIFLUCAN) 150 mg, Oral, Every 72 hours    fluorometholone 0.1% (FML) 0.1 % DrpS INSTILL 1 DROP INTO EACH EYE THREE TIMES DAILY AS DIRECTED    INVELTYS 1 % DrpS 1 drop, Both Eyes, 2 times daily    lancets Misc To check BG 2 times daily, to use with insurance preferred meter    meloxicam (MOBIC) 15 mg, Oral, Daily    mupirocin (BACTROBAN) 2 % ointment Topical (Top), 3 times daily    nystatin (MYCOSTATIN) 400,000 Units, Oral, 3 times daily after meals PRN    ofloxacin (OCUFLOX) 0.3 % ophthalmic solution No dose, route, or frequency recorded.    PROLENSA 0.07 % Drop SMARTSIG:In Eye(s)    SITagliptin (JANUVIA) 100 mg, Oral, Daily    XIIDRA 5 % Dpet INSTILL 1 DROP INTO EACH EYE TWICE DAILY AS DIRECTED      I have reviewed the PMH, social history, FamilyHx, surgical history, allergies and medications documented / confirmed by the patient at the time of this visit.  Review of Systems  Objective:   There were no vitals taken for this visit.  Physical Exam  Limited physical exam due to telephone only interview.  Patient is alert oriented no acute distress.  Assessment:     1. KENYETTA (obstructive sleep apnea)      MDM:     Total time: 11 minutes.  This includes total time spent on the encounter, which includes face to face time and non-face to face time preparing to see the patient (eg, review of previous medical records, tests), Obtaining and/or reviewing separately obtained history, documenting clinical information in the electronic or other health record, independently interpreting results (not separately reported)/communicating results to the patient/family/caregiver, and/or care coordination (not separately reported).    I have Reviewed and summarized old records.  I have performed thorough medication reconciliation today and discussed risk and benefits of  medications.  I have reviewed labs and discussed with patient.  All questions were answered.  I am requesting old records and will review them once they are available.    I have signed for the following orders AND/OR meds.  Orders Placed This Encounter   Procedures    CPAP FOR HOME USE     Order Specific Question:   Length of need (1-99 months):     Answer:   99     Order Specific Question:   Fulfillment Priority:     Answer:   Level 3:  AHI 15 to <30 with CDL or severe daytime sleepiness     Order Specific Question:   Type ():     Answer:   Auto CPAP     Order Specific Question:   Auto CPAP pressure setting range (cmH20):     Answer:   4-20     Order Specific Question:   Humidification ():     Answer:   Heated     Order Specific Question:   Choose ONE mask type and its corresponding cushions and/or pillows:     Answer:    Full Face Mask, 1 per 90 days:  Full Face Cushion, (3 per 90 days)     Comments:   LATEX aLLERGY     Order Specific Question:   Choose EITHER Heated or Non-Heated Tubjing     Answer:    Heated Tubing, 1 per 6 months     Order Specific Question:   All other supplies as needed as listed below:     Answer:    Headgear, 1 per 180 days     Order Specific Question:   All other supplies as needed as listed below:     Answer:    Chin Strap, 1 per 180 days     Order Specific Question:   All other supplies as needed as listed below:     Answer:    Non-Disposable Filter, 1 per 180 days     Order Specific Question:   All other supplies as needed as listed below:     Answer:    Disposable Filter, 6 per 90 days     Order Specific Question:   All other supplies as needed as listed below:     Answer:    Exhalation Port, contact payer for quantity/frequency     Order Specific Question:   All other supplies as needed as listed below:     Answer:    Humidifier Chamber, 1 per 180 days           Follow up if symptoms worsen or fail to improve.    If no  improvement in symptoms or symptoms worsen, advised to call/follow-up at clinic or go to ER. Patient voiced understanding and all questions/concerns were addressed.   DISCLAIMER: This note was compiled by using a speech recognition dictation system and therefore please be aware that typographical / speech recognition errors can and do occur.  Please contact me if you see any errors specifically.    Eh Sow M.D.       Office: [phone] [address] [place] [address]  FAX: [phone]                   This service was not originating from a related E/M service provided within the previous 7 days nor will  to an E/M service or procedure within the next 24 hours or my soonest available appointment.  Prevailing standard of care was able to be met in this audio-only visit.

## 2022-03-11 NOTE — PATIENT INSTRUCTIONS
Follow up if symptoms worsen or fail to improve.     Dear patient,   As a result of recent federal legislation (The Federal Cures Act), you may receive lab or pathology results from your visit in your [facility] account before your physician is able to contact you. Your physician or their representative will relay the results to you with their recommendations at their soonest availability.     If no improvement in symptoms or symptoms worsen, please be advised to call MD, follow-up at clinic and/or go to ER if becomes severe.    Eh Sow M.D.        We Offer TELEHEALTH & Same Day Appointments!   Book your Telehealth appointment with me through my nurse or   Clinic appointments on [employer]!    [address] [place] [address]    Office: [phone]   FAX: [phone]    Check out my Facebook Page and Follow Me at: [email or web address]    Check out my website at [employer] by clicking on: [email or web address]    To Schedule appointments online, go to [employer]: [email or web address]

## 2022-03-15 ENCOUNTER — TELEPHONE (OUTPATIENT)
Dept: FAMILY MEDICINE | Facility: CLINIC | Age: 67
End: 2022-03-15
Payer: MEDICARE

## 2022-03-15 NOTE — TELEPHONE ENCOUNTER
----- Message from Palmira De La Torre sent at 3/15/2022  2:16 PM CDT -----  Katie from [employer] would like a call back in regards to getting a signed order and clinical notes for a c-pap machine. Please call her at [phone]. fax: [phone]

## 2022-03-15 NOTE — TELEPHONE ENCOUNTER
----- Message from Candida Sandy sent at 3/15/2022  1:23 PM CDT -----  Contact: [facility] pharamacy  Called stating that they cant fill the CPAP machine .

## 2022-03-15 NOTE — TELEPHONE ENCOUNTER
Attempted to call back, but left on hold for a long time.  I faxed orders and clinical notes to [facility] as requested.

## 2022-03-15 NOTE — TELEPHONE ENCOUNTER
Spoke with patient's daughter and they are reaching out to insurance to see if we can send to [facility].

## 2022-04-04 ENCOUNTER — PATIENT MESSAGE (OUTPATIENT)
Dept: FAMILY MEDICINE | Facility: CLINIC | Age: 67
End: 2022-04-04
Payer: MEDICARE

## 2022-04-12 ENCOUNTER — PATIENT MESSAGE (OUTPATIENT)
Dept: FAMILY MEDICINE | Facility: CLINIC | Age: 67
End: 2022-04-12
Payer: MEDICARE

## 2022-05-30 ENCOUNTER — PATIENT MESSAGE (OUTPATIENT)
Dept: FAMILY MEDICINE | Facility: CLINIC | Age: 67
End: 2022-05-30
Payer: MEDICARE

## 2022-05-30 DIAGNOSIS — R30.0 DYSURIA: Primary | ICD-10-CM

## 2022-05-30 NOTE — TELEPHONE ENCOUNTER
I have signed for the following orders AND/OR meds.  Please call the patient and ask the patient to schedule the testing AND/OR inform about any medications that were sent.      Orders Placed This Encounter   Procedures    Urinalysis, Reflex to Urine Culture Urine, Clean Catch     Standing Status:   Future     Standing Expiration Date:   11/30/2023     Order Specific Question:   Preferred Collection Type     Answer:   Urine, Clean Catch     Order Specific Question:   Specimen Source     Answer:   Urine

## 2022-05-31 ENCOUNTER — PATIENT MESSAGE (OUTPATIENT)
Dept: FAMILY MEDICINE | Facility: CLINIC | Age: 67
End: 2022-05-31

## 2022-05-31 ENCOUNTER — LAB VISIT (OUTPATIENT)
Dept: LAB | Facility: HOSPITAL | Age: 67
End: 2022-05-31
Attending: FAMILY MEDICINE
Payer: MEDICARE

## 2022-05-31 ENCOUNTER — OFFICE VISIT (OUTPATIENT)
Dept: FAMILY MEDICINE | Facility: CLINIC | Age: 67
End: 2022-05-31
Payer: MEDICARE

## 2022-05-31 DIAGNOSIS — I77.1 STRICTURE OF ARTERY: ICD-10-CM

## 2022-05-31 DIAGNOSIS — I77.9 BILATERAL CAROTID ARTERY DISEASE, UNSPECIFIED TYPE: ICD-10-CM

## 2022-05-31 DIAGNOSIS — M54.50 ACUTE RIGHT-SIDED LOW BACK PAIN WITHOUT SCIATICA: ICD-10-CM

## 2022-05-31 DIAGNOSIS — R30.0 DYSURIA: Primary | ICD-10-CM

## 2022-05-31 DIAGNOSIS — G47.33 OSA (OBSTRUCTIVE SLEEP APNEA): ICD-10-CM

## 2022-05-31 DIAGNOSIS — E11.65 TYPE 2 DIABETES MELLITUS WITH HYPERGLYCEMIA, WITHOUT LONG-TERM CURRENT USE OF INSULIN: ICD-10-CM

## 2022-05-31 DIAGNOSIS — Z79.899 ENCOUNTER FOR LONG-TERM (CURRENT) USE OF MEDICATIONS: ICD-10-CM

## 2022-05-31 DIAGNOSIS — R30.0 DYSURIA: ICD-10-CM

## 2022-05-31 LAB
BILIRUB UR QL STRIP: NEGATIVE
CLARITY UR: CLEAR
COLOR UR: ABNORMAL
GLUCOSE UR QL STRIP: ABNORMAL
HGB UR QL STRIP: NEGATIVE
KETONES UR QL STRIP: NEGATIVE
LEUKOCYTE ESTERASE UR QL STRIP: NEGATIVE
NITRITE UR QL STRIP: NEGATIVE
PH UR STRIP: 6 [PH] (ref 5–8)
PROT UR QL STRIP: NEGATIVE
SP GR UR STRIP: 1.01 (ref 1–1.03)
URN SPEC COLLECT METH UR: ABNORMAL

## 2022-05-31 PROCEDURE — 1160F PR REVIEW ALL MEDS BY PRESCRIBER/CLIN PHARMACIST DOCUMENTED: ICD-10-PCS | Mod: CPTII,95,, | Performed by: FAMILY MEDICINE

## 2022-05-31 PROCEDURE — 3051F PR MOST RECENT HEMOGLOBIN A1C LEVEL 7.0 - < 8.0%: ICD-10-PCS | Mod: CPTII,95,, | Performed by: FAMILY MEDICINE

## 2022-05-31 PROCEDURE — 99214 PR OFFICE/OUTPT VISIT, EST, LEVL IV, 30-39 MIN: ICD-10-PCS | Mod: 95,,, | Performed by: FAMILY MEDICINE

## 2022-05-31 PROCEDURE — 1160F RVW MEDS BY RX/DR IN RCRD: CPT | Mod: CPTII,95,, | Performed by: FAMILY MEDICINE

## 2022-05-31 PROCEDURE — 1159F MED LIST DOCD IN RCRD: CPT | Mod: CPTII,95,, | Performed by: FAMILY MEDICINE

## 2022-05-31 PROCEDURE — 99214 OFFICE O/P EST MOD 30 MIN: CPT | Mod: 95,,, | Performed by: FAMILY MEDICINE

## 2022-05-31 PROCEDURE — 81003 URINALYSIS AUTO W/O SCOPE: CPT | Mod: PO | Performed by: FAMILY MEDICINE

## 2022-05-31 PROCEDURE — 1159F PR MEDICATION LIST DOCUMENTED IN MEDICAL RECORD: ICD-10-PCS | Mod: CPTII,95,, | Performed by: FAMILY MEDICINE

## 2022-05-31 PROCEDURE — 3051F HG A1C>EQUAL 7.0%<8.0%: CPT | Mod: CPTII,95,, | Performed by: FAMILY MEDICINE

## 2022-05-31 RX ORDER — PREDNISOLONE ACETATE 10 MG/ML
1 SUSPENSION/ DROPS OPHTHALMIC 4 TIMES DAILY
COMMUNITY
Start: 2022-04-29 | End: 2023-11-30

## 2022-05-31 NOTE — ASSESSMENT & PLAN NOTE
Patient complains of lower back pain and was concerned that she may have a urinary tract infection.  Urinalysis shows no infection.

## 2022-05-31 NOTE — PROGRESS NOTES
Primary Care Telemedicine Note  The patient location is:  Patient's Home - [place]  The chief complaint leading to consultation is:   Chief Complaint   Patient presents with    Sleep Apnea    Dysuria             Total time:  see MDM below. The total time spent on the encounter, which includes face to face time and non-face to face time preparing to see the patient (eg, review of previous medical records, tests), Obtaining and/or reviewing separately obtained history, documenting clinical information in the electronic or other health record, independently interpreting results (not separately reported)/communicating results to the patient/family/caregiver, and/or care coordination (not separately reported).    Visit type: Virtual visit with synchronous audio only and video  Each patient to whom he or she provides medical services by telemedicine is:  (1) informed of the relationship between the physician and patient and the respective role of any other health care provider with respect to management of the patient; and (2) notified that he or she may decline to receive medical services by telemedicine and may withdraw from such care at any time.  =================================================================  PLAN:      Problem List Items Addressed This Visit     Encounter for long-term (current) use of medications (Chronic)    Type 2 diabetes mellitus with hyperglycemia, without long-term current use of insulin (Chronic)    Relevant Orders    Microalbumin/Creatinine Ratio, Urine    Carotid disease, bilateral     Update carotid artery ultrasound.           Relevant Orders    US Carotid Bilateral    KENYETTA (obstructive sleep apnea)     Patient is benefiting from machine.  Continue usage.  Follow-up with pulmonary as needed.  Check CBC.  Patient's previous blood count abnormality has improved.           Dysuria - Primary     Patient complains of lower back pain and was concerned that she may have a urinary tract  infection.  Urinalysis shows no infection.           Acute right-sided low back pain without sciatica     Increase hydration with water.  Take anti-inflammatory medication as prescribed.  Follow-up sooner if no improvement.  Consider physical therapy if needed.  No heavy lifting.           Stricture of artery     Relevant Orders    US Carotid Bilateral           Medication Management for assessment above:   Medication List with Changes/Refills   Current Medications    ALBUTEROL (PROVENTIL/VENTOLIN HFA) 90 MCG/ACTUATION INHALER    Inhale 2 puffs into the lungs every 6 (six) hours as needed for Wheezing. Rescue    AMLODIPINE (NORVASC) 10 MG TABLET    Take 1 tablet by mouth once daily    ASPIRIN 81 MG CHEW    Take 81 mg by mouth once daily.    ATORVASTATIN (LIPITOR) 10 MG TABLET    Take 1 tablet by mouth once daily    BLOOD SUGAR DIAGNOSTIC STRP    To check BG 2 times daily, to use with insurance preferred meter    BLOOD-GLUCOSE METER KIT    To check BG 2 times daily, to use with insurance preferred meter    FARXIGA 5 MG TAB TABLET    Take 5 mg by mouth once daily.    FLUOROMETHOLONE 0.1% (FML) 0.1 % DRPS    INSTILL 1 DROP INTO EACH EYE THREE TIMES DAILY AS DIRECTED    INVELTYS 1 % DRPS    Place 1 drop into both eyes 2 (two) times daily.    LANCETS MISC    To check BG 2 times daily, to use with insurance preferred meter    MELOXICAM (MOBIC) 15 MG TABLET    Take 1 tablet (15 mg total) by mouth once daily.    MUPIROCIN (BACTROBAN) 2 % OINTMENT    Apply topically 3 (three) times daily.    OFLOXACIN (OCUFLOX) 0.3 % OPHTHALMIC SOLUTION        PREDNISOLONE ACETATE (PRED FORTE) 1 % DRPS    Place 1 drop into the left eye 4 (four) times daily.    PROLENSA 0.07 % DROP    SMARTSIG:In Eye(s)    SITAGLIPTIN (JANUVIA) 100 MG TAB    Take 1 tablet (100 mg total) by mouth once daily.    XIIDRA 5 % DPET    INSTILL 1 DROP INTO EACH EYE TWICE DAILY AS DIRECTED       Eh Sow,  M.D.  ==========================================================================  Subjective:   Patient ID: Crystal Acuna is a 66 y.o. female.  has a past medical history of Allergy, Bronchitis, Family history of colonic polyps (4/27/2021), Hallux abductovalgus, Herniated lumbar intervertebral disc, Hyperlipidemia, Hypertension, and Smoker.   Chief Complaint: Sleep Apnea and Dysuria (/)      Problem List Items Addressed This Visit     Encounter for long-term (current) use of medications (Chronic)    Overview     CHRONIC. Stable. Compliant with medications for managed conditions. See medication list. No SE reported.   Routine lab analysis is being monitored. Refills were addressed.  Lab Results   Component Value Date    WBC 6.17 11/11/2021    HGB 16.8 (H) 11/11/2021    HCT 52.3 (H) 11/11/2021    MCV 94 11/11/2021     11/11/2021         Chemistry        Component Value Date/Time     11/11/2021 0935    K 4.4 11/11/2021 0935     11/11/2021 0935    CO2 26 11/11/2021 0935    BUN 14 11/11/2021 0935    CREATININE 0.8 11/11/2021 0935     11/11/2021 0935        Component Value Date/Time    CALCIUM 9.9 11/11/2021 0935    ALKPHOS 66 11/11/2021 0935    AST 15 11/11/2021 0935    AST 20 03/28/2016 1457    ALT 17 11/11/2021 0935    BILITOT 0.3 11/11/2021 0935    ESTGFRAFRICA >60.0 11/11/2021 0935    EGFRNONAA >60.0 11/11/2021 0935          Lab Results   Component Value Date    TSH 1.529 08/05/2021    FREET4 1.19 10/11/2012                Type 2 diabetes mellitus with hyperglycemia, without long-term current use of insulin (Chronic)    Overview     Patient has side effects to GL P 1. She cannot tolerate metformin due to GI side effects.  Patient currently having increased use infections and recurrent infections on Farxiga.    Diabetes Management Status    Statin: Taking  ACE/ARB: Not taking    Screening or Prevention Patient's value Goal Complete/Controlled?   HgA1C Testing and Control   Lab Results    Component Value Date    HGBA1C 7.0 (H) 11/11/2021      Annually/Less than 8% Yes   Lipid profile : 08/05/2021 Annually Yes   LDL control Lab Results   Component Value Date    LDLCALC 99.4 08/05/2021    Annually/Less than 100 mg/dl  Yes   Nephropathy screening Lab Results   Component Value Date    LABMICR <5.0 11/11/2021     Lab Results   Component Value Date    PROTEINUA Negative 11/11/2021     No results found for: UTPCR   Annually Yes   Blood pressure BP Readings from Last 1 Encounters:   11/11/21 117/66    Less than 140/90 Yes   Dilated retinal exam : 06/25/2021 Annually Yes   Foot exam   : 12/02/2019 Annually No                Carotid disease, bilateral    Overview     Narrative & Impression  EXAMINATION:  US CAROTID BILATERAL     CLINICAL HISTORY:  Cardiac murmur, unspecified     TECHNIQUE:  Grayscale and color Doppler ultrasound examination of the carotid and vertebral artery systems bilaterally.  Stenosis estimates are per the NASCET measurement criteria.     COMPARISON:  08/08/2014     FINDINGS:  Right:     Internal Carotid Artery:     Peak systolic velocity 130 cm/sec     End diastolic velocity 39 cm/sec     IC/CC ratio: 1.9     Plaque formation: Moderate amount of homogeneously plaque present at the carotid bulb.     Vertebral artery: Antegrade flow and normal waveform.     Left:     Internal Carotid Artery:     Peak systolic velocity 116 cm/sec     End diastolic velocity 46 cm/sec     IC/CC ratio: 1.0.     Plaque formation: Moderate amount of homogeneous plaque present at the carotid bulb and proximal ICA with greater than 50% luminal narrowing of the proximal ICA by grayscale analysis     Vertebral artery: Antegrade flow and normal waveform.     IMPRESSION:      Elevated velocities suggesting 50-75% stenosis of the right ICA.     Atherosclerosis with greater than 50-75 luminal narrowing of the proximal left ICA visually with somewhat discordant normal velocities.  Further characterization could be  obtained with CTA or MRA as clinically warranted.        Electronically signed by: Elliot Patel MD  Date:                                            04/05/2018  Time:                                           14:49             Exam Ended: 04/05/18 13:20                      Current Assessment & Plan     Update carotid artery ultrasound.           KENYETTA (obstructive sleep apnea)    Overview     May 2022: Wearing [id number]% and it is working great.  Patient states that her fatigue has improved.  Lab Results   Component Value Date    WBC 9.44 02/24/2022    HGB 15.8 02/24/2022    HCT 47.1 02/24/2022    MCV 90 02/24/2022     02/24/2022 March 2022:  New problem.  Patient was recently diagnosed with sleep apnea.  She has never worn a CPAP device.  Patient reports she is willing to try a device and wear it every night.    Home Sleep Studies     Date/Time: 3/9/2022 8:00 AM  Performed by: Oliver Mederos MD  Authorized by: Eh Sow MD        1 night study  MODERATE OBSTRUCTIVE SLEEP APNEA with overall AHI 21.7/hr ( 120 events): night #1  Oxygen desaturation: 82%. SpO2 between 80% to 84% for 10 min.  SpO2 was below 90%: 69% time  Patient snored 98% time above 50 .  Heart rate range: 57 bpm - 103 bpm  REC's:  Please refer to sleep disorders clinic  Therapy with APAP at 4-20 cm WP using mask of choice with heated humidification is an option.  Weight loss/management. with regular exercise per direction of physician.  Avoid drowsy driving.  Follow up in sleep clinic to maximize adherence and ensure resolution of symptoms.           Current Assessment & Plan     Patient is benefiting from machine.  Continue usage.  Follow-up with pulmonary as needed.  Check CBC.  Patient's previous blood count abnormality has improved.           Dysuria - Primary    Overview      Latest Reference Range & Units 05/31/22 08:46   Specimen UA  Urine, Clean Catch   Color, UA Yellow, Straw, [place]  Straw   Appearance, UA  Clear  Clear   Specific Gravity, UA 1.005 - 1.030  1.015   pH, UA 5.0 - 8.0  6.0   Protein, UA Negative  Negative   Glucose, UA Negative  Trace !   Ketones, UA Negative  Negative   Occult Blood UA Negative  Negative   NITRITE UA Negative  Negative   Bilirubin (UA) Negative  Negative   Leukocytes, UA Negative  Negative   !: Data is abnormal           Current Assessment & Plan     Patient complains of lower back pain and was concerned that she may have a urinary tract infection.  Urinalysis shows no infection.           Acute right-sided low back pain without sciatica    Overview     Patient initially thought she may have a UTI and had a urinalysis performed.  It was negative.  Patient denies any injury or trauma.  She has been more active in may have pulled something in her lower back.  Denies any radiation the legs.  No loss of bowel or bladder.  Denies any numbness tingling or weakness.           Current Assessment & Plan     Increase hydration with water.  Take anti-inflammatory medication as prescribed.  Follow-up sooner if no improvement.  Consider physical therapy if needed.  No heavy lifting.           Stricture of artery            Review of patient's allergies indicates:   Allergen Reactions    Metformin     Farxiga [dapagliflozin]      Yeast infection      Latex, natural rubber      Current Outpatient Medications   Medication Instructions    albuterol (PROVENTIL/VENTOLIN HFA) 90 mcg/actuation inhaler 2 puffs, Inhalation, Every 6 hours PRN, Rescue    amLODIPine (NORVASC) 10 MG tablet Take 1 tablet by mouth once daily    aspirin 81 mg, Oral, Daily    atorvastatin (LIPITOR) 10 MG tablet Take 1 tablet by mouth once daily    blood sugar diagnostic Strp To check BG 2 times daily, to use with insurance preferred meter    blood-glucose meter kit To check BG 2 times daily, to use with insurance preferred meter    FARXIGA 5 mg, Oral, Daily    fluorometholone 0.1% (FML) 0.1 % DrpS INSTILL 1 DROP INTO EACH  EYE THREE TIMES DAILY AS DIRECTED    INVELTYS 1 % DrpS 1 drop, Both Eyes, 2 times daily    lancets Misc To check BG 2 times daily, to use with insurance preferred meter    meloxicam (MOBIC) 15 mg, Oral, Daily    mupirocin (BACTROBAN) 2 % ointment Topical (Top), 3 times daily    ofloxacin (OCUFLOX) 0.3 % ophthalmic solution No dose, route, or frequency recorded.    prednisoLONE acetate (PRED FORTE) 1 % DrpS 1 drop, Left Eye, 4 times daily    PROLENSA 0.07 % Drop SMARTSIG:In Eye(s)    SITagliptin (JANUVIA) 100 mg, Oral, Daily    XIIDRA 5 % Dpet INSTILL 1 DROP INTO EACH EYE TWICE DAILY AS DIRECTED      I have reviewed the PMH, social history, FamilyHx, surgical history, allergies and medications documented / confirmed by the patient at the time of this visit.  Review of Systems   Constitutional: Positive for activity change. Negative for unexpected weight change.   HENT: Positive for rhinorrhea. Negative for hearing loss and trouble swallowing.    Eyes: Negative for discharge and visual disturbance.   Respiratory: Negative for chest tightness and wheezing.    Cardiovascular: Negative for chest pain and palpitations.   Gastrointestinal: Negative for blood in stool, constipation, diarrhea and vomiting.   Endocrine: Negative for polydipsia and polyuria.   Genitourinary: Negative for difficulty urinating, dysuria, hematuria and menstrual problem.   Musculoskeletal: Positive for back pain. Negative for arthralgias, joint swelling and neck pain.   Neurological: Negative for weakness and headaches.   Psychiatric/Behavioral: Negative for confusion and dysphoric mood.     Objective:   There were no vitals taken for this visit.  Physical Exam  Constitutional:       General: She is not in acute distress.     Appearance: She is well-developed. She is not ill-appearing, toxic-appearing or diaphoretic.   HENT:      Head: Normocephalic and atraumatic.      Right Ear: Hearing and external ear normal.      Left Ear: Hearing  and external ear normal.   Eyes:      General: Lids are normal.      Conjunctiva/sclera: Conjunctivae normal.   Pulmonary:      Effort: Pulmonary effort is normal. No respiratory distress.   Musculoskeletal:         General: Normal range of motion.      Cervical back: Normal range of motion.   Skin:     Coloration: Skin is not pale.   Neurological:      Mental Status: She is alert. She is not disoriented.   Psychiatric:         Attention and Perception: She is attentive.         Mood and Affect: Mood is not anxious or depressed.         Speech: Speech is not rapid and pressured or slurred.         Behavior: Behavior normal. Behavior is not agitated, aggressive or hyperactive. Behavior is cooperative.         Thought Content: Thought content normal. Thought content is not paranoid or delusional. Thought content does not include homicidal or suicidal ideation. Thought content does not include homicidal or suicidal plan.         Cognition and Memory: Memory is not impaired.         Judgment: Judgment normal.         Assessment:     1. Dysuria    2. Acute right-sided low back pain without sciatica    3. KENYETTA (obstructive sleep apnea)    4. Encounter for long-term (current) use of medications    5. Bilateral carotid artery disease, unspecified type    6. Stricture of artery     7. Type 2 diabetes mellitus with hyperglycemia, without long-term current use of insulin      MDM:   Moderate complexity.  Moderate risk.  Total time: 31 minutes.  This includes total time spent on the encounter, which includes face to face time and non-face to face time preparing to see the patient (eg, review of previous medical records, tests), Obtaining and/or reviewing separately obtained history, documenting clinical information in the electronic or other health record, independently interpreting results (not separately reported)/communicating results to the patient/family/caregiver, and/or care coordination (not separately reported).    I  have Reviewed and summarized old records.  I have performed thorough medication reconciliation today and discussed risk and benefits of medications.  I have reviewed labs and discussed with patient.  All questions were answered.    I have signed for the following orders AND/OR meds.  Orders Placed This Encounter   Procedures    US Carotid Bilateral     Standing Status:   Future     Standing Expiration Date:   5/31/2023    Microalbumin/Creatinine Ratio, Urine     Standing Status:   Future     Standing Expiration Date:   7/30/2023     Order Specific Question:   Specimen Source     Answer:   Urine           Follow up in about 6 months (around 11/30/2022), or if symptoms worsen or fail to improve, for Med refills, LAB RESULTS.    If no improvement in symptoms or symptoms worsen, advised to call/follow-up at clinic or go to ER. Patient voiced understanding and all questions/concerns were addressed.   DISCLAIMER: This note was compiled by using a speech recognition dictation system and therefore please be aware that typographical / speech recognition errors can and do occur.  Please contact me if you see any errors specifically.    Eh Sow M.D.       Office: [phone] [address] [place] [address]  FAX: [phone]

## 2022-05-31 NOTE — ASSESSMENT & PLAN NOTE
Increase hydration with water.  Take anti-inflammatory medication as prescribed.  Follow-up sooner if no improvement.  Consider physical therapy if needed.  No heavy lifting.

## 2022-05-31 NOTE — ASSESSMENT & PLAN NOTE
Patient is benefiting from machine.  Continue usage.  Follow-up with pulmonary as needed.  Check CBC.  Patient's previous blood count abnormality has improved.

## 2022-06-08 ENCOUNTER — HOSPITAL ENCOUNTER (OUTPATIENT)
Dept: RADIOLOGY | Facility: HOSPITAL | Age: 67
Discharge: HOME | End: 2022-06-08
Attending: FAMILY MEDICINE
Payer: MEDICARE

## 2022-06-08 DIAGNOSIS — I77.1 STRICTURE OF ARTERY: ICD-10-CM

## 2022-06-08 DIAGNOSIS — I77.9 BILATERAL CAROTID ARTERY DISEASE, UNSPECIFIED TYPE: ICD-10-CM

## 2022-06-08 PROCEDURE — 93880 EXTRACRANIAL BILAT STUDY: CPT | Mod: 26,,, | Performed by: RADIOLOGY

## 2022-06-08 PROCEDURE — 93880 EXTRACRANIAL BILAT STUDY: CPT | Mod: TC,PO

## 2022-06-08 PROCEDURE — 93880 US CAROTID BILATERAL: ICD-10-PCS | Mod: 26,,, | Performed by: RADIOLOGY

## 2022-06-08 NOTE — PROGRESS NOTES
1st check to see if patient has seen the results.  If not then  CALL patient with results and Document verification.  Schedule follow-up if needed.  [phone]  Ultrasound of the carotid arteries reviewed and compared to 2018.  There is no significant blockage noted.

## 2022-06-09 ENCOUNTER — PATIENT OUTREACH (OUTPATIENT)
Dept: ADMINISTRATIVE | Facility: HOSPITAL | Age: 67
End: 2022-06-09
Payer: MEDICARE

## 2022-06-11 ENCOUNTER — PATIENT MESSAGE (OUTPATIENT)
Dept: FAMILY MEDICINE | Facility: CLINIC | Age: 67
End: 2022-06-11
Payer: MEDICARE

## 2022-06-11 DIAGNOSIS — H92.09 OTALGIA, UNSPECIFIED LATERALITY: Primary | ICD-10-CM

## 2022-06-11 NOTE — TELEPHONE ENCOUNTER
I received your message which was reviewed along with the the medication list and allergies that we have below.  Please review it for accuracy to make sure that we have the most recent records on your history.     Based on this, the following orders were placed AND/OR medicines were sent in.     Orders Placed This Encounter   Procedures    Ambulatory referral/consult to ENT     Standing Status:   Future     Standing Expiration Date:   7/11/2023     Referral Priority:   Routine     Referral Type:   Consultation     Referral Reason:   Specialty Services Required     Requested Specialty:   Otolaryngology     Number of Visits Requested:   1       Medications written and sent at this time include:       Your pharmacy(ies) of choice at this time on record include the list below and any medications would have been sent to the one at the top.    [facility] [address] [place] [address] [address] [facility]  [address] [facility] [address]  Phone: [phone] Fax: [phone]      Thank you for choosing us as your healthcare provider!  Dr. Eh Sow    ALLERGY LIST  Review of patient's allergies indicates:   Allergen Reactions    Metformin     Farxiga [dapagliflozin]      Yeast infection      Latex, natural rubber        MEDICATION LIST  Current Outpatient Medications on File Prior to Visit   Medication Sig Dispense Refill    albuterol (PROVENTIL/VENTOLIN HFA) 90 mcg/actuation inhaler Inhale 2 puffs into the lungs every 6 (six) hours as needed for Wheezing. Rescue 17 g 11    amLODIPine (NORVASC) 10 MG tablet Take 1 tablet by mouth once daily 90 tablet 4    aspirin 81 MG Chew Take 81 mg by mouth once daily.      atorvastatin (LIPITOR) 10 MG tablet Take 1 tablet by mouth once daily 90 tablet 4    blood sugar diagnostic Strp To check BG 2 times daily, to use with insurance preferred meter 200 each 5    blood-glucose meter kit To check BG 2 times daily, to use with insurance preferred meter 1 each 0     FARXIGA 5 mg Tab tablet Take 5 mg by mouth once daily.      fluorometholone 0.1% (FML) 0.1 % DrpS INSTILL 1 DROP INTO EACH EYE THREE TIMES DAILY AS DIRECTED      INVELTYS 1 % DrpS Place 1 drop into both eyes 2 (two) times daily.      lancets Misc To check BG 2 times daily, to use with insurance preferred meter 200 each 99    meloxicam (MOBIC) 15 MG tablet Take 1 tablet (15 mg total) by mouth once daily. 90 tablet 3    mupirocin (BACTROBAN) 2 % ointment Apply topically 3 (three) times daily. 22 g 2    ofloxacin (OCUFLOX) 0.3 % ophthalmic solution       prednisoLONE acetate (PRED FORTE) 1 % DrpS Place 1 drop into the left eye 4 (four) times daily.      PROLENSA 0.07 % Drop SMARTSIG:In Eye(s)      SITagliptin (JANUVIA) 100 MG Tab Take 1 tablet (100 mg total) by mouth once daily. 90 tablet 4    XIIDRA 5 % Dpet INSTILL 1 DROP INTO EACH EYE TWICE DAILY AS DIRECTED       No current facility-administered medications on file prior to visit.       HEALTH MAINTENANCE THAT IS OVERDUE OR NEEDS TO BE UPDATED ON OUR CHART IS LISTED BELOW.  IF YOU HAVE HAD IT DONE ELSEWHERE, PLEASE SEND US DATES AND RECORDS IF YOU HAVE THEM TO MAKE YOUR CHART ACCURATE.  IF YOU HAVE NOT HAD THESE DONE AND ARE READY FOR US TO SCHEDULE THEM, PLEASE SEND US A MESSAGE.  Health Maintenance Due   Topic Date Due    Cervical Cancer Screening  Never done    HIV Screening  Never done    TETANUS VACCINE  Never done    Shingles Vaccine (1 of 2) Never done    Foot Exam  12/02/2020    COVID-19 Vaccine (3 - Booster for Moderna series) 02/01/2022       DISCLAIMER: This note was compiled by using a speech recognition dictation system and therefore please be aware that typographical / speech recognition errors can and do occur.  Please contact me if you see any errors specifically.    Eh Sow MD  We Offer Telehealth & Same Day Appointments!   Book your Telehealth appointment with me through my nurse or   Clinic appointments on  [employer]!  [phone]     Check out my Facebook Page and Follow Me at: CLICK HERE    Check out my website at [email or web address] by clicking on: CLICK HERE    To Schedule appointments online, go to [employer]: CLICK HERE     Location: [email or web address]    [address] [place] [address]    FAX: [phone]

## 2022-06-14 ENCOUNTER — OFFICE VISIT (OUTPATIENT)
Dept: OTOLARYNGOLOGY | Facility: CLINIC | Age: 67
End: 2022-06-14
Payer: MEDICARE

## 2022-06-14 VITALS
DIASTOLIC BLOOD PRESSURE: 66 MMHG | SYSTOLIC BLOOD PRESSURE: 113 MMHG | BODY MASS INDEX: 32.5 KG/M2 | WEIGHT: 177.69 LBS | TEMPERATURE: 99 F | HEART RATE: 66 BPM

## 2022-06-14 DIAGNOSIS — H61.22 IMPACTED CERUMEN, LEFT EAR: ICD-10-CM

## 2022-06-14 PROCEDURE — 3061F PR NEG MICROALBUMINURIA RESULT DOCUMENTED/REVIEW: ICD-10-PCS | Mod: CPTII,S$GLB,, | Performed by: OTOLARYNGOLOGY

## 2022-06-14 PROCEDURE — 99499 UNLISTED E&M SERVICE: CPT | Mod: S$GLB,,, | Performed by: OTOLARYNGOLOGY

## 2022-06-14 PROCEDURE — 3051F PR MOST RECENT HEMOGLOBIN A1C LEVEL 7.0 - < 8.0%: ICD-10-PCS | Mod: CPTII,S$GLB,, | Performed by: OTOLARYNGOLOGY

## 2022-06-14 PROCEDURE — 99499 NO LOS: ICD-10-PCS | Mod: S$GLB,,, | Performed by: OTOLARYNGOLOGY

## 2022-06-14 PROCEDURE — 3061F NEG MICROALBUMINURIA REV: CPT | Mod: CPTII,S$GLB,, | Performed by: OTOLARYNGOLOGY

## 2022-06-14 PROCEDURE — 3078F DIAST BP <80 MM HG: CPT | Mod: CPTII,S$GLB,, | Performed by: OTOLARYNGOLOGY

## 2022-06-14 PROCEDURE — 69210 PR REMOVAL IMPACTED CERUMEN REQUIRING INSTRUMENTATION, UNILATERAL: ICD-10-PCS | Mod: S$GLB,,, | Performed by: OTOLARYNGOLOGY

## 2022-06-14 PROCEDURE — 3008F BODY MASS INDEX DOCD: CPT | Mod: CPTII,S$GLB,, | Performed by: OTOLARYNGOLOGY

## 2022-06-14 PROCEDURE — 69210 REMOVE IMPACTED EAR WAX UNI: CPT | Mod: S$GLB,,, | Performed by: OTOLARYNGOLOGY

## 2022-06-14 PROCEDURE — 99999 PR PBB SHADOW E&M-EST. PATIENT-LVL III: ICD-10-PCS | Mod: PBBFAC,,, | Performed by: OTOLARYNGOLOGY

## 2022-06-14 PROCEDURE — 99999 PR PBB SHADOW E&M-EST. PATIENT-LVL III: CPT | Mod: PBBFAC,,, | Performed by: OTOLARYNGOLOGY

## 2022-06-14 PROCEDURE — 1159F PR MEDICATION LIST DOCUMENTED IN MEDICAL RECORD: ICD-10-PCS | Mod: CPTII,S$GLB,, | Performed by: OTOLARYNGOLOGY

## 2022-06-14 PROCEDURE — 3066F PR DOCUMENTATION OF TREATMENT FOR NEPHROPATHY: ICD-10-PCS | Mod: CPTII,S$GLB,, | Performed by: OTOLARYNGOLOGY

## 2022-06-14 PROCEDURE — 3051F HG A1C>EQUAL 7.0%<8.0%: CPT | Mod: CPTII,S$GLB,, | Performed by: OTOLARYNGOLOGY

## 2022-06-14 PROCEDURE — 3078F PR MOST RECENT DIASTOLIC BLOOD PRESSURE < 80 MM HG: ICD-10-PCS | Mod: CPTII,S$GLB,, | Performed by: OTOLARYNGOLOGY

## 2022-06-14 PROCEDURE — 3066F NEPHROPATHY DOC TX: CPT | Mod: CPTII,S$GLB,, | Performed by: OTOLARYNGOLOGY

## 2022-06-14 PROCEDURE — 3074F PR MOST RECENT SYSTOLIC BLOOD PRESSURE < 130 MM HG: ICD-10-PCS | Mod: CPTII,S$GLB,, | Performed by: OTOLARYNGOLOGY

## 2022-06-14 PROCEDURE — 1159F MED LIST DOCD IN RCRD: CPT | Mod: CPTII,S$GLB,, | Performed by: OTOLARYNGOLOGY

## 2022-06-14 PROCEDURE — 3074F SYST BP LT 130 MM HG: CPT | Mod: CPTII,S$GLB,, | Performed by: OTOLARYNGOLOGY

## 2022-06-14 PROCEDURE — 3008F PR BODY MASS INDEX (BMI) DOCUMENTED: ICD-10-PCS | Mod: CPTII,S$GLB,, | Performed by: OTOLARYNGOLOGY

## 2022-06-14 NOTE — PROGRESS NOTES
REFERRING PROVIDER  Aaareferral Self  No address on file  Subjective:   Patient: Crystal Acuna [id number], :1955   Visit date:2022 11:20 AM    Chief Complaint:  left ear fullness (Pt states started 3 weeks ago. Completely blocked. Noticed after going under water)        HPI:     Crystal Acuna is a 66 y.o. female whom I am asked to see for evaluation of otalgia or hearing loss in the left ear for the past 1-4 weeks.   Crystal rates the severity as moderate.  No exacerbating or relieving factors.  There is no drainage from the ears.      Her meds, allergies, medical, surgical, social & family histories were reviewed & updated:  -     She has a current medication list which includes the following prescription(s): albuterol, amlodipine, aspirin, atorvastatin, blood sugar diagnostic, blood-glucose meter, lancets, meloxicam, mupirocin, sitagliptin, xiidra, farxiga, fluorometholone 0.1%, inveltys, ofloxacin, prednisolone acetate, and prolensa.  -     She  has a past medical history of Allergy, Bronchitis, Family history of colonic polyps (2021), Hallux abductovalgus, Herniated lumbar intervertebral disc, Hyperlipidemia, Hypertension, and Smoker.   -     She does not have any pertinent problems on file.   -     She  has a past surgical history that includes Cholecystectomy (2010);  section; Cyst Removal; Back surgery; Endometrial ablation (2009); Carpal tunnel release; Foot fracture surgery; and Colonoscopy (N/A, 2021).  -     She  reports that she has quit smoking. Her smoking use included cigarettes. She has a 29.25 pack-year smoking history. She has never used smokeless tobacco. She reports that she does not drink alcohol and does not use drugs.  -     Her family history includes Breast cancer in her maternal aunt; COPD in her mother; Heart disease in her father and mother.  -     She is allergic to metformin; farxiga [dapagliflozin]; and latex, natural rubber.      Review  of Systems:  -     Allergic/Immunologic: is allergic to metformin; farxiga [dapagliflozin]; and latex, natural rubber..  -     Constitutional: Current temp: 99 °F (37.2 °C)        Objective:     Physical Exam:  Vitals:  /66   Pulse 66   Temp 99 °F (37.2 °C)   Wt 80.6 kg (177 lb 11.1 oz)   BMI 32.50 kg/m²   Communication:  Able to communicate, no hoarseness.  Head & Face:  Normocephalic, atraumatic, no sinus tenderness.  Eyes:  Extraocular motions intact.  Ears:  Otoscopy of external auditory canals reveals impaction of left ear canals.  With the patient in the supine position, we used the operating microscope to examine both ears with the appropriate sized ear speculum.  A variety of sterile, micro-instruments were utilized to remove the cerumen atraumatically from the impacted ear(s).   After removal, the ears were reexamined-  Right Ear:  No mass/lesion of auricle. The external auditory canals is without erythema or discharge. Pneumatic otoscopy of the tympanic membrane revealed no perforation and good mobility, with no fluid in middle ear. Clinical speech reception thresholds grossly normal.  Left Ear:  No mass/lesion of auricle. The external auditory canals is without erythema or discharge. Pneumatic otoscopy of the tympanic membrane revealed no perforation and good mobility, with no fluid in middle ear. Clinical speech reception thresholds grossly normal  Nose:  No masses/lesions of external nose, nasal mucosa, septum, and turbinates were within normal limits.  Mouth:  No mass/lesion of lips, teeth, gums, hard/soft palate, tongue, tonsils, or oropharynx.  Neck & Lymphatics:  No cervical lymphadenopathy, no neck mass/crepitus/ asymmetry, trachea is midline, no thyroid enlargement/tenderness/mass.  Neuro/Psych: Alert with normal mood and affect.   Respiration/Chest:  Symmetric expansion during respiration, normal respiratory effort.  Skin:  Warm and intact.    Assessment & Plan:       -     Cerumen  Impaction - Crystal has cerumen impaction.  We discussed preventative measures and treatment options.  Q-tips must be avoided, instead the ears can be cleaned with OTC ear rinses (or a mixture of alcohol & vinegar in equal parts).   For hard wax, Crystal may place mineral oil/baby oil in the ear with a cotton ball at night and remove in the shower.  This will assist in softening the wax and allow it to drain out on its own. If the cerumen impacts the ear canal and causes hearing loss or infection she needs to follow-up in the clinic for treatment and cleaning.

## 2022-07-03 ENCOUNTER — PATIENT MESSAGE (OUTPATIENT)
Dept: FAMILY MEDICINE | Facility: CLINIC | Age: 67
End: 2022-07-03
Payer: MEDICARE

## 2022-07-14 ENCOUNTER — PATIENT MESSAGE (OUTPATIENT)
Dept: FAMILY MEDICINE | Facility: CLINIC | Age: 67
End: 2022-07-14
Payer: MEDICARE

## 2022-07-14 DIAGNOSIS — E11.65 TYPE 2 DIABETES MELLITUS WITH HYPERGLYCEMIA, WITHOUT LONG-TERM CURRENT USE OF INSULIN: ICD-10-CM

## 2022-07-14 RX ORDER — INSULIN PUMP SYRINGE, 3 ML
EACH MISCELLANEOUS
Qty: 1 EACH | Refills: 0 | Status: SHIPPED | OUTPATIENT
Start: 2022-07-14 | End: 2023-11-30

## 2022-07-14 RX ORDER — LANCETS
EACH MISCELLANEOUS
Qty: 200 EACH | Refills: 99 | Status: SHIPPED | OUTPATIENT
Start: 2022-07-14

## 2022-07-14 NOTE — TELEPHONE ENCOUNTER
No new care gaps identified.  [facility] Embedded Care Gaps. Reference number: [id number]. 7/14/2022   9:34:11 AM SULEMANT

## 2022-08-26 ENCOUNTER — PATIENT MESSAGE (OUTPATIENT)
Dept: FAMILY MEDICINE | Facility: CLINIC | Age: 67
End: 2022-08-26
Payer: MEDICARE

## 2022-08-26 DIAGNOSIS — Z79.899 ENCOUNTER FOR LONG-TERM (CURRENT) USE OF MEDICATIONS: ICD-10-CM

## 2022-08-26 DIAGNOSIS — Z00.00 ANNUAL PHYSICAL EXAM: ICD-10-CM

## 2022-08-26 RX ORDER — ATORVASTATIN CALCIUM 20 MG/1
10 TABLET, FILM COATED ORAL DAILY
Qty: 90 TABLET | Refills: 4 | Status: SHIPPED | OUTPATIENT
Start: 2022-08-26 | End: 2022-09-06

## 2022-08-26 NOTE — TELEPHONE ENCOUNTER
No new care gaps identified.  [facility] Embedded Care Gaps. Reference number: [id number]. 8/26/2022   1:08:57 PM CDT

## 2022-09-05 ENCOUNTER — PATIENT MESSAGE (OUTPATIENT)
Dept: FAMILY MEDICINE | Facility: CLINIC | Age: 67
End: 2022-09-05
Payer: MEDICARE

## 2022-09-05 DIAGNOSIS — Z79.899 ENCOUNTER FOR LONG-TERM (CURRENT) USE OF MEDICATIONS: ICD-10-CM

## 2022-09-05 DIAGNOSIS — Z00.00 ANNUAL PHYSICAL EXAM: ICD-10-CM

## 2022-09-06 RX ORDER — ATORVASTATIN CALCIUM 20 MG/1
20 TABLET, FILM COATED ORAL DAILY
Qty: 90 TABLET | Refills: 4 | Status: SHIPPED | OUTPATIENT
Start: 2022-09-06 | End: 2023-04-21

## 2022-09-06 NOTE — TELEPHONE ENCOUNTER
No new care gaps identified.  [facility] Embedded Care Gaps. Reference number: [id number]. 9/06/2022   9:00:45 AM CDT

## 2022-09-13 ENCOUNTER — OFFICE VISIT (OUTPATIENT)
Dept: FAMILY MEDICINE | Facility: CLINIC | Age: 67
End: 2022-09-13
Payer: MEDICARE

## 2022-09-13 DIAGNOSIS — L02.214 GROIN ABSCESS: Primary | ICD-10-CM

## 2022-09-13 PROCEDURE — 99213 OFFICE O/P EST LOW 20 MIN: CPT | Mod: 95,,, | Performed by: NURSE PRACTITIONER

## 2022-09-13 PROCEDURE — 3061F NEG MICROALBUMINURIA REV: CPT | Mod: CPTII,95,, | Performed by: NURSE PRACTITIONER

## 2022-09-13 PROCEDURE — 1160F RVW MEDS BY RX/DR IN RCRD: CPT | Mod: CPTII,95,, | Performed by: NURSE PRACTITIONER

## 2022-09-13 PROCEDURE — 3051F HG A1C>EQUAL 7.0%<8.0%: CPT | Mod: CPTII,95,, | Performed by: NURSE PRACTITIONER

## 2022-09-13 PROCEDURE — 3066F PR DOCUMENTATION OF TREATMENT FOR NEPHROPATHY: ICD-10-PCS | Mod: CPTII,95,, | Performed by: NURSE PRACTITIONER

## 2022-09-13 PROCEDURE — 1159F MED LIST DOCD IN RCRD: CPT | Mod: CPTII,95,, | Performed by: NURSE PRACTITIONER

## 2022-09-13 PROCEDURE — 3066F NEPHROPATHY DOC TX: CPT | Mod: CPTII,95,, | Performed by: NURSE PRACTITIONER

## 2022-09-13 PROCEDURE — 1160F PR REVIEW ALL MEDS BY PRESCRIBER/CLIN PHARMACIST DOCUMENTED: ICD-10-PCS | Mod: CPTII,95,, | Performed by: NURSE PRACTITIONER

## 2022-09-13 PROCEDURE — 3051F PR MOST RECENT HEMOGLOBIN A1C LEVEL 7.0 - < 8.0%: ICD-10-PCS | Mod: CPTII,95,, | Performed by: NURSE PRACTITIONER

## 2022-09-13 PROCEDURE — 3061F PR NEG MICROALBUMINURIA RESULT DOCUMENTED/REVIEW: ICD-10-PCS | Mod: CPTII,95,, | Performed by: NURSE PRACTITIONER

## 2022-09-13 PROCEDURE — 99213 PR OFFICE/OUTPT VISIT, EST, LEVL III, 20-29 MIN: ICD-10-PCS | Mod: 95,,, | Performed by: NURSE PRACTITIONER

## 2022-09-13 PROCEDURE — 1159F PR MEDICATION LIST DOCUMENTED IN MEDICAL RECORD: ICD-10-PCS | Mod: CPTII,95,, | Performed by: NURSE PRACTITIONER

## 2022-09-13 RX ORDER — MUPIROCIN 20 MG/G
OINTMENT TOPICAL 3 TIMES DAILY
Qty: 22 G | Refills: 2 | Status: SHIPPED | OUTPATIENT
Start: 2022-09-13 | End: 2024-02-06 | Stop reason: SDUPTHER

## 2022-09-13 RX ORDER — SULFAMETHOXAZOLE AND TRIMETHOPRIM 800; 160 MG/1; MG/1
1 TABLET ORAL 2 TIMES DAILY
Qty: 20 TABLET | Refills: 0 | Status: SHIPPED | OUTPATIENT
Start: 2022-09-13 | End: 2022-09-23

## 2022-09-13 NOTE — PROGRESS NOTES
Subjective:       Patient ID: Crystal Acuna is a 67 y.o. female.    Primary Care Telemedicine Note    The patient location is:  Patient Home   The chief complaint leading to consultation is: abscess  Total time spent with patient: 15 mins    Visit type: Virtual visit with synchronous audio only and video  Each patient to whom he or she provides medical services by telemedicine is:  (1) informed of the relationship between the physician and patient and the respective role of any other health care provider with respect to management of the patient; and (2) notified that he or she may decline to receive medical services by telemedicine and may withdraw from such care at any time.      Chief Complaint: No chief complaint on file.    Abscess  Chronicity:  RecurrentProgression Since Onset: worsening  Abscess location: groin.  Associated Symptoms: no fever  Characteristics: painful, redness and swelling    Treatments Tried:  Topical antibiotics    Review of Systems   Constitutional:  Negative for activity change, fatigue, fever and unexpected weight change.   HENT:  Negative for ear pain, hearing loss, rhinorrhea, sore throat and trouble swallowing.    Eyes:  Negative for pain, discharge and visual disturbance.   Respiratory:  Negative for cough, chest tightness, shortness of breath and wheezing.    Cardiovascular:  Negative for chest pain and palpitations.   Gastrointestinal:  Negative for abdominal pain, blood in stool, constipation, diarrhea and vomiting.   Endocrine: Negative for polydipsia and polyuria.   Genitourinary:  Negative for difficulty urinating, dysuria, hematuria and menstrual problem.   Musculoskeletal:  Negative for arthralgias, joint swelling, myalgias and neck pain.   Skin:  Negative for color change and rash.   Neurological:  Negative for dizziness, weakness and headaches.   Psychiatric/Behavioral:  Negative for confusion, dysphoric mood and sleep disturbance. The patient is not nervous/anxious.           Objective:     Current Outpatient Medications   Medication Sig Dispense Refill    albuterol (PROVENTIL/VENTOLIN HFA) 90 mcg/actuation inhaler Inhale 2 puffs into the lungs every 6 (six) hours as needed for Wheezing. Rescue 17 g 11    amLODIPine (NORVASC) 10 MG tablet Take 1 tablet by mouth once daily 90 tablet 4    aspirin 81 MG Chew Take 81 mg by mouth once daily.      atorvastatin (LIPITOR) 20 MG tablet Take 1 tablet (20 mg total) by mouth once daily. 90 tablet 4    blood sugar diagnostic Strp To check BG 2 times daily, to use with insurance preferred meter 200 each 5    blood-glucose meter kit To check BG 2 times daily, to use with insurance preferred meter 1 each 0    FARXIGA 5 mg Tab tablet Take 5 mg by mouth once daily.      fluorometholone 0.1% (FML) 0.1 % DrpS INSTILL 1 DROP INTO EACH EYE THREE TIMES DAILY AS DIRECTED      INVELTYS 1 % DrpS Place 1 drop into both eyes 2 (two) times daily.      lancets Misc To check BG 2 times daily, to use with insurance preferred meter 200 each 99    meloxicam (MOBIC) 15 MG tablet Take 1 tablet (15 mg total) by mouth once daily. 90 tablet 3    mupirocin (BACTROBAN) 2 % ointment Apply topically 3 (three) times daily. 22 g 2    ofloxacin (OCUFLOX) 0.3 % ophthalmic solution       prednisoLONE acetate (PRED FORTE) 1 % DrpS Place 1 drop into the left eye 4 (four) times daily.      PROLENSA 0.07 % Drop SMARTSIG:In Eye(s)      SITagliptin (JANUVIA) 100 MG Tab Take 1 tablet (100 mg total) by mouth once daily. 90 tablet 4    sulfamethoxazole-trimethoprim 800-160mg (BACTRIM DS) 800-160 mg Tab Take 1 tablet by mouth 2 (two) times daily. for 10 days 20 tablet 0    XIIDRA 5 % Dpet INSTILL 1 DROP INTO EACH EYE TWICE DAILY AS DIRECTED       No current facility-administered medications for this visit.       Physical Exam  Constitutional:       Appearance: She is well-developed.   HENT:      Head: Normocephalic.   Pulmonary:      Effort: Pulmonary effort is normal. No respiratory  distress.   Musculoskeletal:      Cervical back: Normal range of motion.   Neurological:      Mental Status: She is alert and oriented to person, place, and time.   Psychiatric:         Thought Content: Thought content normal.         Judgment: Judgment normal.       Assessment:       1. Groin abscess          Plan:   Groin abscess    Other orders  -     sulfamethoxazole-trimethoprim 800-160mg (BACTRIM DS) 800-160 mg Tab; Take 1 tablet by mouth 2 (two) times daily. for 10 days  Dispense: 20 tablet; Refill: 0  -     mupirocin (BACTROBAN) 2 % ointment; Apply topically 3 (three) times daily.  Dispense: 22 g; Refill: 2        No follow-ups on file.    There are no Patient Instructions on file for this visit.

## 2022-09-14 DIAGNOSIS — Z78.0 MENOPAUSE: ICD-10-CM

## 2022-09-27 ENCOUNTER — PATIENT MESSAGE (OUTPATIENT)
Dept: FAMILY MEDICINE | Facility: CLINIC | Age: 67
End: 2022-09-27
Payer: MEDICARE

## 2022-09-27 DIAGNOSIS — Z22.322 MRSA CARRIER: Primary | ICD-10-CM

## 2022-09-27 DIAGNOSIS — L08.9 RECURRENT INFECTION OF SKIN: ICD-10-CM

## 2022-09-27 NOTE — TELEPHONE ENCOUNTER
I received your message which was reviewed along with the the medication list and allergies that we have below.  Please review it for accuracy to make sure that we have the most recent records on your history.     Based on this, the following orders were placed AND/OR medicines were sent in.     Orders Placed This Encounter   Procedures    Ambulatory referral/consult to Infectious Disease     Standing Status:   Future     Standing Expiration Date:   10/27/2023     Referral Priority:   Routine     Referral Type:   Consultation     Referral Reason:   Specialty Services Required     Referred to Provider:   Bernie Greco MD     Requested Specialty:   Infectious Diseases     Number of Visits Requested:   1       Medications written and sent at this time include:       Your pharmacy(ies) of choice at this time on record include the list below and any medications would have been sent to the one at the top.    [facility] [address] [place] [address]  [address] [facility] [address]  Phone: [phone] Fax: [phone]      Thank you for choosing us as your healthcare provider!  Dr. Eh Sow    ALLERGY LIST  Review of patient's allergies indicates:   Allergen Reactions    Metformin     Farxiga [dapagliflozin]      Yeast infection      Latex, natural rubber        MEDICATION LIST  Current Outpatient Medications on File Prior to Visit   Medication Sig Dispense Refill    albuterol (PROVENTIL/VENTOLIN HFA) 90 mcg/actuation inhaler Inhale 2 puffs into the lungs every 6 (six) hours as needed for Wheezing. Rescue 17 g 11    amLODIPine (NORVASC) 10 MG tablet Take 1 tablet by mouth once daily 90 tablet 4    aspirin 81 MG Chew Take 81 mg by mouth once daily.      atorvastatin (LIPITOR) 20 MG tablet Take 1 tablet (20 mg total) by mouth once daily. 90 tablet 4    blood sugar diagnostic Strp To check BG 2 times daily, to use with insurance preferred meter 200 each 5    blood-glucose meter kit To check  BG 2 times daily, to use with insurance preferred meter 1 each 0    doxycycline (VIBRAMYCIN) 100 MG Cap Take 1 capsule (100 mg total) by mouth 2 (two) times daily. for 7 days 14 capsule 0    FARXIGA 5 mg Tab tablet Take 5 mg by mouth once daily.      fluorometholone 0.1% (FML) 0.1 % DrpS INSTILL 1 DROP INTO EACH EYE THREE TIMES DAILY AS DIRECTED      hydrocortisone 1 % cream Apply to affected area 2 times daily 30 g 0    INVELTYS 1 % DrpS Place 1 drop into both eyes 2 (two) times daily.      lancets Misc To check BG 2 times daily, to use with insurance preferred meter 200 each 99    meloxicam (MOBIC) 15 MG tablet Take 1 tablet (15 mg total) by mouth once daily. 90 tablet 3    mupirocin (BACTROBAN) 2 % ointment Apply topically 3 (three) times daily. 22 g 2    ofloxacin (OCUFLOX) 0.3 % ophthalmic solution       prednisoLONE acetate (PRED FORTE) 1 % DrpS Place 1 drop into the left eye 4 (four) times daily.      PROLENSA 0.07 % Drop SMARTSIG:In Eye(s)      SITagliptin (JANUVIA) 100 MG Tab Take 1 tablet (100 mg total) by mouth once daily. 90 tablet 4    XIIDRA 5 % Dpet INSTILL 1 DROP INTO EACH EYE TWICE DAILY AS DIRECTED       No current facility-administered medications on file prior to visit.       HEALTH MAINTENANCE THAT IS OVERDUE OR NEEDS TO BE UPDATED ON OUR CHART IS LISTED BELOW.  IF YOU HAVE HAD IT DONE ELSEWHERE, PLEASE SEND US DATES AND RECORDS IF YOU HAVE THEM TO MAKE YOUR CHART ACCURATE.  IF YOU HAVE NOT HAD THESE DONE AND ARE READY FOR US TO SCHEDULE THEM, PLEASE SEND US A MESSAGE.  Health Maintenance Due   Topic Date Due    HIV Screening  Never done    TETANUS VACCINE  Never done    DEXA Scan  Never done    Shingles Vaccine (1 of 2) Never done    Foot Exam  12/02/2020    Pneumococcal Vaccines (Age 65+) (2 - PCV) 09/29/2021    COVID-19 Vaccine (3 - Booster for Moderna series) 10/27/2021    Influenza Vaccine (1) 09/01/2022       DISCLAIMER: This note was compiled by using a speech recognition dictation system  and therefore please be aware that typographical / speech recognition errors can and do occur.  Please contact me if you see any errors specifically.    Eh Sow MD  We Offer Telehealth & Same Day Appointments!   Book your Telehealth appointment with me through my nurse or   Clinic appointments on [employer]!  [phone]     Check out my Facebook Page and Follow Me at: CLICK HERE    Check out my website at [employer] by clicking on: CLICK HERE    To Schedule appointments online, go to [employer]: CLICK HERE     Location: [email or web address]    [address] [place] [address]    FAX: [phone]

## 2022-09-28 ENCOUNTER — OFFICE VISIT (OUTPATIENT)
Dept: FAMILY MEDICINE | Facility: CLINIC | Age: 67
End: 2022-09-28
Payer: MEDICARE

## 2022-09-28 ENCOUNTER — HOSPITAL ENCOUNTER (OUTPATIENT)
Dept: RADIOLOGY | Facility: HOSPITAL | Age: 67
Discharge: HOME | End: 2022-09-28
Attending: FAMILY MEDICINE
Payer: MEDICARE

## 2022-09-28 VITALS
HEIGHT: 62 IN | HEART RATE: 82 BPM | WEIGHT: 175.31 LBS | BODY MASS INDEX: 32.26 KG/M2 | OXYGEN SATURATION: 95 % | SYSTOLIC BLOOD PRESSURE: 128 MMHG | DIASTOLIC BLOOD PRESSURE: 80 MMHG | TEMPERATURE: 97 F | RESPIRATION RATE: 17 BRPM

## 2022-09-28 DIAGNOSIS — R21 RASH: ICD-10-CM

## 2022-09-28 DIAGNOSIS — Z22.322 MRSA CARRIER: Primary | ICD-10-CM

## 2022-09-28 DIAGNOSIS — M25.529 ELBOW PAIN, UNSPECIFIED LATERALITY: ICD-10-CM

## 2022-09-28 DIAGNOSIS — Z78.0 MENOPAUSE: ICD-10-CM

## 2022-09-28 DIAGNOSIS — L08.9 RECURRENT INFECTION OF SKIN: ICD-10-CM

## 2022-09-28 DIAGNOSIS — M25.579 ANKLE PAIN, UNSPECIFIED CHRONICITY, UNSPECIFIED LATERALITY: ICD-10-CM

## 2022-09-28 PROCEDURE — 3066F PR DOCUMENTATION OF TREATMENT FOR NEPHROPATHY: ICD-10-PCS | Mod: CPTII,S$GLB,, | Performed by: FAMILY MEDICINE

## 2022-09-28 PROCEDURE — 1160F RVW MEDS BY RX/DR IN RCRD: CPT | Mod: CPTII,S$GLB,, | Performed by: FAMILY MEDICINE

## 2022-09-28 PROCEDURE — 99214 OFFICE O/P EST MOD 30 MIN: CPT | Mod: S$GLB,,, | Performed by: FAMILY MEDICINE

## 2022-09-28 PROCEDURE — 77080 DXA BONE DENSITY AXIAL: CPT | Mod: TC,PO

## 2022-09-28 PROCEDURE — 1160F PR REVIEW ALL MEDS BY PRESCRIBER/CLIN PHARMACIST DOCUMENTED: ICD-10-PCS | Mod: CPTII,S$GLB,, | Performed by: FAMILY MEDICINE

## 2022-09-28 PROCEDURE — 3066F NEPHROPATHY DOC TX: CPT | Mod: CPTII,S$GLB,, | Performed by: FAMILY MEDICINE

## 2022-09-28 PROCEDURE — 3079F PR MOST RECENT DIASTOLIC BLOOD PRESSURE 80-89 MM HG: ICD-10-PCS | Mod: CPTII,S$GLB,, | Performed by: FAMILY MEDICINE

## 2022-09-28 PROCEDURE — 3051F HG A1C>EQUAL 7.0%<8.0%: CPT | Mod: CPTII,S$GLB,, | Performed by: FAMILY MEDICINE

## 2022-09-28 PROCEDURE — 3074F PR MOST RECENT SYSTOLIC BLOOD PRESSURE < 130 MM HG: ICD-10-PCS | Mod: CPTII,S$GLB,, | Performed by: FAMILY MEDICINE

## 2022-09-28 PROCEDURE — 3074F SYST BP LT 130 MM HG: CPT | Mod: CPTII,S$GLB,, | Performed by: FAMILY MEDICINE

## 2022-09-28 PROCEDURE — 3051F PR MOST RECENT HEMOGLOBIN A1C LEVEL 7.0 - < 8.0%: ICD-10-PCS | Mod: CPTII,S$GLB,, | Performed by: FAMILY MEDICINE

## 2022-09-28 PROCEDURE — 99999 PR PBB SHADOW E&M-EST. PATIENT-LVL V: CPT | Mod: PBBFAC,,, | Performed by: FAMILY MEDICINE

## 2022-09-28 PROCEDURE — 3008F BODY MASS INDEX DOCD: CPT | Mod: CPTII,S$GLB,, | Performed by: FAMILY MEDICINE

## 2022-09-28 PROCEDURE — 1101F PT FALLS ASSESS-DOCD LE1/YR: CPT | Mod: CPTII,S$GLB,, | Performed by: FAMILY MEDICINE

## 2022-09-28 PROCEDURE — 1159F MED LIST DOCD IN RCRD: CPT | Mod: CPTII,S$GLB,, | Performed by: FAMILY MEDICINE

## 2022-09-28 PROCEDURE — 77080 DEXA BONE DENSITY SPINE HIP: ICD-10-PCS | Mod: 26,,, | Performed by: RADIOLOGY

## 2022-09-28 PROCEDURE — 1159F PR MEDICATION LIST DOCUMENTED IN MEDICAL RECORD: ICD-10-PCS | Mod: CPTII,S$GLB,, | Performed by: FAMILY MEDICINE

## 2022-09-28 PROCEDURE — 77080 DXA BONE DENSITY AXIAL: CPT | Mod: 26,,, | Performed by: RADIOLOGY

## 2022-09-28 PROCEDURE — 1101F PR PT FALLS ASSESS DOC 0-1 FALLS W/OUT INJ PAST YR: ICD-10-PCS | Mod: CPTII,S$GLB,, | Performed by: FAMILY MEDICINE

## 2022-09-28 PROCEDURE — 3288F PR FALLS RISK ASSESSMENT DOCUMENTED: ICD-10-PCS | Mod: CPTII,S$GLB,, | Performed by: FAMILY MEDICINE

## 2022-09-28 PROCEDURE — 3288F FALL RISK ASSESSMENT DOCD: CPT | Mod: CPTII,S$GLB,, | Performed by: FAMILY MEDICINE

## 2022-09-28 PROCEDURE — 99999 PR PBB SHADOW E&M-EST. PATIENT-LVL V: ICD-10-PCS | Mod: PBBFAC,,, | Performed by: FAMILY MEDICINE

## 2022-09-28 PROCEDURE — 99214 PR OFFICE/OUTPT VISIT, EST, LEVL IV, 30-39 MIN: ICD-10-PCS | Mod: S$GLB,,, | Performed by: FAMILY MEDICINE

## 2022-09-28 PROCEDURE — 3061F NEG MICROALBUMINURIA REV: CPT | Mod: CPTII,S$GLB,, | Performed by: FAMILY MEDICINE

## 2022-09-28 PROCEDURE — 3061F PR NEG MICROALBUMINURIA RESULT DOCUMENTED/REVIEW: ICD-10-PCS | Mod: CPTII,S$GLB,, | Performed by: FAMILY MEDICINE

## 2022-09-28 PROCEDURE — 3008F PR BODY MASS INDEX (BMI) DOCUMENTED: ICD-10-PCS | Mod: CPTII,S$GLB,, | Performed by: FAMILY MEDICINE

## 2022-09-28 PROCEDURE — 3079F DIAST BP 80-89 MM HG: CPT | Mod: CPTII,S$GLB,, | Performed by: FAMILY MEDICINE

## 2022-09-28 RX ORDER — CHLORHEXIDINE GLUCONATE 40 MG/ML
SOLUTION TOPICAL DAILY PRN
Qty: 473 ML | Refills: 0 | Status: SHIPPED | OUTPATIENT
Start: 2022-09-28

## 2022-09-28 NOTE — PATIENT INSTRUCTIONS
Follow up if symptoms worsen or fail to improve.     Dear patient,   As a result of recent federal legislation (The Federal Cures Act), you may receive lab or pathology results from your visit in your [facility] account before your physician is able to contact you. Your physician or their representative will relay the results to you with their recommendations at their soonest availability.     If no improvement in symptoms or symptoms worsen, please be advised to call MD, follow-up at clinic and/or go to ER if becomes severe.    Eh Sow M.D.        We Offer TELEHEALTH & Same Day Appointments!   Book your Telehealth appointment with me through my nurse or   Clinic appointments on [employer]!    [address] [place] [address]    Office: [phone]   FAX: [phone]    Check out my Facebook Page and Follow Me at: [email or web address]    Check out my website at [employer] by clicking on: [email or web address]    To Schedule appointments online, go to [facility]: [email or web address]

## 2022-09-28 NOTE — PROGRESS NOTES
Please call the patient with results. [phone]   .The bone density test (DEXA scan) shows osteopenia, known as weakening of the bones. You need to be on Calcium and Vitamin D supplementation and also start using weight bearing exercises to help reduce the risk of a fracture.  Also, please start an over-the-counter vitamin-D/calcium supplementation.  Ask the patient to recheck the DEXA scan in 2 years.

## 2022-09-29 ENCOUNTER — PATIENT MESSAGE (OUTPATIENT)
Dept: FAMILY MEDICINE | Facility: CLINIC | Age: 67
End: 2022-09-29
Payer: MEDICARE

## 2022-09-29 ENCOUNTER — TELEPHONE (OUTPATIENT)
Dept: ORTHOPEDICS | Facility: CLINIC | Age: 67
End: 2022-09-29
Payer: MEDICARE

## 2022-09-29 DIAGNOSIS — E11.65 TYPE 2 DIABETES MELLITUS WITH HYPERGLYCEMIA, WITHOUT LONG-TERM CURRENT USE OF INSULIN: Primary | Chronic | ICD-10-CM

## 2022-09-29 DIAGNOSIS — M25.522 LEFT ELBOW PAIN: Primary | ICD-10-CM

## 2022-09-29 NOTE — ASSESSMENT & PLAN NOTE
Referral to infectious disease at [place] per patient request due to location.  Check labs today.  Continue doxycycline at the patient is currently on.  Possible vasculitis given the physical exam.

## 2022-09-29 NOTE — ASSESSMENT & PLAN NOTE
Continue doxycycline for now.  Check labs.  Referral to Orthopedics next available.Discussed condition course and signs and symptoms to expect.  Patient advised take anti-inflammatories and or Tylenol for pain or fever.  ER precautions.  Call MD or follow-up to clinic if not improving or worsening symptoms.

## 2022-09-29 NOTE — TELEPHONE ENCOUNTER
Called to confirm appt and laterality of injury in [place] and asked that patient arrive 30 minutes prior to appt time for xray.  Patient verbalized understanding of appt date, time and location.

## 2022-09-29 NOTE — PROGRESS NOTES
PLAN:      Problem List Items Addressed This Visit       MRSA carrier - Primary (Chronic)     Reduce bleach baths due to patient trying out her skin and making it worse.  Follow-up with infectious disease as scheduled.         Relevant Medications    chlorhexidine (HIBICLENS) 4 % external liquid    Other Relevant Orders    CBC Auto Differential    Sedimentation rate (Completed)    C-Reactive Protein    Uric Acid    Comprehensive Metabolic Panel    Recurrent infection of skin (Chronic)     Referral to infectious disease at [place] per patient request due to location.  Check labs today.  Continue doxycycline at the patient is currently on.  Possible vasculitis given the physical exam.         Relevant Medications    chlorhexidine (HIBICLENS) 4 % external liquid    Other Relevant Orders    CBC Auto Differential    Sedimentation rate (Completed)    C-Reactive Protein    Uric Acid    Comprehensive Metabolic Panel    Ankle pain (Chronic)     Check uric acid.  Referral to orthopedic for ankle pain and elbow pain.         Relevant Orders    CBC Auto Differential    Sedimentation rate (Completed)    C-Reactive Protein    Uric Acid    Comprehensive Metabolic Panel    Ambulatory referral/consult to Orthopedics    Elbow pain (Chronic)     Continue doxycycline for now.  Check labs.  Referral to Orthopedics next available.Discussed condition course and signs and symptoms to expect.  Patient advised take anti-inflammatories and or Tylenol for pain or fever.  ER precautions.  Call MD or follow-up to clinic if not improving or worsening symptoms.           Relevant Orders    CBC Auto Differential    Sedimentation rate (Completed)    C-Reactive Protein    Uric Acid    Comprehensive Metabolic Panel    Ambulatory referral/consult to Orthopedics    Rash     Check labs including ESR CRP P Anca, ER precautions.    Lab Results   Component Value Date    WBC 4.97 09/25/2022    HGB 16.0 09/25/2022    HCT 48.2 09/25/2022    MCV 89  09/25/2022     09/25/2022                Relevant Orders    ISSA Screen w/Reflex    ANTI-NEUTROPHILIC CYTOPLASMIC ANTIBODY     Future Appointments       Date Provider Specialty Appt Notes    10/3/2022 Jose Bianchi MD Orthopedics .    12/1/2022 Eh Sow MD Family Medicine 6month            Medication Management for assessment above:   Medication List with Changes/Refills   New Medications    CHLORHEXIDINE (HIBICLENS) 4 % EXTERNAL LIQUID    Apply topically daily as needed.   Current Medications    ALBUTEROL (PROVENTIL/VENTOLIN HFA) 90 MCG/ACTUATION INHALER    Inhale 2 puffs into the lungs every 6 (six) hours as needed for Wheezing. Rescue    AMLODIPINE (NORVASC) 10 MG TABLET    Take 1 tablet by mouth once daily    ASPIRIN 81 MG CHEW    Take 81 mg by mouth once daily.    ATORVASTATIN (LIPITOR) 20 MG TABLET    Take 1 tablet (20 mg total) by mouth once daily.    BLOOD SUGAR DIAGNOSTIC STRP    To check BG 2 times daily, to use with insurance preferred meter    BLOOD-GLUCOSE METER KIT    To check BG 2 times daily, to use with insurance preferred meter    DOXYCYCLINE (VIBRAMYCIN) 100 MG CAP    Take 1 capsule (100 mg total) by mouth 2 (two) times daily. for 7 days    FARXIGA 5 MG TAB TABLET    Take 5 mg by mouth once daily.    FLUOROMETHOLONE 0.1% (FML) 0.1 % DRPS    INSTILL 1 DROP INTO EACH EYE THREE TIMES DAILY AS DIRECTED    HYDROCORTISONE 1 % CREAM    Apply to affected area 2 times daily    INVELTYS 1 % DRPS    Place 1 drop into both eyes 2 (two) times daily.    LANCETS MISC    To check BG 2 times daily, to use with insurance preferred meter    MELOXICAM (MOBIC) 15 MG TABLET    Take 1 tablet (15 mg total) by mouth once daily.    MUPIROCIN (BACTROBAN) 2 % OINTMENT    Apply topically 3 (three) times daily.    OFLOXACIN (OCUFLOX) 0.3 % OPHTHALMIC SOLUTION        PREDNISOLONE ACETATE (PRED FORTE) 1 % DRPS    Place 1 drop into the left eye 4 (four) times daily.    PROLENSA 0.07 % DROP    SMARTSIG:In  Eye(s)    SITAGLIPTIN (JANUVIA) 100 MG TAB    Take 1 tablet (100 mg total) by mouth once daily.    XIIDRA 5 % DPET    INSTILL 1 DROP INTO EACH EYE TWICE DAILY AS DIRECTED       Eh Sow M.D.  ==========================================================================  Subjective:   Patient ID: Crystal Acuna is a 67 y.o. female.  has a past medical history of Allergy, Bronchitis, Family history of colonic polyps (4/27/2021), Hallux abductovalgus, Herniated lumbar intervertebral disc, Hyperlipidemia, Hypertension, and Smoker.   Chief Complaint: Recurrent Skin Infections (Recurrent boils)      Problem List Items Addressed This Visit       MRSA carrier - Primary (Chronic)    Overview     September 2022: Patient reports that she has been doing bleach baths multiple times per week to prevent recurrent staph infections.         Current Assessment & Plan     Reduce bleach baths due to patient trying out her skin and making it worse.  Follow-up with infectious disease as scheduled.         Recurrent infection of skin (Chronic)    Overview     Chronic.  Recurrent.  Patient infection in started in the groin with abscess.  Patient was put on Bactrim.  She developed another rash on her lower extremities and is now having elbow and ankle pain.  Patient does have well-controlled diabetes.    He has seen Infectious Disease previously.  She was put on Zyvox which did resolve the infection previously.         Current Assessment & Plan     Referral to infectious disease at [place] per patient request due to location.  Check labs today.  Continue doxycycline at the patient is currently on.  Possible vasculitis given the physical exam.         Ankle pain (Chronic)    Overview     New problem.  Patient with ankle thing.  Denies history of gout.    No results found for: URICACID           Current Assessment & Plan     Check uric acid.  Referral to orthopedic for ankle pain and elbow pain.         Elbow pain (Chronic)     Overview     Subacute.  Patient has been having elbow pain for the last two days.  She was put on Bactrim initially but she has finished with that medication.  She did go to the emergency department for evaluation.  No surgery or in incision and drainage was recommended.  Patient is on doxycycline which she was switched to because of a rash that developed on her lower extremities.         Current Assessment & Plan     Continue doxycycline for now.  Check labs.  Referral to Orthopedics next available.Discussed condition course and signs and symptoms to expect.  Patient advised take anti-inflammatories and or Tylenol for pain or fever.  ER precautions.  Call MD or follow-up to clinic if not improving or worsening symptoms.           Rash    Overview     New problem.  Patient states that the rash started after taking Bactrim.  She is concerned that she has infection in her legs.  This has not improved on Bactrim.  She just started doxycycline today.         Current Assessment & Plan     Check labs including ESR CRP P Anca, ER precautions.    Lab Results   Component Value Date    WBC 4.97 09/25/2022    HGB 16.0 09/25/2022    HCT 48.2 09/25/2022    MCV 89 09/25/2022     09/25/2022                    Review of patient's allergies indicates:   Allergen Reactions    Metformin     Farxiga [dapagliflozin]      Yeast infection      Latex, natural rubber      Current Outpatient Medications   Medication Instructions    albuterol (PROVENTIL/VENTOLIN HFA) 90 mcg/actuation inhaler 2 puffs, Inhalation, Every 6 hours PRN, Rescue    amLODIPine (NORVASC) 10 MG tablet Take 1 tablet by mouth once daily    aspirin 81 mg, Oral, Daily    atorvastatin (LIPITOR) 20 mg, Oral, Daily    blood sugar diagnostic Strp To check BG 2 times daily, to use with insurance preferred meter    blood-glucose meter kit To check BG 2 times daily, to use with insurance preferred meter    chlorhexidine (HIBICLENS) 4 % external liquid Topical (Top), Daily  "PRN    doxycycline (VIBRAMYCIN) 100 mg, Oral, 2 times daily    FARXIGA 5 mg, Oral, Daily    fluorometholone 0.1% (FML) 0.1 % DrpS INSTILL 1 DROP INTO EACH EYE THREE TIMES DAILY AS DIRECTED    hydrocortisone 1 % cream Apply to affected area 2 times daily    INVELTYS 1 % DrpS 1 drop, Both Eyes, 2 times daily    lancets Misc To check BG 2 times daily, to use with insurance preferred meter    meloxicam (MOBIC) 15 mg, Oral, Daily    mupirocin (BACTROBAN) 2 % ointment Topical (Top), 3 times daily    ofloxacin (OCUFLOX) 0.3 % ophthalmic solution No dose, route, or frequency recorded.    prednisoLONE acetate (PRED FORTE) 1 % DrpS 1 drop, Left Eye, 4 times daily    PROLENSA 0.07 % Drop SMARTSIG:In Eye(s)    SITagliptin (JANUVIA) 100 mg, Oral, Daily    XIIDRA 5 % Dpet INSTILL 1 DROP INTO EACH EYE TWICE DAILY AS DIRECTED      I have reviewed the PMH, social history, FamilyHx, surgical history, allergies and medications documented / confirmed by the patient at the time of this visit.  Review of Systems   Constitutional:  Negative for chills, fatigue, fever and unexpected weight change.   HENT:  Negative for ear pain and sore throat.    Eyes:  Negative for redness and visual disturbance.   Respiratory:  Negative for cough and shortness of breath.    Cardiovascular:  Negative for chest pain and palpitations.   Gastrointestinal:  Negative for nausea and vomiting.   Genitourinary:  Negative for difficulty urinating and hematuria.   Musculoskeletal:  Negative for arthralgias and myalgias.   Skin:  Positive for color change, rash and wound.   Neurological:  Negative for weakness and headaches.   Psychiatric/Behavioral:  Negative for sleep disturbance. The patient is not nervous/anxious.    Objective:   /80   Pulse 82   Temp 97.4 °F (36.3 °C) (Temporal)   Resp 17   Ht 5' 2" (1.575 m)   Wt 79.5 kg (175 lb 4.8 oz)   SpO2 95%   BMI 32.06 kg/m²   Physical Exam  Vitals and nursing note reviewed.   Constitutional:       " General: She is not in acute distress.     Appearance: She is well-developed. She is not ill-appearing, toxic-appearing or diaphoretic.   HENT:      Head: Normocephalic and atraumatic.      Right Ear: Hearing and external ear normal.      Left Ear: Hearing and external ear normal.      Nose: Nose normal. No rhinorrhea.   Eyes:      General: Lids are normal.      Extraocular Movements: Extraocular movements intact.      Conjunctiva/sclera: Conjunctivae normal.      Pupils: Pupils are equal, round, and reactive to light.   Cardiovascular:      Rate and Rhythm: Normal rate.      Pulses: Normal pulses.   Pulmonary:      Effort: Pulmonary effort is normal. No respiratory distress.      Breath sounds: Normal breath sounds.   Abdominal:      General: Bowel sounds are normal.      Palpations: Abdomen is soft.   Musculoskeletal:         General: Tenderness and deformity present. Normal range of motion.      Cervical back: Normal range of motion and neck supple.      Right lower leg: Edema present.      Left lower leg: Edema present.   Skin:     General: Skin is warm and dry.      Capillary Refill: Capillary refill takes less than 2 seconds.      Coloration: Skin is not pale.      Findings: Bruising, lesion and rash present. Rash is purpuric.   Neurological:      General: No focal deficit present.      Mental Status: She is alert and oriented to person, place, and time. Mental status is at baseline. She is not disoriented.      Cranial Nerves: No cranial nerve deficit.      Motor: No weakness.      Gait: Gait normal.   Psychiatric:         Attention and Perception: She is attentive.         Mood and Affect: Mood normal. Mood is not anxious or depressed.         Speech: Speech is not rapid and pressured or slurred.         Behavior: Behavior normal. Behavior is not agitated, aggressive or hyperactive. Behavior is cooperative.         Thought Content: Thought content normal. Thought content is not paranoid or delusional.  Thought content does not include homicidal or suicidal ideation. Thought content does not include homicidal or suicidal plan.         Cognition and Memory: Memory is not impaired.         Judgment: Judgment normal.     Defer                     Assessment:     1. MRSA carrier    2. Recurrent infection of skin    3. Elbow pain, unspecified laterality    4. Ankle pain, unspecified chronicity, unspecified laterality    5. Rash      MDM:   Moderate complexity.  Moderate risk.  Total time: 33 minutes.  This includes total time spent on the encounter, which includes face to face time and non-face to face time preparing to see the patient (eg, review of previous medical records, tests), Obtaining and/or reviewing separately obtained history, documenting clinical information in the electronic or other health record, independently interpreting results (not separately reported)/communicating results to the patient/family/caregiver, and/or care coordination (not separately reported).    I have Reviewed and summarized old records.  I have performed thorough medication reconciliation today and discussed risk and benefits of medications.  I have reviewed labs and discussed with patient.  All questions were answered.  I am requesting old records and will review them once they are available.  Infectious disease, ENT    I have signed for the following orders AND/OR meds.  Orders Placed This Encounter   Procedures    CBC Auto Differential     Standing Status:   Future     Number of Occurrences:   1     Standing Expiration Date:   11/27/2023    Sedimentation rate     Standing Status:   Future     Number of Occurrences:   1     Standing Expiration Date:   11/27/2023    C-Reactive Protein     Standing Status:   Future     Number of Occurrences:   1     Standing Expiration Date:   11/27/2023    Uric Acid     Standing Status:   Future     Number of Occurrences:   1     Standing Expiration Date:   11/27/2023    Comprehensive Metabolic  Panel     Standing Status:   Future     Number of Occurrences:   1     Standing Expiration Date:   11/27/2023    ISSA Screen w/Reflex     Standing Status:   Future     Number of Occurrences:   1     Standing Expiration Date:   11/27/2023    ANTI-NEUTROPHILIC CYTOPLASMIC ANTIBODY     Standing Status:   Future     Number of Occurrences:   1     Standing Expiration Date:   11/27/2023    Ambulatory referral/consult to Orthopedics     Standing Status:   Future     Standing Expiration Date:   10/28/2023     Referral Priority:   Routine     Referral Type:   Consultation     Requested Specialty:   Orthopedic Surgery     Number of Visits Requested:   1     Medications Ordered This Encounter   Medications    chlorhexidine (HIBICLENS) 4 % external liquid     Sig: Apply topically daily as needed.     Dispense:  473 mL     Refill:  0        Follow up if symptoms worsen or fail to improve.  Future Appointments       Date Provider Specialty Appt Notes    10/3/2022 Jose Bianchi MD Orthopedics .    12/1/2022 Eh Sow MD Family Medicine 6month           If no improvement in symptoms or symptoms worsen, advised to call/follow-up at clinic or go to ER. Patient voiced understanding and all questions/concerns were addressed.   DISCLAIMER: This note was compiled by using a speech recognition dictation system and therefore please be aware that typographical / speech recognition errors can and do occur.  Please contact me if you see any errors specifically.    Eh Sow M.D.       Office: [phone]   [address] [place] [address]  FAX: [phone]

## 2022-09-29 NOTE — TELEPHONE ENCOUNTER
I received your message which was reviewed along with the the medication list and allergies that we have below.  Please review it for accuracy to make sure that we have the most recent records on your history.     Based on this, the following orders were placed AND/OR medicines were sent in.     Orders Placed This Encounter   Procedures    Ambulatory referral/consult to Nutrition Services     Standing Status:   Future     Standing Expiration Date:   10/29/2023     Referral Priority:   Urgent     Referral Type:   Consultation     Referral Reason:   Specialty Services Required     Requested Specialty:   Nutrition     Number of Visits Requested:   1       Medications written and sent at this time include:       Your pharmacy(ies) of choice at this time on record include the list below and any medications would have been sent to the one at the top.    [facility] [address] [place] [address] - [address] [facility]  [address] [facility] [address]  Phone: [phone] Fax: [phone]      Thank you for choosing us as your healthcare provider!  Dr. Eh Sow    ALLERGY LIST  Review of patient's allergies indicates:   Allergen Reactions    Metformin     Farxiga [dapagliflozin]      Yeast infection      Latex, natural rubber        MEDICATION LIST  Current Outpatient Medications on File Prior to Visit   Medication Sig Dispense Refill    albuterol (PROVENTIL/VENTOLIN HFA) 90 mcg/actuation inhaler Inhale 2 puffs into the lungs every 6 (six) hours as needed for Wheezing. Rescue 17 g 11    amLODIPine (NORVASC) 10 MG tablet Take 1 tablet by mouth once daily 90 tablet 4    aspirin 81 MG Chew Take 81 mg by mouth once daily.      atorvastatin (LIPITOR) 20 MG tablet Take 1 tablet (20 mg total) by mouth once daily. 90 tablet 4    blood sugar diagnostic Strp To check BG 2 times daily, to use with insurance preferred meter 200 each 5    blood-glucose meter kit To check BG 2 times daily, to use with insurance preferred meter 1  each 0    chlorhexidine (HIBICLENS) 4 % external liquid Apply topically daily as needed. 473 mL 0    doxycycline (VIBRAMYCIN) 100 MG Cap Take 1 capsule (100 mg total) by mouth 2 (two) times daily. for 7 days 14 capsule 0    FARXIGA 5 mg Tab tablet Take 5 mg by mouth once daily.      fluorometholone 0.1% (FML) 0.1 % DrpS INSTILL 1 DROP INTO EACH EYE THREE TIMES DAILY AS DIRECTED      hydrocortisone 1 % cream Apply to affected area 2 times daily 30 g 0    INVELTYS 1 % DrpS Place 1 drop into both eyes 2 (two) times daily.      lancets Misc To check BG 2 times daily, to use with insurance preferred meter 200 each 99    meloxicam (MOBIC) 15 MG tablet Take 1 tablet (15 mg total) by mouth once daily. 90 tablet 3    mupirocin (BACTROBAN) 2 % ointment Apply topically 3 (three) times daily. 22 g 2    ofloxacin (OCUFLOX) 0.3 % ophthalmic solution       prednisoLONE acetate (PRED FORTE) 1 % DrpS Place 1 drop into the left eye 4 (four) times daily.      PROLENSA 0.07 % Drop SMARTSIG:In Eye(s)      SITagliptin (JANUVIA) 100 MG Tab Take 1 tablet (100 mg total) by mouth once daily. 90 tablet 4    XIIDRA 5 % Dpet INSTILL 1 DROP INTO EACH EYE TWICE DAILY AS DIRECTED       No current facility-administered medications on file prior to visit.       HEALTH MAINTENANCE THAT IS OVERDUE OR NEEDS TO BE UPDATED ON OUR CHART IS LISTED BELOW.  IF YOU HAVE HAD IT DONE ELSEWHERE, PLEASE SEND US DATES AND RECORDS IF YOU HAVE THEM TO MAKE YOUR CHART ACCURATE.  IF YOU HAVE NOT HAD THESE DONE AND ARE READY FOR US TO SCHEDULE THEM, PLEASE SEND US A MESSAGE.  Health Maintenance Due   Topic Date Due    HIV Screening  Never done    TETANUS VACCINE  Never done    Shingles Vaccine (1 of 2) Never done    Foot Exam  12/02/2020    Pneumococcal Vaccines (Age 65+) (2 - PCV) 09/29/2021    COVID-19 Vaccine (3 - Booster for Moderna series) 10/27/2021    Influenza Vaccine (1) 09/01/2022       DISCLAIMER: This note was compiled by using a speech recognition  dictation system and therefore please be aware that typographical / speech recognition errors can and do occur.  Please contact me if you see any errors specifically.    Eh Sow MD  We Offer Telehealth & Same Day Appointments!   Book your Telehealth appointment with me through my nurse or   Clinic appointments on [employer]!  [phone]     Check out my Facebook Page and Follow Me at: CLICK HERE    Check out my website at [facility] by clicking on: CLICK HERE    To Schedule appointments online, go to [employer]: CLICK HERE     Location: [email or web address]    [address] [place] [address]    FAX: [phone]

## 2022-09-29 NOTE — ASSESSMENT & PLAN NOTE
Check labs including ESR CRP P Anca, ER precautions.    Lab Results   Component Value Date    WBC 4.97 09/25/2022    HGB 16.0 09/25/2022    HCT 48.2 09/25/2022    MCV 89 09/25/2022     09/25/2022

## 2022-09-29 NOTE — ASSESSMENT & PLAN NOTE
Reduce bleach baths due to patient trying out her skin and making it worse.  Follow-up with infectious disease as scheduled.

## 2022-10-03 ENCOUNTER — OFFICE VISIT (OUTPATIENT)
Dept: ORTHOPEDICS | Facility: CLINIC | Age: 67
End: 2022-10-03
Payer: MEDICARE

## 2022-10-03 ENCOUNTER — HOSPITAL ENCOUNTER (OUTPATIENT)
Dept: RADIOLOGY | Facility: HOSPITAL | Age: 67
Discharge: HOME | End: 2022-10-03
Attending: STUDENT IN AN ORGANIZED HEALTH CARE EDUCATION/TRAINING PROGRAM
Payer: MEDICARE

## 2022-10-03 VITALS — BODY MASS INDEX: 32.2 KG/M2 | HEIGHT: 62 IN | WEIGHT: 175 LBS

## 2022-10-03 DIAGNOSIS — M25.522 LEFT ELBOW PAIN: ICD-10-CM

## 2022-10-03 DIAGNOSIS — M25.529 ELBOW PAIN, UNSPECIFIED LATERALITY: ICD-10-CM

## 2022-10-03 DIAGNOSIS — M70.22 OLECRANON BURSITIS OF LEFT ELBOW: Primary | ICD-10-CM

## 2022-10-03 DIAGNOSIS — M25.579 ANKLE PAIN, UNSPECIFIED CHRONICITY, UNSPECIFIED LATERALITY: ICD-10-CM

## 2022-10-03 PROCEDURE — 3288F FALL RISK ASSESSMENT DOCD: CPT | Mod: CPTII,S$GLB,, | Performed by: STUDENT IN AN ORGANIZED HEALTH CARE EDUCATION/TRAINING PROGRAM

## 2022-10-03 PROCEDURE — 3066F NEPHROPATHY DOC TX: CPT | Mod: CPTII,S$GLB,, | Performed by: STUDENT IN AN ORGANIZED HEALTH CARE EDUCATION/TRAINING PROGRAM

## 2022-10-03 PROCEDURE — 3066F PR DOCUMENTATION OF TREATMENT FOR NEPHROPATHY: ICD-10-PCS | Mod: CPTII,S$GLB,, | Performed by: STUDENT IN AN ORGANIZED HEALTH CARE EDUCATION/TRAINING PROGRAM

## 2022-10-03 PROCEDURE — 1160F PR REVIEW ALL MEDS BY PRESCRIBER/CLIN PHARMACIST DOCUMENTED: ICD-10-PCS | Mod: CPTII,S$GLB,, | Performed by: STUDENT IN AN ORGANIZED HEALTH CARE EDUCATION/TRAINING PROGRAM

## 2022-10-03 PROCEDURE — 3061F NEG MICROALBUMINURIA REV: CPT | Mod: CPTII,S$GLB,, | Performed by: STUDENT IN AN ORGANIZED HEALTH CARE EDUCATION/TRAINING PROGRAM

## 2022-10-03 PROCEDURE — 99204 OFFICE O/P NEW MOD 45 MIN: CPT | Mod: S$GLB,,, | Performed by: STUDENT IN AN ORGANIZED HEALTH CARE EDUCATION/TRAINING PROGRAM

## 2022-10-03 PROCEDURE — 3061F PR NEG MICROALBUMINURIA RESULT DOCUMENTED/REVIEW: ICD-10-PCS | Mod: CPTII,S$GLB,, | Performed by: STUDENT IN AN ORGANIZED HEALTH CARE EDUCATION/TRAINING PROGRAM

## 2022-10-03 PROCEDURE — 99204 PR OFFICE/OUTPT VISIT, NEW, LEVL IV, 45-59 MIN: ICD-10-PCS | Mod: S$GLB,,, | Performed by: STUDENT IN AN ORGANIZED HEALTH CARE EDUCATION/TRAINING PROGRAM

## 2022-10-03 PROCEDURE — 3008F PR BODY MASS INDEX (BMI) DOCUMENTED: ICD-10-PCS | Mod: CPTII,S$GLB,, | Performed by: STUDENT IN AN ORGANIZED HEALTH CARE EDUCATION/TRAINING PROGRAM

## 2022-10-03 PROCEDURE — 73080 X-RAY EXAM OF ELBOW: CPT | Mod: 26,LT,, | Performed by: STUDENT IN AN ORGANIZED HEALTH CARE EDUCATION/TRAINING PROGRAM

## 2022-10-03 PROCEDURE — 3051F PR MOST RECENT HEMOGLOBIN A1C LEVEL 7.0 - < 8.0%: ICD-10-PCS | Mod: CPTII,S$GLB,, | Performed by: STUDENT IN AN ORGANIZED HEALTH CARE EDUCATION/TRAINING PROGRAM

## 2022-10-03 PROCEDURE — 3008F BODY MASS INDEX DOCD: CPT | Mod: CPTII,S$GLB,, | Performed by: STUDENT IN AN ORGANIZED HEALTH CARE EDUCATION/TRAINING PROGRAM

## 2022-10-03 PROCEDURE — 1160F RVW MEDS BY RX/DR IN RCRD: CPT | Mod: CPTII,S$GLB,, | Performed by: STUDENT IN AN ORGANIZED HEALTH CARE EDUCATION/TRAINING PROGRAM

## 2022-10-03 PROCEDURE — 3288F PR FALLS RISK ASSESSMENT DOCUMENTED: ICD-10-PCS | Mod: CPTII,S$GLB,, | Performed by: STUDENT IN AN ORGANIZED HEALTH CARE EDUCATION/TRAINING PROGRAM

## 2022-10-03 PROCEDURE — 73080 X-RAY EXAM OF ELBOW: CPT | Mod: TC,PO,LT

## 2022-10-03 PROCEDURE — 1159F MED LIST DOCD IN RCRD: CPT | Mod: CPTII,S$GLB,, | Performed by: STUDENT IN AN ORGANIZED HEALTH CARE EDUCATION/TRAINING PROGRAM

## 2022-10-03 PROCEDURE — 1101F PT FALLS ASSESS-DOCD LE1/YR: CPT | Mod: CPTII,S$GLB,, | Performed by: STUDENT IN AN ORGANIZED HEALTH CARE EDUCATION/TRAINING PROGRAM

## 2022-10-03 PROCEDURE — 99999 PR PBB SHADOW E&M-EST. PATIENT-LVL V: CPT | Mod: PBBFAC,,, | Performed by: STUDENT IN AN ORGANIZED HEALTH CARE EDUCATION/TRAINING PROGRAM

## 2022-10-03 PROCEDURE — 73080 XR ELBOW COMPLETE 3 VIEW LEFT: ICD-10-PCS | Mod: 26,LT,, | Performed by: STUDENT IN AN ORGANIZED HEALTH CARE EDUCATION/TRAINING PROGRAM

## 2022-10-03 PROCEDURE — 1159F PR MEDICATION LIST DOCUMENTED IN MEDICAL RECORD: ICD-10-PCS | Mod: CPTII,S$GLB,, | Performed by: STUDENT IN AN ORGANIZED HEALTH CARE EDUCATION/TRAINING PROGRAM

## 2022-10-03 PROCEDURE — 99999 PR PBB SHADOW E&M-EST. PATIENT-LVL V: ICD-10-PCS | Mod: PBBFAC,,, | Performed by: STUDENT IN AN ORGANIZED HEALTH CARE EDUCATION/TRAINING PROGRAM

## 2022-10-03 PROCEDURE — 1101F PR PT FALLS ASSESS DOC 0-1 FALLS W/OUT INJ PAST YR: ICD-10-PCS | Mod: CPTII,S$GLB,, | Performed by: STUDENT IN AN ORGANIZED HEALTH CARE EDUCATION/TRAINING PROGRAM

## 2022-10-03 PROCEDURE — 3051F HG A1C>EQUAL 7.0%<8.0%: CPT | Mod: CPTII,S$GLB,, | Performed by: STUDENT IN AN ORGANIZED HEALTH CARE EDUCATION/TRAINING PROGRAM

## 2022-10-03 NOTE — PROGRESS NOTES
Patient ID: Crystal Acuna  YOB: 1955  MRN: [id number]    Chief Complaint: Swelling and Pain of the Left Elbow      Referred By: Eh Sow MD for Left Elbow Pain / Swelling    History of Present Illness: Crystal Acuna is a left-hand dominant 67 y.o. female who presents today with left elbow pain and infection.  Patient has been dealing with a staph infection for the last year that started in her groin area.  She states that the area in her posterior elbow developed approximately 3 weeks ago.  The area is red and swollen with a small opening that the patient states a green pus fluid has drained from in the past.  She states at the current time she has 0 pain but if she leans on the elbow or hits the elbow on something the pain is sharp and will increase to a 10/10.  Patient is currently taking Doxycycline for the infection and finishes the course of antibiotics this evening.  She is set to see infectious control on .       The patient is active in none.  Occupation: Retired      Past Medical History:   Past Medical History:   Diagnosis Date    Allergy     Bronchitis     Family history of colonic polyps 2021    Three of her siblings have had colon polyps.    Hallux abductovalgus     Herniated lumbar intervertebral disc     Hyperlipidemia     Diet controlled    Hypertension     Smoker     Staph aureus infection      Past Surgical History:   Procedure Laterality Date    BACK SURGERY      L4-L5    CARPAL TUNNEL RELEASE      bilateral     SECTION      times 1    CHOLECYSTECTOMY  2010    COLONOSCOPY N/A 2021    Procedure: COLONOSCOPY;  Surgeon: Kevin Goff MD;  Location: [facility];  Service: Endoscopy;  Laterality: N/A;    CYST REMOVAL      right hand    ENDOMETRIAL ABLATION  2009    EYE SURGERY      FOOT FRACTURE SURGERY      JOINT REPLACEMENT      Left knee    SPINE SURGERY      TONSILLECTOMY       Family History   Problem Relation Age of  Mild leukocytosis on arrival, WBC 11 13    Plan:  -f/u am CBC Onset    COPD Mother     Heart disease Mother     Heart disease Father         MI    Breast cancer Maternal Aunt      Social History     Socioeconomic History    Marital status: Single   Tobacco Use    Smoking status: Former     Packs/day: 0.75     Years: 39.00     Pack years: 29.25     Types: Cigarettes    Smokeless tobacco: Never    Tobacco comments:     quit 2 yrs ago   Substance and Sexual Activity    Alcohol use: No    Drug use: No     Medication List with Changes/Refills   Current Medications    ALBUTEROL (PROVENTIL/VENTOLIN HFA) 90 MCG/ACTUATION INHALER    Inhale 2 puffs into the lungs every 6 (six) hours as needed for Wheezing. Rescue    AMLODIPINE (NORVASC) 10 MG TABLET    Take 1 tablet by mouth once daily    ASPIRIN 81 MG CHEW    Take 81 mg by mouth once daily.    ATORVASTATIN (LIPITOR) 20 MG TABLET    Take 1 tablet (20 mg total) by mouth once daily.    BLOOD SUGAR DIAGNOSTIC STRP    To check BG 2 times daily, to use with insurance preferred meter    BLOOD-GLUCOSE METER KIT    To check BG 2 times daily, to use with insurance preferred meter    CHLORHEXIDINE (HIBICLENS) 4 % EXTERNAL LIQUID    Apply topically daily as needed.    FARXIGA 5 MG TAB TABLET    Take 5 mg by mouth once daily.    FLUOROMETHOLONE 0.1% (FML) 0.1 % DRPS    INSTILL 1 DROP INTO EACH EYE THREE TIMES DAILY AS DIRECTED    HYDROCORTISONE 1 % CREAM    Apply to affected area 2 times daily    INVELTYS 1 % DRPS    Place 1 drop into both eyes 2 (two) times daily.    LANCETS MISC    To check BG 2 times daily, to use with insurance preferred meter    MELOXICAM (MOBIC) 15 MG TABLET    Take 1 tablet (15 mg total) by mouth once daily.    MUPIROCIN (BACTROBAN) 2 % OINTMENT    Apply topically 3 (three) times daily.    OFLOXACIN (OCUFLOX) 0.3 % OPHTHALMIC SOLUTION        PREDNISOLONE ACETATE (PRED FORTE) 1 % DRPS    Place 1 drop into the left eye 4 (four) times daily.    PROLENSA 0.07 % DROP    SMARTSIG:In Eye(s)    SITAGLIPTIN (JANUVIA) 100 MG TAB     Take 1 tablet (100 mg total) by mouth once daily.    XIIDRA 5 % DPET    INSTILL 1 DROP INTO EACH EYE TWICE DAILY AS DIRECTED     Review of patient's allergies indicates:   Allergen Reactions    Metformin     Farxiga [dapagliflozin]      Yeast infection      Latex, natural rubber     Bactrim [sulfamethoxazole-trimethoprim] Rash       Physical Exam:   Body mass index is 32.01 kg/m².    GENERAL: Well appearing, in no acute distress.  HEAD: Normocephalic and atraumatic.  ENT: External ears and nose grossly normal.  EYES: EOMI bilaterally  PULMONARY: Respirations are grossly even and non-labored.  NEURO: Awake, alert, and oriented x 3.  SKIN: No obvious rashes appreciated.  PSYCH: Mood & affect are appropriate.    Detailed MSK exam:     Left elbow exam: full ROM. Swelling, erythema and warmth over olecranon with prior site of drainage visible and not actively draining. TTP over inflamed olecranon. Sensation intact with pulses 2+.     Imaging:  X-Ray Elbow Complete Left  Narrative: EXAMINATION:  Four radiographic views of the LEFT ELBOW.    CLINICAL HISTORY:  Pain in left elbow    TECHNIQUE:  Four radiographic views of the LEFT ELBOW.    COMPARISON:  Left elbow radiograph 10/17/2008.    FINDINGS:  Four views of the left elbow demonstrate normal alignment.  There are mild degenerative changes in the elbow.  There is no fracture.  There is no joint effusion.  There is soft tissue swelling over the olecranon.  Impression: 1. Soft tissue swelling over the olecranon which may represent olecranon bursitis.  2. Degenerative changes in the left elbow.    Electronically signed by: Yovani Salas MD  Date:    10/03/2022  Time:    09:40        Relevant imaging results were reviewed and interpreted by me and per my read shows no acute abnormalities but soft tissue swelling over olecranon and arthritic changes.  This was discussed with the patient and / or family today.     Assessment:  Crystal Acuna is a 67 y.o. female presenting  with left elbow pain.   History, physical and radiographs are consistent with a likely diagnosis of olecranon bursitis. Patient is finishing doxycycline course today. She has completed 10 days of bactrim and now 5 days of doxycycline. Olecranon bursitis had drained previously and is not actively draining and compared to prior pictures appears to be improving with current management. I do not think aspiration would be beneficial at this time as antibiotics seem to have been working to resolve the swelling and there is minimal fluid remaining. Patient has ID follow up on 10/12/2022.    Plan: finish her last doxycycline dose tonight. Warm compresses, voltaren gel as needed. Strict return precautions given. If redness spreads, will extend doxy course. If swelling worsens, consider aspiration and fluid analysis. Continue conservative management for pain.   Follow up in 1 week for recheck. All questions answered.      Olecranon bursitis of left elbow    Elbow pain, unspecified laterality  -     Ambulatory referral/consult to Orthopedics    Ankle pain, unspecified chronicity, unspecified laterality  -     Ambulatory referral/consult to Orthopedics       A copy of today's visit note has been sent to the referring provider.     Electronically signed:  Jose Bianchi MD, MPH  10/03/2022  10:00 AM

## 2022-10-03 NOTE — PATIENT INSTRUCTIONS
Assessment:  Crystal Acuna is a 67 y.o. female   Chief Complaint   Patient presents with    Left Elbow - Swelling, Pain       Encounter Diagnoses   Name Primary?    Elbow pain, unspecified laterality     Ankle pain, unspecified chronicity, unspecified laterality         Plan:  Apply topical diclofenac (Voltaren) up to 4 times a day to the affected area.  It can be bought over the counter at any local pharmacy.    Patient may use warm compresses as needed  Continue to monitor for redness and swelling.  If redness spreads of swelling increases then patient should follow up with our office or PCP immediately.  Believe that the elbow region is improving, at this time do not believe that it would be beneficial to drain the area.  If there is no improvement in 1 week or the area becomes worse, we will consider aspiration with labs.   Follow up in 1 week.      Follow-up: 1 week or sooner if there are any problems between now and then.    Thank you for choosing [facility] and Dr. Jose Bianchi for your orthopedic & sports medicine care. It is our goal to provide you with exceptional care that will help keep you healthy, active, and get you back in the game.    Please do not hesitate to reach out to us via email, phone, or MyChart with any questions, concerns, or feedback.    If you felt that you received exemplary care today, please consider leaving us feedback on [employer] at:  [email or web address]    If you are experiencing pain/discomfort ,or have questions after 5pm and would like to be connected to the [facility] on-call team, please call this number and specify which Sports Medicine provider is treating you: [phone]

## 2022-10-04 ENCOUNTER — TELEPHONE (OUTPATIENT)
Dept: FAMILY MEDICINE | Facility: CLINIC | Age: 67
End: 2022-10-04
Payer: MEDICARE

## 2022-10-04 DIAGNOSIS — E11.65 TYPE 2 DIABETES MELLITUS WITH HYPERGLYCEMIA, WITHOUT LONG-TERM CURRENT USE OF INSULIN: Primary | ICD-10-CM

## 2022-10-04 NOTE — TELEPHONE ENCOUNTER
----- Message from Rai Boyd sent at 10/3/2022  3:56 PM CDT -----  Pt currently has a referral for Nutrition but is requesting a referral for Diabetes Education. The correct referral number is [phone]. Can you please enter this referral so the pt can be scheduled?

## 2022-10-05 NOTE — TELEPHONE ENCOUNTER
I have signed for the following orders AND/OR meds.  Please call the patient and ask the patient to schedule the testing AND/OR inform about any medications that were sent.      Orders Placed This Encounter   Procedures    Ambulatory referral/consult to Diabetes Education     Standing Status:   Future     Standing Expiration Date:   10/4/2023     Referral Priority:   Routine     Referral Type:   Consultation     Referral Reason:   Specialty Services Required     Requested Specialty:   Diabetes     Number of Visits Requested:   1     Expiration Date:   10/4/2023

## 2022-10-07 DIAGNOSIS — Z79.899 ENCOUNTER FOR LONG-TERM (CURRENT) USE OF MEDICATIONS: ICD-10-CM

## 2022-10-07 DIAGNOSIS — Z00.00 ANNUAL PHYSICAL EXAM: ICD-10-CM

## 2022-10-07 RX ORDER — AMLODIPINE BESYLATE 10 MG/1
TABLET ORAL
Qty: 90 TABLET | Refills: 3 | Status: SHIPPED | OUTPATIENT
Start: 2022-10-07 | End: 2023-04-21

## 2022-10-07 NOTE — TELEPHONE ENCOUNTER
No new care gaps identified.  [facility] Embedded Care Gaps. Reference number: [id number]. 10/07/2022   10:04:26 AM EMILIE

## 2022-10-07 NOTE — TELEPHONE ENCOUNTER
Refill Decision Note   Crystal Vuongburn  is requesting a refill authorization.  Brief Assessment and Rationale for Refill:  Approve     Medication Therapy Plan:       Medication Reconciliation Completed: No   Comments:     No Care Gaps recommended.     Note composed:2:12 PM 10/07/2022

## 2022-10-10 ENCOUNTER — TELEPHONE (OUTPATIENT)
Dept: FAMILY MEDICINE | Facility: CLINIC | Age: 67
End: 2022-10-10
Payer: MEDICARE

## 2022-10-10 DIAGNOSIS — E11.65 TYPE 2 DIABETES MELLITUS WITH HYPERGLYCEMIA, WITHOUT LONG-TERM CURRENT USE OF INSULIN: Primary | ICD-10-CM

## 2022-10-10 NOTE — TELEPHONE ENCOUNTER
I have signed for the following orders AND/OR meds.  Please call the patient and ask the patient to schedule the testing AND/OR inform about any medications that were sent.      Orders Placed This Encounter   Procedures    Ambulatory referral/consult to Diabetes Education     Standing Status:   Future     Standing Expiration Date:   10/10/2023     Referral Priority:   Routine     Referral Type:   Consultation     Referral Reason:   Specialty Services Required     Ref[facility] to Provider:   [facility]     Requested Specialty:   Diabetes     Number of Visits Requested:   1     Expiration Date:   10/10/2023

## 2022-10-10 NOTE — TELEPHONE ENCOUNTER
Patient informed and verbalized understanding.  Pt will call to schedule an appointment. Referral faxed.

## 2022-10-11 ENCOUNTER — LAB VISIT (OUTPATIENT)
Dept: LAB | Facility: HOSPITAL | Age: 67
End: 2022-10-11
Attending: INTERNAL MEDICINE
Payer: MEDICARE

## 2022-10-11 DIAGNOSIS — B99.9 RECURRENT INFECTIONS: Primary | ICD-10-CM

## 2022-10-11 PROCEDURE — 86317 IMMUNOASSAY INFECTIOUS AGENT: CPT | Performed by: INTERNAL MEDICINE

## 2022-10-11 PROCEDURE — 86360 T CELL ABSOLUTE COUNT/RATIO: CPT | Performed by: INTERNAL MEDICINE

## 2022-10-11 PROCEDURE — 82784 ASSAY IGA/IGD/IGG/IGM EACH: CPT | Performed by: INTERNAL MEDICINE

## 2022-10-11 PROCEDURE — 82787 IGG 1 2 3 OR 4 EACH: CPT | Performed by: INTERNAL MEDICINE

## 2022-10-11 PROCEDURE — 82784 ASSAY IGA/IGD/IGG/IGM EACH: CPT | Mod: 59 | Performed by: INTERNAL MEDICINE

## 2022-10-11 PROCEDURE — 86357 NK CELLS TOTAL COUNT: CPT | Performed by: INTERNAL MEDICINE

## 2022-10-11 PROCEDURE — 86355 B CELLS TOTAL COUNT: CPT | Performed by: INTERNAL MEDICINE

## 2022-10-11 PROCEDURE — 86359 T CELLS TOTAL COUNT: CPT | Performed by: INTERNAL MEDICINE

## 2022-10-12 LAB
CD3+CD4+ CELLS # BLD: 1040 CELLS/UL (ref 300–1400)
CD3+CD4+ CELLS NFR BLD: 36.6 % (ref 28–57)
IGG SERPL-MCNC: 1523 MG/DL (ref 650–1600)
IGM SERPL-MCNC: 56 MG/DL (ref 50–300)
LYMPHOCYTES NFR CSF MANUAL: 0.77 % (ref 0.9–3.6)
LYMPHOCYTES NFR CSF MANUAL: 10.6 % (ref 7–31)
LYMPHOCYTES NFR CSF MANUAL: 1353 CELLS/UL (ref 200–900)
LYMPHOCYTES NFR CSF MANUAL: 228 CELLS/UL (ref 100–500)
LYMPHOCYTES NFR CSF MANUAL: 2297 CELLS/UL (ref 700–2100)
LYMPHOCYTES NFR CSF MANUAL: 329 CELLS/UL (ref 90–600)
LYMPHOCYTES NFR CSF MANUAL: 47.6 % (ref 10–39)
LYMPHOCYTES NFR CSF MANUAL: 7.3 % (ref 6–19)
LYMPHOCYTES NFR CSF MANUAL: 80.9 % (ref 55–83)

## 2022-10-14 LAB
IGG1 SER-MCNC: 873 MG/DL (ref 382–929)
IGG2 SER-MCNC: 271 MG/DL (ref 242–700)
IGG3 SER-MCNC: 48 MG/DL (ref 22–176)
IGG4 SER-MCNC: 288 MG/DL (ref 4–86)

## 2022-10-18 LAB
C DIPHTHERIAE AB SER IA-ACNC: <0.1 IU/ML
C TETANI TOXOID AB SER-ACNC: >5.33 IU/ML
DEPRECATED S PNEUM23 IGG SER-MCNC: <0.3 UG/ML
DEPRECATED S PNEUM4 IGG SER-MCNC: <0.3 UG/ML
HAEM INFLU B IGG SER IA-MCNC: <0.15 MCG/ML
S PN DA SERO 19F IGG SER-MCNC: 1.8 UG/ML
S PNEUM DA 1 IGG SER-MCNC: 0.5 UG/ML
S PNEUM DA 14 IGG SER-MCNC: 6
S PNEUM DA 18C IGG SER-MCNC: <0.3
S PNEUM DA 19A IGG SER-MCNC: 0.6 UG/ML
S PNEUM DA 3 IGG SER-MCNC: <0.3 UG/ML
S PNEUM DA 5 IGG SER-MCNC: <0.3 UG/ML
S PNEUM DA 6A IGG SER-MCNC: 0.3 UG/ML
S PNEUM DA 6B IGG SER-MCNC: <0.3 UG/ML
S PNEUM DA 7F IGG SER-MCNC: <0.3 UG/ML
S PNEUM DA 9V IGG SER-MCNC: <0.3 UG/ML

## 2022-11-18 LAB
LEFT EYE DM RETINOPATHY: NEGATIVE
RIGHT EYE DM RETINOPATHY: NEGATIVE

## 2022-11-29 ENCOUNTER — PATIENT OUTREACH (OUTPATIENT)
Dept: ADMINISTRATIVE | Facility: HOSPITAL | Age: 67
End: 2022-11-29
Payer: MEDICARE

## 2022-12-01 ENCOUNTER — OFFICE VISIT (OUTPATIENT)
Dept: FAMILY MEDICINE | Facility: CLINIC | Age: 67
End: 2022-12-01
Payer: MEDICARE

## 2022-12-01 VITALS
BODY MASS INDEX: 31.77 KG/M2 | HEIGHT: 62 IN | TEMPERATURE: 98 F | OXYGEN SATURATION: 96 % | WEIGHT: 172.63 LBS | DIASTOLIC BLOOD PRESSURE: 69 MMHG | SYSTOLIC BLOOD PRESSURE: 114 MMHG | RESPIRATION RATE: 16 BRPM | HEART RATE: 62 BPM

## 2022-12-01 DIAGNOSIS — Z79.899 ENCOUNTER FOR LONG-TERM (CURRENT) USE OF MEDICATIONS: ICD-10-CM

## 2022-12-01 DIAGNOSIS — Z22.322 MRSA CARRIER: ICD-10-CM

## 2022-12-01 DIAGNOSIS — E11.65 TYPE 2 DIABETES MELLITUS WITH HYPERGLYCEMIA, WITHOUT LONG-TERM CURRENT USE OF INSULIN: Primary | ICD-10-CM

## 2022-12-01 DIAGNOSIS — Z23 NEED FOR PNEUMOCOCCAL VACCINE: ICD-10-CM

## 2022-12-01 DIAGNOSIS — I15.2 HYPERTENSION ASSOCIATED WITH DIABETES: ICD-10-CM

## 2022-12-01 DIAGNOSIS — E11.59 HYPERTENSION ASSOCIATED WITH DIABETES: ICD-10-CM

## 2022-12-01 PROCEDURE — 3074F PR MOST RECENT SYSTOLIC BLOOD PRESSURE < 130 MM HG: ICD-10-PCS | Mod: CPTII,S$GLB,, | Performed by: FAMILY MEDICINE

## 2022-12-01 PROCEDURE — 99999 PR PBB SHADOW E&M-EST. PATIENT-LVL V: ICD-10-PCS | Mod: PBBFAC,,, | Performed by: FAMILY MEDICINE

## 2022-12-01 PROCEDURE — 3288F PR FALLS RISK ASSESSMENT DOCUMENTED: ICD-10-PCS | Mod: CPTII,S$GLB,, | Performed by: FAMILY MEDICINE

## 2022-12-01 PROCEDURE — 3066F NEPHROPATHY DOC TX: CPT | Mod: CPTII,S$GLB,, | Performed by: FAMILY MEDICINE

## 2022-12-01 PROCEDURE — 3288F FALL RISK ASSESSMENT DOCD: CPT | Mod: CPTII,S$GLB,, | Performed by: FAMILY MEDICINE

## 2022-12-01 PROCEDURE — 99214 PR OFFICE/OUTPT VISIT, EST, LEVL IV, 30-39 MIN: ICD-10-PCS | Mod: S$GLB,,, | Performed by: FAMILY MEDICINE

## 2022-12-01 PROCEDURE — 1101F PR PT FALLS ASSESS DOC 0-1 FALLS W/OUT INJ PAST YR: ICD-10-PCS | Mod: CPTII,S$GLB,, | Performed by: FAMILY MEDICINE

## 2022-12-01 PROCEDURE — 3078F PR MOST RECENT DIASTOLIC BLOOD PRESSURE < 80 MM HG: ICD-10-PCS | Mod: CPTII,S$GLB,, | Performed by: FAMILY MEDICINE

## 2022-12-01 PROCEDURE — 3051F HG A1C>EQUAL 7.0%<8.0%: CPT | Mod: CPTII,S$GLB,, | Performed by: FAMILY MEDICINE

## 2022-12-01 PROCEDURE — G0009 PNEUMOCOCCAL CONJUGATE VACCINE 20-VALENT: ICD-10-PCS | Mod: S$GLB,,, | Performed by: FAMILY MEDICINE

## 2022-12-01 PROCEDURE — 3061F NEG MICROALBUMINURIA REV: CPT | Mod: CPTII,S$GLB,, | Performed by: FAMILY MEDICINE

## 2022-12-01 PROCEDURE — 3078F DIAST BP <80 MM HG: CPT | Mod: CPTII,S$GLB,, | Performed by: FAMILY MEDICINE

## 2022-12-01 PROCEDURE — 3074F SYST BP LT 130 MM HG: CPT | Mod: CPTII,S$GLB,, | Performed by: FAMILY MEDICINE

## 2022-12-01 PROCEDURE — 3066F PR DOCUMENTATION OF TREATMENT FOR NEPHROPATHY: ICD-10-PCS | Mod: CPTII,S$GLB,, | Performed by: FAMILY MEDICINE

## 2022-12-01 PROCEDURE — 90677 PNEUMOCOCCAL CONJUGATE VACCINE 20-VALENT: ICD-10-PCS | Mod: S$GLB,,, | Performed by: FAMILY MEDICINE

## 2022-12-01 PROCEDURE — 1159F MED LIST DOCD IN RCRD: CPT | Mod: CPTII,S$GLB,, | Performed by: FAMILY MEDICINE

## 2022-12-01 PROCEDURE — 99214 OFFICE O/P EST MOD 30 MIN: CPT | Mod: S$GLB,,, | Performed by: FAMILY MEDICINE

## 2022-12-01 PROCEDURE — 1159F PR MEDICATION LIST DOCUMENTED IN MEDICAL RECORD: ICD-10-PCS | Mod: CPTII,S$GLB,, | Performed by: FAMILY MEDICINE

## 2022-12-01 PROCEDURE — 1101F PT FALLS ASSESS-DOCD LE1/YR: CPT | Mod: CPTII,S$GLB,, | Performed by: FAMILY MEDICINE

## 2022-12-01 PROCEDURE — 99999 PR PBB SHADOW E&M-EST. PATIENT-LVL V: CPT | Mod: PBBFAC,,, | Performed by: FAMILY MEDICINE

## 2022-12-01 PROCEDURE — 3061F PR NEG MICROALBUMINURIA RESULT DOCUMENTED/REVIEW: ICD-10-PCS | Mod: CPTII,S$GLB,, | Performed by: FAMILY MEDICINE

## 2022-12-01 PROCEDURE — 90677 PCV20 VACCINE IM: CPT | Mod: S$GLB,,, | Performed by: FAMILY MEDICINE

## 2022-12-01 PROCEDURE — 3008F PR BODY MASS INDEX (BMI) DOCUMENTED: ICD-10-PCS | Mod: CPTII,S$GLB,, | Performed by: FAMILY MEDICINE

## 2022-12-01 PROCEDURE — G0009 ADMIN PNEUMOCOCCAL VACCINE: HCPCS | Mod: S$GLB,,, | Performed by: FAMILY MEDICINE

## 2022-12-01 PROCEDURE — 3051F PR MOST RECENT HEMOGLOBIN A1C LEVEL 7.0 - < 8.0%: ICD-10-PCS | Mod: CPTII,S$GLB,, | Performed by: FAMILY MEDICINE

## 2022-12-01 PROCEDURE — 3008F BODY MASS INDEX DOCD: CPT | Mod: CPTII,S$GLB,, | Performed by: FAMILY MEDICINE

## 2022-12-01 NOTE — PATIENT INSTRUCTIONS
SUBJECTIVE:                                                   Cristina Milton is a 66 year old female who presents to clinic today for the following health issue(s):  Patient presents with:  Vaginal Bleeding      HPI:  Cristina presents for follow up. She has continued to have vaginal bleeding after bowel movements and is wondering what her options are. Her breast cancer was in . She had a lumpectomy and radiation. She was not placed on any estrogen antagonist agents afterwards. She has not been using vaginal lubricants. Using a stool softener has not improved her bleeding. She is not comfortable with estrogen use.    No LMP recorded. Patient has had a hysterectomy..   Patient is not sexually active, .  Using menopause for contraception.    reports that she has never smoked. She has never used smokeless tobacco.    STD testing offered?  Declined - not sexually active    Health maintenance updated:  yes    Today's PHQ-2 Score:   PHQ-2 (  [employer]) 2017   Q1: Little interest or pleasure in doing things 0   Q2: Feeling down, depressed or hopeless 0   PHQ-2 Score 0     Today's PHQ-9 Score:   PHQ-9 SCORE 2018   Total Score 12     Today's SIENA-7 Score:   SIENA-7 SCORE 2018   Total Score 8       Problem list and histories reviewed & adjusted, as indicated.  Additional history: as documented.    Patient Active Problem List   Diagnosis     Arthritis of knee, right     Advanced directives, counseling/discussion     Asthma     Right shoulder pain     Depression     Obstructive sleep apnea     Tubular adenoma     BMI> 39     Recurrent major depressive disorder, in full remission (H)     H/O abdominal hysterectomy     Cervical cancer screening     Past Surgical History:   Procedure Laterality Date     ARTHROPLASTY KNEE Right 2015    Procedure: ARTHROPLASTY KNEE;  Surgeon: Jermain Gonzalez MD;  Location: [facility]     BREAST SURGERY      lumpectomy,early cancer     COLONOSCOPY  ,      Follow up in about 6 months (around 6/1/2023), or if symptoms worsen or fail to improve, for Annual Wellness Exam.     Dear patient,   As a result of recent federal legislation (The Federal Cures Act), you may receive lab or pathology results from your visit in your [facility] account before your physician is able to contact you. Your physician or their representative will relay the results to you with their recommendations at their soonest availability.     If no improvement in symptoms or symptoms worsen, please be advised to call MD, follow-up at clinic and/or go to ER if becomes severe.    Eh Sow M.D.        We Offer TELEHEALTH & Same Day Appointments!   Book your Telehealth appointment with me through my nurse or   Clinic appointments on [employer]!    [address] [place] [address]    Office: [phone]   FAX: [phone]    Check out my Facebook Page and Follow Me at: [email or web address]    Check out my website at [employer] by clicking on: [email or web address]    To Schedule appointments online, go to [employer]: [email or web address]    COLONOSCOPY N/A 12/15/2016    Procedure: COMBINED COLONOSCOPY, SINGLE OR MULTIPLE BIOPSY/POLYPECTOMY BY BIOPSY;  Surgeon: Gil Sanchez MD;  Location:  GI     ENT SURGERY  2012    dilation esoph stricture     GYN SURGERY  1999    hysterectomy with ooperectomy     GYN SURGERY  1995    conization cervix knife/laser     HYSTERECTOMY TOTAL ABDOMINAL       LAPAROSCOPIC CHOLECYSTECTOMY  11/19/2012    Procedure: LAPAROSCOPIC CHOLECYSTECTOMY;  LAPAROSCOPIC CHOLECYSTECTOMY;  Surgeon: Jama Persaud MD;  Location:  [facility]     TONSILLECTOMY        Social History   Substance Use Topics     Smoking status: Never Smoker     Smokeless tobacco: Never Used     Alcohol use 0.0 oz/week     0 Standard drinks or equivalent per week      Comment: rare      Problem (# of Occurrences) Relation (Name,Age of Onset)    Chronic Obstructive Pulmonary Disease (1) Brother    Colon Cancer (1) Maternal Grandfather    Family History Negative (1) Mother    Lymphoma (2) Sister, Maternal Grandmother    Prostate Cancer (1) Father       Negative family history of: Breast Cancer            Current Outpatient Prescriptions   Medication Sig     acetaminophen (TYLENOL) 325 MG tablet Take 2 tablets (650 mg) by mouth every 6 hours     albuterol (VENTOLIN HFA) 108 (90 Base) MCG/ACT Inhaler Inhale 2 puffs into the lungs every 4 hours as needed for shortness of breath / dyspnea or wheezing     Biotin 1 MG CAPS Take by mouth daily      calcium-vitamin D (CALTRATE) 600-400 MG-UNIT per tablet Take 2 tablets by mouth daily     cholecalciferol (VITAMIN D-3 SUPER STRENGTH) 2000 UNITS tablet Take 2,000 Units by mouth     citalopram (CELEXA) 40 MG tablet Take 1 tablet (40 mg) by mouth daily     fluticasone (FLONASE) 50 MCG/ACT spray SHAKE LIQUID AND USE 2 SPRAYS IN EACH NOSTRIL DAILY     fluticasone-salmeterol (ADVAIR) 250-50 MCG/DOSE diskus inhaler Inhale 1 puff into the lungs 2 times daily     glucosamine-chondroitin 500-400 MG CAPS Take 2 capsules by mouth  "daily      montelukast (SINGULAIR) 10 MG tablet Take 1 tablet (10 mg) by mouth At Bedtime     MULTIPLE VITAMIN PO Take 1 tablet by mouth daily     No current facility-administered medications for this visit.      Allergies   Allergen Reactions     Cats      Dogs      Dust Mites      Mold      Nsaids      Gastric ulcer     Trees        ROS:  12 point review of systems negative other than symptoms noted below.  Constitutional: Fatigue  Genitourinary: Spotting    OBJECTIVE:     /70  Ht 5' 8\" (1.727 m)  Wt 242 lb (109.8 kg)  BMI 36.8 kg/m2  Body mass index is 36.8 kg/(m^2).    Exam:  Constitutional:  Appearance: Well nourished, well developed alert, in no acute distress  Neurologic/Psychiatric:  Mental Status:  Oriented X3   No Pelvic Exam performed as patient declined.    In-Clinic Test Results:  No results found for this or any previous visit (from the past 24 hour(s)).    ASSESSMENT/PLAN:                                                        ICD-10-CM    1. Vaginal bleeding N93.9      I encouraged her to use vaginal lubricants. I also discussed that she could consider a LEEP versus something more invasive like a trachelectomy. I tried to reassure her about the low dose amount of vaginal estrogen and that it would be for a short time. I suggested she speak to an Oncologist about what the risk of breast cancer recurrence would be for her. Given the fact that she did not have any tamoxifen or anastrozole use, she may not have had positive estrogen receptors. She would like to think about it. She declined a pelvic exam with silver nitrate treatment today as that did not work the last time.    Cammy Rocha MD  [facility] [place]  "

## 2022-12-01 NOTE — PROGRESS NOTES
PLAN:      Problem List Items Addressed This Visit       Type 2 diabetes mellitus with hyperglycemia, without long-term current use of insulin - Primary (Chronic)     Continue Januvia.We will plan to monitor hemoglobin A1c at designated intervals 3 to 6 months.  I recommend ongoing Education for diabetic diet and exercise protocol.  We will continue to monitor for side effects.    Please be advised of symptoms to monitor for and to notify me immediately if persistent or worsening.  Follow up with Ophthalmology/Optometry and Podiatry at least annually.           Relevant Medications    SITagliptin phosphate (JANUVIA) 100 MG Tab    Encounter for long-term (current) use of medications (Chronic)     Complete history and physical was completed today.  Complete and thorough medication reconciliation was performed.  Discussed risks and benefits of medications.  Advised patient on orders and health maintenance.  We discussed old records and old labs if available.  Will request any records not available through epic.  Continue current medications listed on your summary sheet.           Hypertension associated with diabetes (Chronic)     Consider adding ACE-inhibitor with history of diabetes.  Blood Pressure well controlled at this time.    Counseled on importance of hypertension disease course, I recommend ongoing Education for DASH-diet and exercise.  Counseled on medication regimen importance of treating high blood pressure.  Please be advised of risk of untreated blood pressure as discussed.  Please advised of ER precautions were given for symptoms of hypertensive urgency and emergency.           Relevant Medications    SITagliptin phosphate (JANUVIA) 100 MG Tab    MRSA carrier (Chronic)     Resolved.  Continue follow-up with Infectious Disease if recurrence.  Patient is at higher risk with diabetes.  Focus on good control of blood sugar.  See diabetes problem.          Other Visit Diagnoses       Need for pneumococcal  vaccine        Relevant Orders    (In Office Administered) Pneumococcal Conjugate Vaccine (20 Valent) (IM) (Completed)        Discussed risk and benefits of pneumococcal vaccination.  Patient agrees to proceed.  Future Appointments       Date Provider Specialty Appt Notes    6/8/2023 Eh Sow MD Family Medicine 6mth             Medication Management for assessment above:   Medication List with Changes/Refills   Current Medications    ALBUTEROL (PROVENTIL/VENTOLIN HFA) 90 MCG/ACTUATION INHALER    Inhale 2 puffs into the lungs every 6 (six) hours as needed for Wheezing. Rescue    AMLODIPINE (NORVASC) 10 MG TABLET    Take 1 tablet by mouth once daily    ASPIRIN 81 MG CHEW    Take 81 mg by mouth once daily.    ATORVASTATIN (LIPITOR) 20 MG TABLET    Take 1 tablet (20 mg total) by mouth once daily.    BLOOD SUGAR DIAGNOSTIC STRP    To check BG 2 times daily, to use with insurance preferred meter    BLOOD-GLUCOSE METER KIT    To check BG 2 times daily, to use with insurance preferred meter    CHLORHEXIDINE (HIBICLENS) 4 % EXTERNAL LIQUID    Apply topically daily as needed.    FLUOROMETHOLONE 0.1% (FML) 0.1 % DRPS    INSTILL 1 DROP INTO EACH EYE THREE TIMES DAILY AS DIRECTED    HYDROCORTISONE 1 % CREAM    Apply to affected area 2 times daily    INVELTYS 1 % DRPS    Place 1 drop into both eyes 2 (two) times daily.    LANCETS MISC    To check BG 2 times daily, to use with insurance preferred meter    MELOXICAM (MOBIC) 15 MG TABLET    Take 1 tablet (15 mg total) by mouth once daily.    MUPIROCIN (BACTROBAN) 2 % OINTMENT    Apply topically 3 (three) times daily.    OFLOXACIN (OCUFLOX) 0.3 % OPHTHALMIC SOLUTION        PREDNISOLONE ACETATE (PRED FORTE) 1 % DRPS    Place 1 drop into the left eye 4 (four) times daily.    PROLENSA 0.07 % DROP    SMARTSIG:In Eye(s)    XIIDRA 5 % DPET    INSTILL 1 DROP INTO EACH EYE TWICE DAILY AS DIRECTED   Changed and/or Refilled Medications    Modified Medication Previous Medication     SITAGLIPTIN PHOSPHATE (JANUVIA) 100 MG TAB SITagliptin (JANUVIA) 100 MG Tab       Take 1 tablet (100 mg total) by mouth once daily.    Take 1 tablet (100 mg total) by mouth once daily.   Discontinued Medications    FARXIGA 5 MG TAB TABLET    Take 5 mg by mouth once daily.       Eh Sow M.D.  ==========================================================================  Subjective:   Patient ID: Crystal Acuna is a 67 y.o. female.  has a past medical history of Allergy, Bronchitis, Family history of colonic polyps (04/27/2021), Hallux abductovalgus, Herniated lumbar intervertebral disc, Hyperlipidemia, Hypertension, Smoker, and Staph aureus infection.   Chief Complaint: Follow-up (6 month follow up )      Problem List Items Addressed This Visit       Type 2 diabetes mellitus with hyperglycemia, without long-term current use of insulin - Primary (Chronic)    Overview     December 2022:  Patient doing well on Januvia.    Patient has side effects to GL P 1. She cannot tolerate metformin due to GI side effects.  Patient currently having increased use infections and recurrent infections on Farxiga.Diabetes Management StatusStatin: TakingACE/ARB: Not taking    Diabetes Management Status    Statin: Taking  ACE/ARB: Not taking    Screening or Prevention Patient's value Goal Complete/Controlled?   HgA1C Testing and Control   Lab Results   Component Value Date    HGBA1C 7.6 (H) 09/28/2022      Annually/Less than 8% Yes   Lipid profile : 06/08/2022 Annually Yes   LDL control Lab Results   Component Value Date    LDLCALC 114.0 06/08/2022    Annually/Less than 100 mg/dl  No   Nephropathy screening Lab Results   Component Value Date    LABMICR <5.0 06/08/2022     Lab Results   Component Value Date    PROTEINUA Negative 05/31/2022     No results found for: UTPCR   Annually Yes   Blood pressure BP Readings from Last 1 Encounters:   12/01/22 114/69    Less than 140/90 Yes   Dilated retinal exam : 11/18/2022 Annually Yes    Foot exam   : 12/01/2022 Annually Yes              Current Assessment & Plan     Continue Januvia.We will plan to monitor hemoglobin A1c at designated intervals 3 to 6 months.  I recommend ongoing Education for diabetic diet and exercise protocol.  We will continue to monitor for side effects.    Please be advised of symptoms to monitor for and to notify me immediately if persistent or worsening.  Follow up with Ophthalmology/Optometry and Podiatry at least annually.           Encounter for long-term (current) use of medications (Chronic)    Overview     December 2022: Reviewed labs.  CHRONIC. Stable. Compliant with medications for managed conditions. See medication list. No SE reported.   Routine lab analysis is being monitored. Refills were addressed.  Lab Results   Component Value Date    WBC 6.90 09/28/2022    HGB 16.0 09/28/2022    HCT 50.1 (H) 09/28/2022    MCV 92 09/28/2022     09/28/2022       Chemistry        Component Value Date/Time     09/28/2022 1122    K 4.1 09/28/2022 1122     09/28/2022 1122    CO2 25 09/28/2022 1122    BUN 14 09/28/2022 1122    CREATININE 0.7 09/28/2022 1122     (H) 09/28/2022 1122        Component Value Date/Time    CALCIUM 9.7 09/28/2022 1122    ALKPHOS 65 09/28/2022 1122    AST 13 09/28/2022 1122    AST 20 03/28/2016 1457    ALT 16 09/28/2022 1122    BILITOT 0.3 09/28/2022 1122    ESTGFRAFRICA >60.0 06/08/2022 0747    EGFRNONAA >60.0 06/08/2022 0747          Lab Results   Component Value Date    TSH 1.100 06/08/2022    FREET4 1.19 10/11/2012            Current Assessment & Plan     Complete history and physical was completed today.  Complete and thorough medication reconciliation was performed.  Discussed risks and benefits of medications.  Advised patient on orders and health maintenance.  We discussed old records and old labs if available.  Will request any records not available through epic.  Continue current medications listed on your summary  sheet.           Hypertension associated with diabetes (Chronic)    Overview     CHRONIC. STABLE. BP Reviewed.  Compliant with BP medications. No SE reported.  December 2022: Well controlled on current regimen.  (-) CP, SOB, palpitations, dizziness, lightheadedness, HA, arm numbness, tingling or weakness, syncope.  Creatinine   Date Value Ref Range Status   09/28/2022 0.7 0.5 - 1.4 mg/dL Final            Current Assessment & Plan     Consider adding ACE-inhibitor with history of diabetes.  Blood Pressure well controlled at this time.    Counseled on importance of hypertension disease course, I recommend ongoing Education for DASH-diet and exercise.  Counseled on medication regimen importance of treating high blood pressure.  Please be advised of risk of untreated blood pressure as discussed.  Please advised of ER precautions were given for symptoms of hypertensive urgency and emergency.           MRSA carrier (Chronic)    Overview     December 22: Patient reports that she had injection with [facility] and that her infection improved tremendously.  September 2022: Patient reports that she has been doing bleach baths multiple times per week to prevent recurrent staph infections.         Current Assessment & Plan     Resolved.  Continue follow-up with Infectious Disease if recurrence.  Patient is at higher risk with diabetes.  Focus on good control of blood sugar.  See diabetes problem.          Other Visit Diagnoses       Need for pneumococcal vaccine                 Review of patient's allergies indicates:   Allergen Reactions    Metformin     Farxiga [dapagliflozin]      Yeast infection      Latex, natural rubber     Bactrim [sulfamethoxazole-trimethoprim] Rash     Current Outpatient Medications   Medication Instructions    albuterol (PROVENTIL/VENTOLIN HFA) 90 mcg/actuation inhaler 2 puffs, Inhalation, Every 6 hours PRN, Rescue    amLODIPine (NORVASC) 10 MG tablet Take 1 tablet by mouth once daily    aspirin 81 mg,  Oral, Daily    atorvastatin (LIPITOR) 20 mg, Oral, Daily    blood sugar diagnostic Strp To check BG 2 times daily, to use with insurance preferred meter    blood-glucose meter kit To check BG 2 times daily, to use with insurance preferred meter    chlorhexidine (HIBICLENS) 4 % external liquid Topical (Top), Daily PRN    fluorometholone 0.1% (FML) 0.1 % DrpS INSTILL 1 DROP INTO EACH EYE THREE TIMES DAILY AS DIRECTED    hydrocortisone 1 % cream Apply to affected area 2 times daily    INVELTYS 1 % DrpS 1 drop, Both Eyes, 2 times daily    lancets Misc To check BG 2 times daily, to use with insurance preferred meter    meloxicam (MOBIC) 15 mg, Oral, Daily    mupirocin (BACTROBAN) 2 % ointment Topical (Top), 3 times daily    ofloxacin (OCUFLOX) 0.3 % ophthalmic solution No dose, route, or frequency recorded.    prednisoLONE acetate (PRED FORTE) 1 % DrpS 1 drop, Left Eye, 4 times daily    PROLENSA 0.07 % Drop SMARTSIG:In Eye(s)    SITagliptin phosphate (JANUVIA) 100 mg, Oral, Daily    XIIDRA 5 % Dpet INSTILL 1 DROP INTO EACH EYE TWICE DAILY AS DIRECTED      I have reviewed the PMH, social history, FamilyHx, surgical history, allergies and medications documented / confirmed by the patient at the time of this visit.  Review of Systems   Constitutional:  Negative for chills, fatigue, fever and unexpected weight change.   HENT:  Negative for ear pain and sore throat.    Eyes:  Negative for redness and visual disturbance.   Respiratory:  Negative for cough and shortness of breath.    Cardiovascular:  Negative for chest pain and palpitations.   Gastrointestinal:  Negative for nausea and vomiting.   Genitourinary:  Negative for difficulty urinating and hematuria.   Musculoskeletal:  Negative for arthralgias and myalgias.   Skin:  Negative for color change, rash and wound.   Neurological:  Negative for weakness and headaches.   Psychiatric/Behavioral:  Negative for sleep disturbance. The patient is not nervous/anxious.   "  Objective:   /69 (BP Location: Left arm, Patient Position: Sitting, BP Method: Medium (Automatic))   Pulse 62   Temp 97.8 °F (36.6 °C)   Resp 16   Ht 5' 2" (1.575 m)   Wt 78.3 kg (172 lb 9.9 oz)   SpO2 96%   BMI 31.57 kg/m²   Physical Exam  Vitals and nursing note reviewed.   Constitutional:       General: She is not in acute distress.     Appearance: She is well-developed. She is not ill-appearing, toxic-appearing or diaphoretic.   HENT:      Head: Normocephalic and atraumatic.      Right Ear: Hearing and external ear normal.      Left Ear: Hearing and external ear normal.      Nose: Nose normal. No rhinorrhea.   Eyes:      General: Lids are normal.      Extraocular Movements: Extraocular movements intact.      Conjunctiva/sclera: Conjunctivae normal.      Pupils: Pupils are equal, round, and reactive to light.   Cardiovascular:      Rate and Rhythm: Normal rate.      Pulses: Normal pulses.   Pulmonary:      Effort: Pulmonary effort is normal. No respiratory distress.      Breath sounds: Normal breath sounds.   Abdominal:      General: Bowel sounds are normal.      Palpations: Abdomen is soft.   Musculoskeletal:         General: Normal range of motion.      Cervical back: Normal range of motion and neck supple.   Skin:     General: Skin is warm and dry.      Capillary Refill: Capillary refill takes less than 2 seconds.      Coloration: Skin is not pale.   Neurological:      General: No focal deficit present.      Mental Status: She is alert and oriented to person, place, and time. Mental status is at baseline. She is not disoriented.      Cranial Nerves: No cranial nerve deficit.      Motor: No weakness.      Gait: Gait normal.   Psychiatric:         Attention and Perception: She is attentive.         Mood and Affect: Mood normal. Mood is not anxious or depressed.         Speech: Speech is not rapid and pressured or slurred.         Behavior: Behavior normal. Behavior is not agitated, aggressive or " hyperactive. Behavior is cooperative.         Thought Content: Thought content normal. Thought content is not paranoid or delusional. Thought content does not include homicidal or suicidal ideation. Thought content does not include homicidal or suicidal plan.         Cognition and Memory: Memory is not impaired.         Judgment: Judgment normal.     Defer       Assessment:     1. Type 2 diabetes mellitus with hyperglycemia, without long-term current use of insulin    2. Need for pneumococcal vaccine    3. Encounter for long-term (current) use of medications    4. MRSA carrier    5. Hypertension associated with diabetes      MDM:   Moderate complexity.  Moderate risk.  Total time: 31 minutes.  This includes total time spent on the encounter, which includes face to face time and non-face to face time preparing to see the patient (eg, review of previous medical records, tests), Obtaining and/or reviewing separately obtained history, documenting clinical information in the electronic or other health record, independently interpreting results (not separately reported)/communicating results to the patient/family/caregiver, and/or care coordination (not separately reported).    I have Reviewed and summarized old records.  I have performed thorough medication reconciliation today and discussed risk and benefits of medications.  I have reviewed labs and discussed with patient.  All questions were answered.  I am requesting old records and will review them once they are available.  Infectious disease, orthopedic    I have signed for the following orders AND/OR meds.  Orders Placed This Encounter   Procedures    (In Office Administered) Pneumococcal Conjugate Vaccine (20 Valent) (IM)     Medications Ordered This Encounter   Medications    SITagliptin phosphate (JANUVIA) 100 MG Tab     Sig: Take 1 tablet (100 mg total) by mouth once daily.     Dispense:  90 tablet     Refill:  4          Follow up in about 6 months (around  6/1/2023), or if symptoms worsen or fail to improve, for Annual Wellness Exam.  Future Appointments       Date Provider Specialty Appt Notes    6/8/2023 Eh Sow MD Family Medicine 6mth            If no improvement in symptoms or symptoms worsen, advised to call/follow-up at clinic or go to ER. Patient voiced understanding and all questions/concerns were addressed.   DISCLAIMER: This note was compiled by using a speech recognition dictation system and therefore please be aware that typographical / speech recognition errors can and do occur.  Please contact me if you see any errors specifically.    Eh Sow M.D.       Office: [phone]   [address] [place] [address]  FAX: [phone]

## 2022-12-03 PROBLEM — R79.89 ABNORMAL CBC: Status: RESOLVED | Noted: 2022-01-14 | Resolved: 2022-12-03

## 2022-12-03 PROBLEM — Z12.11 COLON CANCER SCREENING: Status: RESOLVED | Noted: 2021-04-27 | Resolved: 2022-12-03

## 2022-12-03 PROBLEM — M65.20 CALCIFIC TENDONITIS: Status: RESOLVED | Noted: 2021-04-16 | Resolved: 2022-12-03

## 2022-12-03 PROBLEM — R21 RASH: Status: RESOLVED | Noted: 2022-09-28 | Resolved: 2022-12-03

## 2022-12-03 PROBLEM — D75.1 ERYTHROCYTOSIS: Status: RESOLVED | Noted: 2022-01-14 | Resolved: 2022-12-03

## 2022-12-03 PROBLEM — M25.511 ACUTE PAIN OF RIGHT SHOULDER: Status: RESOLVED | Noted: 2021-04-16 | Resolved: 2022-12-03

## 2022-12-03 PROBLEM — M25.529 ELBOW PAIN: Chronic | Status: RESOLVED | Noted: 2022-09-28 | Resolved: 2022-12-03

## 2022-12-03 PROBLEM — M25.579 ANKLE PAIN: Chronic | Status: RESOLVED | Noted: 2022-09-28 | Resolved: 2022-12-03

## 2022-12-03 PROBLEM — A49.9 BACTERIAL INFECTION: Status: RESOLVED | Noted: 2021-11-11 | Resolved: 2022-12-03

## 2022-12-03 PROBLEM — B37.9 YEAST INFECTION: Status: RESOLVED | Noted: 2021-11-11 | Resolved: 2022-12-03

## 2022-12-03 PROBLEM — M54.50 ACUTE RIGHT-SIDED LOW BACK PAIN WITHOUT SCIATICA: Status: RESOLVED | Noted: 2022-05-31 | Resolved: 2022-12-03

## 2022-12-03 PROBLEM — R30.0 DYSURIA: Status: RESOLVED | Noted: 2022-05-31 | Resolved: 2022-12-03

## 2022-12-03 PROBLEM — L08.9 RECURRENT INFECTION OF SKIN: Chronic | Status: RESOLVED | Noted: 2022-09-28 | Resolved: 2022-12-03

## 2022-12-03 NOTE — ASSESSMENT & PLAN NOTE
Consider adding ACE-inhibitor with history of diabetes.  Blood Pressure well controlled at this time.    Counseled on importance of hypertension disease course, I recommend ongoing Education for DASH-diet and exercise.  Counseled on medication regimen importance of treating high blood pressure.  Please be advised of risk of untreated blood pressure as discussed.  Please advised of ER precautions were given for symptoms of hypertensive urgency and emergency.

## 2022-12-03 NOTE — ASSESSMENT & PLAN NOTE
Resolved.  Continue follow-up with Infectious Disease if recurrence.  Patient is at higher risk with diabetes.  Focus on good control of blood sugar.  See diabetes problem.

## 2022-12-03 NOTE — ASSESSMENT & PLAN NOTE
Continue Januvia.We will plan to monitor hemoglobin A1c at designated intervals 3 to 6 months.  I recommend ongoing Education for diabetic diet and exercise protocol.  We will continue to monitor for side effects.    Please be advised of symptoms to monitor for and to notify me immediately if persistent or worsening.  Follow up with Ophthalmology/Optometry and Podiatry at least annually.

## 2023-01-11 ENCOUNTER — PATIENT MESSAGE (OUTPATIENT)
Dept: FAMILY MEDICINE | Facility: CLINIC | Age: 68
End: 2023-01-11
Payer: MEDICARE

## 2023-01-19 ENCOUNTER — HOSPITAL ENCOUNTER (OUTPATIENT)
Dept: RADIOLOGY | Facility: HOSPITAL | Age: 68
Discharge: HOME | End: 2023-01-19
Attending: FAMILY MEDICINE
Payer: MEDICARE

## 2023-01-19 ENCOUNTER — OFFICE VISIT (OUTPATIENT)
Dept: OTOLARYNGOLOGY | Facility: CLINIC | Age: 68
End: 2023-01-19
Payer: MEDICARE

## 2023-01-19 VITALS — BODY MASS INDEX: 31.89 KG/M2 | WEIGHT: 173.31 LBS | HEIGHT: 62 IN

## 2023-01-19 VITALS — BODY MASS INDEX: 31.69 KG/M2 | TEMPERATURE: 98 F | WEIGHT: 173.31 LBS

## 2023-01-19 DIAGNOSIS — H92.02 OTALGIA OF LEFT EAR: ICD-10-CM

## 2023-01-19 DIAGNOSIS — Z12.31 SCREENING MAMMOGRAM FOR BREAST CANCER: ICD-10-CM

## 2023-01-19 DIAGNOSIS — H60.8X3 CHRONIC ECZEMATOID OTITIS EXTERNA OF BOTH EARS: Primary | ICD-10-CM

## 2023-01-19 DIAGNOSIS — H61.22 LEFT EAR IMPACTED CERUMEN: ICD-10-CM

## 2023-01-19 PROCEDURE — 1159F MED LIST DOCD IN RCRD: CPT | Mod: CPTII,S$GLB,, | Performed by: OTOLARYNGOLOGY

## 2023-01-19 PROCEDURE — 99999 PR PBB SHADOW E&M-EST. PATIENT-LVL III: CPT | Mod: PBBFAC,,, | Performed by: OTOLARYNGOLOGY

## 2023-01-19 PROCEDURE — 77067 MAMMO DIGITAL SCREENING BILAT WITH TOMO: ICD-10-PCS | Mod: 26,,, | Performed by: RADIOLOGY

## 2023-01-19 PROCEDURE — 77067 SCR MAMMO BI INCL CAD: CPT | Mod: 26,,, | Performed by: RADIOLOGY

## 2023-01-19 PROCEDURE — 3008F BODY MASS INDEX DOCD: CPT | Mod: CPTII,S$GLB,, | Performed by: OTOLARYNGOLOGY

## 2023-01-19 PROCEDURE — 1101F PT FALLS ASSESS-DOCD LE1/YR: CPT | Mod: CPTII,S$GLB,, | Performed by: OTOLARYNGOLOGY

## 2023-01-19 PROCEDURE — 77067 SCR MAMMO BI INCL CAD: CPT | Mod: TC,PO

## 2023-01-19 PROCEDURE — 3288F PR FALLS RISK ASSESSMENT DOCUMENTED: ICD-10-PCS | Mod: CPTII,S$GLB,, | Performed by: OTOLARYNGOLOGY

## 2023-01-19 PROCEDURE — 69210 PR REMOVAL IMPACTED CERUMEN REQUIRING INSTRUMENTATION, UNILATERAL: ICD-10-PCS | Mod: S$GLB,,, | Performed by: OTOLARYNGOLOGY

## 2023-01-19 PROCEDURE — 99214 PR OFFICE/OUTPT VISIT, EST, LEVL IV, 30-39 MIN: ICD-10-PCS | Mod: 25,S$GLB,, | Performed by: OTOLARYNGOLOGY

## 2023-01-19 PROCEDURE — 1159F PR MEDICATION LIST DOCUMENTED IN MEDICAL RECORD: ICD-10-PCS | Mod: CPTII,S$GLB,, | Performed by: OTOLARYNGOLOGY

## 2023-01-19 PROCEDURE — 3008F PR BODY MASS INDEX (BMI) DOCUMENTED: ICD-10-PCS | Mod: CPTII,S$GLB,, | Performed by: OTOLARYNGOLOGY

## 2023-01-19 PROCEDURE — 69210 REMOVE IMPACTED EAR WAX UNI: CPT | Mod: S$GLB,,, | Performed by: OTOLARYNGOLOGY

## 2023-01-19 PROCEDURE — 77063 BREAST TOMOSYNTHESIS BI: CPT | Mod: 26,,, | Performed by: RADIOLOGY

## 2023-01-19 PROCEDURE — 1126F AMNT PAIN NOTED NONE PRSNT: CPT | Mod: CPTII,S$GLB,, | Performed by: OTOLARYNGOLOGY

## 2023-01-19 PROCEDURE — 3288F FALL RISK ASSESSMENT DOCD: CPT | Mod: CPTII,S$GLB,, | Performed by: OTOLARYNGOLOGY

## 2023-01-19 PROCEDURE — 99214 OFFICE O/P EST MOD 30 MIN: CPT | Mod: 25,S$GLB,, | Performed by: OTOLARYNGOLOGY

## 2023-01-19 PROCEDURE — 99999 PR PBB SHADOW E&M-EST. PATIENT-LVL III: ICD-10-PCS | Mod: PBBFAC,,, | Performed by: OTOLARYNGOLOGY

## 2023-01-19 PROCEDURE — 1126F PR PAIN SEVERITY QUANTIFIED, NO PAIN PRESENT: ICD-10-PCS | Mod: CPTII,S$GLB,, | Performed by: OTOLARYNGOLOGY

## 2023-01-19 PROCEDURE — 1101F PR PT FALLS ASSESS DOC 0-1 FALLS W/OUT INJ PAST YR: ICD-10-PCS | Mod: CPTII,S$GLB,, | Performed by: OTOLARYNGOLOGY

## 2023-01-19 PROCEDURE — 77063 MAMMO DIGITAL SCREENING BILAT WITH TOMO: ICD-10-PCS | Mod: 26,,, | Performed by: RADIOLOGY

## 2023-01-19 RX ORDER — FLUOCINOLONE ACETONIDE 0.11 MG/ML
3 OIL AURICULAR (OTIC) 2 TIMES DAILY
Qty: 20 ML | Refills: 5 | Status: SHIPPED | OUTPATIENT
Start: 2023-01-19

## 2023-01-19 NOTE — PROGRESS NOTES
Referring Provider:    Pankajreferral Self  No address on file  Subjective:   Patient: Crystal Acuna [id number], :1955   Visit date:2023 8:47 AM    Chief Complaint:  Other (Left ear )    HPI:    Prior notes reviewed by myself.  Clinical documentation obtained by nursing staff reviewed.     68 y/o female here for evaluation of left sided otalgia and muffled hearing.  She just finished a course of doxycycline that was prescribed by urgent care.  She feels that the pain is improved but still notices some irritation especially around her external auditory meatus.  She is a long history of chronic ear pruritus and admits to scratching her ear frequently due to these symptoms.  No other significant prior otologic history.      Objective:     Physical Exam:  Vitals:  Temp 97.5 °F (36.4 °C) (Temporal)   Wt 78.6 kg (173 lb 4.5 oz)   BMI 31.69 kg/m²   General appearance:  Well developed, well nourished    Ears:  Otoscopy of external auditory canals and tympanic membranes was normal right and dry/scaly erythematous EAC skin left with cerumen impaction, normal TMs, clinical speech reception thresholds grossly intact, no mass/lesion of auricle.    Nose:  No masses/lesions of external nose, nasal mucosa, septum, and turbinates were within normal limits.    Mouth:  No mass/lesion of lips, teeth, gums, hard/soft palate, tongue, tonsils, or oropharynx.    Neck & Lymphatics:  No cervical lymphadenopathy, no neck mass/crepitus/ asymmetry, trachea is midline, no thyroid enlargement/tenderness/mass.    Procedure Note    CHIEF COMPLAINT:  Cerumen Impaction    Description:  The patient was seated in an exam chair.  An ear speculum was placed in the left EAC and was examined under the microscope.  Suction and/or loop curettes were used to remove a large cerumen impaction.  The tympanic membrane was visualized and was normal in appearance.  The patient tolerated the procedure well.      [x]  Data Reviewed:    Lab Results    Component Value Date    WBC 6.90 09/28/2022    HGB 16.0 09/28/2022    HCT 50.1 (H) 09/28/2022    MCV 92 09/28/2022    EOSINOPHIL 2.3 09/28/2022               Assessment & Plan:   Chronic eczematoid otitis externa of both ears  -     fluocinolone acetonide oiL (DERMOTIC OIL) 0.01 % Drop; Place 3 drops in ear(s) 2 (two) times daily.  Dispense: 20 mL; Refill: 5    Otalgia of left ear    Left ear impacted cerumen        She has chronic eczematoid changes of her left ear canal.  We debrided her left ear canal of cerumen and discussed using derm otic oil for her chronic eczematoid skin issues

## 2023-01-28 ENCOUNTER — PATIENT MESSAGE (OUTPATIENT)
Dept: FAMILY MEDICINE | Facility: CLINIC | Age: 68
End: 2023-01-28
Payer: MEDICARE

## 2023-01-30 ENCOUNTER — PATIENT MESSAGE (OUTPATIENT)
Dept: FAMILY MEDICINE | Facility: CLINIC | Age: 68
End: 2023-01-30
Payer: MEDICARE

## 2023-01-31 DIAGNOSIS — E11.65 TYPE 2 DIABETES MELLITUS WITH HYPERGLYCEMIA, WITHOUT LONG-TERM CURRENT USE OF INSULIN: ICD-10-CM

## 2023-01-31 NOTE — TELEPHONE ENCOUNTER
No new care gaps identified.  [facility] Embedded Care Gaps. Reference number: [id number]. 1/31/2023   8:24:43 AM CST

## 2023-04-21 ENCOUNTER — OFFICE VISIT (OUTPATIENT)
Dept: FAMILY MEDICINE | Facility: CLINIC | Age: 68
End: 2023-04-21
Payer: MEDICARE

## 2023-04-21 DIAGNOSIS — E78.5 HYPERLIPIDEMIA ASSOCIATED WITH TYPE 2 DIABETES MELLITUS: ICD-10-CM

## 2023-04-21 DIAGNOSIS — I77.1 STRICTURE OF ARTERY: ICD-10-CM

## 2023-04-21 DIAGNOSIS — Z95.5 STATUS POST PRIMARY ANGIOPLASTY WITH CORONARY STENT: ICD-10-CM

## 2023-04-21 DIAGNOSIS — Z09 HOSPITAL DISCHARGE FOLLOW-UP: Primary | ICD-10-CM

## 2023-04-21 DIAGNOSIS — E11.65 TYPE 2 DIABETES MELLITUS WITH HYPERGLYCEMIA, WITHOUT LONG-TERM CURRENT USE OF INSULIN: ICD-10-CM

## 2023-04-21 DIAGNOSIS — E11.69 HYPERLIPIDEMIA ASSOCIATED WITH TYPE 2 DIABETES MELLITUS: ICD-10-CM

## 2023-04-21 DIAGNOSIS — E11.59 HYPERTENSION ASSOCIATED WITH DIABETES: Chronic | ICD-10-CM

## 2023-04-21 DIAGNOSIS — I15.2 HYPERTENSION ASSOCIATED WITH DIABETES: Chronic | ICD-10-CM

## 2023-04-21 DIAGNOSIS — Z79.899 ENCOUNTER FOR LONG-TERM (CURRENT) USE OF MEDICATIONS: ICD-10-CM

## 2023-04-21 PROBLEM — I21.3 STEMI (ST ELEVATION MYOCARDIAL INFARCTION): Status: ACTIVE | Noted: 2023-04-07

## 2023-04-21 PROBLEM — Z87.891 FORMER SMOKER: Status: ACTIVE | Noted: 2023-04-08

## 2023-04-21 PROBLEM — E87.1 HYPONATREMIA: Status: ACTIVE | Noted: 2023-04-08

## 2023-04-21 PROBLEM — I50.20 HFREF (HEART FAILURE WITH REDUCED EJECTION FRACTION): Status: ACTIVE | Noted: 2023-04-08

## 2023-04-21 PROCEDURE — 1160F PR REVIEW ALL MEDS BY PRESCRIBER/CLIN PHARMACIST DOCUMENTED: ICD-10-PCS | Mod: CPTII,95,, | Performed by: FAMILY MEDICINE

## 2023-04-21 PROCEDURE — 3051F HG A1C>EQUAL 7.0%<8.0%: CPT | Mod: CPTII,95,, | Performed by: FAMILY MEDICINE

## 2023-04-21 PROCEDURE — 99214 PR OFFICE/OUTPT VISIT, EST, LEVL IV, 30-39 MIN: ICD-10-PCS | Mod: 95,,, | Performed by: FAMILY MEDICINE

## 2023-04-21 PROCEDURE — 1159F PR MEDICATION LIST DOCUMENTED IN MEDICAL RECORD: ICD-10-PCS | Mod: CPTII,95,, | Performed by: FAMILY MEDICINE

## 2023-04-21 PROCEDURE — 1160F RVW MEDS BY RX/DR IN RCRD: CPT | Mod: CPTII,95,, | Performed by: FAMILY MEDICINE

## 2023-04-21 PROCEDURE — 99214 OFFICE O/P EST MOD 30 MIN: CPT | Mod: 95,,, | Performed by: FAMILY MEDICINE

## 2023-04-21 PROCEDURE — 3051F PR MOST RECENT HEMOGLOBIN A1C LEVEL 7.0 - < 8.0%: ICD-10-PCS | Mod: CPTII,95,, | Performed by: FAMILY MEDICINE

## 2023-04-21 PROCEDURE — 1159F MED LIST DOCD IN RCRD: CPT | Mod: CPTII,95,, | Performed by: FAMILY MEDICINE

## 2023-04-21 RX ORDER — LEVALBUTEROL TARTRATE 45 UG/1
AEROSOL, METERED ORAL
COMMUNITY
Start: 2023-04-13 | End: 2023-05-09

## 2023-04-21 RX ORDER — CLOPIDOGREL BISULFATE 75 MG/1
75 TABLET ORAL
COMMUNITY
Start: 2023-04-13

## 2023-04-21 RX ORDER — METOPROLOL SUCCINATE 25 MG/1
25 TABLET, EXTENDED RELEASE ORAL
COMMUNITY
Start: 2023-04-13

## 2023-04-21 RX ORDER — AMIODARONE HYDROCHLORIDE 200 MG/1
200 TABLET ORAL
COMMUNITY
Start: 2023-04-13

## 2023-04-21 RX ORDER — APIXABAN 5 MG/1
5 TABLET, FILM COATED ORAL 2 TIMES DAILY
COMMUNITY
Start: 2023-04-13

## 2023-04-21 RX ORDER — FUROSEMIDE 20 MG/1
20 TABLET ORAL DAILY PRN
COMMUNITY
Start: 2023-04-13

## 2023-04-21 RX ORDER — ATORVASTATIN CALCIUM 80 MG/1
80 TABLET, FILM COATED ORAL NIGHTLY
COMMUNITY
Start: 2023-04-13

## 2023-04-21 NOTE — ASSESSMENT & PLAN NOTE
I recommend a low-dose ACE inhibitor once her blood pressure stabilizes.  Counseled on importance of hypertension disease course, I recommend ongoing Education for DASH-diet and exercise.  Counseled on medication regimen importance of treating high blood pressure.  Please be advised of risk of untreated blood pressure as discussed.  Please advised of ER precautions were given for symptoms of hypertensive urgency and emergency.

## 2023-04-21 NOTE — PATIENT INSTRUCTIONS
Follow up in 3 months (on 7/21/2023), or if symptoms worsen or fail to improve, for Med refills, DM.     Dear patient,   As a result of recent federal legislation (The Federal Cures Act), you may receive lab or pathology results from your visit in your [facility] account before your physician is able to contact you. Your physician or their representative will relay the results to you with their recommendations at their soonest availability.     If no improvement in symptoms or symptoms worsen, please be advised to call MD, follow-up at clinic and/or go to ER if becomes severe.    Eh Sow M.D.        We Offer TELEHEALTH & Same Day Appointments!   Book your Telehealth appointment with me through my nurse or   Clinic appointments on [employer]!    [address] [place] [address]    Office: [phone]   FAX: [phone]    Check out my Facebook Page and Follow Me at: [email or web address]    Check out my website at [facility] by clicking on: [email or web address]    To Schedule appointments online, go to [facility]: [email or web address]

## 2023-04-21 NOTE — PROGRESS NOTES
Primary Care Telemedicine Note  The patient location is:  Patient's Home - [place]  The chief complaint leading to consultation is:   Chief Complaint   Patient presents with    Hospital Follow Up      Total time:  see MDM below. The total time spent on the encounter, which includes face to face time and non-face to face time preparing to see the patient (eg, review of previous medical records, tests), Obtaining and/or reviewing separately obtained history, documenting clinical information in the electronic or other health record, independently interpreting results (not separately reported)/communicating results to the patient/family/caregiver, and/or care coordination (not separately reported).    Visit type: Virtual visit with synchronous audio and video  Each patient to whom he or she provides medical services by telemedicine is:  (1) informed of the relationship between the physician and patient and the respective role of any other health care provider with respect to management of the patient; and (2) notified that he or she may decline to receive medical services by telemedicine and may withdraw from such care at any time.  =================================================================  PLAN:      Problem List Items Addressed This Visit       Hyperlipidemia associated with type 2 diabetes mellitus (Chronic)     Target LDL less than 70.  High-intensity statin has been started.  Counseled on hyperlipidemia disease course, healthy diet and increased need for exercise.  Please be advised of the risk of cardiovascular disease, increase stroke and heart attack risk with uncontrolled/untreated hyperlipidemia.     Patient voiced understanding and understood the treatment plan. All questions were answered.              Relevant Orders    CBC Without Differential    Comprehensive Metabolic Panel    TSH    Hemoglobin A1C    Lipid Panel    Type 2 diabetes mellitus with hyperglycemia, without long-term current use of  insulin (Chronic)     Continue Januvia.We will plan to monitor hemoglobin A1c at designated intervals 3 to 6 months.  I recommend ongoing Education for diabetic diet and exercise protocol.  We will continue to monitor for side effects.    Please be advised of symptoms to monitor for and to notify me immediately if persistent or worsening.  Follow up with Ophthalmology/Optometry and Podiatry at least annually.             Relevant Orders    CBC Without Differential    Comprehensive Metabolic Panel    TSH    Hemoglobin A1C    Lipid Panel    Encounter for long-term (current) use of medications (Chronic)     Complete history and limited telemedicine physical was completed today.  Complete and thorough medication reconciliation was performed.  Discussed risks and benefits of medications.  Advised patient on orders and health maintenance.  We discussed old records and old labs if available.  Will request any records not available through epic.  Continue current medications listed on your summary sheet.             Relevant Orders    CBC Without Differential    Comprehensive Metabolic Panel    TSH    Hemoglobin A1C    Lipid Panel    Hypertension associated with diabetes (Chronic)     I recommend a low-dose ACE inhibitor once her blood pressure stabilizes.  Counseled on importance of hypertension disease course, I recommend ongoing Education for DASH-diet and exercise.  Counseled on medication regimen importance of treating high blood pressure.  Please be advised of risk of untreated blood pressure as discussed.  Please advised of ER precautions were given for symptoms of hypertensive urgency and emergency.           Relevant Orders    CBC Without Differential    Comprehensive Metabolic Panel    TSH    Hemoglobin A1C    Lipid Panel    Stricture of artery     Hospital discharge follow-up - Primary     Start cardiac rehab as ordered.  Follow-up with cardiology as scheduled.  Continue current medications on summary list.   Transitional Care Note    Family and/or Caretaker present at visit?  No  Diagnostic tests reviewed/disposition: I have reviewed all completed as well as pending diagnostic tests at the time of discharge.  Disease/illness education:  MI status post coronary stent  Home health/community services discussion/referrals: Patient does not have home health established from hospital visit.  They do not need home health.  If needed, we will set up home health for the patient.   Establishment or re-establishment of referral orders for community resources: No other necessary community resources.   Discussion with other health care providers: No discussion with other health care providers necessary.                  Relevant Orders    CBC Without Differential    Comprehensive Metabolic Panel    TSH    Hemoglobin A1C    Lipid Panel    Status post primary angioplasty with coronary stent     Continue current medications including Plavix.            Future Appointments       Date Provider Specialty Appt Notes    7/21/2023  Lab [facility]    8/30/2023 Eh Sow MD Family Medicine ANNUAL           Medication Management for assessment above:   Medication List with Changes/Refills   Current Medications    AMIODARONE (PACERONE) 200 MG TAB    Take 200 mg by mouth.    ATORVASTATIN (LIPITOR) 80 MG TABLET    Take 80 mg by mouth every evening.    BLOOD SUGAR DIAGNOSTIC STRP    To check BG 2 times daily, to use with insurance preferred meter    BLOOD-GLUCOSE METER KIT    To check BG 2 times daily, to use with insurance preferred meter    CHLORHEXIDINE (HIBICLENS) 4 % EXTERNAL LIQUID    Apply topically daily as needed.    CLOPIDOGREL (PLAVIX) 75 MG TABLET    Take 75 mg by mouth.    ELIQUIS 5 MG TAB    Take 5 mg by mouth 2 (two) times daily.    FLUOCINOLONE ACETONIDE OIL (DERMOTIC OIL) 0.01 % DROP    Place 3 drops in ear(s) 2 (two) times daily.    FLUOROMETHOLONE 0.1% (FML) 0.1 % DRPS    INSTILL 1 DROP INTO EACH EYE THREE TIMES DAILY AS  DIRECTED    FUROSEMIDE (LASIX) 20 MG TABLET    Take 20 mg by mouth daily as needed.    HYDROCORTISONE 1 % CREAM    Apply to affected area 2 times daily    INVELTYS 1 % DRPS    Place 1 drop into both eyes 2 (two) times daily.    LANCETS MISC    To check BG 2 times daily, to use with insurance preferred meter    LEVALBUTEROL (XOPENEX HFA) 45 MCG/ACTUATION INHALER    SMARTSI Puff(s) Via Inhaler Every 4 Hours PRN    METOPROLOL SUCCINATE (TOPROL-XL) 25 MG 24 HR TABLET    Take 25 mg by mouth.    MUPIROCIN (BACTROBAN) 2 % OINTMENT    Apply topically 3 (three) times daily.    OFLOXACIN (OCUFLOX) 0.3 % OPHTHALMIC SOLUTION        PREDNISOLONE ACETATE (PRED FORTE) 1 % DRPS    Place 1 drop into the left eye 4 (four) times daily.    PROLENSA 0.07 % DROP    SMARTSIG:In Eye(s)    SITAGLIPTIN PHOSPHATE (JANUVIA) 100 MG TAB    Take 1 tablet (100 mg total) by mouth once daily.    XIIDRA 5 % DPET    INSTILL 1 DROP INTO EACH EYE TWICE DAILY AS DIRECTED   Discontinued Medications    ALBUTEROL (PROVENTIL/VENTOLIN HFA) 90 MCG/ACTUATION INHALER    Inhale 2 puffs into the lungs every 6 (six) hours as needed for Wheezing. Rescue    AMLODIPINE (NORVASC) 10 MG TABLET    Take 1 tablet by mouth once daily    ASPIRIN 81 MG CHEW    Take 81 mg by mouth once daily.    ATORVASTATIN (LIPITOR) 20 MG TABLET    Take 1 tablet (20 mg total) by mouth once daily.    MELOXICAM (MOBIC) 15 MG TABLET    Take 1 tablet (15 mg total) by mouth once daily.       Eh Sow M.D.  ==========================================================================  Subjective:   Patient ID: Crystal Acuna is a 67 y.o. female.  has a past medical history of Allergy, Bronchitis, Family history of colonic polyps (2021), Hallux abductovalgus, Herniated lumbar intervertebral disc, Hyperlipidemia, Hypertension, Smoker, and Staph aureus infection.   Chief Complaint: Hospital Follow Up      Problem List Items Addressed This Visit       Hyperlipidemia associated with  type 2 diabetes mellitus (Chronic)    Overview     April 2023: Patient with recent MI with stents.  Patient currently on atorvastatin 80 milligrams daily.  CHRONIC. STABLE. Lab analysis reviewed.   (-) CP, SOB, abdominal pain, N/V/D, constipation, jaundice, skin changes.  (-) Myalgias  Lab Results   Component Value Date    CHOL 136 04/08/2023    CHOL 192 01/19/2023    CHOL 189 06/08/2022     Lab Results   Component Value Date    HDL 37 04/08/2023    HDL 44 01/19/2023    HDL 56 06/08/2022     Lab Results   Component Value Date    LDLCALC 76 04/08/2023    LDLCALC 120.0 01/19/2023    LDLCALC 114.0 06/08/2022     Lab Results   Component Value Date    TRIG 115 04/08/2023    TRIG 140 01/19/2023    TRIG 95 06/08/2022     Lab Results   Component Value Date    CHOLHDL 3.7 04/08/2023    CHOLHDL 22.9 01/19/2023    CHOLHDL 29.6 06/08/2022     Lab Results   Component Value Date    TOTALCHOLEST 4.4 01/19/2023    TOTALCHOLEST 3.4 06/08/2022    TOTALCHOLEST 3.5 08/05/2021     Lab Results   Component Value Date    ALT 15 01/19/2023    AST 12 01/19/2023    ALKPHOS 60 01/19/2023    BILITOT 0.4 01/19/2023   ======================================================  The ASCVD Risk score (Mary Lou ECHAVARRIA, et al., 2019) failed to calculate for the following reasons:    The patient has a prior MI or stroke diagnosis           Current Assessment & Plan     Target LDL less than 70.  High-intensity statin has been started.  Counseled on hyperlipidemia disease course, healthy diet and increased need for exercise.  Please be advised of the risk of cardiovascular disease, increase stroke and heart attack risk with uncontrolled/untreated hyperlipidemia.     Patient voiced understanding and understood the treatment plan. All questions were answered.              Type 2 diabetes mellitus with hyperglycemia, without long-term current use of insulin (Chronic)    Overview     April 2023:  Patient has well-controlled blood sugar on Januvia.  December 2022:   Patient doing well on Januvia.    Patient has side effects to GL P 1. She cannot tolerate metformin due to GI side effects.  Patient currently having increased use infections and recurrent infections on Farxiga.Diabetes Management StatusStatin: TakingACE/ARB: Not taking      Diabetes Management Status    Statin: Taking  ACE/ARB: Not taking    Screening or Prevention Patient's value Goal Complete/Controlled?   HgA1C Testing and Control   Lab Results   Component Value Date    HGBA1C 7.2 (H) 04/09/2023      Annually/Less than 8% Yes   Lipid profile : 04/08/2023 Annually Yes   LDL control Lab Results   Component Value Date    LDLCALC 76 04/08/2023    Annually/Less than 100 mg/dl  Yes   Nephropathy screening Lab Results   Component Value Date    LABMICR <5.0 06/08/2022     Lab Results   Component Value Date    PROTEINUA Negative 01/09/2023     No results found for: UTPCR   Annually Yes   Blood pressure BP Readings from Last 1 Encounters:   01/09/23 (!) 120/55    Less than 140/90 Yes   Dilated retinal exam : 11/18/2022 Annually Yes   Foot exam   : 12/01/2022 Annually Yes              Current Assessment & Plan     Continue Januvia.We will plan to monitor hemoglobin A1c at designated intervals 3 to 6 months.  I recommend ongoing Education for diabetic diet and exercise protocol.  We will continue to monitor for side effects.    Please be advised of symptoms to monitor for and to notify me immediately if persistent or worsening.  Follow up with Ophthalmology/Optometry and Podiatry at least annually.             Encounter for long-term (current) use of medications (Chronic)    Overview     April 2023: Reviewed labs.  December 2022: Reviewed labs.  CHRONIC. Stable. Compliant with medications for managed conditions. See medication list. No SE reported.   Routine lab analysis is being monitored. Refills were addressed.  Lab Results   Component Value Date    WBC 5.68 01/19/2023    HGB 15.4 01/19/2023    HCT 49.3 (H) 01/19/2023     MCV 93 01/19/2023     01/19/2023       Chemistry        Component Value Date/Time     01/19/2023 0710    K 4.2 01/19/2023 0710     01/19/2023 0710    CO2 25 01/19/2023 0710    BUN 16 01/19/2023 0710    CREATININE 0.8 01/19/2023 0710     (H) 01/19/2023 0710        Component Value Date/Time    CALCIUM 9.9 01/19/2023 0710    ALKPHOS 60 01/19/2023 0710    AST 12 01/19/2023 0710    AST 20 03/28/2016 1457    ALT 15 01/19/2023 0710    BILITOT 0.4 01/19/2023 0710    ESTGFRAFRICA >60.0 06/08/2022 0747    EGFRNONAA >60.0 06/08/2022 0747          Lab Results   Component Value Date    TSH 0.976 01/19/2023    FREET4 1.19 10/11/2012            Current Assessment & Plan     Complete history and limited telemedicine physical was completed today.  Complete and thorough medication reconciliation was performed.  Discussed risks and benefits of medications.  Advised patient on orders and health maintenance.  We discussed old records and old labs if available.  Will request any records not available through epic.  Continue current medications listed on your summary sheet.             Hypertension associated with diabetes (Chronic)    Overview     CHRONIC.  April 2023:  Patient has had some low blood pressure.    Patient recent medication changes due to MI with stents at [place].  Patient now following with Cardiology.  STABLE.  Hypertension Medications               furosemide (LASIX) 20 MG tablet Take 20 mg by mouth daily as needed.    metoprolol succinate (TOPROL-XL) 25 MG 24 hr tablet Take 25 mg by mouth.    BP Reviewed.  Compliant with BP medications. No SE reported.  December 2022: Well controlled on current regimen.  (-) CP, SOB, palpitations, dizziness, lightheadedness, HA, arm numbness, tingling or weakness, syncope.  Creatinine   Date Value Ref Range Status   01/19/2023 0.8 0.5 - 1.4 mg/dL Final            Current Assessment & Plan     I recommend a low-dose ACE inhibitor once her blood pressure  stabilizes.  Counseled on importance of hypertension disease course, I recommend ongoing Education for DASH-diet and exercise.  Counseled on medication regimen importance of treating high blood pressure.  Please be advised of risk of untreated blood pressure as discussed.  Please advised of ER precautions were given for symptoms of hypertensive urgency and emergency.           Stricture of artery     Overview     PATIENT WITH RECENT MI.  SEE HOSPITAL FOLLOW-UP.           Hospital discharge follow-up - Primary    Overview     [facility] DISCHARGE SUMMARY    Patient ID:  Crystal Acuna  [id number]  67 y.o.  1955  Admit Date: 4/7/2023 9:34 AM  Discharge Date: 4/13/2023  Admitting Physician: Madi Zhou MD   Discharge Physician: MADI ZHOU MD    Consultants/Treatment Team:  Consultants   Provider Service Role Specialty   Kulwinder Gee DO -- Consulting Physician Hospitalist   Clara, Candice, NP -- Nurse Practitioner Nurse Practitioner Acute Care   Sj Bullock MD Cardiology Consulting Physician Cardiology       Reason for Admission/Admission Diagnoses:   Present on Admission:   STEMI (ST elevation myocardial infarction) (Formerly [facility])   HLD (hyperlipidemia)   HFrEF (heart failure with reduced ejection fraction) (Formerly [facility])   HTN (hypertension)   DM (diabetes mellitus), type 2 (HCC)   Cardiogenic shock (Formerly [facility])   Hyponatremia    Discharge Diagnoses:   Active Hospital Problems   Diagnosis Date Noted    STEMI (ST elevation myocardial infarction) (Former[facility]) 04/07/2023    HLD (hyperlipidemia) 04/08/2023    HFrEF (heart failure with reduced ejection fraction) (Former[facility]) 04/08/2023    HTN (hypertension) 04/08/2023    Former smoker 04/08/2023    DM (diabetes mellitus), type 2 (HCC) 04/08/2023    Cardiogenic shock (Former[facility]) 04/08/2023    Hyponatremia 04/08/2023     Resolved Hospital Problems     History of Present Illness: 67-year-old female with past medical history of hypertension diabetes presents emergency room for evaluation  of chest pain. Presents by EMS apparently began having chest pressure radiating to her neck it 8:00 this morning EMS was called and on arrival EKG with ST elevations in her anterolateral leads.    Hospital Course and Treatment:   Admission Information   Date & Time  4/7/2023 Provider  Madi Zhou MD Department  [facility] Phone  [phone]       Anterolateral STEMI / multivessel CAD/ Residual RCA disease-treat medically.  Cardiogenic shock  Acute limb ischemia  Bilateral carotid artery disease  -Troponin >80, on 4/7: PCI and stenting (IVUS directed) to the LAD and left circ (shockwave, aspiration thrombectomy performed with assistance of Impella device).   -residual RCA disease treated medically; s/p Integrilin.   -On 4/8: developed an acute ischemic L leg. Leg was cold and pulseless. She underwent an emergent LE runoff with aspiration thrombectomy of L common femoral mid and distal SFA, PTA of the left common femoral and proximal SFA, PTA with drug-coated balloon of the common femoral and proximal SFA. Pulses confirmed with doppler.   -Cardiology following. On ASA/Plavix/high intensity statin and Eliquis.      Acute HFrEF-pulmonary edema on HD #5, requiring diuresis w IV Lasix. Based on SOB/cough, WHITNEY and imaging. ACE DC'd to allow washout; as of now, pt not able to start Entresto due to hypotension. Much improved w/ diuresis, currently on room air without any distress.  CXR 4/13-Almost complete resolution of pulmonary edema. No evidence of new disease  Echo 4/10: EF 30-35%, indeterminate diastolic function with apical,anteroseptum and inferoseptal akinesis.    Paroxysmal A-fib: Postprocedure, intermittent episodes of A-fib with RVR, paroxysmal requiring IV amiodarone drip. Converted to NSR, placed on p.o. Amio/Eliquis    HTN, primary; hypotensive requiring NS bolus; BP improved to 95/64. Pt unable to tolerate Entresto, w subsequent hypotension. Close BP  monitoring recommended at home         Disposition home    Condition improved    Diet cardiac    Activity as tolerated    Follow-up with PCP and Dr. Bullock in 2 weeks    See medicine reconciliation list for medication changes and updates. Currently on Eliquis/ASA/Plavix x3 weeks. In 2 weeks DC aspirin. LifeVest recommended at AR, patient refused. HF medication regimen in place at [place]. No ACE/ARB or Entresto secondary to hypotension    I discussed with the patient disease process and treatment.     I have personally seen and examined the patient, Crystal Acuna, in a face to face encounter on the date of discharge.     She is cleared for discharge with instructions to follow up as directed.   Total time in the care and discharge planning of this patient was greater than 30 minutes.    Significant Diagnostic Studies:  Recent Imaging and Procedure Results   Procedure Component Value Ref Range Date/Time   CATH LAB ONLY-IVUS FFR / OCT Order* [[id number]] Resulted: 04/12/2023 1100   Updated: 04/12/2023 1100   Narrative:   For Dictation, see Procedure or Operative Note under the Notes Tab.   CATH LAB ONLY-IVUS FFR / OCT Order* [[id number]] Resulted: 04/12/2023 1057   Updated: 04/12/2023 1057   Narrative:   For Dictation, see Procedure or Operative Note under the Notes Tab.   Echo Limited With Contrast [[id number]] Collected: 04/10/2023 0944   Updated: 04/10/2023 1311   PatientHeight 157.48 cm   PatientWeight 78.9264 kg   BSA 1.8 m   2D/MMode measurements and calculations: MMode 2D Measurements & Calculations   Interventricular Septum in Diastole 1.1 cm   Left Ventricle in Diastole 4.3 cm   Left Ventricle in Systole 2.5 cm   LV post wall in Diastole 1.1 cm   IVS/LVPW 0.97   FS 43.3 %   EDV(Teich) 84.6 ml   ESV(Teich) 21.3 ml   EF(Teich) 74.8 %   EDV(cubed) 81.4 ml   ESV(cubed) 14.8 ml   EF(cubed) 81.8 %   LV mass(C)d 169.1 grams   LV mass(C)dI 93.8 grams/m   SV(Teich) 63.3 ml   SI(Teich) 35.1 ml/m   SV(cubed) 66.6 ml    SI(cubed) 37 ml/m   LVOT Diameter 2.1 cm   LVOT area 3.4 cm   LVOT area (traced) 3.5 cm   LVLd ap4 8.5 cm   EDV(MOD-sp4) 132 ml   LVLs ap4 7.7 cm   ESV(MOD-sp4) 57.6 ml   EF(MOD-sp4) 56.4 %   LVLd ap2 8.7 cm   EDV(MOD-sp2) 119 ml   LVLs ap2 8.3 cm   ESV(MOD-sp2) 74.9 ml   EF(MOD-sp2) 37.1 %   SV(MOD-sp4) 74.4 ml   SI(MOD-sp4) 41.3 ml/m   SV(MOD-sp2) 44.1 ml   SI(MOD-sp2) 24.5 ml/m   Time Measurements Time Measurements   Aortic R-R 0.7 sec   Aortic HR 86 BPM   Doppler measurements and calculations: Doppler Measurements & Calculations   Ao V2 Max 171.6 cm/sec   Ao max PG 11.8 mmHg   Ao max PG (full) 1.7 mmHg   Ao V2 mean 105.7 cm/sec   Ao mean PG 5.1 mmHg   Ao mean PG (full) 0.67 mmHg   Ao V2 VTI 24.3 cm   CHRISTOPHER(I,A) 3.3 cm   CHRISTOPHER(I,D) 3.3 cm   CHRISTOPHER(V,A) 3.1 cm   CHRISTOPHER(V,D) 3.1 cm   LV V1 max PG 10.1 mmHg   LV V1 mean PG 4.5 mmHg   LV V1 max 159.1 cm/sec   LV V1 mean 97.9 cm/sec   LV V1 VTI 24 cm   CO(LVOT) 7 l/min   CI(LVOT) 3.9 l/min/m   SV(LVOT) 81.2 ml   SI(LVOT) 45.1 ml/m   EF MIN = 25 %   EF MAX = 30 %   Narrative:       Adult Echocardiogram  Name: SUNITHA CANO Study Date: 04/10/2023  MRN: [id number] Age: 67 yrs  Patient Location: [facility]^[id number] Height: 62 in  : 1955 Weight: 174 lb  Gender: Female BSA: 1.8 m2  Reason For Study: eval CO/CI    Ordering Physician: JEREMIAS COLORADO  Performed By: Olimpia Mclean RDCS    Interpretation Summary  Ejection Fraction = 25-30%.  There is Apical 'Akinesis'.  mid anterior, septal and apical walls are virtually akinetic    Measurements  IVSd: 1.1 cm LVIDd: 4.3 cm  LVPWd: 1.1 cm LVIDs: 2.5 cm  LVOT diam: 2.1 cm    MMode/2D Measurements & Calculations  FS: 43.3 % LVOT area: 3.4 cm2  EDV(Teich): 84.6 ml  ESV(Teich): 21.3 ml  EF(Teich): 74.8 %  LVLd ap4: 8.5 cm EDV(MOD-sp2): 119.0 ml  EDV(MOD-sp4): 132.0 ml  LVLs ap4: 7.7 cm  ESV(MOD-sp4): 57.6 ml  EF(MOD-sp4): 56.4 %  SV(MOD-sp4): 74.4 ml Aortic R-R: 0.70 sec    Time Measurements  Aortic HR: 86.0 BPM    Doppler  Measurements & Calculations  Ao V2 max: 171.6 cm/sec LV V1 max PG: 10.1 mmHg  Ao max P.8 mmHg LV V1 mean P.5 mmHg  Ao V2 mean: 105.7 cm/sec LV V1 max: 159.1 cm/sec  Ao mean P.1 mmHg LV V1 mean: 97.9 cm/sec  Ao V2 VTI: 24.3 cm LV V1 VTI: 24.0 cm  CHRISTOPHER(I,D): 3.3 cm2  CHRISTOPHER(V,D): 3.1 cm2  CO(LVOT): 7.0 l/min AV VR_phl: 0.93  CI(LVOT): 3.9 l/min/m2  SV(LVOT): 81.2 ml    ______________________________________________________________________________  CHRISTOPHER(VTI)/BSA_phl: 1.9    LEFT VENTRICLE    o The left ventricle is normal in size.  o Ejection Fraction = 25-30%.  o There is mild concentric left ventricular hypertrophy.  o There is Apical 'Akinesis'.  o mid anterior, septal and apical walls are virtually akinetic.    RIGHT VENTRICLE    o The right ventricle is grossly normal size.  o RV systolic function grossly normal.    ATRIA    o The left atrial size is normal.  o Right atrial size is normal.  o IVC diameter < 2.1cm with > 50% collapse.    MITRAL VALVE    o The mitral valve leaflets appear normal. There is no evidence of  stenosis, fluttering, or prolapse.  o There is no mitral regurgitation noted.  o There is no mitral valve stenosis.    TRICUSPID VALVE    o The tricuspid valve is normal.  o There is no tricuspid stenosis.    AORTIC VALVE    o The aortic valve is not well visualized.  o No aortic regurgitation is present.  o There is no aortic valvular stenosis.    PULMONIC VALVE    o The pulmonic valve is not well visualized.    GREAT VESSELS    o The aortic root is normal size.    PERICARDIUM/PLEURAL EFFUSION    o There is no pericardial effusion.    ______________________________________________________________________________  Electronically signed by: Sj Bullock MD 04/10/2023 01:11 PM  InterpretingPhysician: Sj Bullock MD electronically signed on 2023-04-10 13:11:09.6   IR Angiogram Lower Extremity* [[id number]] Collected: 2023   Updated: 04/10/2023 1206   Narrative:   PROCEDURES:  1.  Abdominal aortography with limited runoff, right common femoral   artery angiography.  2. Left external iliac artery angiography with runoff to the foot.  3. Left SFA artery angiography with selective catheter placement from   the right groin to the left SFA.  4. FilterWire placement in the left popliteal artery.  5. Aspiration thrombectomy of the left common femoral, proximal, mid,   and distal left superficial femoral arteries with Pronto aspiration   catheter.  6. Intravascular ultrasound evaluation of the left external iliac, left   common femoral, proximal, mid, and distal left SFA.  7. PTA of the left common femoral and proximal SFA.  8. PTA using a drug-coated balloon of the left common femoral and   proximal left superficial femoral arteries.  9. Mynx closure of the right femoral arteriotomy.    SURGEON: Sj Bullock M.D.    ASSISTANT: Sukh.    INDICATION: Critical limb ischemia with a cold left foot following   left-sided intervention and with a history of MANTA closure device   involving the left common femoral arteriotomy.    PROCEDURE IN DETAIL: The patient was taken to the cath lab and was placed   on nasal cannula oxygen. Blood pressure, heart rate, EKG, and heart   rhythm were monitored continuously during the procedure. She was given   incremental doses of Versed and   fentanyl to achieve conscious sedation with the start time of 1609, end   time of 17:18, total of 2 mg of Versed and 100 mcg of fentanyl   administered with face-to-face observation of the patient assessing vital   signs, telemetry, and level of sedation   during the case. Using ultrasound guidance and a micropuncture needle, we   accessed the right common femoral artery and placed a 6-Colombian sheath   within. An angiogram was performed of the right common femoral artery.   We then advanced an Omni Flush   catheter to the abdominal aortic position. Abdominal aortography was   performed with limited runoff. We then positioned the  catheter in an   up-and-over fashion using a 0.035 stiff-angled Glidewire. The Glidewire   met with an obstructive lesion involving   the left common femoral. We removed the Omni Flush catheter as well as   the 6-Polish sheath and advanced a 6-Polish 45 cm up-and-over sheath from   the right groin to the left external iliac. We then used a 0.035   TrailBlazer catheter and the stiff-angled   Glidewire and with this, we were able to cross easily the common femoral   artery occlusion. The TrailBlazer catheter was positioned into the distal   left SFA and was then flushed. An angiogram was performed to the left SFA   with runoff to the foot. We   then advanced the TrailBlazer catheter to the popliteal and removed the   wire. We then deployed a 6 EV3 FilterWire for distal embolic protection   and then removed the TrailBlazer catheter. Next, we dilated the distal   common femoral and proximal SFA with   a 7 x 40 EV3 EverCross balloon catheter. Two inflations were performed.   Please see the case log for details. This resulted in a good lumen and   brisk flow distally. We noticed a linear filling defect within the SFA,   all the way to the popliteal   suggestive of either laminated thrombus or dissection. This was actually   noted on the first injection as well. Next, we advanced a Pronto   aspiration catheter and performed aspiration thrombectomy of the left   common femoral, proximal to distal left SFA.  No significant thrombotic aspirate was retrieved. We then placed a   drug-coated balloon, which was an IN.PACT Admiral 6 x 60 in the left   common femoral and very proximal SFA, a 3-minute inflation was performed   at this level. We then removed the   balloon catheter and angiogram demonstrated an improved angiographic   result, but with the appearance of a linear dissection just above the   origin of the profunda. We removed this catheter and advanced a Volcano   intravascular ultrasound catheter and   performed IVUS  interrogation of the left external iliac, the left common   femoral, proximal, mid, and distal left SFA. We then removed the IVUS   catheter. Final angiogram was performed after retrieving the FilterWire   through the TrailBlazer catheter.   Good angiographic result was noted with a brisk flow in the common   femoral, although with a small dissection, which occupies perhaps 30% of   the vessel lumen. The profunda was normal. We then backed out the   sheath, repositioned the 0.035 J-wire into the  abdominal aorta and removed the sheath. We then placed a 6 short sheath   into the right femoral arteriotomy and then performed Mynx closure of the   right femoral arteriotomy with good hemostasis. She tolerated the   procedure well without complications.    RESULTS:  ANGIOGRAPHIC DATA: There was ectasia noted in the distal abdominal aorta.   Both right and left common iliacs have 30-40% plaque noted. The common   femoral on the left appears occluded and there is flow distally via   internal iliac collaterals. After   angioplasty and with drug-coated balloon angioplasty of the common   femoral, there was brisk antegrade flow, perhaps 20% residual stenosis in   a linear dissection, which appears to result in about 30% luminal   compromise. The SFA has diffuse narrowing,   perhaps a 50% throughout with a linear filling defect starting from mid   vessel distally to just above the popliteal, which occupies at least half   the lumen size. This has the angiographic appearance of either laminated   thrombus or a dissection. IVUS   images reveal a discrete dissection in the common femoral just proximal to   the profunda origin. There was some calcific plaque noted in the proximal   SFA as well. IVUS images of the SFA reveal a discrete intramural defect,   which might represent either   laminated thrombus or healed dissection plane.    CONCLUSION: Acute thrombotic occlusion of the left common femoral with   combination of dissection  and plaque formation as well as probable   thrombus formation. This was treated with balloon angioplasty as well as   a drug-coated balloon angioplasty with an  acceptable angiographic result, but with a residual dissection plane   noted angiographically and on IVUS occupying about 30% of the lumen, but   with brisk antegrade flow into the profunda and SFA. The mid to distal   SFA has diffuse luminal decrease in   diameter, suggestive of either a laminated thrombus or healed dissection,   most likely laminated thrombus formation.    Sj Bullock MD  V# [id number] D# [id number]  D: bme/WT: OPRPT/T: ars  DD: 04/08/2023 17:38 TD: 04/08/2023 19:49   US Carotid Bilateral [[id number]] Collected: 04/09/2023 1458   Updated: 04/09/2023 1503   Narrative:   REASON FOR EXAM: rule out clot/stenosis     TECHNICAL FACTORS: Using a linear high-frequency vascular ultrasound transducer, transverse and longitudinal gray-scale images are obtained of the extracranial carotid system. B-mode, color Doppler and spectral Doppler images of the CCA, ICA, ECA, and   vertebral arteries are included. Technical quality of exam is adequate.    TECHNOLOGIST: Theodora Soriano    COMPARISON: None    RIGHT CAROTID FINDINGS:  There is moderate atheromatous plaquing. The Doppler findings are normal.  ICA Prox PS: 86.6 cm/s  ICA Prox ED: 27.0 cm/s  ICA Mid PS: 81.4 cm/s  ICA Mid ED: 20.6 cm/s  ICA Dist PS: 78.8 cm/s  ICA Dist ED: 18.0 cm/s  CCA Mid PS: 53.1 cm/s  CCA Mid ED: 17.4 cm/s  ECA PS: 97.3 cm/s  Vert PS: 30.4 cm/s Antegrade flow.   ICA/CCA PS Ratio: 1.6    LEFT CAROTID FINDINGS:  There is mild atheromatous plaquing. The Doppler findings are normal.  ICA Prox PS: 63.3 cm/s  ICA Prox ED: 17.1 cm/s  ICA Mid PS: 65.9 cm/s  ICA Mid ED: 24.9 cm/s  ICA Dist PS: 64.2 cm/s  ICA Dist ED: 19.7 cm/s  CCA Mid PS: 56.3 cm/s  CCA Mid ED: 11.0 cm/s  ECA PS: 56.3 cm/s  Vert PS: 24.4 cm/s Antegrade flow.   ICA/CCA PS Ratio: 1.2    Comparison of the proximal internal  carotid artery diameter to the distal internal carotid artery diameter bilaterally was performed.     IMPRESSION:  Bilateral carotid atheromatous plaquing. There is less than 50% diameter reduction.    Electronically signed by Nicole Cervantes MD on 4/9/2023 3:00 PM    Echo Contrast [[id number]] Collected: 04/08/2023 0553   Updated: 04/08/2023 1844   PatientHeight 157.48 cm   PatientWeight 84.3696 kg   Heart Rate 88 bpm   Systolic Pressure 127 mmHg   Diastolic Pressure 91 mmHg   BSA 1.9 m   2D/MMode measurements and calculations: MMode 2D Measurements & Calculations   Interventricular Septum in Diastole 1 cm   Left Ventricle in Diastole 3.9 cm   Left Ventricle in Systole 3.4 cm   LV post wall in Diastole 0.93 cm   IVS/LVPW 1.1   FS 14.6 %   EDV(Teich) 67 ml   ESV(Teich) 45.9 ml   EF(Teich) 31.6 %   EDV(cubed) 60.6 ml   ESV(cubed) 37.7 ml   EF(cubed) 37.8 %   LV mass(C)d 119.8 grams   LV mass(C)dI 64.7 grams/m   SV(Teich) 21.2 ml   SI(Teich) 11.4 ml/m   SV(cubed) 22.9 ml   SI(cubed) 12.4 ml/m   LA Dimension 3.4 cm   asc Aorta Diam 3 cm   LVLd ap4 7.7 cm   EDV(MOD-sp4) 119 ml   LVLs ap4 6.6 cm   ESV(MOD-sp4) 85.7 ml   EF(MOD-sp4) 28 %   LVLd ap2 7.1 cm   EDV(MOD-sp2) 96.8 ml   LVLs ap2 6.2 cm   ESV(MOD-sp2) 60.4 ml   EF(MOD-sp2) 37.6 %   SV(MOD-sp4) 33.3 ml   SI(MOD-sp4) 18 ml/m   SV(MOD-sp2) 36.4 ml   SI(MOD-sp2) 19.6 ml/m   Doppler measurements and calculations: Doppler Measurements & Calculations   MV e max velocity 66.6 cm/sec   MV a velocity 57.6 cm/sec   MV e/a ratio 1.2   MV P ½ time max danitza 99.6 cm/sec   MV P ½ 47.9 msec   MVA ( P ½ ) 4.6 cm   MV Dec slope 609.1 cm/sec   MV dec time 0.12 sec   Ao V2 Max 125 cm/sec   Ao max PG 6.3 mmHg   Ao max PG (full) 2.7 mmHg   Ao V2 mean 83.2 cm/sec   Ao mean PG 3 mmHg   Ao mean PG (full) 1 mmHg   Ao V2 VTI 20.9 cm   LV V1 max PG 3.5 mmHg   LV V1 mean PG 2 mmHg   LV V1 max 93.8 cm/sec   LV V1 mean 58.7 cm/sec   LV V1 VTI 13.9 cm   EF MIN = 30 %   EF MAX = 35 %   Narrative:        Adult Echocardiogram  Name: SUNITHA CANO Study Date: 2023  MRN: [id number] Age: 67 yrs  Patient Location: [facility] [place]^[id number] Height: 62 in  : 1955 Weight: 186 lb  Gender: Female BSA: 1.9 m2  Reason For Study: post MI  BP: 127/91 mmHg    Ordering Physician: FELICIANO SWIFT  Performed By: ROXANA Ku    Interpretation Summary  There is Apical 'Akinesis'.  Ejection Fraction = 30-35%.  Lung Interference    Measurements  IVSd: 1.0 cm LVIDd: 3.9 cm  LVPWd: 0.93 cm LVIDs: 3.4 cm  LA dimension: 3.4 cm    MMode/2D Measurements & Calculations  FS: 14.6 % LVLd ap4: 7.7 cm  EDV(Teich): 67.0 ml EDV(MOD-sp4): 119.0 ml  ESV(Teich): 45.9 ml LVLs ap4: 6.6 cm  EF(Teich): 31.6 % ESV(MOD-sp4): 85.7 ml  EF(MOD-sp4): 28.0 %  EDV(MOD-sp2): 96.8 ml SV(MOD-sp4): 33.3 ml  ______________________________________________________________________________  IVC Diam_phl: 2.0 cm RV Base_phl: 2.8 cm  ______________________________________________________________________________  RV Mid_phl: 2.2 cm    Time Measurements  MV dec time: 0.12 sec    Doppler Measurements & Calculations  MV E max danitza: 66.6 cm/sec MV P1/2t max danitza: 99.6 cm/sec  MV A max danitza: 57.6 cm/sec MV P1/2t: 47.9 msec  MV E/A: 1.2 MVA(P1/2t): 4.6 cm2  MV dec slope: 609.1 cm/sec2  Ao V2 max: 125.0 cm/sec LV V1 max PG: 3.5 mmHg  Ao max P.3 mmHg LV V1 mean P.0 mmHg  Ao V2 mean: 83.2 cm/sec LV V1 max: 93.8 cm/sec  Ao mean PG: 3.0 mmHg LV V1 mean: 58.7 cm/sec  Ao V2 VTI: 20.9 cm LV V1 VTI: 13.9 cm  ______________________________________________________________________________  AV VR_phl: 0.75 MV P1/2t-pr_phl: 47.9 msec  ______________________________________________________________________________  RV S Vel_phl: 7.6 cm/sec    LEFT VENTRICLE    o The left ventricle is grossly normal size.  o Ejection Fraction = 30-35%.  o LV wall thickness grossly normal.  o There is Apical 'Akinesis'.  o Anteroseptum Akinesis  Inferoseptum Akinesis.  o There are regional wall  motion abnormalities as specified.    DIASTOLOGY    o Lateral e'= '6' cm/s.  o Septal e'= '6' cm/s.  o E/e' ='10'.  o LAESV index = '21' ml/m2.  o Indeterminate Diastolic Function.    RIGHT VENTRICLE    o The right ventricle is grossly normal size.  o The right ventricular systolic function is normal.    ATRIA    o Left Atrium Grossly Normal.  o IVC diameter < 2.1cm with < 50% collapse.    MITRAL VALVE    o Mitral valve leaflets appear to be mildly thickened.  o There is trace mitral regurgitation.  o There is no mitral valve stenosis.    TRICUSPID VALVE    o The tricuspid valve is not well visualized, but is grossly normal.  o No tricuspid regurgitation.  o There is no tricuspid stenosis.    AORTIC VALVE    o Aortic Valve Grossly Normal.  o No aortic regurgitation is present.  o There is no aortic valvular stenosis.  o AV MAX PG 6.3 mmHg.    PULMONIC VALVE    o The pulmonic valve is not well visualized.    GREAT VESSELS    o The aortic root is normal size.  o The pulmonary artery is not well visualized.    PERICARDIUM/PLEURAL EFFUSION    o There is no pericardial effusion.  o There is no pleural effusion.    ______________________________________________________________________________  Electronically signed by: Sj Bullock MD 2023 06:43 PM  InterpretingPhysician: Sj Bullock MD electronically signed on 2023 18:43:39.51   CATH LAB ONLY-Left Heart Cath* [[id number]] Collected: 2023 1308   Updated: 2023 1937   Narrative:   PROCEDURES: Include the followin. Left heart catheterization with bilateral selective coronary   angiography via the right femoral approach.  2. Angioplasty of the occluded proximal left anterior descending with   angioplasty to the proximal, mid, and distal left anterior descending.  3. Aspiration thrombectomy of the proximal, mid, and distal left   anterior descending.   4. Intravascular ultrasound interrogation of the left main as well as   proximal, mid, and  distal left anterior descending.  5. Angioplasty with a drug-eluting stent of the proximal and mid left   anterior descending with a drug-eluting stent, IVUS directed with Impella   placement via the left femoral approach.  6. Angioplasty and stenting of the proximal and ostial circumflex with   kissing balloon technique to the proximal left anterior descending and   proximal circumflex, IVUS directed.  7. Placement of Impella CP device via the left common femoral   approach.  8. Left common femoral artery angiography.  9. Right common femoral artery angiography.  10. MANTA closure of the left femoral arteriotomy with supervision by   the Impella rep and by Dr. Hinkle  11. Adjunctive balloon angioplasty of the left common femoral artery   via the right common femoral approach with a 7 x 40 [place] balloon   catheter and with selective angiogram of the left external iliac artery.  12. Angio-Seal closure of the right femoral arteriotomy.  13. Supervised conscious sedation with a start time of 10:06, end time   of 12:37. 2 mg of Versed and 100 mcg of fentanyl administered with   face-to-face observation of the patient by the RN assessing vital signs,   telemetry, and level of sedation during  the case. Also with the addition of 2 mg of morphine sulfate and   dopamine briefly for hemodynamic support.    SURGEON: Sj Bullock M.D.    ASSISTANT: [place].    INDICATION: Acute anterolateral ST-elevation-myocardial infarction with   cardiogenic shock.    PROCEDURE IN DETAIL: The patient was taken to the cath lab, placed on   nasal cannula oxygen. Blood pressure, heart rate, EKG, and heart rhythm   were monitored continuously during the procedure. She was given   incremental doses of Versed and fentanyl to   achieve conscious sedation with face-to-face observation by the RN as   mentioned above. With ultrasound guidance and with a micropuncture needle   and after administration of lidocaine into the right groin, we accessed    the right common femoral artery and   placed a 7-Swedish sheath within. We then advanced a CLS 3.5 coronary   guiding catheter over a 0.035 J-wire. The patient was given intravenous   heparin was also started on Integrilin drip after a double bolus.   Angiography confirms a very proximal LAD   100% occlusion. We advanced a 0.014 ChoICE PT wire through the guiding   catheter and distally into the occluded LAD. Balloon angioplasty was   accomplished with a 2.5 x 12 Emerge catheter with aspiration of the   proximal, mid, and distal portion of the   vessel. This restored patency. There was sluggish antegrade flow with   thrombus formation in the mid to distal LAD. We removed this catheter and   advanced a Pronto aspiration catheter. Multiple aspiration rounds with   the Pronto were performed with   retrieval of thrombotic material and improvement in antegrade flow though   still sluggish at the apex. Next, we performed intravascular ultrasound   interrogation of the left main as well as proximal and mid to distal LAD.   We removed the IVUS catheter and  then deployed a stent into the ostial and mid portion of the LAD. Prior   to stenting, we performed shockwave angioplasty using a 3.5 x 12 balloon   catheter with shockwave treatments in the proximal and in the midportion   of the LAD. Please see the case   log for details. We then removed the Shockwave catheter and deployed the   stent which is a 3.5 x 32 Synergy drug-eluting stent. This was deployed   at nominal atmospheres. Please see the case log for details. Prior to   deploying the stent, we did place   the Impella device into the left femoral artery. We accessed the left   common femoral artery with ultrasound guidance and with a micropuncture   needle and with local anesthesia. An angiogram was performed of the left   common femoral and this revealed a   good stick site. Next, we upsized and dilated the sheath in the left and   then placed the Impella sheath  into the left groin. Through the sheath,   we then advanced a pigtail catheter over a 0.035 J-wire. This crossed the   aortic valve without difficulty.  Hemodynamics in the left ventricle were then obtained. Next, LV   cineangiography was performed in VALLE projections. We then advanced the   Impella guidewire through the pigtail catheter and removed the pigtail   catheter. Next, we advanced the Impella CP   device through the left femoral sheath over the wire and into the left   ventricle. The wire was then removed and the Impella device was activated   and was functioning normally. After Impella support, we then proceeded   with definitive stenting of the LAD.  Prior to doing so, we advanced a 0.014 Luge wire into the circumflex   artery in case of plaque shift. The stent was deployed at the ostium and   proximal and into the midportion of the LAD in orthogonal views. We then   removed the stent deployment   balloon and angiogram demonstrated a good result to the distal left main   as visible at the proximal and mid LAD, but there was significant plaque   shift into the ostium of the circumflex. This was dilated with a 3.0 x 12   NC balloon. Please see the case   log for details. We then removed the balloon catheter and angiogram   demonstrated a good result to the circumflex with no impingement on the   LAD. We decided to stent this area and we placed a 3.0 x 12 Synergy   drug-eluting stent at the ostium of the   circumflex. This was deployed at nominal atmospheres. We then removed   the stent deployment balloon and reinserted the 3.0 x 12 NC Quantum within   the circumflex stent. We advanced the 3.5 x 20 NC Quantum into the LAD   and performed high pressure balloon  inflations throughout the length of the LAD stent and then kissing   balloon inflations on the circumflex and on the ostial and proximal LAD   with simultaneous inflations and deflations. Please see the case log for   details. We then removed the NC  balloon  from the LAD and from the circumflex and an angiogram was performed in   orthogonal views. A good angiographic result was noted to the ostium of   the LAD and the circumflex with no plaque shift into the left main with   brisk antegrade flow. We then   readvanced the Volcano intravascular ultrasound catheter and performed   IVUS interrogation of the left main as well as proximal mid to distal LAD.   This reveals good stent apposition of the proximal and mid LAD stents and   there was a step-down without any  evidence of edge dissection to the mid LAD, which tapers down to a 2.75   mm vessel. More distally, the LAD is 2.0 mm in diameter without focal   stenosis. We then removed the wire and the patient was given some   additional nitro and followup angiography   was performed of the left coronary in multiple views. We then removed   this guiding catheter over the wire. It should be noted during balloon   inflations of the LAD, the patient became hypotensive with systolic   pressures in the 70s, which required the   initiation of dopamine. This was all prior to the Impella placement.   After definitive stenting of both the LAD and the circumflex, blood   pressures were in the 130 systolic range and we were able to wean off the   dopamine in its entirety as well as wean   off the Impella device. We advanced a JR4, 6-Estonian catheter over a 0.035   J-wire. This cannulated the right coronary artery. Angiography of the   right coronary artery was performed in orthogonal views. We then removed   this catheter over the wire.   Next, we weaned off the Impella completely and this was withdrawn across   the aortic valve. The Impella device was then stopped and removed from   the left femoral sheath. We then maintained the Amplatz wire in the left   femoral artery and over this wire,   we removed the Impella sheath and while maintaining hemostasis, advanced   the sounding device for the MANTA. Flow stopped at 5 cm. We  then   repeated this and again flow stopped at 5 cm. We then removed the   sounding device while maintaining hemostasis   and advanced the MANTA sheath. Over the MANTA sheath, we then advanced   the MANTA closure device and this was clipped into place. The entire unit   was elevated at 45 degrees and was pulled out of the sheath. Reasonable   hemostasis was accomplished with a  3-minute to 4-minute hold. We then relaxed the device and examined the   arteriotomy of the left and there was some oozing noted. Additional   hemostasis was noted and further camping of the collagen tube was also   performed. Manual pressure was applied   on the left side for 10 minutes and this improved the oozing to the left   groin. We advanced a pigtail catheter to the abdominal aortic position.   Abdominal aortography was performed and this revealed some oozing from the   site. Additional 10 minutes of   holding was performed and this improved oozing quite a bit. There was   sluggish antegrade flow in the common femoral, so we advanced a 0.035   stiff-angled Glidewire into the left superficial femoral artery. We then   advanced a 7 x 40 Gilbert balloon and   performed low pressure balloon inflations of the left common femoral   artery for a 90-second inflation. We then backed out the balloon and   removed the wire and performed the angiogram through the balloon lumen.   This revealed a widely patent left common   femoral with a good flow into the profunda and into the SFA with minimal   oozing at the site. We then removed the balloon catheter from the sheath,   flushed the sheath and then removed the sheath and deployed a 6-[place]   Angio-Seal hemostatic device into   the right femoral arteriotomy with good hemostasis. She tolerated the   procedure well without complications.    RESULTS:  HEMODYNAMIC DATA: The left ventricular pressure was 94/38 to 94/42 and   the aortic pressure was 94/70.    ANGIOGRAPHIC DATA: Left main coronary artery  is short with mild plaque   and some calcium. The proximal LAD is 100% occluded with some ostial 60%   plaque noted. After balloon angioplasty, the LAD was open, but with   thrombus formation in the mid to distal  portion of the vessel. This improved with aspiration thrombectomy with   additional ballooning with a 2.0 x 20 balloon catheter towards the apex of   the LAD. Initially, there was some thrombotic embolus into the first   diagonal branch as well and this   seemed to clear with continued Integrilin infusion. The circumflex has   mild plaque at its ostium and after definitive stenting of the proximal   and mid LAD after shockwave angioplasty due to circumferential calcium in   the proximal vessel, there was   plaque shift into the ostium of the circumflex. This was treated with   balloon angioplasty and stenting. Kissing balloons were ultimately used   for post-stent deployment of both the ostial and proximal circumflex and   ostial, proximal and mid LAD stents.   An excellent angiographic result was noted with good stent apposition   noted on IVUS and with an intact left main. The mid LAD has mild luminal   disease and the apex has a sluggish antegrade flow consistent with   thrombotic debris in this location. There  were 2 diagonal branches, both of which appeared normal. The circumflex   as mentioned had a 70% ostial plaque treated with kissing stents with good   angiographic result. There were 3 OM branches noted. OM1 is tiny, OM2   and 3 are fairly normal. The   right coronary artery is dominant. There is moderate plaque involving the   proximal right coronary artery and moderate 50% to 60% plaque involving   the mid right coronary artery in the vicinity of an RV acute marginal   branch, which was a bifurcating   vessel. Posterolateral artery is normal. The PDA is small.    LV ejection fraction is 30% with anterolateral akinesis and apical   akinesis.    CONCLUSION:   1. Anterolateral ST elevation  myocardial infarction with a proximal LAD   occlusion treated with angioplasty, aspiration thrombectomy shockwave   angioplasty of the proximal and mid LAD as well as drug-eluting stenting   of the proximal and mid LAD, IVUS   directed, and with stent deployment in the proximal ostial circumflex with   kissing post-stent deployments in the LAD and in the circumflex with a   good angiographic result.   2. Moderate disease involving the proximal and mid right coronary   artery.  3. Severely depressed LV systolic function with anterolateral and   apical akinesis and with moderately elevated LVEDP at 38-42 mmHg.  4. Cardiogenic shock during the case with systolic pressures in the   70s, recovering after dopamine administered briefly and with Impella   supported intervention to the LAD and circumflex.  5. MANTA closure of the left femoral arteriotomy with mild oozing at   the site treated with external pressure and with internal balloon   tamponade, transiently diminished flow in the left common femoral which   improved with balloon inflation to the left   common femoral from the right common femoral approach.  6. Angio-Seal closure of the right femoral arteriotomy.    Sj Bullock MD  V# [id number] D# [id number]  D: bme/WT: [facility]PT/T: man  DD: 04/07/2023 13:08 TD: 04/07/2023 15:07         Surgical Procedures during this visit:    Procedure(s):  LOWER EXTREMITY ANGIOGRAM (RUNOFF)  Date  4/7/2023  Primary Surgeon  Sj Bullock MD  -------------------    Procedure(s):  LOWER EXTREMITY ANGIOGRAM (RUNOFF)  Date  4/8/2023  Primary Surgeon  Sj Bullock MD  -------------------    Patient's Condition On Discharge: Stable    Physical Exam  Vitals and nursing note reviewed.   Constitutional:   General: She is not in acute distress.  Appearance: Normal appearance. She is normal weight. She is not ill-appearing or toxic-appearing.   HENT:   Head: Normocephalic and atraumatic.   Nose: Nose normal.   Mouth/Throat:    Mouth: Mucous membranes are moist.   Cardiovascular:   Rate and Rhythm: Regular rhythm.   Heart sounds: Normal heart sounds.   Pulmonary:   Effort: Pulmonary effort is normal. No respiratory distress.   Breath sounds: Normal breath sounds. No wheezing.   Abdominal:   General: Bowel sounds are normal.   Palpations: Abdomen is soft.   Tenderness: There is no abdominal tenderness.   Musculoskeletal:   General: Normal range of motion.   Cervical back: Neck supple.   Skin:  General: Skin is warm.   Neurological:   General: No focal deficit present.   Mental Status: She is alert and oriented to person, place, and time.   Psychiatric:   Mood and Affect: Mood normal.   Behavior: Behavior normal.     Discharge Disposition:   Order for Discharge (From admission, onward)   Start Ordered   04/13/23 1549 Discharge Disposition to: Home or Self Care Once   Expected Discharge Date: 04/13/23   Expected Discharge Time: Afternoon     Question: Discharge Disposition to Answer: Home or Self Care   04/13/23 1550   04/13/23 0000 Follow-up with PCP   04/13/23 1550   04/13/23 0000 Follow-up with: FELICIANO SWIFT; Schedule for: 2; Weeks   Question Answer Comment   with FELICIANO SWIFT   Schedule for 2   when Weeks     04/13/23 1550             Discharge Orders:    Future Appointments:    Immunizations Administered for This Admission   No immunizations given this admission.         Medication List     START taking these medications   amiodarone 200 MG Tab tablet  Commonly known as: PACERONE  Take 1 tablet (200 mg total) by mouth daily  Start taking on: April 14, 2023    apixaban 5 mg Tab  Commonly known as: ELIQUIS  Take 1 tablet (5 mg total) by mouth 2 (two) times daily  Start taking on: April 14, 2023    clopidogreL 75 mg Tab tablet  Commonly known as: PLAVIX  Take 1 tablet (75 mg total) by mouth daily  Start taking on: April 14, 2023    furosemide 20 MG Tab tablet  Commonly known as: LASIX  Take 1 tablet (20 mg total) by mouth  daily as needed (SOB/LE edema)  Start taking on: April 14, 2023    metoprolol succinate 25 MG Tb24 24 hr tablet  Commonly known as: TOPROL-XL  Take 1 tablet (25 mg total) by mouth daily  Start taking on: April 14, 2023      CHANGE how you take these medications   atorvastatin 80 MG Tab tablet  Commonly known as: LIPITOR  Take 1 tablet (80 mg total) by mouth nightly  What changed:   · medication strength  · how much to take  · when to take this      CONTINUE taking these medications   albuterol 90 mcg/actuation Hfaa inhaler  Commonly known as: VENTOLIN    aspirin 81 MG Chew chewable tablet    blood sugar diagnostic Strp test strip    blood-glucose meter Kit kit    chlorhexidine 4 % Liqd external liquid  Commonly known as: HIBICLENS    fluorometholone 0.1 % Drps eye drops  Commonly known as: FML LIQUIFILM    hydrocortisone 1 % Crea topical cream    Inveltys 1 % Drps  Generic drug: loteprednol etabonate    Januvia 100 MG Tab tablet  Generic drug: SITagliptin phosphate    ofloxacin 0.3 % Drop eye drops  Commonly known as: OCUFLOX    Prolensa 0.07 % Drop  Generic drug: bromfenac      STOP taking these medications   amLODIPine 10 MG Tab tablet  Commonly known as: NORVASC    meloxicam 15 MG Tab tablet  Commonly known as: MOBIC        Where to Get Your Medications     You can get these medications from any pharmacy   Bring a paper prescription for each of these medications  · amiodarone 200 MG Tab tablet  · apixaban 5 mg Tab  · atorvastatin 80 MG Tab tablet  · clopidogreL 75 mg Tab tablet  · furosemide 20 MG Tab tablet  · metoprolol succinate 25 MG Tb24 24 hr tablet      Discharge Orders   Future Labs/Procedures Expected by Expires   Activity as tolerated As directed   Diet (Select type) Cardiac/Low Chol/OPAL As directed   Questions:   Diet Type: Cardiac/Low Chol/OPAL   Restriction(s):   Solid Consistency:   Liquid Consistency:   Sodium Restriction:   Fluid restriction:   Follow-up with PCP As directed   Questions:   Schedule  for:   when:   Follow-up with: FELICIANO SWIFT; Schedule for: 2; Weeks As directed   Questions:   with: FELICIANO SWIFT   Schedule for: 2   when: Weeks         Electronically signed by Madi Zhou MD at 04/13/2023 4:00 PM CDT           Current Assessment & Plan     Start cardiac rehab as ordered.  Follow-up with cardiology as scheduled.  Continue current medications on summary list.  Transitional Care Note    Family and/or Caretaker present at visit?  No  Diagnostic tests reviewed/disposition: I have reviewed all completed as well as pending diagnostic tests at the time of discharge.  Disease/illness education:  MI status post coronary stent  Home health/community services discussion/referrals: Patient does not have home health established from hospital visit.  They do not need home health.  If needed, we will set up home health for the patient.   Establishment or re-establishment of referral orders for community resources: No other necessary community resources.   Discussion with other health care providers: No discussion with other health care providers necessary.                  Status post primary angioplasty with coronary stent    Overview     See Cardiology note.           Current Assessment & Plan     Continue current medications including Plavix.               Review of patient's allergies indicates:   Allergen Reactions    Metformin     Farxiga [dapagliflozin]      Yeast infection      Latex, natural rubber     Bactrim [sulfamethoxazole-trimethoprim] Rash     Current Outpatient Medications   Medication Instructions    amiodarone (PACERONE) 200 mg, Oral    atorvastatin (LIPITOR) 80 mg, Oral, Nightly    blood sugar diagnostic Strp To check BG 2 times daily, to use with insurance preferred meter    blood-glucose meter kit To check BG 2 times daily, to use with insurance preferred meter    chlorhexidine (HIBICLENS) 4 % external liquid Topical (Top), Daily PRN    clopidogreL (PLAVIX) 75  mg, Oral    ELIQUIS 5 mg, Oral, 2 times daily    fluocinolone acetonide oiL (DERMOTIC OIL) 0.01 % Drop 3 drops, Otic, 2 times daily    fluorometholone 0.1% (FML) 0.1 % DrpS INSTILL 1 DROP INTO EACH EYE THREE TIMES DAILY AS DIRECTED    furosemide (LASIX) 20 mg, Oral, Daily PRN    hydrocortisone 1 % cream Apply to affected area 2 times daily    INVELTYS 1 % DrpS 1 drop, Both Eyes, 2 times daily    lancets Misc To check BG 2 times daily, to use with insurance preferred meter    levalbuterol (XOPENEX HFA) 45 mcg/actuation inhaler SMARTSI Puff(s) Via Inhaler Every 4 Hours PRN    metoprolol succinate (TOPROL-XL) 25 mg, Oral    mupirocin (BACTROBAN) 2 % ointment Topical (Top), 3 times daily    ofloxacin (OCUFLOX) 0.3 % ophthalmic solution No dose, route, or frequency recorded.    prednisoLONE acetate (PRED FORTE) 1 % DrpS 1 drop, Left Eye, 4 times daily    PROLENSA 0.07 % Drop SMARTSIG:In Eye(s)    SITagliptin phosphate (JANUVIA) 100 mg, Oral, Daily    XIIDRA 5 % Dpet INSTILL 1 DROP INTO EACH EYE TWICE DAILY AS DIRECTED      I have reviewed the PMH, social history, FamilyHx, surgical history, allergies and medications documented / confirmed by the patient at the time of this visit.  Review of Systems   Constitutional:  Positive for activity change. Negative for unexpected weight change.   HENT:  Negative for hearing loss, rhinorrhea and trouble swallowing.    Eyes:  Negative for discharge and visual disturbance.   Respiratory:  Negative for chest tightness and wheezing.    Cardiovascular:  Positive for palpitations. Negative for chest pain.   Gastrointestinal:  Negative for blood in stool, constipation, diarrhea and vomiting.   Endocrine: Negative for polydipsia and polyuria.   Genitourinary:  Negative for difficulty urinating, dysuria, hematuria and menstrual problem.   Musculoskeletal:  Negative for arthralgias, joint swelling and neck pain.   Neurological:  Negative for weakness and headaches.    Psychiatric/Behavioral:  Negative for confusion and dysphoric mood.    Objective:   There were no vitals taken for this visit.  Physical Exam  Constitutional:       General: She is not in acute distress.     Appearance: She is well-developed. She is not ill-appearing, toxic-appearing or diaphoretic.   HENT:      Head: Normocephalic and atraumatic.      Right Ear: Hearing and external ear normal.      Left Ear: Hearing and external ear normal.   Eyes:      General: Lids are normal.      Conjunctiva/sclera: Conjunctivae normal.   Pulmonary:      Effort: Pulmonary effort is normal. No respiratory distress.   Musculoskeletal:         General: Normal range of motion.      Cervical back: Normal range of motion.   Skin:     Coloration: Skin is not pale.   Neurological:      Mental Status: She is alert. She is not disoriented.   Psychiatric:         Attention and Perception: She is attentive.         Mood and Affect: Mood is not anxious or depressed.         Speech: Speech is not rapid and pressured or slurred.         Behavior: Behavior normal. Behavior is not agitated, aggressive or hyperactive. Behavior is cooperative.         Thought Content: Thought content normal. Thought content is not paranoid or delusional. Thought content does not include homicidal or suicidal ideation. Thought content does not include homicidal or suicidal plan.         Cognition and Memory: Memory is not impaired.         Judgment: Judgment normal.       Assessment:     1. Hospital discharge follow-up    2. Type 2 diabetes mellitus with hyperglycemia, without long-term current use of insulin    3. Hypertension associated with diabetes    4. Encounter for long-term (current) use of medications    5. Hyperlipidemia associated with type 2 diabetes mellitus    6. Stricture of artery    7. Status post primary angioplasty with coronary stent      MDM:   Moderate medical complexity.  Moderate risk.  Total time: 31 minutes.  This includes total time  spent on the encounter, which includes face to face time and non-face to face time preparing to see the patient (eg, review of previous medical records, tests), Obtaining and/or reviewing separately obtained history, documenting clinical information in the electronic or other health record, independently interpreting results (not separately reported)/communicating results to the patient/family/caregiver, and/or care coordination (not separately reported).    I have Reviewed and summarized old records.  I have performed thorough medication reconciliation today and discussed risk and benefits of medications.  I have reviewed labs and discussed with patient.  All questions were answered.  I am requesting old records and will review them once they are available.  Cardiology    I have signed for the following orders AND/OR meds.  Orders Placed This Encounter   Procedures    CBC Without Differential     Standing Status:   Future     Standing Expiration Date:   6/19/2024    Comprehensive Metabolic Panel     Standing Status:   Future     Standing Expiration Date:   6/19/2024    TSH     Standing Status:   Future     Standing Expiration Date:   6/19/2024    Hemoglobin A1C     Standing Status:   Future     Standing Expiration Date:   6/19/2024    Lipid Panel     Standing Status:   Future     Standing Expiration Date:   6/19/2024           Follow up if symptoms worsen or fail to improve.  Future Appointments       Date Provider Specialty Appt Notes    7/21/2023  [facility]    8/30/2023 Eh Swo MD Family Medicine ANNUAL          If no improvement in symptoms or symptoms worsen, advised to call/follow-up at clinic or go to ER. Patient voiced understanding and all questions/concerns were addressed.   DISCLAIMER: This note was compiled by using a speech recognition dictation system and therefore please be aware that typographical / speech recognition errors can and do occur.  Please contact me if you see any errors  specifically.    Eh Sow M.D.       Office: [phone] [id number] [place] [address]  FAX: [phone]

## 2023-04-21 NOTE — ASSESSMENT & PLAN NOTE
Target LDL less than 70.  High-intensity statin has been started.  Counseled on hyperlipidemia disease course, healthy diet and increased need for exercise.  Please be advised of the risk of cardiovascular disease, increase stroke and heart attack risk with uncontrolled/untreated hyperlipidemia.     Patient voiced understanding and understood the treatment plan. All questions were answered.

## 2023-04-21 NOTE — ASSESSMENT & PLAN NOTE
Start cardiac rehab as ordered.  Follow-up with cardiology as scheduled.  Continue current medications on summary list.  Transitional Care Note    Family and/or Caretaker present at visit?  No  Diagnostic tests reviewed/disposition: I have reviewed all completed as well as pending diagnostic tests at the time of discharge.  Disease/illness education:  MI status post coronary stent  Home health/community services discussion/referrals: Patient does not have home health established from hospital visit.  They do not need home health.  If needed, we will set up home health for the patient.   Establishment or re-establishment of referral orders for community resources: No other necessary community resources.   Discussion with other health care providers: No discussion with other health care providers necessary.

## 2023-05-08 NOTE — TELEPHONE ENCOUNTER
Care Due:                  Date            Visit Type   Department     Provider  --------------------------------------------------------------------------------                                ESTABLISHED                              PATIENT -    [facility]  Last Visit: 04-      [facility]      MEDICINE       Eh Sow                               -                              PRIMARY      [facility]  Next Visit: 08-      CARE ([facility])   MEDICINE       Eh Sow                                                            Last  Test          Frequency    Reason                     Performed    Due Date  --------------------------------------------------------------------------------    HBA1C.......  6 months...  SITagliptin..............  01- 07-    [facility] Embedded Care Due Messages. Reference number: [id number].   5/08/2023 3:40:02 PM CDT

## 2023-05-09 RX ORDER — LEVALBUTEROL TARTRATE 45 UG/1
AEROSOL, METERED ORAL
Qty: 15 G | Refills: 0 | Status: SHIPPED | OUTPATIENT
Start: 2023-05-09 | End: 2023-06-28

## 2023-05-09 NOTE — TELEPHONE ENCOUNTER
Refill Routing Note   Medication(s) are not appropriate for processing by [facility] for the following reason(s):      New or recently adjusted medication  No active prescription written by PCP: Historical Med    [facility] action(s):  Defer Care Due:  None identified   Medication Therapy Plan: FLOS, Historical Med      Appointments  past 12m or future 3m with PCP    Date Provider   Last Visit   4/21/2023 Eh Sow MD   Next Visit   8/30/2023 Eh Sow MD   ED visits in past 90 days: 0        Note composed:10:29 AM 05/09/2023

## 2023-06-05 ENCOUNTER — PATIENT MESSAGE (OUTPATIENT)
Dept: FAMILY MEDICINE | Facility: CLINIC | Age: 68
End: 2023-06-05
Payer: MEDICARE

## 2023-06-06 ENCOUNTER — PATIENT MESSAGE (OUTPATIENT)
Dept: OTOLARYNGOLOGY | Facility: CLINIC | Age: 68
End: 2023-06-06
Payer: MEDICARE

## 2023-06-13 ENCOUNTER — OFFICE VISIT (OUTPATIENT)
Dept: OTOLARYNGOLOGY | Facility: CLINIC | Age: 68
End: 2023-06-13
Payer: MEDICARE

## 2023-06-13 VITALS — BODY MASS INDEX: 31.89 KG/M2 | HEIGHT: 62 IN | WEIGHT: 173.31 LBS | TEMPERATURE: 98 F

## 2023-06-13 DIAGNOSIS — H60.92 OTITIS EXTERNA OF LEFT EAR, UNSPECIFIED CHRONICITY, UNSPECIFIED TYPE: Primary | ICD-10-CM

## 2023-06-13 PROCEDURE — 99999 PR PBB SHADOW E&M-EST. PATIENT-LVL III: CPT | Mod: PBBFAC,,, | Performed by: OTOLARYNGOLOGY

## 2023-06-13 PROCEDURE — 3051F PR MOST RECENT HEMOGLOBIN A1C LEVEL 7.0 - < 8.0%: ICD-10-PCS | Mod: CPTII,S$GLB,, | Performed by: OTOLARYNGOLOGY

## 2023-06-13 PROCEDURE — 99212 PR OFFICE/OUTPT VISIT, EST, LEVL II, 10-19 MIN: ICD-10-PCS | Mod: S$GLB,,, | Performed by: OTOLARYNGOLOGY

## 2023-06-13 PROCEDURE — 3288F FALL RISK ASSESSMENT DOCD: CPT | Mod: CPTII,S$GLB,, | Performed by: OTOLARYNGOLOGY

## 2023-06-13 PROCEDURE — 99999 PR PBB SHADOW E&M-EST. PATIENT-LVL III: ICD-10-PCS | Mod: PBBFAC,,, | Performed by: OTOLARYNGOLOGY

## 2023-06-13 PROCEDURE — 3288F PR FALLS RISK ASSESSMENT DOCUMENTED: ICD-10-PCS | Mod: CPTII,S$GLB,, | Performed by: OTOLARYNGOLOGY

## 2023-06-13 PROCEDURE — 1101F PT FALLS ASSESS-DOCD LE1/YR: CPT | Mod: CPTII,S$GLB,, | Performed by: OTOLARYNGOLOGY

## 2023-06-13 PROCEDURE — 1101F PR PT FALLS ASSESS DOC 0-1 FALLS W/OUT INJ PAST YR: ICD-10-PCS | Mod: CPTII,S$GLB,, | Performed by: OTOLARYNGOLOGY

## 2023-06-13 PROCEDURE — 3008F BODY MASS INDEX DOCD: CPT | Mod: CPTII,S$GLB,, | Performed by: OTOLARYNGOLOGY

## 2023-06-13 PROCEDURE — 1159F MED LIST DOCD IN RCRD: CPT | Mod: CPTII,S$GLB,, | Performed by: OTOLARYNGOLOGY

## 2023-06-13 PROCEDURE — 1126F PR PAIN SEVERITY QUANTIFIED, NO PAIN PRESENT: ICD-10-PCS | Mod: CPTII,S$GLB,, | Performed by: OTOLARYNGOLOGY

## 2023-06-13 PROCEDURE — 3051F HG A1C>EQUAL 7.0%<8.0%: CPT | Mod: CPTII,S$GLB,, | Performed by: OTOLARYNGOLOGY

## 2023-06-13 PROCEDURE — 1126F AMNT PAIN NOTED NONE PRSNT: CPT | Mod: CPTII,S$GLB,, | Performed by: OTOLARYNGOLOGY

## 2023-06-13 PROCEDURE — 3008F PR BODY MASS INDEX (BMI) DOCUMENTED: ICD-10-PCS | Mod: CPTII,S$GLB,, | Performed by: OTOLARYNGOLOGY

## 2023-06-13 PROCEDURE — 99212 OFFICE O/P EST SF 10 MIN: CPT | Mod: S$GLB,,, | Performed by: OTOLARYNGOLOGY

## 2023-06-13 PROCEDURE — 1159F PR MEDICATION LIST DOCUMENTED IN MEDICAL RECORD: ICD-10-PCS | Mod: CPTII,S$GLB,, | Performed by: OTOLARYNGOLOGY

## 2023-06-13 NOTE — PROGRESS NOTES
"Referring Provider:    No referring provider defined for this encounter.  Subjective:   Patient: Crystal Acuna [id number], :1955   Visit date:2023 2:51 PM    Chief Complaint:  Ear Fullness (Ear infection follow up, She went to urgent care. Patient states her ear feels stopped up all the time. She was taking antibiotic drops and oral medication. L ear.)    HPI:    Prior notes reviewed by myself.  Clinical documentation obtained by nursing staff reviewed.     67-year-old female presents for evaluation of recent left-sided ear infection.  She reports what sounds like otitis externa that was diagnosed and treated in urgent care with dutch topical ear drops and antibiotics.  She does feel as if things are substantially improved but still occasionally feels muffled hearing.      Objective:     Physical Exam:  Vitals:  Temp 97.5 °F (36.4 °C) (Temporal)   Ht 5' 2.01" (1.575 m)   Wt 78.6 kg (173 lb 4.5 oz)   BMI 31.68 kg/m²   General appearance:  Well developed, well nourished    Ears:  Otoscopy of external auditory canals and tympanic membranes was significant for scaly/dry skin left EAC and normal TMs, clinical speech reception thresholds grossly intact, no mass/lesion of auricle.    Nose:  No masses/lesions of external nose, nasal mucosa, septum, and turbinates were within normal limits.    Mouth:  No mass/lesion of lips, teeth, gums, hard/soft palate, tongue, tonsils, or oropharynx.    Neck & Lymphatics:  No cervical lymphadenopathy, no neck mass/crepitus/ asymmetry, trachea is midline, no thyroid enlargement/tenderness/mass.        [x]  Data Reviewed:    Lab Results   Component Value Date    WBC 5.68 2023    HGB 15.4 2023    HCT 49.3 (H) 2023    MCV 93 2023    EOSINOPHIL 2.3 2022             Assessment & Plan:   Otitis externa of left ear, unspecified chronicity, unspecified type        She seems to have a resolving/resolved otitis externa.  I reassured her and explained that " if her symptoms return that she should restart the ear drops.  She still has some of the dutch topical antibiotic drops at home.  She can follow up p.r.n..

## 2023-06-28 RX ORDER — LEVALBUTEROL TARTRATE 45 UG/1
AEROSOL, METERED ORAL
Qty: 45 G | Refills: 1 | Status: SHIPPED | OUTPATIENT
Start: 2023-06-28

## 2023-06-28 NOTE — TELEPHONE ENCOUNTER
Refill Routing Note   Medication(s) are not appropriate for processing by [facility] for the following reason(s):      New or recently adjusted medication    [facility] action(s):  Defer None identified            Appointments  past 12m or future 3m with PCP    Date Provider   Last Visit   4/21/2023 Eh Sow MD   Next Visit   8/30/2023 Eh Sow MD   ED visits in past 90 days: 0        Note composed:10:14 AM 06/28/2023

## 2023-06-28 NOTE — TELEPHONE ENCOUNTER
No care due was identified.  [facility] Embedded Care Due Messages. Reference number: [id number].   6/28/2023 8:27:11 AM CDT

## 2023-07-24 PROBLEM — I21.3 STEMI (ST ELEVATION MYOCARDIAL INFARCTION): Status: RESOLVED | Noted: 2023-04-07 | Resolved: 2023-07-24

## 2023-07-24 PROBLEM — Z09 HOSPITAL DISCHARGE FOLLOW-UP: Status: RESOLVED | Noted: 2023-04-21 | Resolved: 2023-07-24

## 2023-07-25 ENCOUNTER — PATIENT OUTREACH (OUTPATIENT)
Dept: ADMINISTRATIVE | Facility: HOSPITAL | Age: 68
End: 2023-07-25
Payer: MEDICARE

## 2023-07-26 ENCOUNTER — LAB VISIT (OUTPATIENT)
Dept: LAB | Facility: HOSPITAL | Age: 68
End: 2023-07-26
Attending: FAMILY MEDICINE
Payer: MEDICARE

## 2023-07-26 ENCOUNTER — OFFICE VISIT (OUTPATIENT)
Dept: FAMILY MEDICINE | Facility: CLINIC | Age: 68
End: 2023-07-26
Payer: MEDICARE

## 2023-07-26 VITALS
WEIGHT: 182.38 LBS | BODY MASS INDEX: 33.56 KG/M2 | HEIGHT: 62 IN | OXYGEN SATURATION: 97 % | HEART RATE: 54 BPM | DIASTOLIC BLOOD PRESSURE: 70 MMHG | SYSTOLIC BLOOD PRESSURE: 100 MMHG

## 2023-07-26 DIAGNOSIS — Z09 HOSPITAL DISCHARGE FOLLOW-UP: Primary | ICD-10-CM

## 2023-07-26 DIAGNOSIS — Z79.899 ENCOUNTER FOR LONG-TERM (CURRENT) USE OF MEDICATIONS: ICD-10-CM

## 2023-07-26 DIAGNOSIS — E11.59 HYPERTENSION ASSOCIATED WITH DIABETES: ICD-10-CM

## 2023-07-26 DIAGNOSIS — I15.2 HYPERTENSION ASSOCIATED WITH DIABETES: ICD-10-CM

## 2023-07-26 DIAGNOSIS — Z95.5 STATUS POST PRIMARY ANGIOPLASTY WITH CORONARY STENT: ICD-10-CM

## 2023-07-26 DIAGNOSIS — E11.69 HYPERLIPIDEMIA ASSOCIATED WITH TYPE 2 DIABETES MELLITUS: ICD-10-CM

## 2023-07-26 DIAGNOSIS — E11.59 HYPERTENSION ASSOCIATED WITH DIABETES: Chronic | ICD-10-CM

## 2023-07-26 DIAGNOSIS — Z09 HOSPITAL DISCHARGE FOLLOW-UP: ICD-10-CM

## 2023-07-26 DIAGNOSIS — E11.65 TYPE 2 DIABETES MELLITUS WITH HYPERGLYCEMIA, WITHOUT LONG-TERM CURRENT USE OF INSULIN: ICD-10-CM

## 2023-07-26 DIAGNOSIS — E78.5 HYPERLIPIDEMIA ASSOCIATED WITH TYPE 2 DIABETES MELLITUS: ICD-10-CM

## 2023-07-26 DIAGNOSIS — I15.2 HYPERTENSION ASSOCIATED WITH DIABETES: Chronic | ICD-10-CM

## 2023-07-26 DIAGNOSIS — G47.33 OSA (OBSTRUCTIVE SLEEP APNEA): ICD-10-CM

## 2023-07-26 PROBLEM — I48.0 PAF (PAROXYSMAL ATRIAL FIBRILLATION): Status: ACTIVE | Noted: 2023-07-18

## 2023-07-26 PROBLEM — I50.22 CHRONIC SYSTOLIC CONGESTIVE HEART FAILURE: Status: ACTIVE | Noted: 2023-07-18

## 2023-07-26 PROBLEM — R00.1 BRADYCARDIA: Status: ACTIVE | Noted: 2023-07-18

## 2023-07-26 PROBLEM — I25.10 CAD (CORONARY ARTERY DISEASE): Status: ACTIVE | Noted: 2023-07-18

## 2023-07-26 PROBLEM — R07.9 CHEST PAIN: Status: ACTIVE | Noted: 2023-07-18

## 2023-07-26 PROBLEM — H60.90 OTITIS EXTERNA: Status: ACTIVE | Noted: 2023-06-03

## 2023-07-26 LAB
ALBUMIN SERPL BCP-MCNC: 3.2 G/DL (ref 3.5–5.2)
ALP SERPL-CCNC: 62 U/L (ref 55–135)
ALT SERPL W/O P-5'-P-CCNC: 16 U/L (ref 10–44)
ANION GAP SERPL CALC-SCNC: 13 MMOL/L (ref 8–16)
AST SERPL-CCNC: 20 U/L (ref 10–40)
BILIRUB SERPL-MCNC: 0.2 MG/DL (ref 0.1–1)
BUN SERPL-MCNC: 18 MG/DL (ref 8–23)
CALCIUM SERPL-MCNC: 8.8 MG/DL (ref 8.7–10.5)
CHLORIDE SERPL-SCNC: 104 MMOL/L (ref 95–110)
CHOLEST SERPL-MCNC: 151 MG/DL (ref 120–199)
CHOLEST/HDLC SERPL: 3.2 {RATIO} (ref 2–5)
CO2 SERPL-SCNC: 25 MMOL/L (ref 23–29)
CREAT SERPL-MCNC: 0.9 MG/DL (ref 0.5–1.4)
ERYTHROCYTE [DISTWIDTH] IN BLOOD BY AUTOMATED COUNT: 16.7 % (ref 11.5–14.5)
EST. GFR  (NO RACE VARIABLE): >60 ML/MIN/1.73 M^2
ESTIMATED AVG GLUCOSE: 146 MG/DL (ref 68–131)
GLUCOSE SERPL-MCNC: 128 MG/DL (ref 70–110)
HBA1C MFR BLD: 6.7 % (ref 4–5.6)
HCT VFR BLD AUTO: 45.4 % (ref 37–48.5)
HDLC SERPL-MCNC: 47 MG/DL (ref 40–75)
HDLC SERPL: 31.1 % (ref 20–50)
HGB BLD-MCNC: 14 G/DL (ref 12–16)
LDLC SERPL CALC-MCNC: 87.8 MG/DL (ref 63–159)
MCH RBC QN AUTO: 28.1 PG (ref 27–31)
MCHC RBC AUTO-ENTMCNC: 30.8 G/DL (ref 32–36)
MCV RBC AUTO: 91 FL (ref 82–98)
NONHDLC SERPL-MCNC: 104 MG/DL
PLATELET # BLD AUTO: 178 K/UL (ref 150–450)
PMV BLD AUTO: 11.8 FL (ref 9.2–12.9)
POTASSIUM SERPL-SCNC: 4.2 MMOL/L (ref 3.5–5.1)
PROT SERPL-MCNC: 6.8 G/DL (ref 6–8.4)
RBC # BLD AUTO: 4.98 M/UL (ref 4–5.4)
SODIUM SERPL-SCNC: 142 MMOL/L (ref 136–145)
T4 FREE SERPL-MCNC: 0.94 NG/DL (ref 0.71–1.51)
TRIGL SERPL-MCNC: 81 MG/DL (ref 30–150)
TSH SERPL DL<=0.005 MIU/L-ACNC: 5.59 UIU/ML (ref 0.4–4)
WBC # BLD AUTO: 5.25 K/UL (ref 3.9–12.7)

## 2023-07-26 PROCEDURE — 1160F RVW MEDS BY RX/DR IN RCRD: CPT | Mod: CPTII,S$GLB,, | Performed by: FAMILY MEDICINE

## 2023-07-26 PROCEDURE — 1101F PR PT FALLS ASSESS DOC 0-1 FALLS W/OUT INJ PAST YR: ICD-10-PCS | Mod: CPTII,S$GLB,, | Performed by: FAMILY MEDICINE

## 2023-07-26 PROCEDURE — 84443 ASSAY THYROID STIM HORMONE: CPT | Performed by: FAMILY MEDICINE

## 2023-07-26 PROCEDURE — 85027 COMPLETE CBC AUTOMATED: CPT | Performed by: FAMILY MEDICINE

## 2023-07-26 PROCEDURE — 3288F PR FALLS RISK ASSESSMENT DOCUMENTED: ICD-10-PCS | Mod: CPTII,S$GLB,, | Performed by: FAMILY MEDICINE

## 2023-07-26 PROCEDURE — 3078F PR MOST RECENT DIASTOLIC BLOOD PRESSURE < 80 MM HG: ICD-10-PCS | Mod: CPTII,S$GLB,, | Performed by: FAMILY MEDICINE

## 2023-07-26 PROCEDURE — 99999 PR PBB SHADOW E&M-EST. PATIENT-LVL V: ICD-10-PCS | Mod: PBBFAC,,, | Performed by: FAMILY MEDICINE

## 2023-07-26 PROCEDURE — 80061 LIPID PANEL: CPT | Performed by: FAMILY MEDICINE

## 2023-07-26 PROCEDURE — 99999 PR PBB SHADOW E&M-EST. PATIENT-LVL V: CPT | Mod: PBBFAC,,, | Performed by: FAMILY MEDICINE

## 2023-07-26 PROCEDURE — 1126F PR PAIN SEVERITY QUANTIFIED, NO PAIN PRESENT: ICD-10-PCS | Mod: CPTII,S$GLB,, | Performed by: FAMILY MEDICINE

## 2023-07-26 PROCEDURE — 3051F PR MOST RECENT HEMOGLOBIN A1C LEVEL 7.0 - < 8.0%: ICD-10-PCS | Mod: CPTII,S$GLB,, | Performed by: FAMILY MEDICINE

## 2023-07-26 PROCEDURE — 3008F PR BODY MASS INDEX (BMI) DOCUMENTED: ICD-10-PCS | Mod: CPTII,S$GLB,, | Performed by: FAMILY MEDICINE

## 2023-07-26 PROCEDURE — 1126F AMNT PAIN NOTED NONE PRSNT: CPT | Mod: CPTII,S$GLB,, | Performed by: FAMILY MEDICINE

## 2023-07-26 PROCEDURE — 99215 PR OFFICE/OUTPT VISIT, EST, LEVL V, 40-54 MIN: ICD-10-PCS | Mod: S$GLB,,, | Performed by: FAMILY MEDICINE

## 2023-07-26 PROCEDURE — 4010F PR ACE/ARB THEARPY RXD/TAKEN: ICD-10-PCS | Mod: CPTII,S$GLB,, | Performed by: FAMILY MEDICINE

## 2023-07-26 PROCEDURE — 3078F DIAST BP <80 MM HG: CPT | Mod: CPTII,S$GLB,, | Performed by: FAMILY MEDICINE

## 2023-07-26 PROCEDURE — 3074F PR MOST RECENT SYSTOLIC BLOOD PRESSURE < 130 MM HG: ICD-10-PCS | Mod: CPTII,S$GLB,, | Performed by: FAMILY MEDICINE

## 2023-07-26 PROCEDURE — 84439 ASSAY OF FREE THYROXINE: CPT | Performed by: FAMILY MEDICINE

## 2023-07-26 PROCEDURE — 83036 HEMOGLOBIN GLYCOSYLATED A1C: CPT | Performed by: FAMILY MEDICINE

## 2023-07-26 PROCEDURE — 99215 OFFICE O/P EST HI 40 MIN: CPT | Mod: S$GLB,,, | Performed by: FAMILY MEDICINE

## 2023-07-26 PROCEDURE — 1101F PT FALLS ASSESS-DOCD LE1/YR: CPT | Mod: CPTII,S$GLB,, | Performed by: FAMILY MEDICINE

## 2023-07-26 PROCEDURE — 4010F ACE/ARB THERAPY RXD/TAKEN: CPT | Mod: CPTII,S$GLB,, | Performed by: FAMILY MEDICINE

## 2023-07-26 PROCEDURE — 3288F FALL RISK ASSESSMENT DOCD: CPT | Mod: CPTII,S$GLB,, | Performed by: FAMILY MEDICINE

## 2023-07-26 PROCEDURE — 3008F BODY MASS INDEX DOCD: CPT | Mod: CPTII,S$GLB,, | Performed by: FAMILY MEDICINE

## 2023-07-26 PROCEDURE — 36415 COLL VENOUS BLD VENIPUNCTURE: CPT | Mod: PO | Performed by: FAMILY MEDICINE

## 2023-07-26 PROCEDURE — 1159F PR MEDICATION LIST DOCUMENTED IN MEDICAL RECORD: ICD-10-PCS | Mod: CPTII,S$GLB,, | Performed by: FAMILY MEDICINE

## 2023-07-26 PROCEDURE — 80053 COMPREHEN METABOLIC PANEL: CPT | Performed by: FAMILY MEDICINE

## 2023-07-26 PROCEDURE — 1159F MED LIST DOCD IN RCRD: CPT | Mod: CPTII,S$GLB,, | Performed by: FAMILY MEDICINE

## 2023-07-26 PROCEDURE — 1160F PR REVIEW ALL MEDS BY PRESCRIBER/CLIN PHARMACIST DOCUMENTED: ICD-10-PCS | Mod: CPTII,S$GLB,, | Performed by: FAMILY MEDICINE

## 2023-07-26 PROCEDURE — 3051F HG A1C>EQUAL 7.0%<8.0%: CPT | Mod: CPTII,S$GLB,, | Performed by: FAMILY MEDICINE

## 2023-07-26 PROCEDURE — 3074F SYST BP LT 130 MM HG: CPT | Mod: CPTII,S$GLB,, | Performed by: FAMILY MEDICINE

## 2023-07-26 RX ORDER — NITROGLYCERIN 0.4 MG/1
0.4 TABLET SUBLINGUAL
COMMUNITY
Start: 2023-04-21

## 2023-07-26 RX ORDER — FAMOTIDINE 40 MG/1
40 TABLET, FILM COATED ORAL
COMMUNITY
End: 2023-11-30 | Stop reason: SDUPTHER

## 2023-07-26 RX ORDER — ASPIRIN 81 MG/1
81 TABLET ORAL
COMMUNITY
Start: 2023-07-21 | End: 2023-11-30

## 2023-07-26 RX ORDER — SACUBITRIL AND VALSARTAN 24; 26 MG/1; MG/1
1 TABLET, FILM COATED ORAL 2 TIMES DAILY
COMMUNITY
Start: 2023-07-06

## 2023-07-26 NOTE — ASSESSMENT & PLAN NOTE
Blood pressure is on low end of normal as well as pulse rate.  Cardiology aware.  ER precautions.  Patient with heart failure also.  Continue Entresto.  Counseled on importance of hypertension disease course, I recommend ongoing Education for DASH-diet and exercise.  Counseled on medication regimen importance of treating high blood pressure.  Please be advised of risk of untreated blood pressure as discussed.  Please advised of ER precautions were given for symptoms of hypertensive urgency and emergency.

## 2023-07-26 NOTE — ASSESSMENT & PLAN NOTE
Continue current medications.  Follow-up with Cardiology has been established.  Transitional Care Note    Family and/or Caretaker present at visit?  Yes.  Diagnostic tests reviewed/disposition: I have reviewed all completed as well as pending diagnostic tests at the time of discharge.  Disease/illness education: CAD w/ stents  Home health/community services discussion/referrals: Patient does not have home health established from hospital visit.  They do not need home health.  If needed, we will set up home health for the patient.   Establishment or re-establishment of referral orders for community resources: No other necessary community resources.   Discussion with other health care providers: No discussion with other health care providers necessary.

## 2023-07-26 NOTE — ASSESSMENT & PLAN NOTE
Continue current medications.  Patient will discuss aspirin and Plavix with Cardiology.  ER precautions.

## 2023-07-26 NOTE — PROGRESS NOTES
PLAN:      Problem List Items Addressed This Visit       Type 2 diabetes mellitus with hyperglycemia, without long-term current use of insulin (Chronic)     Continue Januvia.  Consider low-dose GLP 1 if tolerated.  We will plan to monitor hemoglobin A1c at designated intervals 3 to 6 months.  I recommend ongoing Education for diabetic diet and exercise protocol.  We will continue to monitor for side effects.    Please be advised of symptoms to monitor for and to notify me immediately if persistent or worsening.  Follow up with Ophthalmology/Optometry and Podiatry at least annually.           Relevant Orders    Microalbumin/Creatinine Ratio, Urine    Encounter for long-term (current) use of medications (Chronic)     Complete history and physical was completed today.  Complete and thorough medication reconciliation was performed.  Discussed risks and benefits of medications.  Advised patient on orders and health maintenance.  We discussed old records and old labs if available.  Will request any records not available through epic.  Continue current medications listed on your summary sheet.           Hypertension associated with diabetes (Chronic)     Blood pressure is on low end of normal as well as pulse rate.  Cardiology aware.  ER precautions.  Patient with heart failure also.  Continue Entresto.  Counseled on importance of hypertension disease course, I recommend ongoing Education for DASH-diet and exercise.  Counseled on medication regimen importance of treating high blood pressure.  Please be advised of risk of untreated blood pressure as discussed.  Please advised of ER precautions were given for symptoms of hypertensive urgency and emergency.           KENYETTA (obstructive sleep apnea)     Patient is benefiting from machine.  Continue usage.  Follow-up with pulmonary as needed.  Check CBC.  Patient's previous blood count abnormality has improved.         Relevant Orders    Ambulatory referral/consult to Sleep  Disorders    Hospital discharge follow-up - Primary     Continue current medications.  Follow-up with Cardiology has been established.  Transitional Care Note    Family and/or Caretaker present at visit?  Yes.  Diagnostic tests reviewed/disposition: I have reviewed all completed as well as pending diagnostic tests at the time of discharge.  Disease/illness education: CAD w/ stents  Home health/community services discussion/referrals: Patient does not have home health established from hospital visit.  They do not need home health.  If needed, we will set up home health for the patient.   Establishment or re-establishment of referral orders for community resources: No other necessary community resources.   Discussion with other health care providers: No discussion with other health care providers necessary.                  Status post primary angioplasty with coronary stent     Continue current medications.  Patient will discuss aspirin and Plavix with Cardiology.  ER precautions.          Future Appointments       Date Provider Specialty Appt Notes    8/30/2023 Eh Sow MD Family Medicine ANNUAL           Medication Management for assessment above:   Medication List with Changes/Refills   Current Medications    AMIODARONE (PACERONE) 200 MG TAB    Take 200 mg by mouth.    ASPIRIN (ECOTRIN) 81 MG EC TABLET    Take 81 mg by mouth.    ATORVASTATIN (LIPITOR) 80 MG TABLET    Take 80 mg by mouth every evening.    BLOOD SUGAR DIAGNOSTIC STRP    To check BG 2 times daily, to use with insurance preferred meter    BLOOD-GLUCOSE METER KIT    To check BG 2 times daily, to use with insurance preferred meter    CHLORHEXIDINE (HIBICLENS) 4 % EXTERNAL LIQUID    Apply topically daily as needed.    CLOPIDOGREL (PLAVIX) 75 MG TABLET    Take 75 mg by mouth.    ELIQUIS 5 MG TAB    Take 5 mg by mouth 2 (two) times daily.    ENTRESTO 24-26 MG PER TABLET    Take 1 tablet by mouth 2 (two) times daily.    FAMOTIDINE (PEPCID) 40 MG  TABLET    Take 40 mg by mouth.    FLUOCINOLONE ACETONIDE OIL (DERMOTIC OIL) 0.01 % DROP    Place 3 drops in ear(s) 2 (two) times daily.    FLUOROMETHOLONE 0.1% (FML) 0.1 % DRPS    INSTILL 1 DROP INTO EACH EYE THREE TIMES DAILY AS DIRECTED    FUROSEMIDE (LASIX) 20 MG TABLET    Take 20 mg by mouth daily as needed.    HYDROCORTISONE 1 % CREAM    Apply to affected area 2 times daily    INVELTYS 1 % DRPS    Place 1 drop into both eyes 2 (two) times daily.    LANCETS MISC    To check BG 2 times daily, to use with insurance preferred meter    LEVALBUTEROL (XOPENEX HFA) 45 MCG/ACTUATION INHALER    INHALE ONE PUFF INTO THE LUNGS EVERY 4 HOURS AS NEEDED FOR WHEEZING    METOPROLOL SUCCINATE (TOPROL-XL) 25 MG 24 HR TABLET    Take 25 mg by mouth.    MUPIROCIN (BACTROBAN) 2 % OINTMENT    Apply topically 3 (three) times daily.    NITROGLYCERIN (NITROSTAT) 0.4 MG SL TABLET    Place 0.4 mg under the tongue as needed.    OFLOXACIN (OCUFLOX) 0.3 % OPHTHALMIC SOLUTION        PREDNISOLONE ACETATE (PRED FORTE) 1 % DRPS    Place 1 drop into the left eye 4 (four) times daily.    PROLENSA 0.07 % DROP    SMARTSIG:In Eye(s)    SITAGLIPTIN PHOSPHATE (JANUVIA) 100 MG TAB    Take 1 tablet (100 mg total) by mouth once daily.    XIIDRA 5 % DPET    INSTILL 1 DROP INTO EACH EYE TWICE DAILY AS DIRECTED       Eh Sow M.D.  ==========================================================================  Subjective:   Patient ID: Crystal Acuna is a 67 y.o. female.  has a past medical history of Allergy, Bronchitis, Family history of colonic polyps (04/27/2021), Hallux abductovalgus, Herniated lumbar intervertebral disc, Hyperlipidemia, Hypertension, Smoker, and Staph aureus infection.   Chief Complaint: Follow-up (From hospital)      Problem List Items Addressed This Visit       Type 2 diabetes mellitus with hyperglycemia, without long-term current use of insulin (Chronic)    Overview     July 2023:  Patient reports she had issues with  Ozempic causing stomach issues.  Diabetes Medications               SITagliptin phosphate (JANUVIA) 100 MG Tab Take 1 tablet (100 mg total) by mouth once daily.   April 2023:  Patient has well-controlled blood sugar on Januvia.  December 2022:  Patient doing well on Januvia.  Patient has side effects to GL P 1. She cannot tolerate metformin due to GI side effects.  Patient currently having increased use infections and recurrent infections on Farxiga.Diabetes Management StatusStatin: TakingACE/ARB: Not taking  Diabetes Management Status    Statin: Taking  ACE/ARB: Not taking    Screening or Prevention Patient's value Goal Complete/Controlled?   HgA1C Testing and Control   Lab Results   Component Value Date    HGBA1C 7.2 (H) 04/09/2023      Annually/Less than 8% Yes   Lipid profile : 04/08/2023 Annually Yes   LDL control Lab Results   Component Value Date    LDLCALC 76 04/08/2023    Annually/Less than 100 mg/dl  Yes   Nephropathy screening Lab Results   Component Value Date    LABMICR <5.0 06/08/2022     Lab Results   Component Value Date    PROTEINUA Negative 01/09/2023     No results found for: UTPCR   Annually Yes   Blood pressure BP Readings from Last 1 Encounters:   07/26/23 100/70    Less than 140/90 Yes   Dilated retinal exam : 11/18/2022 Annually Yes   Foot exam   : 12/01/2022 Annually Yes            Current Assessment & Plan     Continue Januvia.  Consider low-dose GLP 1 if tolerated.  We will plan to monitor hemoglobin A1c at designated intervals 3 to 6 months.  I recommend ongoing Education for diabetic diet and exercise protocol.  We will continue to monitor for side effects.    Please be advised of symptoms to monitor for and to notify me immediately if persistent or worsening.  Follow up with Ophthalmology/Optometry and Podiatry at least annually.           Encounter for long-term (current) use of medications (Chronic)    Overview     July 2023: Reviewed labs.  April 2023: Reviewed labs.  December 2022:  Reviewed labs.  CHRONIC. Stable. Compliant with medications for managed conditions. See medication list. No SE reported.   Routine lab analysis is being monitored. Refills were addressed.  Lab Results   Component Value Date    WBC 5.68 01/19/2023    HGB 15.4 01/19/2023    HCT 49.3 (H) 01/19/2023    MCV 93 01/19/2023     01/19/2023       Chemistry        Component Value Date/Time     07/21/2023 0337     01/19/2023 0710    K 3.9 07/21/2023 0337    K 4.2 01/19/2023 0710     01/19/2023 0710    CO2 25 07/21/2023 0337    CO2 25 01/19/2023 0710    BUN 19 07/21/2023 0337    BUN 16 01/19/2023 0710    CREATININE 0.87 07/21/2023 0337    CREATININE 0.8 01/19/2023 0710     (H) 01/19/2023 0710        Component Value Date/Time    CALCIUM 8.9 07/21/2023 0337    CALCIUM 9.9 01/19/2023 0710    ALKPHOS 56 07/21/2023 0337    ALKPHOS 60 01/19/2023 0710    AST 16 07/21/2023 0337    AST 12 01/19/2023 0710    AST 20 03/28/2016 1457    ALT 17 07/21/2023 0337    ALT 15 01/19/2023 0710    BILITOT 0.5 07/21/2023 0337    BILITOT 0.4 01/19/2023 0710    ESTGFRAFRICA >60.0 06/08/2022 0747    EGFRNONAA >60.0 06/08/2022 0747          Lab Results   Component Value Date    TSH 0.976 01/19/2023    FREET4 1.19 10/11/2012            Current Assessment & Plan     Complete history and physical was completed today.  Complete and thorough medication reconciliation was performed.  Discussed risks and benefits of medications.  Advised patient on orders and health maintenance.  We discussed old records and old labs if available.  Will request any records not available through epic.  Continue current medications listed on your summary sheet.           Hypertension associated with diabetes (Chronic)    Overview     CHRONIC.  July 2023:  Hypertension Medications               ENTRESTO 24-26 mg per tablet Take 1 tablet by mouth 2 (two) times daily.    furosemide (LASIX) 20 MG tablet Take 20 mg by mouth daily as needed.    metoprolol  succinate (TOPROL-XL) 25 MG 24 hr tablet Take 25 mg by mouth.    nitroGLYCERIN (NITROSTAT) 0.4 MG SL tablet Place 0.4 mg under the tongue as needed.   April 2023:  Patient has had some low blood pressure.    Patient recent medication changes due to MI with stents at [place].  Patient now following with Cardiology.  STABLE.  Patient on Lasix 20 milligrams daily and metoprolol 25 milligrams daily.   BP Reviewed.  Compliant with BP medications. No SE reported.  December 2022: Well controlled on current regimen.  (-) CP, SOB, palpitations, dizziness, lightheadedness, HA, arm numbness, tingling or weakness, syncope.  Creatinine   Date Value Ref Range Status   07/21/2023 0.87 0.60 - 1.10 mg/dL Final   01/19/2023 0.8 0.5 - 1.4 mg/dL Final            Current Assessment & Plan     Blood pressure is on low end of normal as well as pulse rate.  Cardiology aware.  ER precautions.  Patient with heart failure also.  Continue Entresto.  Counseled on importance of hypertension disease course, I recommend ongoing Education for DASH-diet and exercise.  Counseled on medication regimen importance of treating high blood pressure.  Please be advised of risk of untreated blood pressure as discussed.  Please advised of ER precautions were given for symptoms of hypertensive urgency and emergency.           KENYETTA (obstructive sleep apnea)    Overview     July 2023: Patient has not gotten in with sleep medicine yet.  Referral placed.  May 2022: Wearing [id number]% and it is working great.  Patient states that her fatigue has improved.  Lab Results   Component Value Date    WBC 5.68 01/19/2023    HGB 15.4 01/19/2023    HCT 49.3 (H) 01/19/2023    MCV 93 01/19/2023     01/19/2023 March 2022:  New problem.  Patient was recently diagnosed with sleep apnea.  She has never worn a CPAP device.  Patient reports she is willing to try a device and wear it every night.    Home Sleep Studies     Date/Time: 3/9/2022 8:00 AM  Performed by: Oliver  MD Rosa M  Authorized by: Eh Sow MD        1 night study  MODERATE OBSTRUCTIVE SLEEP APNEA with overall AHI 21.7/hr ( 120 events): night #1  Oxygen desaturation: 82%. SpO2 between 80% to 84% for 10 min.  SpO2 was below 90%: 69% time  Patient snored 98% time above 50 .  Heart rate range: 57 bpm - 103 bpm  REC's:  Please refer to sleep disorders clinic  Therapy with APAP at 4-20 cm WP using mask of choice with heated humidification is an option.  Weight loss/management. with regular exercise per direction of physician.  Avoid drowsy driving.  Follow up in sleep clinic to maximize adherence and ensure resolution of symptoms.         Current Assessment & Plan     Patient is benefiting from machine.  Continue usage.  Follow-up with pulmonary as needed.  Check CBC.  Patient's previous blood count abnormality has improved.         Hospital discharge follow-up - Primary    Overview     [facility] DISCHARGE SUMMARY    Patient ID:  Crystal Acuna  [id number]  67 y.o.  1955    Admit Date:   7/18/2023 8:21 PM    Discharge Date:   Discharge Today: 7/21/2023    Admitting Physician:   Krystin Houston MD     Discharge Physician:   KRYSTIN HOUSTON MD    Consultants/Treatment Team:  Consultants   Provider Service Role Specialty   Sj Bullock MD Cardiology Consulting Physician Cardiology   Sj Bullock MD Cardiology Surgeon Cardiology       Reason for Admission/Admission Diagnoses:   Present on Admission:   Chest pain, unspecified type   Chronic systolic congestive heart failure (HCC)   HLD (hyperlipidemia)   CAD (coronary artery disease)   PAF (paroxysmal atrial fibrillation) (HCC)   Bradycardia   HTN (hypertension)   HFrEF (heart failure with reduced ejection fraction) ([facility])    Discharge Diagnoses:   Active Hospital Problems   Diagnosis Date Noted    Chest pain, unspecified type 07/18/2023    Chronic systolic congestive heart failure (HCC) 07/18/2023    CAD (coronary artery disease) 07/18/2023     PAF (paroxysmal atrial fibrillation) ([facility]) 07/18/2023    Bradycardia 07/18/2023    HLD (hyperlipidemia) 04/08/2023    Type 2 diabetes mellitus without complication, without long-term current use of insulin ([facility]) 04/08/2023    HTN (hypertension) 04/08/2023    HFrEF (heart failure with reduced ejection fraction) ([facility]) 04/08/2023    Former smoker 04/08/2023     Resolved Hospital Problems     History of Present Illness:   Ms. Acuna is a 67-year-old  female with PMH significant for CAD, cardiac stents in place, PAF on apixaban, systolic CHF (EF 25-30%), NIDDM, HTN, hyperlipidemia, followed by Dr. Bullock, has been complaining of intermittent chest pressure/SOB for the past few days. Denies radiation, denies N/V, reported intermittent palpitations. Patient was seen by Dr. Guillermo's office earlier today, and was sent to the ED for further ischemic work-up including possible left heart catheterization after holding Eliquis for the next 48 hours. Patient currently is asymptomatic. Denies SOB, CP, N/B. Currently in sinus bradycardia, HR in the 40s, denies lightheadedness, dizziness. Laboratory work-up is unremarkable, troponin within normal limits. BNP elevated 542. CXR without infiltrates masses or effusions.     Hospital Course and Treatment:   Admission Information   Date & Time  7/18/2023 Provider  Krystin Houston MD Department  [facility] Dept. Phone  [phone]       Intermittent chest pain  -Left and right heart cath 7/20/23: Angioplasty and stenting of mid RCA with ALAN, IVUS directed with good angiographic result. Angioplasty through the mid RCA stent struts of a second RV acute marginal branch which developed plaque shift after intervention to the mid RCA with a good angiographic result. 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   Continue ASA, plavix, statin  Follow-up with  cardiology    CAD s/p prior stents     HFrEF  Cont lasix, Toprol, Januvia and entresto     Paroxysmal atrial fibrillation  Sinus bradycardia  -Continue amiodarone and Toprol  -Dr. Bullock is aware that the patient's heart rate is a little bit on the low side     Hyperlipidemia  -Continue statin     Noninsulin-dependent type 2 diabetes mellitus  -Resume Januvia    PVD        I discussed with the patient disease process and treatment.     I have personally seen and examined the patient, Crystal Acuna, in a face to face encounter on the date of discharge.     She is cleared for discharge with instructions to follow up as directed.   Total time in the care and discharge planning of this patient was greater than 30 minutes.    Significant Diagnostic Studies:  Recent Imaging and Procedure Results   Procedure Component Value Ref Range Date/Time   CATH LAB ONLY-IVUS FFR / OCT Order* [[id number]] Resulted: 07/21/2023 0715   Updated: 07/21/2023 0715   Narrative:   For Dictation, see Procedure or Operative Note under the Notes Tab.   Echo Limited With Contrast [[id number]] Collected: 07/20/2023 1459   Updated: 07/20/2023 1737   PatientHeight 157.48 cm   PatientWeight 80.2872 kg   Systolic Pressure 108 mmHg   Diastolic Pressure 60 mmHg   BSA 1.8 m   2D/MMode measurements and calculations: MMode 2D Measurements & Calculations   Interventricular Septum in Diastole 1.1 cm   Left Ventricle in Diastole 6.2 cm   Left Ventricle in Systole 5.6 cm   LV post wall in Diastole 0.79 cm   IVS/LVPW 1.4   FS 10.7 %   EDV(Teich) 197.3 ml   ESV(Teich) 152.2 ml   EF(Teich) 22.8 %   EDV(cubed) 243.6 ml   ESV(cubed) 173.5 ml   EF(cubed) 28.8 %   LV mass(C)d 250.9 grams   LV mass(C)dI 138.2 grams/m   SV(Teich) 45 ml   SI(Teich) 24.8 ml/m   SV(cubed) 70.1 ml   SI(cubed) 38.6 ml/m   LVLd ap4 8.6 cm   EDV(MOD-sp4) 164 ml   LVLs ap4 7.3 cm   ESV(MOD-sp4) 96.1 ml   EF(MOD-sp4) 41.4 %   LVLd ap2 8.7 cm   EDV(MOD-sp2) 181 ml   LVLs ap2 7.8 cm    ESV(MOD-sp2) 129 ml   EF(MOD-sp2) 28.7 %   SV(MOD-sp4) 67.9 ml   SI(MOD-sp4) 37.4 ml/m   SV(MOD-sp2) 52 ml   SI(MOD-sp2) 28.7 ml/m   Doppler measurements and calculations: Doppler Measurements & Calculations   MV e max velocity 81.7 cm/sec   MV a velocity 76.5 cm/sec   MV e/a ratio 1.1   MV dec time 0.21 sec   EF MIN = 20 %   EF MAX = 25 %   Narrative:       Adult Echocardiogram  Name: SUNITHA CANO Study Date: 2023  MRN: [id number] Age: 67 yrs  Patient Location: [facility]^[id number] Height: 62 in  : 1955 Weight: 177 lb  Gender: Female BSA: 1.8 m2  Reason For Study: SOB and CP  BP: 108/60 mmHg    Ordering Physician: JOLEEN GARCIA  Performed By: Jorge Lopez RDCS    Interpretation Summary  The study was technically difficult.  Ejection Fraction = 20-25%.  The left ventricle is moderately dilated.  The left atrium is mildly dilated.  There is Apical 'Akinesis'.  Left ventricular systolic function is severely reduced.  The study was technically difficult.    Measurements  IVSd: 1.1 cm LVIDd: 6.2 cm  LVPWd: 0.79 cm LVIDs: 5.6 cm    MMode/2D Measurements & Calculations  FS: 10.7 % LVLd ap4: 8.6 cm  EDV(Teich): 197.3 ml EDV(MOD-sp4): 164.0 ml  ESV(Teich): 152.2 ml LVLs ap4: 7.3 cm  EF(Teich): 22.8 % ESV(MOD-sp4): 96.1 ml  EF(MOD-sp4): 41.4 %  EDV(MOD-sp2): 181.0 ml SV(MOD-sp4): 67.9 ml    Time Measurements  MV dec time: 0.21 sec    Doppler Measurements & Calculations  MV E max danitza: 81.7 cm/sec  MV A max danitza: 76.5 cm/sec  MV E/A: 1.1    LEFT VENTRICLE    o The left ventricle is moderately dilated.  o Ejection Fraction = 20-25%.  o Left ventricular systolic function is severely reduced.  o There is normal left ventricular wall thickness.  o There is Apical 'Akinesis'.    RIGHT VENTRICLE    o The right ventricle is grossly normal size.    ATRIA    o The left atrium is mildly dilated.  o Right Atrium Grossly Normal.    MITRAL VALVE    o Mitral valve leaflets appear to be mildly thickened.  o There is mild  mitral regurgitation.    TRICUSPID VALVE    o The tricuspid valve is not well visualized, but is grossly normal.    AORTIC VALVE    o The aortic valve is trileaflet.  o No aortic regurgitation is present.    PULMONIC VALVE    o The pulmonic valve is not well visualized.  o There is no pulmonic valvular regurgitation.    GREAT VESSELS    o The aortic root is normal size.    PERICARDIUM/PLEURAL EFFUSION    o There is no pericardial effusion.  o There is no pleural effusion.    ______________________________________________________________________________  Electronically signed by: Kulwinder Wade MD 2023 05:36 PM  InterpretingPhysician: Kulwinder Wade MD electronically signed on 2023 17:36:[id number]   CATH LAB ONLY-Left Heart Cath* [[id number]] Collected: 2023 1220   Updated: 2023 1428   Narrative:   PROCEDURES: Include the followin. Right heart catheterization with hemodynamics in the right atrium,   right ventricle, pulmonary artery, and pulmonary capillary wedge pressure   via the right brachial venous approach.  2. Left heart catheterization with bilateral selective coronary   angiography and left ventricular angiography via the right radial   approach.  3. Doppler flow wire interrogation of the right coronary artery.  4. Intravascular ultrasound interrogation of the right coronary artery.  5. Angioplasty and stenting of the mid right coronary artery and of the   distal right coronary artery with placement of 2 drug-eluting stents,   intravascular ultrasound directed.  6. Angioplasty through the right coronary artery stent struts of a RV   acute marginal branch, which is jailed by the stent.  7. Supervised conscious sedation with a start time of 10:37 and end   time of 11:53, 2 mg of Versed and 100 mcg of fentanyl administered with   face-to-face observation of the patient to assess vital signs, telemetry,   and level of sedation during the   case.    SURGEON: Sj Bullock,  M.D.    COMPLICATIONS: None.    ESTIMATED BLOOD LOSS: Less than 10 mL.    INDICATIONS: Known coronary artery disease, anginal recurrence.    PROCEDURE IN DETAIL: The patient was taken to the cath lab, placed on   nasal cannula oxygen. Blood pressure, heart rate, EKG, and heart rhythm   were monitored continuously during the procedure. She was given   incremental doses of Versed and fentanyl with  a start time of 10:37 and end time of 11:53, 2 mg of Versed and 100 mcg   of fentanyl administered with face-to-face observation to detect vital   signs, telemetry, and level of sedation during the case Using the existing   right Angiocath in the brachial   vein, this was exchanged over the wire for a 6-Barb[place] sheath. We then   flushed the sheath and advanced a [place] catheter via the venous sheath   into the right heart chambers. Hemodynamics in the right atrium, right   ventricle, pulmonary artery, and   pulmonary capillary wedge pressure were obtained. Next, the [place]   catheter was removed from the venous sheath. Next, after confirming dual   arterial circulation to the right hand, the right wrist was prepped and   draped, lidocaine given, and with   ultrasound guidance, we cannulated the right radial artery and placed a   6-Barbadian sheath within. We then removed the obturator and the wire,   flushed the sheath, and administered a cocktail comprised of   nitroglycerin, heparin and verapamil. We then   advanced a 6-Bar[place] Tiger catheter over a 0.035 J-wire. This cannulated   the left main. Angiography of the left coronary artery was performed in   orthogonal views. This catheter was then used to selectively cannulate   the right coronary artery.   Angiography of the right coronary artery was then performed in orthogonal   views as well. There was some hemodynamic damping upon catheter   engagement. We then removed this catheter over the wire and advanced the   pigtail catheter across the aortic valve.  Hemodynamics  in the left ventricle were then obtained. Next, LV   cineangiography was performed in VALLE projection. Catheter was then   withdrawn across the aortic valve. Hemodynamics were again recorded. We   then advanced a 6-Hungarian JR4 coronary guiding  catheter over a 0.035 J-wire. This cannulated the right coronary artery.   The patient was given additional heparin. Please see the case log for   details. ACT was obtained. No additional heparin was required. We then   zeroed a Doppler flow wire   outside the body and advanced the flow wire to a point just exiting the   guiding catheter. The wire and the guiding catheter were then normalized.   The wire was then advanced distally into the right coronary artery and   iFR measurements were obtained, but  with significant variability ranging anywhere from 0.90 to unity. These   were felt to be inaccurate. We then advanced a [place] intravascular   ultrasound catheter over the wire and performed IVUS interrogation of the   proximal and mid right coronary   artery. This revealed mild to moderate plaque in the proximal right   coronary artery, which does not appear obstructive. The mid right   coronary artery does have a significant plaque formation and the vessel   measures 3 mm in diameter. We then removed   this IVUS catheter and advanced a 2.5 x 12 Euphora catheter and performed   balloon inflations of the mid right coronary artery. Two inflations were   performed. Please see the case log for details. An improved angiographic   result was noted. The plaque   in the mid right coronary artery seemed to extend proximal to two RV acute   marginal branches. We then removed the catheter and placed a stent, which   is a Medtronic Efrem [place] 3.0 x 26 drug-eluting stent. This was placed   in the mid right coronary   artery, covering the two RV acute marginal branches. This was deployed at   nominal atmospheres. Please see the case log for details. We then   noticed the second RV acute  marginal branch appeared to be subtotally   occluded. We advanced a 0.014 ChoICE PT   wire into the right coronary artery and directed the wire through the   stent struts into the second acute marginal branch in the RV. This was   kept in standby readiness. We then postdilated the stent using a 3.0   [place] [employer] x 12 NC Emerge catheter.  High-pressure inflations were performed within the stented segment of   the mid right coronary artery. We then removed this catheter and advanced   a [place] [employer] Emerge push 1.5 x 8 and performed balloon inflations   of the ostium of the second RV   acute marginal branch. This was then upsized to a 2.0 x 12 Euphora.   Balloon inflations were performed for 60 seconds at the ostium of the   second acute marginal branch. We then removed this catheter and angiogram   demonstrated a good result to the   branches; however, we did notice a distal dissected appearance, likely   from guidewire placement of the right coronary artery. This was covered   by a second stent which was a Medtronic [place] 3.0 x 12   drug-eluting stent. This was positioned over   the dissected region and deployed at high atmospheres. Please see the   case log for balloon inflation details. We then removed the stent   deployment balloon and angiogram demonstrated an excellent result to the   mid right coronary artery, to the distal   right coronary artery, and to the 2 RV branches. The wires were removed.   The patient was given 200 mcg of intracoronary nitroglycerin. Followup   angiography was then performed in orthogonal views. Good angiographic   result was noted to the mid and   distal right coronary artery and to the two acute marginal branches.   There was an unchanged appearance of the proximal right coronary artery.   The guiding catheter was then removed over the wire, the sheath was then   flushed and a TR band applied with   good hemostasis. The right brachial venous sheath was removed  with manual   pressure for 5 minutes. A Band-Aid was then applied over the puncture   site in the right brachial venous access site. She tolerated the   procedure well without complications.    RESULTS:  HEMODYNAMIC DATA: Right atrial pressure was 9-10. Right ventricular   pressure was 34/11. PA pressures were 35/21 with a mean of 28. Pulmonary   capillary wedge pressure was 18-22. The nonsimultaneously measured left   ventricular pressure was 147/24.   The aortic pressure was 145/75 with a mean of 102.    ANGIOGRAPHIC DATA: The left ventricle appears somewhat dilated. The   anteroapical and inferoapical walls were frankly dyskinetic and somewhat   aneurysmal in appearance. Estimated ejection fraction is 25%, and there   was severe overall decrease in LV   systolic function.    CORONARY ANATOMY: Left main coronary artery is short. It bifurcates into   the LAD and into the circumflex. The LAD has a stent in its proximal   portion, which is widely patent. The circumflex similarly has a proximal   stent which is   widely patent. The mid LAD is normal. The distal LAD after a second   diagonal branch has a tapering 75% small caliber vessel with the   angiographic appearance of a myocardial bridge in the segment. The distal   LAD is also occluded at the apex. There was  a diagonal branch of the LAD which is a 40% proximal lesion. Second   diagonal branch is small. Several septal perforators are noted in the   proximal to mid vessel, widely patent. The circumflex gives off 3 OM   branches, all of which appear fairly   normal. The right coronary artery is a dominant vessel. There is 40% to   50% proximal disease. There is a tortuous segment of 70% stenosis in the   mid right coronary artery and this covers the origin of two RV acute   marginal branches. After angioplasty  and stenting, there is no residual stenosis in the mid right coronary   artery. The distal right coronary artery developed a dissected appearance   and  this was covered with a drug-eluting stent and there was no further   dissection or flow limitation noted.  The distal right coronary artery, the posterior descending and   posterolateral arteries appeared fairly normal.    CONCLUSION:   1. Mildly elevated right heart pressures.  2. Mildly elevated LVEDP and pulmonary capillary wedge pressures.  3. Severely depressed LV systolic function with an ejection fraction on   the order of 25% and with distal anterolateral, apical, and inferoapical   akinesis to dyskinesis.  4. Normal, but short left main.  5. Patent stents in the proximal left anterior descending and proximal   circumflex arteries. Distal LAD has a tapering 75% stenosis with the   angiographic appearance of myocardial bridging and the apical most portion   of the LAD is occluded. There was   diagonal #1 which has a 40% plaque noted. Three OM branches of the   circumflex appeared normal.  6. Dominant right coronary artery with 40% to 50% proximal disease.   Midportion significant disease in a tortuous segment of the vessel,   treated with a drug-eluting stent and with plaque shift into the second RV   acute marginal branch, treated with   balloon angioplasty through the mid right coronary artery stent struts   with a good angiographic result of this vessel.  7. Distal guidewire dissection treated with a drug-eluting stent with a   good angiographic result.  8. Normal posterior descending and posterolateral arteries.    Sj Bullock MD  V# [id number] D# [id number]  D: bme/WT: OPRPT/T: man  DD: 07/20/2023 12:20 TD: 07/20/2023 12:41         Surgical Procedures during this visit:    Procedure(s):  LEFT HEART CATHETERIZATION  RIGHT HEART CATHETERIZATION  Date  7/20/2023  Primary Surgeon  Sj Bullock MD  -------------------    Patient's Condition On Discharge:   Stable    Physical Exam  Vitals and nursing note reviewed.   Constitutional:   Appearance: Normal appearance.   HENT:   Head: Atraumatic.    Mouth/Throat:   Mouth: Mucous membranes are moist.   Pharynx: Oropharynx is clear.   Eyes:   Extraocular Movements: Extraocular movements intact.   Pupils: Pupils are equal, round, and reactive to light.   Cardiovascular:   Rate and Rhythm: Normal rate and regular rhythm.   Pulses: Normal pulses.   Heart sounds: Normal heart sounds.   Pulmonary:   Effort: Pulmonary effort is normal.   Breath sounds: Normal breath sounds.   Abdominal:   General: Abdomen is flat. Bowel sounds are normal.   Musculoskeletal:   General: No swelling or deformity. Normal range of motion.   Cervical back: Normal range of motion.   Skin:  General: Skin is warm and dry.   Neurological:   General: No focal deficit present.   Mental Status: She is oriented to person, place, and time.   Psychiatric:   Mood and Affect: Mood normal.   Behavior: Behavior normal.   Thought Content: Thought content normal.   Judgment: Judgment normal.     Discharge Disposition:   Order for Discharge (From admission, onward)   Start Ordered   07/21/23 1306 If ok with cards after they see her Discharge Disposition to: Home or Self Care Once   Comments: If ok with cards after they see her   Expected Discharge Date: 07/21/23     Question: Discharge Disposition to Answer: Home or Self Care   07/21/23 1306   07/21/23 0000 Follow-up with: FELICIANO SWIFT; Regino (1)   Question Answer Comment   with FELICIANO SWIFT   when Weeks 1     07/21/23 1306             Discharge Orders:  Follow-up Information   [facility] .   Specialty: Cardiac Rehabilitation  Contact information:  [address] [place] [address]  [phone]  Additional information:  Directions to [facility]    From: [place]   ([address] & [address] Interchanges)   Exit [address] ([place])   To Exit 40 ([place])   Go South (right) approximately 1 mile.   [facility] is on the right.    From: [place] ([address])   Exit  40 ([place])   Go South (left) approximately 1 mile.   [facility] is on the right.    From: [place] ([place])   Exit 40 ([place])   Go South (right) approximately 1 mile.   [facility] is on the right.    From: [place]  ([address] to [address])   Exit 26 ([place] - Take right split to   [address])   At first traffic light go North (left) to [address].   Travel approximately 2 miles.   [facility] is on the left.        Eh Sow MD. Go on 7/26/2023.   Specialty: Family Medicine  Why: You have an appointment 7/26/2023 @ [facility] location  Contact information:  [address] [address]  [phone]                Future Appointments:  Future Appointments   Provider Department Dept Phone Center   9/22/2023 2:30 PM Sj Bullock MD [facility] [phone] [facility]       Immunizations Administered for This Admission   No immunizations given this admission.         Medication List     START taking these medications   aspirin EC 81 MG Tbec EC tablet  Commonly known as: ECOTRIN  Take 1 tablet (81 mg total) by mouth daily      CONTINUE taking these medications   amiodarone 200 MG Tab tablet  Commonly known as: PACERONE  Take 0.5 tablets (100 mg total) by mouth daily    apixaban 5 mg Tab  Commonly known as: ELIQUIS  Take 1 tablet (5 mg total) by mouth 2 (two) times daily    atorvastatin 80 MG Tab tablet  Commonly known as: LIPITOR  Take 1 tablet (80 mg total) by mouth nightly    blood sugar diagnostic Strp test strip    clopidogreL 75 mg Tab tablet  Commonly known as: PLAVIX  Take 1 tablet (75 mg total) by mouth daily    Entresto 24-26 mg Tab per tablet  Generic drug: sacubitriL-valsartan  Take 1 tablet by mouth 2 (two) times daily    famotidine 40 MG Tab tablet  Commonly known as: PEPCID    furosemide 20 MG Tab tablet  Commonly known as: LASIX  Take 1  tablet (20 mg total) by mouth daily as needed (SOB/LE edema)    hydrocortisone 1 % Crea topical cream    Januvia 100 MG Tab tablet  Generic drug: SITagliptin phosphate    levalbuterol 45 mcg/actuation Hfaa inhaler  Commonly known as: Xopenex HFA  Inhale 1 puff into the lungs every 4 (four) hours as needed for Wheezing    metoprolol succinate 25 MG Tb24 24 hr tablet  Commonly known as: TOPROL-XL    nitroglycerin 0.4 MG Subl SL tablet  Commonly known as: Nitrostat  Place 1 tablet (0.4 mg total) under the tongue every 5 (five) minutes as needed for Chest pain      STOP taking these medications   blood-glucose meter Kit kit        Where to Get Your Medications     Information about where to get these medications is not yet available   Ask your nurse or doctor about these medications  · amiodarone 200 MG Tab tablet  · aspirin EC 81 MG Tbec EC tablet      Discharge Orders   Future Labs/Procedures Expected by Expires   Activity as tolerated As directed   Ambulatory Referral to Cardiac Rehab As directed 7/21/2024   Questions:   Which Phase?: Phase 2   Instructions for Referral:   Diet (Select type) Cardiac/Low Chol/OPAL As directed   Questions:   Diet Type: Cardiac/Low Chol/OPAL   Restriction(s):   Solid Consistency:   Liquid Consistency:   Sodium Restriction:   Fluid restriction:   Follow-up with: FELICIANO SWIFT; Regino (1) As directed   Questions:   with: FELICIANO SWIFT   Schedule for:   when: Weeks Comment - 1         Electronically signed by Krystin Houston MD at 07/21/2023 3:21 PM CDT           Current Assessment & Plan     Continue current medications.  Follow-up with Cardiology has been established.  Transitional Care Note    Family and/or Caretaker present at visit?  Yes.  Diagnostic tests reviewed/disposition: I have reviewed all completed as well as pending diagnostic tests at the time of discharge.  Disease/illness education: CAD w/ stents  Home health/community services discussion/referrals: Patient  does not have home health established from hospital visit.  They do not need home health.  If needed, we will set up home health for the patient.   Establishment or re-establishment of referral orders for community resources: No other necessary community resources.   Discussion with other health care providers: No discussion with other health care providers necessary.                  Status post primary angioplasty with coronary stent    Overview     Patient status post 4 stents    See Cardiology note.               Current Assessment & Plan     Continue current medications.  Patient will discuss aspirin and Plavix with Cardiology.  ER precautions.             Review of patient's allergies indicates:   Allergen Reactions    Metformin     Farxiga [dapagliflozin]      Yeast infection      Latex, natural rubber     Bactrim [sulfamethoxazole-trimethoprim] Rash     Current Outpatient Medications   Medication Instructions    amiodarone (PACERONE) 200 mg, Oral    aspirin (ECOTRIN) 81 mg, Oral    atorvastatin (LIPITOR) 80 mg, Oral, Nightly    blood sugar diagnostic Strp To check BG 2 times daily, to use with insurance preferred meter    blood-glucose meter kit To check BG 2 times daily, to use with insurance preferred meter    chlorhexidine (HIBICLENS) 4 % external liquid Topical (Top), Daily PRN    clopidogreL (PLAVIX) 75 mg, Oral    ELIQUIS 5 mg, Oral, 2 times daily    ENTRESTO 24-26 mg per tablet 1 tablet, Oral, 2 times daily    famotidine (PEPCID) 40 mg, Oral    fluocinolone acetonide oiL (DERMOTIC OIL) 0.01 % Drop 3 drops, Otic, 2 times daily    fluorometholone 0.1% (FML) 0.1 % DrpS INSTILL 1 DROP INTO EACH EYE THREE TIMES DAILY AS DIRECTED    furosemide (LASIX) 20 mg, Oral, Daily PRN    hydrocortisone 1 % cream Apply to affected area 2 times daily    INVELTYS 1 % DrpS 1 drop, Both Eyes, 2 times daily    lancets Misc To check BG 2 times daily, to use with insurance preferred meter    levalbuterol (XOPENEX HFA) 45  "mcg/actuation inhaler INHALE ONE PUFF INTO THE LUNGS EVERY 4 HOURS AS NEEDED FOR WHEEZING    metoprolol succinate (TOPROL-XL) 25 mg, Oral    mupirocin (BACTROBAN) 2 % ointment Topical (Top), 3 times daily    nitroGLYCERIN (NITROSTAT) 0.4 mg, Sublingual, As needed (PRN)    ofloxacin (OCUFLOX) 0.3 % ophthalmic solution No dose, route, or frequency recorded.    prednisoLONE acetate (PRED FORTE) 1 % DrpS 1 drop, Left Eye, 4 times daily    PROLENSA 0.07 % Drop SMARTSIG:In Eye(s)    SITagliptin phosphate (JANUVIA) 100 mg, Oral, Daily    XIIDRA 5 % Dpet INSTILL 1 DROP INTO EACH EYE TWICE DAILY AS DIRECTED      I have reviewed the PMH, social history, FamilyHx, surgical history, allergies and medications documented / confirmed by the patient at the time of this visit.  Review of Systems   Constitutional:  Positive for fatigue. Negative for activity change and unexpected weight change.   HENT:  Negative for hearing loss, rhinorrhea and trouble swallowing.    Eyes:  Negative for discharge and visual disturbance.   Respiratory:  Negative for chest tightness and wheezing.    Cardiovascular:  Negative for chest pain and palpitations.   Gastrointestinal:  Negative for blood in stool, constipation, diarrhea and vomiting.   Endocrine: Negative for polydipsia and polyuria.   Genitourinary:  Negative for difficulty urinating, dysuria, hematuria and menstrual problem.   Musculoskeletal:  Negative for arthralgias, joint swelling and neck pain.   Neurological:  Negative for weakness and headaches.   Psychiatric/Behavioral:  Negative for confusion and dysphoric mood.    Objective:   /70   Pulse (!) 54   Ht 5' 2" (1.575 m)   Wt 82.7 kg (182 lb 6.4 oz)   SpO2 97%   BMI 33.36 kg/m²   Physical Exam  Vitals and nursing note reviewed.   Constitutional:       General: She is not in acute distress.     Appearance: She is well-developed. She is not ill-appearing, toxic-appearing or diaphoretic.   HENT:      Head: Normocephalic and " atraumatic.      Right Ear: Hearing and external ear normal.      Left Ear: Hearing and external ear normal.      Nose: Nose normal. No rhinorrhea.   Eyes:      General: Lids are normal.      Extraocular Movements: Extraocular movements intact.      Conjunctiva/sclera: Conjunctivae normal.      Pupils: Pupils are equal, round, and reactive to light.   Cardiovascular:      Rate and Rhythm: Normal rate.      Pulses: Normal pulses.      Heart sounds: No murmur heard.  Pulmonary:      Effort: Pulmonary effort is normal. No respiratory distress.      Breath sounds: Normal breath sounds.   Abdominal:      General: Bowel sounds are normal.      Palpations: Abdomen is soft.   Musculoskeletal:         General: Normal range of motion.      Cervical back: Normal range of motion and neck supple.   Skin:     General: Skin is warm and dry.      Capillary Refill: Capillary refill takes less than 2 seconds.      Coloration: Skin is not pale.   Neurological:      General: No focal deficit present.      Mental Status: She is alert and oriented to person, place, and time. Mental status is at baseline. She is not disoriented.      Cranial Nerves: No cranial nerve deficit.      Motor: No weakness.      Gait: Gait normal.   Psychiatric:         Attention and Perception: She is attentive.         Mood and Affect: Mood normal. Mood is not anxious or depressed.         Speech: Speech is not rapid and pressured or slurred.         Behavior: Behavior normal. Behavior is not agitated, aggressive or hyperactive. Behavior is cooperative.         Thought Content: Thought content normal. Thought content is not paranoid or delusional. Thought content does not include homicidal or suicidal ideation. Thought content does not include homicidal or suicidal plan.         Cognition and Memory: Memory is not impaired.         Judgment: Judgment normal.       Assessment:     1. Hospital discharge follow-up    2. Encounter for long-term (current) use of  medications    3. Type 2 diabetes mellitus with hyperglycemia, without long-term current use of insulin    4. Hypertension associated with diabetes    5. Status post primary angioplasty with coronary stent    6. KENYETTA (obstructive sleep apnea)      MDM:   High medical complexity.  Moderate to high risk.  Total time: 45 minutes.  This includes total time spent on the encounter, which includes face to face time and non-face to face time preparing to see the patient (eg, review of previous medical records, tests), Obtaining and/or reviewing separately obtained history, documenting clinical information in the electronic or other health record, independently interpreting results (not separately reported)/communicating results to the patient/family/caregiver, and/or care coordination (not separately reported).    I have Reviewed and summarized old records.  I have performed thorough medication reconciliation today and discussed risk and benefits of medications.  I have reviewed labs and discussed with patient.  All questions were answered.  I am requesting old records and will review them once they are available.  Cardiology    I have signed for the following orders AND/OR meds.  Orders Placed This Encounter   Procedures    Microalbumin/Creatinine Ratio, Urine     Standing Status:   Future     Number of Occurrences:   1     Standing Expiration Date:   9/23/2024     Order Specific Question:   Specimen Source     Answer:   Urine    Ambulatory referral/consult to Sleep Disorders     Standing Status:   Future     Standing Expiration Date:   8/26/2024     Referral Priority:   Routine     Referral Type:   Consultation     Requested Specialty:   Sleep Medicine     Number of Visits Requested:   1           Follow up in about 3 months (around 10/26/2023), or if symptoms worsen or fail to improve, for Med refills, DM, HTN.  Future Appointments       Date Provider Specialty Appt Notes    8/30/2023 Eh Sow MD Family Medicine  ANNUAL          If no improvement in symptoms or symptoms worsen, advised to call/follow-up at clinic or go to ER. Patient voiced understanding and all questions/concerns were addressed.   DISCLAIMER: This note was compiled by using a speech recognition dictation system and therefore please be aware that typographical / speech recognition errors can and do occur.  Please contact me if you see any errors specifically.    Eh Sow M.D.       Office: [phone] [address] [place] [address]  FAX: [phone]

## 2023-07-26 NOTE — ASSESSMENT & PLAN NOTE
Continue Januvia.  Consider low-dose GLP 1 if tolerated.  We will plan to monitor hemoglobin A1c at designated intervals 3 to 6 months.  I recommend ongoing Education for diabetic diet and exercise protocol.  We will continue to monitor for side effects.    Please be advised of symptoms to monitor for and to notify me immediately if persistent or worsening.  Follow up with Ophthalmology/Optometry and Podiatry at least annually.

## 2023-07-26 NOTE — PATIENT INSTRUCTIONS
Follow up in about 3 months (around 10/26/2023), or if symptoms worsen or fail to improve, for Med refills, DM, HTN.     Dear patient,   As a result of recent federal legislation (The Federal Cures Act), you may receive lab or pathology results from your visit in your [facility] account before your physician is able to contact you. Your physician or their representative will relay the results to you with their recommendations at their soonest availability.     If no improvement in symptoms or symptoms worsen, please be advised to call MD, follow-up at clinic and/or go to ER if becomes severe.    Eh Sow M.D.        We Offer TELEHEALTH & Same Day Appointments!   Book your Telehealth appointment with me through my nurse or   Clinic appointments on [employer]!    [address] [place] [address]    Office: [phone]   FAX: [phone]    Check out my Facebook Page and Follow Me at: [email or web address]    Check out my website at [employer] by clicking on: [email or web address]    To Schedule appointments online, go to [employer]: [email or web address]

## 2023-07-28 NOTE — PROGRESS NOTES
Make follow-up lab appointment per recommendation below.  Check to see if patient has seen the results through my chart.  If not then,  #CALL THE PATIENT# to discuss results/see if they have questions and document verification of contact. Make F/U appt if needed. [phone]    #My interpretation that was sent to them through [employer]:  Crystal, I have reviewed your recent blood work.     Your complete blood count is stable.    Your metabolic panel which shows your glucose, kidney function, electrolytes, and liver function is stable.   Thyroid study is abnormal.  This level indicates mild hypothyroidism.  I recommend close monitoring with repeat TSH in two months.  Cholesterol panel is improved from previous.  Your hemoglobin A1c is improved from previous.  This test is gold standard screening test for diabetes.  It is a measures 3 months of your average blood sugar.  =========================  Also please address any outstanding health maintenance that may be due: There are no preventive care reminders to display for this patient.

## 2023-08-07 ENCOUNTER — PATIENT MESSAGE (OUTPATIENT)
Dept: FAMILY MEDICINE | Facility: CLINIC | Age: 68
End: 2023-08-07
Payer: MEDICARE

## 2023-08-07 DIAGNOSIS — G47.33 OSA (OBSTRUCTIVE SLEEP APNEA): Primary | ICD-10-CM

## 2023-08-07 NOTE — TELEPHONE ENCOUNTER
I received your message which was reviewed along with the the medication list and allergies that we have below.  Please review it for accuracy to make sure that we have the most recent records on your history.     Based on this, the following orders were placed AND/OR medicines were sent in.     Orders Placed This Encounter   Procedures    CPAP/BIPAP SUPPLIES     Order Specific Question:   Length of need (1-99 months):     Answer:   99     Order Specific Question:   Choose ONE mask type and its corresponding cushions and/or pillows:     Answer:    Full Face Mask, 1 per 90 days:  Full Face Cushion, (3 per 90 days)     Order Specific Question:   Choose EITHER Heated or Non-Heated Tubjing     Answer:    Non-Heated Tubing, 1 per 90 days     Order Specific Question:   All other supplies as needed as listed below:     Answer:    Headgear, 1 per 180 days     Order Specific Question:   All other supplies as needed as listed below:     Answer:    Disposable Filter, 6 per 90 days     Order Specific Question:   All other supplies as needed as listed below:     Answer:    Exhalation Port, contact payer for quantity/frequency     Order Specific Question:   All other supplies as needed as listed below:     Answer:    Humidifier Chamber, 1 per 180 days     Order Specific Question:   All other supplies as needed as listed below:     Answer:    Non-Disposable Filter, 1 per 180 days     Order Specific Question:   All other supplies as needed as listed below:     Answer:    Chin Strap, 1 per 180 days       Medications written and sent at this time include:       Your pharmacy(ies) of choice at this time on record include the list below and any medications would have been sent to the one at the top.    [facility] - [address] [address]  Phone: [phone] Fax: [phone]      Thank you for choosing us as your healthcare provider!    Eh Sow    ALLERGY LIST  Review of patient's allergies indicates:   Allergen Reactions    Metformin     Farxiga [dapagliflozin]      Yeast infection      Latex, natural rubber     Bactrim [sulfamethoxazole-trimethoprim] Rash       MEDICATION LIST  Current Outpatient Medications on File Prior to Visit   Medication Sig Dispense Refill    amiodarone (PACERONE) 200 MG Tab Take 200 mg by mouth.      aspirin (ECOTRIN) 81 MG EC tablet Take 81 mg by mouth.      atorvastatin (LIPITOR) 80 MG tablet Take 80 mg by mouth every evening.      blood sugar diagnostic Strp To check BG 2 times daily, to use with insurance preferred meter 200 each 5    blood-glucose meter kit To check BG 2 times daily, to use with insurance preferred meter 1 each 0    chlorhexidine (HIBICLENS) 4 % external liquid Apply topically daily as needed. 473 mL 0    clopidogreL (PLAVIX) 75 mg tablet Take 75 mg by mouth.      ELIQUIS 5 mg Tab Take 5 mg by mouth 2 (two) times daily.      ENTRESTO 24-26 mg per tablet Take 1 tablet by mouth 2 (two) times daily.      famotidine (PEPCID) 40 MG tablet Take 40 mg by mouth.      fluocinolone acetonide oiL (DERMOTIC OIL) 0.01 % Drop Place 3 drops in ear(s) 2 (two) times daily. 20 mL 5    fluorometholone 0.1% (FML) 0.1 % DrpS INSTILL 1 DROP INTO EACH EYE THREE TIMES DAILY AS DIRECTED      furosemide (LASIX) 20 MG tablet Take 20 mg by mouth daily as needed.      hydrocortisone 1 % cream Apply to affected area 2 times daily 30 g 0    INVELTYS 1 % DrpS Place 1 drop into both eyes 2 (two) times daily.      lancets Misc To check BG 2 times daily, to use with insurance preferred meter 200 each 99    levalbuterol (XOPENEX HFA) 45 mcg/actuation inhaler INHALE ONE PUFF INTO THE LUNGS EVERY 4 HOURS AS NEEDED FOR WHEEZING 45 g 1    metoprolol succinate (TOPROL-XL) 25 MG 24 hr tablet Take 25 mg by mouth.      mupirocin (BACTROBAN) 2 % ointment Apply topically 3 (three) times daily. 22 g 2    nitroGLYCERIN (NITROSTAT) 0.4 MG  SL tablet Place 0.4 mg under the tongue as needed.      ofloxacin (OCUFLOX) 0.3 % ophthalmic solution       prednisoLONE acetate (PRED FORTE) 1 % DrpS Place 1 drop into the left eye 4 (four) times daily.      PROLENSA 0.07 % Drop SMARTSIG:In Eye(s)      SITagliptin phosphate (JANUVIA) 100 MG Tab Take 1 tablet (100 mg total) by mouth once daily. 90 tablet 4    XIIDRA 5 % Dpet INSTILL 1 DROP INTO EACH EYE TWICE DAILY AS DIRECTED       No current facility-administered medications on file prior to visit.       HEALTH MAINTENANCE THAT IS OVERDUE OR NEEDS TO BE UPDATED ON OUR CHART IS LISTED BELOW.  IF YOU HAVE HAD IT DONE ELSEWHERE, PLEASE SEND US DATES AND RECORDS IF YOU HAVE THEM TO MAKE YOUR CHART ACCURATE.  IF YOU HAVE NOT HAD THESE DONE AND ARE READY FOR US TO SCHEDULE THEM, PLEASE SEND US A MESSAGE.  There are no preventive care reminders to display for this patient.    DISCLAIMER: This note was compiled by using a speech recognition dictation system and therefore please be aware that typographical / speech recognition errors can and do occur.  Please contact me if you see any errors specifically.    Eh Sow MD  We Offer Telehealth & Same Day Appointments!   Book your Telehealth appointment with me through my nurse or   Clinic appointments on [employer]!  [phone]     Check out my Facebook Page and Follow Me at: CLICK HERE    Check out my website at [facility] by clicking on: CLICK HERE    To Schedule appointments online, go to [employer]: CLICK HERE     Location: [email or web address]    [address] [place] [address]    FAX: [phone]

## 2023-08-10 ENCOUNTER — CLINICAL SUPPORT (OUTPATIENT)
Dept: PULMONOLOGY | Facility: CLINIC | Age: 68
End: 2023-08-10
Payer: MEDICARE

## 2023-08-10 ENCOUNTER — HOSPITAL ENCOUNTER (OUTPATIENT)
Dept: RADIOLOGY | Facility: HOSPITAL | Age: 68
Discharge: HOME | End: 2023-08-10
Attending: INTERNAL MEDICINE
Payer: MEDICARE

## 2023-08-10 ENCOUNTER — OFFICE VISIT (OUTPATIENT)
Dept: SLEEP MEDICINE | Facility: CLINIC | Age: 68
End: 2023-08-10
Payer: MEDICARE

## 2023-08-10 VITALS
BODY MASS INDEX: 34.16 KG/M2 | OXYGEN SATURATION: 96 % | SYSTOLIC BLOOD PRESSURE: 98 MMHG | RESPIRATION RATE: 17 BRPM | WEIGHT: 185.63 LBS | HEART RATE: 53 BPM | DIASTOLIC BLOOD PRESSURE: 66 MMHG | HEIGHT: 62 IN

## 2023-08-10 VITALS — HEIGHT: 62 IN | BODY MASS INDEX: 34.16 KG/M2 | WEIGHT: 185.63 LBS

## 2023-08-10 DIAGNOSIS — Z87.891 FORMER SMOKER: ICD-10-CM

## 2023-08-10 DIAGNOSIS — J41.0 SIMPLE CHRONIC BRONCHITIS: ICD-10-CM

## 2023-08-10 DIAGNOSIS — I25.10 CORONARY ARTERY DISEASE, UNSPECIFIED VESSEL OR LESION TYPE, UNSPECIFIED WHETHER ANGINA PRESENT, UNSPECIFIED WHETHER NATIVE OR TRANSPLANTED HEART: ICD-10-CM

## 2023-08-10 DIAGNOSIS — I15.2 HYPERTENSION ASSOCIATED WITH DIABETES: Chronic | ICD-10-CM

## 2023-08-10 DIAGNOSIS — J44.9 COPD, GROUP A, BY GOLD 2017 CLASSIFICATION: ICD-10-CM

## 2023-08-10 DIAGNOSIS — I50.22 CHRONIC SYSTOLIC CONGESTIVE HEART FAILURE: ICD-10-CM

## 2023-08-10 DIAGNOSIS — R06.02 SOB (SHORTNESS OF BREATH): ICD-10-CM

## 2023-08-10 DIAGNOSIS — G47.33 OSA (OBSTRUCTIVE SLEEP APNEA): ICD-10-CM

## 2023-08-10 DIAGNOSIS — E78.5 HYPERLIPIDEMIA ASSOCIATED WITH TYPE 2 DIABETES MELLITUS: Chronic | ICD-10-CM

## 2023-08-10 DIAGNOSIS — E11.69 HYPERLIPIDEMIA ASSOCIATED WITH TYPE 2 DIABETES MELLITUS: Chronic | ICD-10-CM

## 2023-08-10 DIAGNOSIS — E11.65 TYPE 2 DIABETES MELLITUS WITH HYPERGLYCEMIA, WITHOUT LONG-TERM CURRENT USE OF INSULIN: Chronic | ICD-10-CM

## 2023-08-10 DIAGNOSIS — E11.59 HYPERTENSION ASSOCIATED WITH DIABETES: Chronic | ICD-10-CM

## 2023-08-10 DIAGNOSIS — G47.33 OSA (OBSTRUCTIVE SLEEP APNEA): Primary | ICD-10-CM

## 2023-08-10 DIAGNOSIS — I48.0 PAF (PAROXYSMAL ATRIAL FIBRILLATION): ICD-10-CM

## 2023-08-10 PROBLEM — J42 CHRONIC BRONCHITIS: Status: ACTIVE | Noted: 2023-08-10

## 2023-08-10 LAB
BRPFT: ABNORMAL
FEF 25 75 CHG: 17.9 %
FEF 25 75 LLN: 0.93
FEF 25 75 POST REF: 46.7 %
FEF 25 75 PRE REF: 39.6 %
FEF 25 75 REF: 1.93
FET100 CHG: -13.6 %
FEV1 CHG: 1.3 %
FEV1 FVC CHG: 4.7 %
FEV1 FVC LLN: 66
FEV1 FVC POST REF: 86.4 %
FEV1 FVC PRE REF: 82.5 %
FEV1 FVC REF: 79
FEV1 LLN: 1.6
FEV1 POST REF: 73.8 %
FEV1 PRE REF: 72.8 %
FEV1 REF: 2.16
FVC CHG: -3.2 %
FVC LLN: 2.04
FVC POST REF: 85 %
FVC PRE REF: 87.8 %
FVC REF: 2.76
PEF CHG: 9.7 %
PEF LLN: 4.07
PEF POST REF: 103.6 %
PEF PRE REF: 94.4 %
PEF REF: 5.66
POST FEF 25 75: 0.9 L/S (ref 0.93–2.93)
POST FET 100: 10.63 SEC
POST FEV1 FVC: 68.18 % (ref 66.15–91.71)
POST FEV1: 1.6 L (ref 1.6–2.72)
POST FVC: 2.34 L (ref 2.04–3.47)
POST PEF: 5.87 L/S (ref 4.07–7.26)
PRE FEF 25 75: 0.76 L/S (ref 0.93–2.93)
PRE FET 100: 12.3 SEC
PRE FEV1 FVC: 65.11 % (ref 66.15–91.71)
PRE FEV1: 1.57 L (ref 1.6–2.72)
PRE FVC: 2.42 L (ref 2.04–3.47)
PRE PEF: 5.35 L/S (ref 4.07–7.26)

## 2023-08-10 PROCEDURE — 71046 X-RAY EXAM CHEST 2 VIEWS: CPT | Mod: TC

## 2023-08-10 PROCEDURE — 3074F PR MOST RECENT SYSTOLIC BLOOD PRESSURE < 130 MM HG: ICD-10-PCS | Mod: CPTII,S$GLB,, | Performed by: INTERNAL MEDICINE

## 2023-08-10 PROCEDURE — 3078F PR MOST RECENT DIASTOLIC BLOOD PRESSURE < 80 MM HG: ICD-10-PCS | Mod: CPTII,S$GLB,, | Performed by: INTERNAL MEDICINE

## 2023-08-10 PROCEDURE — 99999 PR PBB SHADOW E&M-EST. PATIENT-LVL I: ICD-10-PCS | Mod: PBBFAC,,,

## 2023-08-10 PROCEDURE — 94618 PULMONARY STRESS TESTING: ICD-10-PCS | Mod: S$GLB,,, | Performed by: INTERNAL MEDICINE

## 2023-08-10 PROCEDURE — 3061F NEG MICROALBUMINURIA REV: CPT | Mod: CPTII,S$GLB,, | Performed by: INTERNAL MEDICINE

## 2023-08-10 PROCEDURE — 99205 OFFICE O/P NEW HI 60 MIN: CPT | Mod: 25,S$GLB,, | Performed by: INTERNAL MEDICINE

## 2023-08-10 PROCEDURE — 1159F MED LIST DOCD IN RCRD: CPT | Mod: CPTII,S$GLB,, | Performed by: INTERNAL MEDICINE

## 2023-08-10 PROCEDURE — 99999 PR PBB SHADOW E&M-EST. PATIENT-LVL I: CPT | Mod: PBBFAC,,,

## 2023-08-10 PROCEDURE — 3066F PR DOCUMENTATION OF TREATMENT FOR NEPHROPATHY: ICD-10-PCS | Mod: CPTII,S$GLB,, | Performed by: INTERNAL MEDICINE

## 2023-08-10 PROCEDURE — 3288F FALL RISK ASSESSMENT DOCD: CPT | Mod: CPTII,S$GLB,, | Performed by: INTERNAL MEDICINE

## 2023-08-10 PROCEDURE — 3044F PR MOST RECENT HEMOGLOBIN A1C LEVEL <7.0%: ICD-10-PCS | Mod: CPTII,S$GLB,, | Performed by: INTERNAL MEDICINE

## 2023-08-10 PROCEDURE — 4010F PR ACE/ARB THEARPY RXD/TAKEN: ICD-10-PCS | Mod: CPTII,S$GLB,, | Performed by: INTERNAL MEDICINE

## 2023-08-10 PROCEDURE — 3288F PR FALLS RISK ASSESSMENT DOCUMENTED: ICD-10-PCS | Mod: CPTII,S$GLB,, | Performed by: INTERNAL MEDICINE

## 2023-08-10 PROCEDURE — 99999 PR PBB SHADOW E&M-EST. PATIENT-LVL V: ICD-10-PCS | Mod: PBBFAC,,, | Performed by: INTERNAL MEDICINE

## 2023-08-10 PROCEDURE — 94060 EVALUATION OF WHEEZING: CPT | Mod: S$GLB,,, | Performed by: INTERNAL MEDICINE

## 2023-08-10 PROCEDURE — 1160F RVW MEDS BY RX/DR IN RCRD: CPT | Mod: CPTII,S$GLB,, | Performed by: INTERNAL MEDICINE

## 2023-08-10 PROCEDURE — 3074F SYST BP LT 130 MM HG: CPT | Mod: CPTII,S$GLB,, | Performed by: INTERNAL MEDICINE

## 2023-08-10 PROCEDURE — 1160F PR REVIEW ALL MEDS BY PRESCRIBER/CLIN PHARMACIST DOCUMENTED: ICD-10-PCS | Mod: CPTII,S$GLB,, | Performed by: INTERNAL MEDICINE

## 2023-08-10 PROCEDURE — 3008F BODY MASS INDEX DOCD: CPT | Mod: CPTII,S$GLB,, | Performed by: INTERNAL MEDICINE

## 2023-08-10 PROCEDURE — 3066F NEPHROPATHY DOC TX: CPT | Mod: CPTII,S$GLB,, | Performed by: INTERNAL MEDICINE

## 2023-08-10 PROCEDURE — 1101F PT FALLS ASSESS-DOCD LE1/YR: CPT | Mod: CPTII,S$GLB,, | Performed by: INTERNAL MEDICINE

## 2023-08-10 PROCEDURE — 1101F PR PT FALLS ASSESS DOC 0-1 FALLS W/OUT INJ PAST YR: ICD-10-PCS | Mod: CPTII,S$GLB,, | Performed by: INTERNAL MEDICINE

## 2023-08-10 PROCEDURE — 71046 XR CHEST PA AND LATERAL: ICD-10-PCS | Mod: 26,,, | Performed by: RADIOLOGY

## 2023-08-10 PROCEDURE — 71046 X-RAY EXAM CHEST 2 VIEWS: CPT | Mod: 26,,, | Performed by: RADIOLOGY

## 2023-08-10 PROCEDURE — 3078F DIAST BP <80 MM HG: CPT | Mod: CPTII,S$GLB,, | Performed by: INTERNAL MEDICINE

## 2023-08-10 PROCEDURE — 94060 PR EVAL OF BRONCHOSPASM: ICD-10-PCS | Mod: S$GLB,,, | Performed by: INTERNAL MEDICINE

## 2023-08-10 PROCEDURE — 3008F PR BODY MASS INDEX (BMI) DOCUMENTED: ICD-10-PCS | Mod: CPTII,S$GLB,, | Performed by: INTERNAL MEDICINE

## 2023-08-10 PROCEDURE — 94618 PULMONARY STRESS TESTING: CPT | Mod: S$GLB,,, | Performed by: INTERNAL MEDICINE

## 2023-08-10 PROCEDURE — 99205 PR OFFICE/OUTPT VISIT, NEW, LEVL V, 60-74 MIN: ICD-10-PCS | Mod: 25,S$GLB,, | Performed by: INTERNAL MEDICINE

## 2023-08-10 PROCEDURE — 3061F PR NEG MICROALBUMINURIA RESULT DOCUMENTED/REVIEW: ICD-10-PCS | Mod: CPTII,S$GLB,, | Performed by: INTERNAL MEDICINE

## 2023-08-10 PROCEDURE — 99999 PR PBB SHADOW E&M-EST. PATIENT-LVL V: CPT | Mod: PBBFAC,,, | Performed by: INTERNAL MEDICINE

## 2023-08-10 PROCEDURE — 1159F PR MEDICATION LIST DOCUMENTED IN MEDICAL RECORD: ICD-10-PCS | Mod: CPTII,S$GLB,, | Performed by: INTERNAL MEDICINE

## 2023-08-10 PROCEDURE — 4010F ACE/ARB THERAPY RXD/TAKEN: CPT | Mod: CPTII,S$GLB,, | Performed by: INTERNAL MEDICINE

## 2023-08-10 PROCEDURE — 3044F HG A1C LEVEL LT 7.0%: CPT | Mod: CPTII,S$GLB,, | Performed by: INTERNAL MEDICINE

## 2023-08-10 NOTE — ASSESSMENT & PLAN NOTE
Patient is identified as having Systolic (HFrEF) heart failure that is Chronic. CHF is currently controlled. Latest ECHO performed and demonstrates- No results found for this or any previous visit.  . Continue ARNI and monitor clinical status closely. Monitor on telemetry. Patient is on CHF pathway.  Monitor strict Is&Os and daily weights.  Place on fluid restriction of 1.5 L. Cardiology has been consulted. Continue to stress to patient importance of self efficacy and  on diet for CHF. Last BNP reviewed- and noted below @LABRCNTIP(BNP,BNPTRIAGEBLO)@.

## 2023-08-10 NOTE — PROGRESS NOTES
Pulmonary Outpatient  Visit     Subjective:       Patient ID: Crystal Acuna is a 67 y.o. female.    Social History     Tobacco Use   Smoking Status Former    Current packs/day: 0.75    Average packs/day: 0.8 packs/day for 39.0 years (29.3 ttl pk-yrs)    Types: Cigarettes   Smokeless Tobacco Never   Tobacco Comments    quit 2 yrs ago            Chief Complaint: Sleep Apnea      Crystal Acuna is 67 y.o.  Referred by Eh Sow MD  Diagnosis KENYETTA based on HSAt  Has CPAP : > 1 year, [facility], D[facility]  Download reviewed  Heavy smoker in past:  Prescribed Inhaler  Recent acute MI: 3 stent  Inhaler was added by cardiology: no prior spirometry  Last 2 view CXR 2021  No occupational exposure  On Amio and elliquis  UTD immunisation  No cough, No wheezing or hemoptysis  Quit smoking 7 years  Last Echo Ejection Fraction = 30-35%  Currently on APAP 4-20  AHI 1.0  Using and benefits              Review of Systems   Constitutional:  Positive for fatigue.   Respiratory:  Positive for cough, dyspnea on extertion and use of rescue inhaler. Negative for apnea, snoring, sputum production, choking, chest tightness and wheezing.    Psychiatric/Behavioral:  Positive for sleep disturbance.    All other systems reviewed and are negative.      Outpatient Encounter Medications as of 8/10/2023   Medication Sig Dispense Refill    amiodarone (PACERONE) 200 MG Tab Take 200 mg by mouth.      aspirin (ECOTRIN) 81 MG EC tablet Take 81 mg by mouth.      atorvastatin (LIPITOR) 80 MG tablet Take 80 mg by mouth every evening.      blood sugar diagnostic Strp To check BG 2 times daily, to use with insurance preferred meter 200 each 5    chlorhexidine (HIBICLENS) 4 % external liquid Apply topically daily as needed. 473 mL 0    clopidogreL (PLAVIX) 75 mg tablet Take 75 mg by mouth.      ELIQUIS 5 mg Tab Take 5 mg by mouth 2 (two) times daily.      ENTRESTO 24-26 mg per tablet Take 1 tablet by  mouth 2 (two) times daily.      famotidine (PEPCID) 40 MG tablet Take 40 mg by mouth.      fluocinolone acetonide oiL (DERMOTIC OIL) 0.01 % Drop Place 3 drops in ear(s) 2 (two) times daily. 20 mL 5    fluorometholone 0.1% (FML) 0.1 % DrpS INSTILL 1 DROP INTO EACH EYE THREE TIMES DAILY AS DIRECTED      furosemide (LASIX) 20 MG tablet Take 20 mg by mouth daily as needed.      hydrocortisone 1 % cream Apply to affected area 2 times daily 30 g 0    INVELTYS 1 % DrpS Place 1 drop into both eyes 2 (two) times daily.      lancets Misc To check BG 2 times daily, to use with insurance preferred meter 200 each 99    levalbuterol (XOPENEX HFA) 45 mcg/actuation inhaler INHALE ONE PUFF INTO THE LUNGS EVERY 4 HOURS AS NEEDED FOR WHEEZING 45 g 1    metoprolol succinate (TOPROL-XL) 25 MG 24 hr tablet Take 25 mg by mouth.      mupirocin (BACTROBAN) 2 % ointment Apply topically 3 (three) times daily. 22 g 2    nitroGLYCERIN (NITROSTAT) 0.4 MG SL tablet Place 0.4 mg under the tongue as needed.      ofloxacin (OCUFLOX) 0.3 % ophthalmic solution       prednisoLONE acetate (PRED FORTE) 1 % DrpS Place 1 drop into the left eye 4 (four) times daily.      PROLENSA 0.07 % Drop SMARTSIG:In Eye(s)      SITagliptin phosphate (JANUVIA) 100 MG Tab Take 1 tablet (100 mg total) by mouth once daily. 90 tablet 4    XIIDRA 5 % Dpet INSTILL 1 DROP INTO EACH EYE TWICE DAILY AS DIRECTED      blood-glucose meter kit To check BG 2 times daily, to use with insurance preferred meter 1 each 0     Facility-Administered Encounter Medications as of 8/10/2023   Medication Dose Route Frequency Provider Last Rate Last Admin    [DISCONTINUED] GENERIC EXTERNAL MEDICATION     Provider, Generic External Data           The following portions of the patient's history were reviewed and updated as appropriate: She  has a past medical history of Allergy, Bronchitis, Family history of colonic polyps (04/27/2021), Hallux abductovalgus, Herniated lumbar intervertebral disc,  Hyperlipidemia, Hypertension, Smoker, and Staph aureus infection.  She does not have any pertinent problems on file.  She  has a past surgical history that includes Cholecystectomy (2010);  section; Cyst Removal; Back surgery; Endometrial ablation (2009); Carpal tunnel release; Foot fracture surgery; Colonoscopy (N/A, 2021); Eye surgery; Spine surgery; Tonsillectomy; Joint replacement; and Breast biopsy.  Her family history includes Breast cancer in her maternal aunt; COPD in her mother; Heart disease in her father and mother.  She  reports that she has quit smoking. Her smoking use included cigarettes. She has a 29.3 pack-year smoking history. She has never used smokeless tobacco. She reports that she does not drink alcohol and does not use drugs.  She has a current medication list which includes the following prescription(s): amiodarone, aspirin, atorvastatin, blood sugar diagnostic, chlorhexidine, clopidogrel, eliquis, entresto, famotidine, fluocinolone acetonide oil, fluorometholone 0.1%, furosemide, hydrocortisone, inveltys, lancets, levalbuterol, metoprolol succinate, mupirocin, nitroglycerin, ofloxacin, prednisolone acetate, prolensa, sitagliptin phosphate, xiidra, blood-glucose meter, and tiotropium bromide.  Current Outpatient Medications on File Prior to Visit   Medication Sig Dispense Refill    amiodarone (PACERONE) 200 MG Tab Take 200 mg by mouth.      aspirin (ECOTRIN) 81 MG EC tablet Take 81 mg by mouth.      atorvastatin (LIPITOR) 80 MG tablet Take 80 mg by mouth every evening.      blood sugar diagnostic Strp To check BG 2 times daily, to use with insurance preferred meter 200 each 5    chlorhexidine (HIBICLENS) 4 % external liquid Apply topically daily as needed. 473 mL 0    clopidogreL (PLAVIX) 75 mg tablet Take 75 mg by mouth.      ELIQUIS 5 mg Tab Take 5 mg by mouth 2 (two) times daily.      ENTRESTO 24-26 mg per tablet Take 1 tablet by mouth 2 (two) times daily.       famotidine (PEPCID) 40 MG tablet Take 40 mg by mouth.      fluocinolone acetonide oiL (DERMOTIC OIL) 0.01 % Drop Place 3 drops in ear(s) 2 (two) times daily. 20 mL 5    fluorometholone 0.1% (FML) 0.1 % DrpS INSTILL 1 DROP INTO EACH EYE THREE TIMES DAILY AS DIRECTED      furosemide (LASIX) 20 MG tablet Take 20 mg by mouth daily as needed.      hydrocortisone 1 % cream Apply to affected area 2 times daily 30 g 0    INVELTYS 1 % DrpS Place 1 drop into both eyes 2 (two) times daily.      lancets Misc To check BG 2 times daily, to use with insurance preferred meter 200 each 99    levalbuterol (XOPENEX HFA) 45 mcg/actuation inhaler INHALE ONE PUFF INTO THE LUNGS EVERY 4 HOURS AS NEEDED FOR WHEEZING 45 g 1    metoprolol succinate (TOPROL-XL) 25 MG 24 hr tablet Take 25 mg by mouth.      mupirocin (BACTROBAN) 2 % ointment Apply topically 3 (three) times daily. 22 g 2    nitroGLYCERIN (NITROSTAT) 0.4 MG SL tablet Place 0.4 mg under the tongue as needed.      ofloxacin (OCUFLOX) 0.3 % ophthalmic solution       prednisoLONE acetate (PRED FORTE) 1 % DrpS Place 1 drop into the left eye 4 (four) times daily.      PROLENSA 0.07 % Drop SMARTSIG:In Eye(s)      SITagliptin phosphate (JANUVIA) 100 MG Tab Take 1 tablet (100 mg total) by mouth once daily. 90 tablet 4    XIIDRA 5 % Dpet INSTILL 1 DROP INTO EACH EYE TWICE DAILY AS DIRECTED      blood-glucose meter kit To check BG 2 times daily, to use with insurance preferred meter 1 each 0     Current Facility-Administered Medications on File Prior to Visit   Medication Dose Route Frequency Provider Last Rate Last Admin    [DISCONTINUED] GENERIC EXTERNAL MEDICATION     Provider, Generic External Data         She is allergic to metformin; farxiga [dapagliflozin]; latex, natural rubber; and bactrim [sulfamethoxazole-trimethoprim]..      BP Readings from Last 3 Encounters:   08/10/23 98/66   07/26/23 100/70   01/09/23 (!) 120/55     Snoring / Sleep:     [place] Questionnaire (validated KENYETTA  "screening questionnaire)    YES -- Snoring/apnea    YES -- Fatigue    Body mass index is 33.95 kg/m².  (>25 is overweight, >30 is obese)    Blood Pressure = normal blood pressure  (PreHTN 120-139/80-89, Stg1 140-159/90-99, Stg2 >160/>100)  [place] = 2 of three KENYETTA categories are positive (high risk is 2-3 positive categories)     [place] Sleepiness Scale TOTAL =    EPWORTH SLEEPINESS SCALE 8/10/2023   Sitting and reading 0   Watching TV 0   Sitting, inactive in a public place (e.g. a theatre or a meeting) 0   As a passenger in a car for an hour without a break 0   Lying down to rest in the afternoon when circumstances permit 2   Sitting and talking to someone 0   Sitting quietly after a lunch without alcohol 2   In a car, while stopped for a few minutes in traffic 0   Total score 4      (validated sleepiness questionnaire with a higher score indicating greater sleepiness; range 0-24)  No flowsheet data found.    STOP-Bang Questionnaire (validated KENYETTA screening questionnaire)  Negative unless checked off.  [x] Snoring    [x]  Tired/Fatigued/Sleepy  [x] Obstruction (apneas/choking)  [] Pressure (HTN)  [] BMI >35  [x] Age >50  [] Neck >40 cm  [] Gender male   STOP-Bang = 4 (low risk 0-2,high risk 3-8)    Neck circumference 17"        MMRC Dyspnea Scale (4 is worst)     [] MMRC 0: Dyspneic on strenuous excercise (0 points)    [x] MMRC 1: Dyspneic on walking a slight hill (0 points)    [] MMRC 2: Dyspneic on walking level ground; must stop occasionally due to breathlessness (1 point)    [] MMRC 3: Must stop for breathlessness after walking 100 yards or after a few minutes (2 points)    [] MMRC 4: Cannot leave house; breathless on dressing/undressing (3 points)                             Objective:     Vital Signs (Most Recent)  Vital Signs  Pulse: (!) 53  Resp: 17  SpO2: 96 %  BP: 98/66  Height and Weight  Height: 5' 2" (157.5 cm)  Weight: 84.2 kg (185 lb 10 oz)  BSA (Calculated - sq m): 1.92 sq meters  BMI (Calculated): " "33.9  Weight in (lb) to have BMI = 25: 136.4]  Wt Readings from Last 2 Encounters:   08/10/23 84.2 kg (185 lb 10 oz)   07/26/23 82.7 kg (182 lb 6.4 oz)       Physical Exam  Vitals and nursing note reviewed.   Constitutional:       Appearance: She is normal weight.   HENT:      Head: Normocephalic and atraumatic.      Nose: Nose normal.   Eyes:      Pupils: Pupils are equal, round, and reactive to light.   Cardiovascular:      Rate and Rhythm: Normal rate and regular rhythm.      Pulses: Normal pulses.      Heart sounds: Normal heart sounds.   Pulmonary:      Effort: Pulmonary effort is normal.      Breath sounds: Normal breath sounds.   Abdominal:      General: Bowel sounds are normal.      Palpations: Abdomen is soft.   Musculoskeletal:      Cervical back: Normal range of motion.   Skin:     General: Skin is warm.   Neurological:      General: No focal deficit present.      Mental Status: She is alert and oriented to person, place, and time.   Psychiatric:         Mood and Affect: Mood normal.          Laboratory  Lab Results   Component Value Date    WBC 5.25 07/26/2023    RBC 4.98 07/26/2023    HGB 14.0 07/26/2023    HCT 45.4 07/26/2023    MCV 91 07/26/2023    MCH 28.1 07/26/2023    MCHC 30.8 (L) 07/26/2023    RDW 16.7 (H) 07/26/2023     07/26/2023    MPV 11.8 07/26/2023    GRAN 3.6 09/28/2022    GRAN 52.3 09/28/2022    LYMPH 2.5 09/28/2022    LYMPH 36.8 09/28/2022    MONO 0.5 09/28/2022    MONO 7.5 09/28/2022    EOS 0.2 09/28/2022    BASO 0.05 09/28/2022    EOSINOPHIL 2.3 09/28/2022    BASOPHIL 0.7 09/28/2022       BMP  Lab Results   Component Value Date     07/26/2023    K 4.2 07/26/2023     07/26/2023    CO2 25 07/26/2023    BUN 18 07/26/2023    CREATININE 0.9 07/26/2023    CALCIUM 8.8 07/26/2023    ANIONGAP 13 07/26/2023    ESTGFRAFRICA >60.0 06/08/2022    EGFRNONAA >60.0 06/08/2022    AST 20 07/26/2023    ALT 16 07/26/2023    PROT 6.8 07/26/2023          No results found for: "IGE"     No " "results found for: "ASPERGILLUS"  No results found for: "AFUMIGATUSCL"     No results found for: "ACE"     Diagnostic Results:  I have personally reviewed today the following studies:     1 night study  MODERATE OBSTRUCTIVE SLEEP APNEA with overall AHI 21.7/hr ( 120 events): night #1  Oxygen desaturation: 82%. SpO2 between 80% to 84% for 10 min.  SpO2 was below 90%: 69% time  Patient snored 98% time above 50 .  Heart rate range: 57 bpm - 103 bpm  REC's:  Please refer to sleep disorders clinic  Therapy with APAP at 4-20 cm WP using mask of choice with heated humidification is an option.  Weight loss/management. with regular exercise per direction of physician.  Avoid drowsy driving.  Follow up in sleep clinic to maximize adherence and ensure resolution of symptoms.      2023 - 2023  Patient ID: [id number]  : 1955  Age: 67 years  Gender: Female  [facility] [address] [place]  [place], [address]  Compliance Report  Compliance  Payor Standard  Usage 2023 - 2023  Usage days 30/30 days (100%)  >= 4 hours 29 days (97%)  < 4 hours 1 days (3%)  Usage hours 227 hours 33 minutes  Average usage (total days) 7 hours 35 minutes  Average usage (days used) 7 hours 35 minutes  Median usage (days used) 7 hours 54 minutes  Total used hours (value since last reset - 2023) 3,376 hours  AirSense 11 AutoSet  Serial number [id number]  Mode AutoSet  Min Pressure 4 cmH2O  Max Pressure 20 cmH2O  EPR Fulltime  EPR level 3  Response Standard  Therapy  Pressure - cmH2O Median: 7.0 95th percentile: 10.8 Maximum: 12.3  Leaks - L/min Median: 3.0 95th percentile: 7.7 Maximum: 12.4  Events per hour AI: 0.8 HI: 0.2 AHI: 1.0  Apnea Index Central: 0.0 Obstructive: 0.7 Unknown: 0.0  RERA Index 0.1      Spirometry shows moderate obstruction. Spirometry remains unimproved following bronchodilator.Notes:The failure to demonstrate improvement in spirometry does not preclude a clinical response to a " "trial of bronchodilators.(Physician   Final: 08/10/2023 11:12AM, Electronically signed by Dr. AMMY Mederos)      Ordering Provider:            Interpreting Provider:   Performing nurse/tech/RT: MIGUEL Boswell RRT  Diagnosis: Shortness of Breath  Height: 5' 2" (157.5 cm)  Weight: 84.2 kg (185 lb 10 oz)  BMI (Calculated): 33.9              Patient Race:                                                                Phase Oxygen Assessment Supplemental O2 Heart   Rate Blood Pressure [place] Dyspnea Scale Rating   Resting 95 % Room Air 50 bpm 121/63 0   Exercise             Minute             1 93 % Room Air 71 bpm       2 94 % Room Air 80 bpm       3 96 % Room Air 83 bpm       4 95 % Room Air 85 bpm       5 95 % Room Air 83 bpm       6  96 % Room Air 79 bpm 133/57 2   Recovery             Minute             1 97 % Room Air 65 bpm       2 97 % Room Air 59 bpm       3 97 % Room Air 53 bpm       4 97 % Room Air 54 bpm 115/54 0      Six Minute Walk Summary  6MWT Status: completed without stopping  Patient Reported: Dyspnea, Leg pain (Back Pain)         Interpretation:  Did the patient stop or pause?: No  Total Time Walked (Calculated): 360 seconds  Final Partial Lap Distance (feet): 0 feet  Total Distance Meters (Calculated): 365.76 meters  Predicted Distance Meters (Calculated): 430.81 meters  Percentage of Predicted (Calculated): 84.9  Peak VO2 (Calculated): 14.95  Mets: 4.27  Has The Patient Had a Previous Six Minute Walk Test?: No     Previous 6MWT Results  Has The Patient Had a Previous Six Minute Walk Test?: No      X-Ray Chest PA And Lateral  Narrative: EXAM:  XR CHEST PA AND LATERAL    CLINICAL HISTORY:   Obstructive sleep apnea.    COMPARISON: 12/06/2021.    2 views of the chest.    FINDINGS: The lungs are clear.  The cardiac silhouette upper limits of normal in size.  Mediastinum unremarkable.  Osseous structures and soft tissues are within normal limits.  Calcified aortic knob.  Impression:   " No acute chest findings.    Finalized on: 8/10/2023 9:40 AM By:  Yovani Carranza MD  [facility]# [id number]      2023-08-10 09:42:[id number]    [facility]     Assessment/Plan:     Problem List Items Addressed This Visit       Type 2 diabetes mellitus with hyperglycemia, without long-term current use of insulin (Chronic)    Hyperlipidemia associated with type 2 diabetes mellitus (Chronic)     On LIPITOR         Hypertension associated with diabetes (Chronic)     Metoprolol         KENYETTA (obstructive sleep apnea) - Primary     EPWORTH SLEEPINESS SCALE 8/10/2023   Sitting and reading 0   Watching TV 0   Sitting, inactive in a public place (e.g. a theatre or a meeting) 0   As a passenger in a car for an hour without a break 0   Lying down to rest in the afternoon when circumstances permit 2   Sitting and talking to someone 0   Sitting quietly after a lunch without alcohol 2   In a car, while stopped for a few minutes in traffic 0   Total score 4          Data 07/05/2023 to 08/03/2023  Usage > 4 hrs was 97%  APAP 4-20 AHI 1.0    USING AND BENEFITS         Relevant Orders    CPAP/BIPAP SUPPLIES    X-Ray Chest PA And Lateral    HME - OTHER    CAD (coronary artery disease)     Recent Acute MI  X PCI [place]         Chronic systolic congestive heart failure     Patient is identified as having Systolic (HFrEF) heart failure that is Chronic. CHF is currently controlled. Latest ECHO performed and demonstrates- No results found for this or any previous visit.  . Continue ARNI and monitor clinical status closely. Monitor on telemetry. Patient is on CHF pathway.  Monitor strict Is&Os and daily weights.  Place on fluid restriction of 1.5 L. Cardiology has been consulted. Continue to stress to patient importance of self efficacy and  on diet for CHF. Last BNP reviewed- and noted below @LABRCNTIP(BNP,BNPTRIAGEBLO)@.         PAF (paroxysmal atrial fibrillation)     On Amiodarone and elliquis          Other Visit Diagnoses       SOB (shortness of  breath)        Relevant Orders    Spirometry with/without bronchodilator    Stress test, pulmonary    X-Ray Chest PA And Lateral             New COPD Dx  Added Spiriva  Using CPAP and benefits     Follow up in about 3 months (around 11/10/2023), or [place]twan, walk, CXR today, see Riana, CPAP supplies.    This note was prepared using voice recognition system and is likely to have sound alike errors that may have been overlooked even after proof reading.  Please call me with any questions    Discussed diagnosis, its evaluation, treatment and usual course. All questions answered.    Thank you for the courtesy of participating in the care of this patient    Oliver Mederos MD      Personal Diagnostic Review  []  CXR    []  ECHO    []  ONSAT    []  6MWD    []  LABS    []  CHEST CT    []  PET CT    []  Biopsy results              Problems Address                                                 Amount and/or Complexity                                                                      Risk       3           [] 2 or more self-limited or minor problems                      [] Limited                                                                        [] Low                  [] 1 stable chronic illness                                                  Any combination of the two                                               OTC drugs                  []Acute, uncomplicated illness or injury                            Review of prior external notes from unique source           Minor surgery with no risk factors                                                                                                               [] 1 []2  []3+                                                                                                              Review of results from each unique test                                                                                                               [] 1 []2  [] 3+                                                                                                               Order of each unique test                                                                                                               [] 1 []2  [] 3+                                                                                                              Or                                                                                                             [] Assessment requiring an independent historian      4            [] One or more chronic illness with exacerbation,              [] Moderate                                                                      [] Moderate                 Progression, or side effects of treatment                            -test documents or independent historians                        Prescription drug management                []  2 or more sable chronic illnesses                                    [] Independent interpretation of tests                              Minor surgery with identifiable risk                [] 1 undiagnosed new problem with uncertain prognosis    [] Discussion or management of test results                    elective major surgery                [] 1 acute illness with                systemic symptoms                                                                                                                                                              [] 1 acute complicated injury                                                                                                                                          Elective major surgery                                                                                                                                                                                                                                                                                                                                                                                                   5            [] 1 or more chronic illnesses with severe exacerbation,     [] Extensive(two from below)                                         [] High                                                                                                               [] Independent interpretation of results                         Drug therapy requiring intensive                                                                                                               []Discussion of management or test interpretation           monitoring                                                                                                                                                                                                       Decision to de-escalate care                 [] 1 acute or chronic illness or injury that poses a threat                                                                                               Decision regarding hospitalization

## 2023-08-10 NOTE — ASSESSMENT & PLAN NOTE
EPWORTH SLEEPINESS SCALE 8/10/2023   Sitting and reading 0   Watching TV 0   Sitting, inactive in a public place (e.g. a theatre or a meeting) 0   As a passenger in a car for an hour without a break 0   Lying down to rest in the afternoon when circumstances permit 2   Sitting and talking to someone 0   Sitting quietly after a lunch without alcohol 2   In a car, while stopped for a few minutes in traffic 0   Total score 4          Data 07/05/2023 to 08/03/2023  Usage > 4 hrs was 97%  APAP 4-20 AHI 1.0    USING AND BENEFITS

## 2023-08-10 NOTE — PROCEDURES
"[facility]  Six [facility]     SUMMARY     Ordering Provider:    Interpreting Provider:   Performing nurse/tech/RT: MIGUEL Boswell RRT  Diagnosis: Shortness of Breath  Height: 5' 2" (157.5 cm)  Weight: 84.2 kg (185 lb 10 oz)  BMI (Calculated): 33.9   Patient Race:             Phase Oxygen Assessment Supplemental O2 Heart   Rate Blood Pressure Esperanza Dyspnea Scale Rating   Resting 95 % Room Air 50 bpm 121/63 0   Exercise        Minute        1 93 % Room Air 71 bpm     2 94 % Room Air 80 bpm     3 96 % Room Air 83 bpm     4 95 % Room Air 85 bpm     5 95 % Room Air 83 bpm     6  96 % Room Air 79 bpm 133/57 2   Recovery        Minute        1 97 % Room Air 65 bpm     2 97 % Room Air 59 bpm     3 97 % Room Air 53 bpm     4 97 % Room Air 54 bpm 115/54 0     Six Minute Walk Summary  6MWT Status: completed without stopping  Patient Reported: Dyspnea, Leg pain (Back Pain)     Interpretation:  Did the patient stop or pause?: No               Total Time Walked (Calculated): 360 seconds  Final Partial Lap Distance (feet): 0 feet  Total Distance Meters (Calculated): 365.76 meters  Predicted Distance Meters (Calculated): 430.81 meters  Percentage of Predicted (Calculated): 84.9  Peak VO2 (Calculated): 14.95  Mets: 4.27  Has The Patient Had a Previous Six Minute Walk Test?: No       Previous 6MWT Results  Has The Patient Had a Previous Six Minute Walk Test?: No      Six minute walk distance is 365.76m /430.81 meters (84.9 % predicted) with very light.Patient did complete the study, walking 360 seconds of the 360 second test . During exercise, there was no  significant desaturation while breathing room air .Lowest oxygen saturation was 93% .Maximum heart rate during exercise was 85 bpm which is 54 % of maximum predicted heart rate of 155 bpm. Blood pressure remained stable and Heart rate remained stable Based upon age and body mass index, exercise capacity is normal.  Peak VO2 during " walking was 14.95 ml/kg/min which is 28 % of predicted Peak VO2 max of 52 ml/kg/min based on a resting heart rate of 50/min.    Patient has not had a previous study. No previous study performed.

## 2023-08-14 ENCOUNTER — TELEPHONE (OUTPATIENT)
Dept: PULMONOLOGY | Facility: CLINIC | Age: 68
End: 2023-08-14
Payer: MEDICARE

## 2023-08-14 NOTE — TELEPHONE ENCOUNTER
Chronic Disease Management:  Called patient to schedule initial Pulmonary Disease Management appointment.            Left message.

## 2023-08-17 ENCOUNTER — PATIENT MESSAGE (OUTPATIENT)
Dept: FAMILY MEDICINE | Facility: CLINIC | Age: 68
End: 2023-08-17
Payer: MEDICARE

## 2023-08-17 DIAGNOSIS — Z79.899 ENCOUNTER FOR LONG-TERM (CURRENT) USE OF MEDICATIONS: Primary | ICD-10-CM

## 2023-08-17 RX ORDER — FAMOTIDINE 40 MG/1
40 TABLET, FILM COATED ORAL DAILY
Qty: 30 TABLET | Refills: 11 | Status: SHIPPED | OUTPATIENT
Start: 2023-08-17 | End: 2024-08-16

## 2023-08-17 NOTE — TELEPHONE ENCOUNTER
No care due was identified.  [facility] Embedded Care Due Messages. Reference number: [id number].   8/17/2023 10:20:20 AM CDT

## 2023-08-23 ENCOUNTER — CLINICAL SUPPORT (OUTPATIENT)
Dept: PULMONOLOGY | Facility: CLINIC | Age: 68
End: 2023-08-23
Payer: MEDICARE

## 2023-08-23 VITALS — BODY MASS INDEX: 33.95 KG/M2 | HEIGHT: 62 IN | RESPIRATION RATE: 16 BRPM

## 2023-08-23 DIAGNOSIS — J44.9 COPD, GROUP A, BY GOLD 2017 CLASSIFICATION: ICD-10-CM

## 2023-08-23 NOTE — PATIENT INSTRUCTIONS
What is COPD?  COPD stands for chronic obstructive pulmonary disease. It means the airways in your lungs are blocked (obstructed). Because of this, it is hard to breathe. You may have trouble with daily activities because of shortness of breath. Over time the shortness of breath usually worsens making it more and more difficult to take care of yourself and take part in activities. Chronic bronchitis and emphysema are two common types of COPD.  What happens in chronic bronchitis?  The cells in the airways make more mucus than normal. The mucus builds up, narrowing the airways. This means less air travels into and out of the lungs. The lining of the airways may also become inflamed (swollen) and causes the airways to narrow even more.            What happens in emphysema?  The small airways are damaged and lose their stretchiness. The airways collapse when you exhale, causing air to get trapped in the air sacs. This means that less oxygen enters the blood vessels and less oxygen is delivered to all of the cells of your body. This makes it hard to breathe.         Damage to cilia  Cilia are small hairs that line and protect the airways. Smoking damages the cilia. Damaged cilia can't sweep mucus and particles away. Some of the cilia are destroyed. This damage worsens COPD.      How did I get COPD?  Most people get COPD from smoking. Cigarette smoke damages lungs, which can develop into COPD over many years.  How COPD affects you  COPD makes you work harder to breathe. Air may get trapped in the lungs, which prevents your lungs from filling completely with fresh oxygen-filled air when you inhale (breathe in). It's harder to take deep breaths especially when you are active and start breathing faster. Over time, your lungs may become enlarged filling the lung with air that does not transfer oxygen into the blood. These problems cause you to have shortness of breath (also called dyspnea). Wheezing (hoarse, whistling  breathing), chronic cough, and fatigue (feeling tired and worn out) are also common.   © 4788-1351 [employer]. [address] [facility], [place] [address]. All rights reserved. This information is not intended as a substitute for professional medical care. Always follow your healthcare professional's instructions.           [id number]  Shortness of Breath: Maximizing Your Energy    Fear of shortness of breath may stop you from being as active as you once were. You don't have to live this way. Managing your time and pacing yourself can help you conserve energy and do more. It's even OK if you're short of breath sometimes. You can learn to work through this without limiting your activities.  Manage your time  Shortness of breath can make everyday tasks take longer. This means there's less time to do the things you enjoy. You can help prevent this by managing your time. Try these tips:  Plan ahead so your tasks are spaced throughout the day. As you plan, keep in mind the times of day you tend to have the most energy.  Do only one thing at a time. Finish one task before starting another.  Gather everything you need before you start a task. This cuts out unneeded steps while you're working.  Think about what you really need to do. Be realistic about what you can get done in a day.      Balance activity and rest  When you're tired, your activities will take longer. Fatigue also makes you more likely to get an infection. Plan your day so that your tasks are spaced throughout the day. To have more energy:  Stop and rest when you need to. Don't wait until you're overtired.  Switch back and forth between hard tasks and easy ones.  Give yourself plenty of time for each task, so you don't have to hurry.  Take 20- to 30-minute rest breaks after meals and throughout the day.  If an activity takes a lot of energy, break it into smaller parts. For instance, fold the laundry first. Then take a break before putting it  away.  Try not to exert yourself in extreme cold or heat.       Find ways to conserve energy  Conserving your energy can help you stay active and breathe better. The way you use your body during a task can help you conserve energy. Think of ways to make things easier, and take your time to ease shortness of breath.  For some tasks, you can also use special aids designed to reduce the amount of energy needed. Here are some tips:  Sit whenever possible, and keep your arms close to your body. Use slow, smooth motions.  Sit to dress and undress, shave, brush your teeth, and comb your hair. Use a long-handled reacher to pull on socks and shoes, and long-handled items like shoe horns.  Sit on a bench to shower. Use warm water, not hot. (Steam can make it harder to breathe.) Dry off by wrapping yourself in a terrycloth robe.  Use energy-saving appliances, such as an electric can opener, a power toothbrush, and a .  Use a cart with wheels to move groceries, laundry, dishes, and other items around the house. Some carts have seats so you can rest when you need to.  Use lightweight, nonstick pots and pans to cook. Soak dirty dishes instead of scrubbing them. Air-dry dishes, or use a .  Mix, cook, serve, and store foods in the same dish.  Keep the things you use most at waist level, so you can get them without reaching or bending.  Use steps slowly, pausing at each step. If you have steps outside or in your home, think about adding ramps or stair lifts.  Think about ways that others can help you. You might get help from friends, family members, or home health aides.      Remember to breathe  Sometimes people with an illness or condition that affects the lungs try to rush through tasks so they won't get short of breath. This uses more energy and can actually increase shortness of breath. Instead, slow down and pace your breathing. These tips may help:  Move slowly during tasks that take a lot of effort, such  as climbing stairs or pushing a shopping cart.  Use pursed-lip and diaphragmatic breathing while you go about a task.  Breathe out (exhale) when you exert effort. For example, breathe out as you lift up a grocery bag. Once you're holding the bag, breathe in. Ask the  at the [employer] to pack your grocery bags so they are light and easy to carry.  Focus on taking slow, deep breaths. If your breathing is shallow, you don't take in as much air.  Remember that it's OK to be short of breath. Just pace yourself and do pursed-lip breathing.      Talk with your healthcare provider about:  If you should use supplemental oxygen  If you need a referral to occupational and physical therapy. Therapists can help you with exercise and daily activities, and how to make things easier.      Pursed-lip breathing  This type of breathing helps you exhale better. Breathing this way during activity will help you reduce shortness of breath:  Relax your neck and shoulder muscles. Breathe in (inhale) slowly through your nose for 2 counts or more.  Pucker your lips as if you are going to blow out a candle. Breathe out slowly and gently through your lips for at least twice as long as you inhaled. Chronic Lung Disease: Preventing Lung Infections  Chronic lung diseases include chronic obstructive pulmonary disease (COPD), which includes chronic bronchitis and emphysema. Other chronic lung diseases include pulmonary fibrosis, sarcoidosis, and other conditions. When you have chronic lung diseases, it's very important to protect yourself from respiratory infections, like colds, the flu, and lung infections. Infections may cause your lung condition to worsen. Although you can't completely avoid them, there are things you can do to lessen the chance of infections.    Take precautions  Taking the following precautions can help you avoid illness:  Remember to keep your hands away from your nose and mouth. Germs on your hands get into your  respiratory system this way.  Wash your hands often. When you wash them:  Use soap and warm water.  Rub your hands together well for at least 20 seconds.  Make sure to rinse them well.  Dry your hands using clean towels or air-dry them.  Use hand  containing alcohol, if you are unable to wash your hands. Use the  after touching doorknobs, handles, and supermarket carts, for example, since lots of people touch them. Then wash your hands as soon as you can.  To help prevent the flu, get a flu vaccination every year. This may be given at your healthcare provider's office, a drugstore, or pharmacy, or at work. Get your flu shot as soon as the vaccines are available in your area. This is usually around September each year.  To help prevent pneumococcal pneumonia, get pneumonia vaccinations. Talk with your healthcare provider about which pneumococcal vaccinations you need.  Try to stay away from people with respiratory infections, such as colds or the flu. Stay away from crowded places, like shopping centers or movie theatres during cold and flu season.  If you smoke, think about quitting. In addition to causing or worsening many lung conditions, the lung damage from smoking increases your risk of infections. Stay away from others who smoke, too. This is also harmful and increases your chance of infections.  © 3121-6827 [employer]. [address] [place] [address], [place] [address]. All rights reserved. This information is not intended as a substitute for professional medical care. Always follow your healthcare professional's instructions.   ACTION PLAN    GREEN ZONE  My sputum is clear/white/usual color and easily cleared.  My breathing is no harder than usual.  I can do my usual activities.  I can think clearly.   Take your usual medicines, including oxygen, as you are told to do so by your health care provider.   YELLOW ZONE  My sputum has change (color, thickness, amount).  I am more short of  breath than usual.  I cough or wheeze more.  I weigh more and my legs/feet swell.  I cannot do my usual activities without resting.   Call your health care provider. You will probably be told to begin taking an antibiotic and prednisone. Have your pharmacy phone number available.   RED ZONE  I cannot cough out my sputum.  I am much more short of breath than normal.  I need to sit up to breathe  I cannot do my usual activities.  I am unable to speak more than one or two words at a time.  I am confused.   Call your health care provider. You may be asked to come in to be seen, told to go to the emergency room, or told to call 9-1-1.

## 2023-08-23 NOTE — PROGRESS NOTES
Pulmonary Disease Management  [facility]  Initial Visit- Virtual Visit   Chronic Care Management    Crystal Acuna  was seen 8/23/2023  11:00 AM in the Pulmonary Disease management clinic for evaluation, education, reinforcement of self management techniques and exacerbation action plan.    The patient location is:    [address] [facility] [address]  Visit type: Virtual visit with synchronous audio and video     Amanuel TRACE Girons    Past Medical History:   Diagnosis Date    Allergy     Bronchitis     Family history of colonic polyps 04/27/2021    Three of her siblings have had colon polyps.    Hallux abductovalgus     Herniated lumbar intervertebral disc     Hyperlipidemia     Diet controlled    Hypertension     Smoker     Staph aureus infection        Patient's Medications   New Prescriptions    No medications on file   Previous Medications    AMIODARONE (PACERONE) 200 MG TAB    Take 200 mg by mouth.    ASPIRIN (ECOTRIN) 81 MG EC TABLET    Take 81 mg by mouth.    ATORVASTATIN (LIPITOR) 80 MG TABLET    Take 80 mg by mouth every evening.    BLOOD SUGAR DIAGNOSTIC STRP    To check BG 2 times daily, to use with insurance preferred meter    BLOOD-GLUCOSE METER KIT    To check BG 2 times daily, to use with insurance preferred meter    CHLORHEXIDINE (HIBICLENS) 4 % EXTERNAL LIQUID    Apply topically daily as needed.    CLOPIDOGREL (PLAVIX) 75 MG TABLET    Take 75 mg by mouth.    ELIQUIS 5 MG TAB    Take 5 mg by mouth 2 (two) times daily.    ENTRESTO 24-26 MG PER TABLET    Take 1 tablet by mouth 2 (two) times daily.    FAMOTIDINE (PEPCID) 40 MG TABLET    Take 40 mg by mouth.    FAMOTIDINE (PEPCID) 40 MG TABLET    Take 1 tablet (40 mg total) by mouth once daily.    FLUOCINOLONE ACETONIDE OIL (DERMOTIC OIL) 0.01 % DROP    Place 3 drops in ear(s) 2 (two) times daily.    FLUOROMETHOLONE 0.1% (FML) 0.1 % DRPS    INSTILL 1 DROP INTO EACH EYE THREE TIMES DAILY AS DIRECTED    FUROSEMIDE (LASIX) 20 MG TABLET     "Take 20 mg by mouth daily as needed.    HYDROCORTISONE 1 % CREAM    Apply to affected area 2 times daily    INVELTYS 1 % DRPS    Place 1 drop into both eyes 2 (two) times daily.    LANCETS MISC    To check BG 2 times daily, to use with insurance preferred meter    LEVALBUTEROL (XOPENEX HFA) 45 MCG/ACTUATION INHALER    INHALE ONE PUFF INTO THE LUNGS EVERY 4 HOURS AS NEEDED FOR WHEEZING    METOPROLOL SUCCINATE (TOPROL-XL) 25 MG 24 HR TABLET    Take 25 mg by mouth.    MUPIROCIN (BACTROBAN) 2 % OINTMENT    Apply topically 3 (three) times daily.    NITROGLYCERIN (NITROSTAT) 0.4 MG SL TABLET    Place 0.4 mg under the tongue as needed.    OFLOXACIN (OCUFLOX) 0.3 % OPHTHALMIC SOLUTION        PREDNISOLONE ACETATE (PRED FORTE) 1 % DRPS    Place 1 drop into the left eye 4 (four) times daily.    PROLENSA 0.07 % DROP    SMARTSIG:In Eye(s)    SITAGLIPTIN PHOSPHATE (JANUVIA) 100 MG TAB    Take 1 tablet (100 mg total) by mouth once daily.    TIOTROPIUM BROMIDE (SPIRIVA RESPIMAT) 2.5 MCG/ACTUATION INHALER    Inhale 2 puffs into the lungs once daily. Controller    XIIDRA 5 % DPET    INSTILL 1 DROP INTO EACH EYE TWICE DAILY AS DIRECTED   Modified Medications    No medications on file   Discontinued Medications    No medications on file       Office Spirometry Results:         8/23/2023    11:01 AM 8/10/2023    11:17 AM 8/10/2023     8:44 AM 7/26/2023     8:35 AM 6/13/2023     1:04 PM 1/19/2023    10:38 AM 1/19/2023     8:30 AM   Pulmonary Function Tests   SpO2   96 % 97 %      Ordering Provider          Performing nurse/tech/RT  MIGUEL Boswell, RRT        Diagnosis  Shortness of Breath        Height 5' 2" (1.575 m) 5' 2" (1.575 m) 5' 2" (1.575 m) 5' 2" (1.575 m) 5' 2.01" (1.575 m) 5' 2.01" (1.575 m)    Weight  84.2 kg (185 lb 10 oz) 84.2 kg (185 lb 10 oz) 82.7 kg (182 lb 6.4 oz) 78.6 kg (173 lb 4.5 oz) 78.6 kg (173 lb 4.5 oz) 78.6 kg (173 lb 4.5 oz)   BMI (Calculated)  33.9 33.9 33.4 31.7 31.7    Patient Race        " "  6MWT Status  completed without stopping        Patient Reported  Dyspnea;Leg pain        Was O2 used?  No        6MW Distance walked (feet)  1200 feet        Distance walked (meters)  365.76 meters        Did patient stop?  No        Type of assistive device(s) used?  no assistive devices        Oxygen Saturation  95 %        Supplemental Oxygen  Room Air        Heart Rate  50 bpm        Blood Pressure  121/63        Esperanza Dyspnea Rating   nothing at all        Oxygen Saturation  96 %        Supplemental Oxygen  Room Air        Heart Rate  79 bpm        Blood Pressure  133/57        Esperanza Dyspnea Rating   light        Recovery Time (seconds)  240 seconds        Oxygen Saturation  97 %        Supplemental Oxygen  Room Air        Heart Rate  54 bpm        Blood Pressure  115/54        Esperanza Dyspnea Rating   nothing at all        Is procedure ready for interpretation?  Yes        Oxygen Qualification?  No              8/23/2023    11:01 AM   Pulmonary Studies Review   Height 5' 2" (1.575 m)   Predicted Distance 499.05                 Educational assessment:   [x]            Good  []            Fair  []            Poor    Readiness to learn:   [x]            Good  []            Fair  []            Poor    Vision Status:   [x]            Good  []            Fair  []            Poor    Reading Ability:  [x]            Good  []            Fair  []            Poor    Knowledge of condition:   [x]            Good  []            Fair  []            Poor    Language Barriers:   []            Good  []            Fair  []            Poor  [x]            None    Cognitive/ Physical Barriers:   []            Good  []            Fair  []            Poor  [x]            None    Learning best by:                       [x]            Seeing  []            Hearing  []            Reading                         [x]            Doing    Describe any barrier /Limitation or financial implications of care choices identified     []            " Financial  []            Emotional  []            Education  []            Vision/Hearing  []            Physical  [x]            None  []                TOPIC /CONTENT FOR TODAY:    [x]            MDI with or without spacer  []            Dry powder inhaler  []            Acapella   []           Peak Flow meter  [x]            COPD action plan  []            Nebulizer use  []            Oxygen use safety  [x]            Breathing and cough techniques  [x]            Energy conservation  [x]            Infection prevention  [x]           CPAP use        Learner:    [x]            Patient   []            Caregiver    Method:    [x]            Verbal explanation  [x]            Audio visual    [x]            Literature  [x]            Teach back      Evaluation:    [x]            Teach back  [x]            Demonstrate  [x]            Follow up phone call    []            2 weeks     [x]            4 weeks   [x]            PRN        Therapist comments:   Patient was seen today to review respiratory medication purpose and proper technique for use of inhalers. Patient practiced proper technique using MDI. Patient demonstrated understanding. Literature was given to patient.    Reviewed breathing techniques such as pursed-lip breathing, cobb-cough technique, and diaphragmatic breathing. Patient practiced proper technique and verbalized understanding. Literature given to patient.         Infection prevention was discussed. Patient's immunizations are current. Hand hygiene and cleaning of respiratory equipment was also discussed. Patient verbalized understanding.      COPD action plan was reviewed and literature was given to patient. Patient verbalized understanding.      Plan:  Monthly Pulmonary Disease Management Questionnaire  Follow-up PDM appointment scheduled for 2/20/24   Reinforce education  Meds: Spiriva, levalbuterol HFA   DME Needs: OHME   Action Plan: COPD   Immunization: Pneumococcal- current, Flu-current,  Covid- current   Next Provider Visit: 11/30/23  Next Spirometry/CPFT: 11/30/23  Approximate time spent with patient: 30 mins

## 2023-09-22 ENCOUNTER — LAB VISIT (OUTPATIENT)
Dept: LAB | Facility: HOSPITAL | Age: 68
End: 2023-09-22
Attending: FAMILY MEDICINE
Payer: MEDICARE

## 2023-09-22 DIAGNOSIS — Z00.00 ANNUAL PHYSICAL EXAM: ICD-10-CM

## 2023-09-22 DIAGNOSIS — Z79.899 ENCOUNTER FOR LONG-TERM (CURRENT) USE OF MEDICATIONS: ICD-10-CM

## 2023-09-22 PROCEDURE — 84443 ASSAY THYROID STIM HORMONE: CPT | Performed by: FAMILY MEDICINE

## 2023-09-22 PROCEDURE — 84439 ASSAY OF FREE THYROXINE: CPT | Performed by: FAMILY MEDICINE

## 2023-09-22 PROCEDURE — 36415 COLL VENOUS BLD VENIPUNCTURE: CPT | Mod: PO | Performed by: FAMILY MEDICINE

## 2023-09-23 LAB
T4 FREE SERPL-MCNC: 0.9 NG/DL (ref 0.71–1.51)
TSH SERPL DL<=0.005 MIU/L-ACNC: 7.19 UIU/ML (ref 0.4–4)

## 2023-09-25 ENCOUNTER — PATIENT MESSAGE (OUTPATIENT)
Dept: ADMINISTRATIVE | Facility: OTHER | Age: 68
End: 2023-09-25
Payer: MEDICARE

## 2023-09-26 ENCOUNTER — PATIENT MESSAGE (OUTPATIENT)
Dept: FAMILY MEDICINE | Facility: CLINIC | Age: 68
End: 2023-09-26
Payer: MEDICARE

## 2023-09-26 DIAGNOSIS — E03.9 HYPOTHYROIDISM, UNSPECIFIED TYPE: Primary | ICD-10-CM

## 2023-09-26 RX ORDER — LEVOTHYROXINE SODIUM 25 UG/1
25 TABLET ORAL
Qty: 30 TABLET | Refills: 11 | Status: SHIPPED | OUTPATIENT
Start: 2023-09-26 | End: 2024-09-25

## 2023-09-26 NOTE — TELEPHONE ENCOUNTER
I received your message which was reviewed along with the the medication list and allergies that we have below.  Please review it for accuracy to make sure that we have the most recent records on your history.     Based on this, the following orders were placed AND/OR medicines were sent in.     No orders of the defined types were placed in this encounter.      Medications written and sent at this time include:  Medications Ordered This Encounter   Medications    levothyroxine (SYNTHROID) 25 MCG tablet     Sig: Take 1 tablet (25 mcg total) by mouth before breakfast.     Dispense:  30 tablet     Refill:  11       Your pharmacy(ies) of choice at this time on record include the list below and any medications would have been sent to the one at the top.    [address] [address]  Phone: [phone] Fax: [phone]      Thank you for choosing us as your healthcare provider!  Dr. Eh Sow    ALLERGY LIST  Review of patient's allergies indicates:   Allergen Reactions    Metformin     Farxiga [dapagliflozin]      Yeast infection      Latex, natural rubber     Bactrim [sulfamethoxazole-trimethoprim] Rash       MEDICATION LIST  Current Outpatient Medications on File Prior to Visit   Medication Sig Dispense Refill    amiodarone (PACERONE) 200 MG Tab Take 200 mg by mouth.      aspirin (ECOTRIN) 81 MG EC tablet Take 81 mg by mouth.      atorvastatin (LIPITOR) 80 MG tablet Take 80 mg by mouth every evening.      blood sugar diagnostic Strp To check BG 2 times daily, to use with insurance preferred meter 200 each 5    blood-glucose meter kit To check BG 2 times daily, to use with insurance preferred meter 1 each 0    chlorhexidine (HIBICLENS) 4 % external liquid Apply topically daily as needed. 473 mL 0    clopidogreL (PLAVIX) 75 mg tablet Take 75 mg by mouth.      ELIQUIS 5 mg Tab Take 5 mg by mouth 2 (two) times daily.      ENTRESTO 24-26 mg per tablet  Take 1 tablet by mouth 2 (two) times daily.      famotidine (PEPCID) 40 MG tablet Take 40 mg by mouth.      famotidine (PEPCID) 40 MG tablet Take 1 tablet (40 mg total) by mouth once daily. 30 tablet 11    fluocinolone acetonide oiL (DERMOTIC OIL) 0.01 % Drop Place 3 drops in ear(s) 2 (two) times daily. 20 mL 5    fluorometholone 0.1% (FML) 0.1 % DrpS INSTILL 1 DROP INTO EACH EYE THREE TIMES DAILY AS DIRECTED      furosemide (LASIX) 20 MG tablet Take 20 mg by mouth daily as needed.      hydrocortisone 1 % cream Apply to affected area 2 times daily 30 g 0    INVELTYS 1 % DrpS Place 1 drop into both eyes 2 (two) times daily.      lancets Misc To check BG 2 times daily, to use with insurance preferred meter 200 each 99    levalbuterol (XOPENEX HFA) 45 mcg/actuation inhaler INHALE ONE PUFF INTO THE LUNGS EVERY 4 HOURS AS NEEDED FOR WHEEZING 45 g 1    metoprolol succinate (TOPROL-XL) 25 MG 24 hr tablet Take 25 mg by mouth.      mupirocin (BACTROBAN) 2 % ointment Apply topically 3 (three) times daily. 22 g 2    nitroGLYCERIN (NITROSTAT) 0.4 MG SL tablet Place 0.4 mg under the tongue as needed.      ofloxacin (OCUFLOX) 0.3 % ophthalmic solution       prednisoLONE acetate (PRED FORTE) 1 % DrpS Place 1 drop into the left eye 4 (four) times daily.      PROLENSA 0.07 % Drop SMARTSIG:In Eye(s)      SITagliptin phosphate (JANUVIA) 100 MG Tab Take 1 tablet (100 mg total) by mouth once daily. 90 tablet 4    tiotropium bromide (SPIRIVA RESPIMAT) 2.5 mcg/actuation inhaler Inhale 2 puffs into the lungs once daily. Controller 4 g 11    XIIDRA 5 % Dpet INSTILL 1 DROP INTO EACH EYE TWICE DAILY AS DIRECTED       No current facility-administered medications on file prior to visit.       HEALTH MAINTENANCE THAT IS OVERDUE OR NEEDS TO BE UPDATED ON OUR CHART IS LISTED BELOW.  IF YOU HAVE HAD IT DONE ELSEWHERE, PLEASE SEND US DATES AND RECORDS IF YOU HAVE THEM TO MAKE YOUR CHART ACCURATE.  IF YOU HAVE NOT HAD THESE DONE AND ARE READY FOR US  TO SCHEDULE THEM, PLEASE SEND US A MESSAGE.  Health Maintenance Due   Topic Date Due    Influenza Vaccine (1) 09/01/2023    Eye Exam  11/18/2023       DISCLAIMER: This note was compiled by using a speech recognition dictation system and therefore please be aware that typographical / speech recognition errors can and do occur.  Please contact me if you see any errors specifically.    Eh Sow MD  We Offer Telehealth & Same Day Appointments!   Book your Telehealth appointment with me through my nurse or   Clinic appointments on [facility]!  [phone]     Check out my Facebook Page and Follow Me at: CLICK HERE    Check out my website at [employer] by clicking on: CLICK HERE    To Schedule appointments online, go to [facility]: CLICK HERE     Location: [email or web address]    [address] [place] [address]    FAX: [phone]

## 2023-09-26 NOTE — PROGRESS NOTES
Make follow-up lab appointment per recommendation below.  Check to see if patient has seen the results through my chart.  If not then,  #CALL THE PATIENT# to discuss results/see if they have questions and document verification of contact. Make F/U appt if needed. [phone]    #My interpretation that was sent to them through [employer]:  Crystal, I have reviewed your recent blood work.     Thyroid study is worse from previous.  I recommend that you start a medication called levothyroxine.  Let me know if you are in agreement and I will send 25 micrograms daily to the pharmacy.  Recheck TSH and FT4 in three months.    =========================  Also please address any outstanding health maintenance that may be due: Influenza Vaccine(1) due on 09/01/2023  Eye Exam due on 11/18/2023

## 2023-10-30 ENCOUNTER — PATIENT MESSAGE (OUTPATIENT)
Dept: ADMINISTRATIVE | Facility: OTHER | Age: 68
End: 2023-10-30
Payer: MEDICARE

## 2023-10-30 PROBLEM — Z09 HOSPITAL DISCHARGE FOLLOW-UP: Status: RESOLVED | Noted: 2023-04-21 | Resolved: 2023-10-30

## 2023-11-16 LAB
LEFT EYE DM RETINOPATHY: NEGATIVE
RIGHT EYE DM RETINOPATHY: NEGATIVE

## 2023-11-24 ENCOUNTER — HOSPITAL ENCOUNTER (OUTPATIENT)
Dept: RADIOLOGY | Facility: HOSPITAL | Age: 68
Discharge: HOME | End: 2023-11-24
Attending: NURSE PRACTITIONER
Payer: MEDICARE

## 2023-11-24 DIAGNOSIS — M25.569 KNEE PAIN: ICD-10-CM

## 2023-11-24 DIAGNOSIS — M79.643 HAND PAIN: ICD-10-CM

## 2023-11-24 PROCEDURE — 73130 X-RAY EXAM OF HAND: CPT | Mod: 26,RT,, | Performed by: RADIOLOGY

## 2023-11-24 PROCEDURE — 73560 X-RAY EXAM OF KNEE 1 OR 2: CPT | Mod: 26,59,RT, | Performed by: RADIOLOGY

## 2023-11-24 PROCEDURE — 73560 X-RAY EXAM OF KNEE 1 OR 2: CPT | Mod: TC,PO,RT

## 2023-11-24 PROCEDURE — 73130 X-RAY EXAM OF HAND: CPT | Mod: TC,PO,RT

## 2023-11-24 PROCEDURE — 73562 XR KNEE ORTHO LEFT: ICD-10-PCS | Mod: 26,LT,, | Performed by: RADIOLOGY

## 2023-11-24 PROCEDURE — 73562 X-RAY EXAM OF KNEE 3: CPT | Mod: 26,LT,, | Performed by: RADIOLOGY

## 2023-11-24 PROCEDURE — 73562 X-RAY EXAM OF KNEE 3: CPT | Mod: TC,PO,LT

## 2023-11-24 PROCEDURE — 73560 XR KNEE ORTHO LEFT: ICD-10-PCS | Mod: 26,59,RT, | Performed by: RADIOLOGY

## 2023-11-24 PROCEDURE — 73130 XR HAND COMPLETE 3 VIEW RIGHT: ICD-10-PCS | Mod: 26,RT,, | Performed by: RADIOLOGY

## 2023-11-28 ENCOUNTER — PATIENT OUTREACH (OUTPATIENT)
Dept: ADMINISTRATIVE | Facility: HOSPITAL | Age: 68
End: 2023-11-28
Payer: MEDICARE

## 2023-11-30 ENCOUNTER — HOSPITAL ENCOUNTER (OUTPATIENT)
Dept: RADIOLOGY | Facility: HOSPITAL | Age: 68
Discharge: HOME | End: 2023-11-30
Attending: INTERNAL MEDICINE
Payer: MEDICARE

## 2023-11-30 ENCOUNTER — OFFICE VISIT (OUTPATIENT)
Dept: SLEEP MEDICINE | Facility: CLINIC | Age: 68
End: 2023-11-30
Payer: MEDICARE

## 2023-11-30 VITALS
HEIGHT: 62 IN | WEIGHT: 205.94 LBS | HEART RATE: 52 BPM | RESPIRATION RATE: 16 BRPM | DIASTOLIC BLOOD PRESSURE: 74 MMHG | OXYGEN SATURATION: 98 % | SYSTOLIC BLOOD PRESSURE: 126 MMHG | BODY MASS INDEX: 37.9 KG/M2

## 2023-11-30 DIAGNOSIS — E11.59 HYPERTENSION ASSOCIATED WITH DIABETES: Chronic | ICD-10-CM

## 2023-11-30 DIAGNOSIS — E11.69 HYPERLIPIDEMIA ASSOCIATED WITH TYPE 2 DIABETES MELLITUS: Chronic | ICD-10-CM

## 2023-11-30 DIAGNOSIS — E78.5 HYPERLIPIDEMIA ASSOCIATED WITH TYPE 2 DIABETES MELLITUS: Chronic | ICD-10-CM

## 2023-11-30 DIAGNOSIS — G47.33 OSA (OBSTRUCTIVE SLEEP APNEA): ICD-10-CM

## 2023-11-30 DIAGNOSIS — E11.65 TYPE 2 DIABETES MELLITUS WITH HYPERGLYCEMIA, WITHOUT LONG-TERM CURRENT USE OF INSULIN: Chronic | ICD-10-CM

## 2023-11-30 DIAGNOSIS — Z87.891 FORMER SMOKER: ICD-10-CM

## 2023-11-30 DIAGNOSIS — R91.8 PULMONARY NODULES: ICD-10-CM

## 2023-11-30 DIAGNOSIS — I15.2 HYPERTENSION ASSOCIATED WITH DIABETES: Chronic | ICD-10-CM

## 2023-11-30 DIAGNOSIS — I50.20 HFREF (HEART FAILURE WITH REDUCED EJECTION FRACTION): ICD-10-CM

## 2023-11-30 DIAGNOSIS — J44.9 COPD, GROUP A, BY GOLD 2017 CLASSIFICATION: Primary | ICD-10-CM

## 2023-11-30 DIAGNOSIS — I48.0 PAF (PAROXYSMAL ATRIAL FIBRILLATION): ICD-10-CM

## 2023-11-30 PROCEDURE — 3288F FALL RISK ASSESSMENT DOCD: CPT | Mod: CPTII,S$GLB,, | Performed by: INTERNAL MEDICINE

## 2023-11-30 PROCEDURE — 3066F PR DOCUMENTATION OF TREATMENT FOR NEPHROPATHY: ICD-10-PCS | Mod: CPTII,S$GLB,, | Performed by: INTERNAL MEDICINE

## 2023-11-30 PROCEDURE — 99999 PR PBB SHADOW E&M-EST. PATIENT-LVL V: CPT | Mod: PBBFAC,,, | Performed by: INTERNAL MEDICINE

## 2023-11-30 PROCEDURE — 3066F NEPHROPATHY DOC TX: CPT | Mod: CPTII,S$GLB,, | Performed by: INTERNAL MEDICINE

## 2023-11-30 PROCEDURE — 3074F SYST BP LT 130 MM HG: CPT | Mod: CPTII,S$GLB,, | Performed by: INTERNAL MEDICINE

## 2023-11-30 PROCEDURE — 1101F PR PT FALLS ASSESS DOC 0-1 FALLS W/OUT INJ PAST YR: ICD-10-PCS | Mod: CPTII,S$GLB,, | Performed by: INTERNAL MEDICINE

## 2023-11-30 PROCEDURE — 3061F PR NEG MICROALBUMINURIA RESULT DOCUMENTED/REVIEW: ICD-10-PCS | Mod: CPTII,S$GLB,, | Performed by: INTERNAL MEDICINE

## 2023-11-30 PROCEDURE — 3008F BODY MASS INDEX DOCD: CPT | Mod: CPTII,S$GLB,, | Performed by: INTERNAL MEDICINE

## 2023-11-30 PROCEDURE — 3078F DIAST BP <80 MM HG: CPT | Mod: CPTII,S$GLB,, | Performed by: INTERNAL MEDICINE

## 2023-11-30 PROCEDURE — 99999 PR PBB SHADOW E&M-EST. PATIENT-LVL V: ICD-10-PCS | Mod: PBBFAC,,, | Performed by: INTERNAL MEDICINE

## 2023-11-30 PROCEDURE — 71271 CT THORAX LUNG CANCER SCR C-: CPT | Mod: TC

## 2023-11-30 PROCEDURE — 1159F PR MEDICATION LIST DOCUMENTED IN MEDICAL RECORD: ICD-10-PCS | Mod: CPTII,S$GLB,, | Performed by: INTERNAL MEDICINE

## 2023-11-30 PROCEDURE — 71271 CT THORAX LUNG CANCER SCR C-: CPT | Mod: 26,,, | Performed by: RADIOLOGY

## 2023-11-30 PROCEDURE — 99214 PR OFFICE/OUTPT VISIT, EST, LEVL IV, 30-39 MIN: ICD-10-PCS | Mod: S$GLB,,, | Performed by: INTERNAL MEDICINE

## 2023-11-30 PROCEDURE — 1160F RVW MEDS BY RX/DR IN RCRD: CPT | Mod: CPTII,S$GLB,, | Performed by: INTERNAL MEDICINE

## 2023-11-30 PROCEDURE — 3044F HG A1C LEVEL LT 7.0%: CPT | Mod: CPTII,S$GLB,, | Performed by: INTERNAL MEDICINE

## 2023-11-30 PROCEDURE — 4010F PR ACE/ARB THEARPY RXD/TAKEN: ICD-10-PCS | Mod: CPTII,S$GLB,, | Performed by: INTERNAL MEDICINE

## 2023-11-30 PROCEDURE — 1101F PT FALLS ASSESS-DOCD LE1/YR: CPT | Mod: CPTII,S$GLB,, | Performed by: INTERNAL MEDICINE

## 2023-11-30 PROCEDURE — 1159F MED LIST DOCD IN RCRD: CPT | Mod: CPTII,S$GLB,, | Performed by: INTERNAL MEDICINE

## 2023-11-30 PROCEDURE — 71271 CT CHEST LUNG SCREENING LOW DOSE: ICD-10-PCS | Mod: 26,,, | Performed by: RADIOLOGY

## 2023-11-30 PROCEDURE — 1160F PR REVIEW ALL MEDS BY PRESCRIBER/CLIN PHARMACIST DOCUMENTED: ICD-10-PCS | Mod: CPTII,S$GLB,, | Performed by: INTERNAL MEDICINE

## 2023-11-30 PROCEDURE — 4010F ACE/ARB THERAPY RXD/TAKEN: CPT | Mod: CPTII,S$GLB,, | Performed by: INTERNAL MEDICINE

## 2023-11-30 PROCEDURE — 3288F PR FALLS RISK ASSESSMENT DOCUMENTED: ICD-10-PCS | Mod: CPTII,S$GLB,, | Performed by: INTERNAL MEDICINE

## 2023-11-30 PROCEDURE — 99214 OFFICE O/P EST MOD 30 MIN: CPT | Mod: S$GLB,,, | Performed by: INTERNAL MEDICINE

## 2023-11-30 PROCEDURE — 3074F PR MOST RECENT SYSTOLIC BLOOD PRESSURE < 130 MM HG: ICD-10-PCS | Mod: CPTII,S$GLB,, | Performed by: INTERNAL MEDICINE

## 2023-11-30 PROCEDURE — 3078F PR MOST RECENT DIASTOLIC BLOOD PRESSURE < 80 MM HG: ICD-10-PCS | Mod: CPTII,S$GLB,, | Performed by: INTERNAL MEDICINE

## 2023-11-30 PROCEDURE — 3008F PR BODY MASS INDEX (BMI) DOCUMENTED: ICD-10-PCS | Mod: CPTII,S$GLB,, | Performed by: INTERNAL MEDICINE

## 2023-11-30 PROCEDURE — 3061F NEG MICROALBUMINURIA REV: CPT | Mod: CPTII,S$GLB,, | Performed by: INTERNAL MEDICINE

## 2023-11-30 PROCEDURE — 3044F PR MOST RECENT HEMOGLOBIN A1C LEVEL <7.0%: ICD-10-PCS | Mod: CPTII,S$GLB,, | Performed by: INTERNAL MEDICINE

## 2023-11-30 NOTE — PROGRESS NOTES
Pulmonary Outpatient  Visit     Subjective:       Patient ID: Crystal Acuna is a 68 y.o. female.    Social History     Tobacco Use   Smoking Status Former    Current packs/day: 0.75    Average packs/day: 0.8 packs/day for 39.0 years (29.3 ttl pk-yrs)    Types: Cigarettes   Smokeless Tobacco Never   Tobacco Comments    quit 2 yrs ago            Chief Complaint: Sleep Apnea and Results (Ct low dose)      Crystal Acuna is 67 y.o.  Referred by Eh Sow MD  Diagnosis KENYETTA based on HSAt  Has CPAP : > 1 year, FFM, DME [facility]  Download reviewed  Heavy smoker in past:  Prescribed Inhaler  Recent acute MI: 3 stent  Inhaler was added by cardiology: no prior spirometry  Last 2 view CXR 2021  No occupational exposure  On Amio and elliquis  UTD immunisation  No cough, No wheezing or hemoptysis  Quit smoking 7 years  Last Echo Ejection Fraction = 30-35%  Currently on APAP 4-20  AHI 1.0  Using and benefits      11/30/2023  Followup  Here with daughter  Stable COPD  Reveiwed LDCT  Nodules 6.5 mm and 6 mm  Stable on Spiriva        COPD Questionnaire  How often do you cough?: All of the time  How often do you have phlegm (mucus) in your chest?: A little of the time  How often does your chest feel tight?: Never  When you walk up a hill or one flight of stairs, how often are you breathless?: All of the time  How often are you limited doing any activities at home?: Never  How often are you confident leaving the house despite your lung condition?: All of the time  How often do you sleep soundly?: All of the time  How often do you have energy?: Most of the time  Total score: 13            11/30/2023    10:49 AM   EPWORTH SLEEPINESS SCALE   Sitting and reading 3   Watching TV 0   Sitting, inactive in a public place (e.g. a theatre or a meeting) 1   As a passenger in a car for an hour without a break 2   Lying down to rest in the afternoon when circumstances permit 0   Sitting  and talking to someone 0   Sitting quietly after a lunch without alcohol 0   In a car, while stopped for a few minutes in traffic 0   Total score 6            Review of Systems   Constitutional:  Positive for fatigue.   Respiratory:  Negative for apnea, snoring, cough, sputum production, choking, chest tightness, wheezing, dyspnea on extertion and use of rescue inhaler.    Psychiatric/Behavioral:  Negative for sleep disturbance.    All other systems reviewed and are negative.      Outpatient Encounter Medications as of 11/30/2023   Medication Sig Dispense Refill    amiodarone (PACERONE) 200 MG Tab Take 200 mg by mouth.      atorvastatin (LIPITOR) 80 MG tablet Take 80 mg by mouth every evening.      blood sugar diagnostic Strp To check BG 2 times daily, to use with insurance preferred meter 200 each 5    blood-glucose meter kit To check BG 2 times daily, to use with insurance preferred meter 1 each 0    chlorhexidine (HIBICLENS) 4 % external liquid Apply topically daily as needed. 473 mL 0    clopidogreL (PLAVIX) 75 mg tablet Take 75 mg by mouth.      ELIQUIS 5 mg Tab Take 5 mg by mouth 2 (two) times daily.      ENTRESTO 24-26 mg per tablet Take 1 tablet by mouth 2 (two) times daily.      famotidine (PEPCID) 40 MG tablet Take 1 tablet (40 mg total) by mouth once daily. 30 tablet 11    fluocinolone acetonide oiL (DERMOTIC OIL) 0.01 % Drop Place 3 drops in ear(s) 2 (two) times daily. 20 mL 5    fluorometholone 0.1% (FML) 0.1 % DrpS INSTILL 1 DROP INTO EACH EYE THREE TIMES DAILY AS DIRECTED      furosemide (LASIX) 20 MG tablet Take 20 mg by mouth daily as needed.      hydrocortisone 1 % cream Apply to affected area 2 times daily 30 g 0    lancets Misc To check BG 2 times daily, to use with insurance preferred meter 200 each 99    levalbuterol (XOPENEX HFA) 45 mcg/actuation inhaler INHALE ONE PUFF INTO THE LUNGS EVERY 4 HOURS AS NEEDED FOR WHEEZING 45 g 1    levothyroxine (SYNTHROID) 25 MCG tablet Take 1 tablet (25 mcg  total) by mouth before breakfast. 30 tablet 11    metoprolol succinate (TOPROL-XL) 25 MG 24 hr tablet Take 25 mg by mouth.      mupirocin (BACTROBAN) 2 % ointment Apply topically 3 (three) times daily. 22 g 2    nitroGLYCERIN (NITROSTAT) 0.4 MG SL tablet Place 0.4 mg under the tongue as needed.      SITagliptin phosphate (JANUVIA) 100 MG Tab Take 1 tablet (100 mg total) by mouth once daily. 90 tablet 4    tiotropium bromide (SPIRIVA RESPIMAT) 2.5 mcg/actuation inhaler Inhale 2 puffs into the lungs once daily. Controller 4 g 11    [DISCONTINUED] aspirin (ECOTRIN) 81 MG EC tablet Take 81 mg by mouth.      [DISCONTINUED] famotidine (PEPCID) 40 MG tablet Take 40 mg by mouth.      [DISCONTINUED] INVELTYS 1 % DrpS Place 1 drop into both eyes 2 (two) times daily.      [DISCONTINUED] ofloxacin (OCUFLOX) 0.3 % ophthalmic solution       [DISCONTINUED] prednisoLONE acetate (PRED FORTE) 1 % DrpS Place 1 drop into the left eye 4 (four) times daily.      [DISCONTINUED] PROLENSA 0.07 % Drop SMARTSIG:In Eye(s)      [DISCONTINUED] XIIDRA 5 % Dpet INSTILL 1 DROP INTO EACH EYE TWICE DAILY AS DIRECTED       No facility-administered encounter medications on file as of 2023.       The following portions of the patient's history were reviewed and updated as appropriate: She  has a past medical history of Allergy, Bronchitis, Family history of colonic polyps (2021), Hallux abductovalgus, Herniated lumbar intervertebral disc, Hyperlipidemia, Hypertension, Smoker, and Staph aureus infection.  She does not have any pertinent problems on file.  She  has a past surgical history that includes Cholecystectomy (2010);  section; Cyst Removal; Back surgery; Endometrial ablation (2009); Carpal tunnel release; Foot fracture surgery; Colonoscopy (N/A, 2021); Eye surgery; Spine surgery; Tonsillectomy; Joint replacement; and Breast biopsy.  Her family history includes Breast cancer in her maternal aunt; COPD in her  mother; Heart disease in her father and mother.  She  reports that she has quit smoking. Her smoking use included cigarettes. She has a 29.3 pack-year smoking history. She has never used smokeless tobacco. She reports that she does not drink alcohol and does not use drugs.  She has a current medication list which includes the following prescription(s): amiodarone, atorvastatin, blood sugar diagnostic, blood-glucose meter, chlorhexidine, clopidogrel, eliquis, entresto, famotidine, fluocinolone acetonide oil, fluorometholone 0.1%, furosemide, hydrocortisone, lancets, levalbuterol, levothyroxine, metoprolol succinate, mupirocin, nitroglycerin, sitagliptin phosphate, and tiotropium bromide.  Current Outpatient Medications on File Prior to Visit   Medication Sig Dispense Refill    amiodarone (PACERONE) 200 MG Tab Take 200 mg by mouth.      atorvastatin (LIPITOR) 80 MG tablet Take 80 mg by mouth every evening.      blood sugar diagnostic Strp To check BG 2 times daily, to use with insurance preferred meter 200 each 5    blood-glucose meter kit To check BG 2 times daily, to use with insurance preferred meter 1 each 0    chlorhexidine (HIBICLENS) 4 % external liquid Apply topically daily as needed. 473 mL 0    clopidogreL (PLAVIX) 75 mg tablet Take 75 mg by mouth.      ELIQUIS 5 mg Tab Take 5 mg by mouth 2 (two) times daily.      ENTRESTO 24-26 mg per tablet Take 1 tablet by mouth 2 (two) times daily.      famotidine (PEPCID) 40 MG tablet Take 1 tablet (40 mg total) by mouth once daily. 30 tablet 11    fluocinolone acetonide oiL (DERMOTIC OIL) 0.01 % Drop Place 3 drops in ear(s) 2 (two) times daily. 20 mL 5    fluorometholone 0.1% (FML) 0.1 % DrpS INSTILL 1 DROP INTO EACH EYE THREE TIMES DAILY AS DIRECTED      furosemide (LASIX) 20 MG tablet Take 20 mg by mouth daily as needed.      hydrocortisone 1 % cream Apply to affected area 2 times daily 30 g 0    lancets Misc To check BG 2 times daily, to use with insurance preferred  meter 200 each 99    levalbuterol (XOPENEX HFA) 45 mcg/actuation inhaler INHALE ONE PUFF INTO THE LUNGS EVERY 4 HOURS AS NEEDED FOR WHEEZING 45 g 1    levothyroxine (SYNTHROID) 25 MCG tablet Take 1 tablet (25 mcg total) by mouth before breakfast. 30 tablet 11    metoprolol succinate (TOPROL-XL) 25 MG 24 hr tablet Take 25 mg by mouth.      mupirocin (BACTROBAN) 2 % ointment Apply topically 3 (three) times daily. 22 g 2    nitroGLYCERIN (NITROSTAT) 0.4 MG SL tablet Place 0.4 mg under the tongue as needed.      SITagliptin phosphate (JANUVIA) 100 MG Tab Take 1 tablet (100 mg total) by mouth once daily. 90 tablet 4    tiotropium bromide (SPIRIVA RESPIMAT) 2.5 mcg/actuation inhaler Inhale 2 puffs into the lungs once daily. Controller 4 g 11    [DISCONTINUED] aspirin (ECOTRIN) 81 MG EC tablet Take 81 mg by mouth.      [DISCONTINUED] famotidine (PEPCID) 40 MG tablet Take 40 mg by mouth.      [DISCONTINUED] INVELTYS 1 % DrpS Place 1 drop into both eyes 2 (two) times daily.      [DISCONTINUED] ofloxacin (OCUFLOX) 0.3 % ophthalmic solution       [DISCONTINUED] prednisoLONE acetate (PRED FORTE) 1 % DrpS Place 1 drop into the left eye 4 (four) times daily.      [DISCONTINUED] PROLENSA 0.07 % Drop SMARTSIG:In Eye(s)      [DISCONTINUED] XIIDRA 5 % Dpet INSTILL 1 DROP INTO EACH EYE TWICE DAILY AS DIRECTED       No current facility-administered medications on file prior to visit.     She is allergic to metformin; perflutren lipid microspheres; farxiga [dapagliflozin]; latex, natural rubber; and bactrim [sulfamethoxazole-trimethoprim]..      BP Readings from Last 3 Encounters:   11/30/23 126/74   08/10/23 98/66   07/26/23 100/70     Snoring      MMRC Dyspnea Scale (4 is worst)     [] MMRC 0: Dyspneic on strenuous excercise (0 points)    [x] MMRC 1: Dyspneic on walking a slight hill (0 points)    [] MMRC 2: Dyspneic on walking level ground; must stop occasionally due to breathlessness (1 point)    [] MMRC 3: Must stop for  "breathlessness after walking 100 yards or after a few minutes (2 points)    [] MMRC 4: Cannot leave house; breathless on dressing/undressing (3 points)              COPD Questionnaire  How often do you cough?: All of the time  How often do you have phlegm (mucus) in your chest?: A little of the time  How often does your chest feel tight?: Never  When you walk up a hill or one flight of stairs, how often are you breathless?: All of the time  How often are you limited doing any activities at home?: Never  How often are you confident leaving the house despite your lung condition?: All of the time  How often do you sleep soundly?: All of the time  How often do you have energy?: Most of the time  Total score: 13              Objective:     Vital Signs (Most Recent)  Vital Signs  Pulse: (!) 52  Resp: 16  SpO2: 98 %  BP: 126/74  Height and Weight  Height: 5' 2" (157.5 cm)  Weight: 93.4 kg (205 lb 14.6 oz)  BSA (Calculated - sq m): 2.02 sq meters  BMI (Calculated): 37.7  Weight in (lb) to have BMI = 25: 136.4]  Wt Readings from Last 2 Encounters:   11/30/23 93.4 kg (205 lb 14.6 oz)   08/10/23 84.2 kg (185 lb 10 oz)       Physical Exam  Vitals and nursing note reviewed.   Constitutional:       Appearance: She is normal weight.   HENT:      Head: Normocephalic and atraumatic.      Nose: Nose normal.   Eyes:      Pupils: Pupils are equal, round, and reactive to light.   Cardiovascular:      Rate and Rhythm: Normal rate and regular rhythm.      Pulses: Normal pulses.      Heart sounds: Normal heart sounds.   Pulmonary:      Effort: Pulmonary effort is normal.      Breath sounds: Normal breath sounds.   Abdominal:      General: Bowel sounds are normal.      Palpations: Abdomen is soft.   Musculoskeletal:      Cervical back: Normal range of motion.   Skin:     General: Skin is warm.   Neurological:      General: No focal deficit present.      Mental Status: She is alert and oriented to person, place, and time.   Psychiatric:    " "     Mood and Affect: Mood normal.          Laboratory  Lab Results   Component Value Date    WBC 5.25 2023    RBC 4.98 2023    HGB 14.0 2023    HCT 45.4 2023    MCV 91 2023    MCH 28.1 2023    MCHC 30.8 (L) 2023    RDW 16.7 (H) 2023     2023    MPV 11.8 2023    GRAN 3.6 2022    GRAN 52.3 2022    LYMPH 2.5 2022    LYMPH 36.8 2022    MONO 0.5 2022    MONO 7.5 2022    EOS 0.2 2022    BASO 0.05 2022    EOSINOPHIL 2.3 2022    BASOPHIL 0.7 2022       BMP  Lab Results   Component Value Date     2023    K 4.2 2023     2023    CO2 25 2023    BUN 18 2023    CREATININE 0.9 2023    CALCIUM 8.8 2023    ANIONGAP 13 2023    ESTGFRAFRICA >60.0 2022    EGFRNONAA >60.0 2022    AST 20 2023    ALT 16 2023    PROT 6.8 2023          No results found for: "IGE"     No results found for: "ASPERGILLUS"  No results found for: "AFUMIGATUSCL"     No results found for: "ACE"     Diagnostic Results:  I have personally reviewed today the following studies:        10/28/2023 - 2023  Patient ID: [id number]  : 1955  Age: 68 years  Gender: Female  [facility]  [address] [place]  [place], [address]  Compliance Report  Compliance  Payor Standard  Usage 10/28/2023 - 2023  Usage days 30/30 days (100%)  >= 4 hours 29 days (97%)  < 4 hours 1 days (3%)  Usage hours 204 hours 18 minutes  Average usage (total days) 6 hours 49 minutes  Average usage (days used) 6 hours 49 minutes  Median usage (days used) 7 hours 9 minutes  Total used hours (value since last reset - 2023) 4,221 hours  AirSense 11 AutoSet  Serial number [id number]  Mode AutoSet  Min Pressure 6 cmH2O  Max Pressure 14 cmH2O  EPR Fulltime  EPR level 3  Response Standard  Therapy  Pressure - cmH2O Median: 8.0 95th percentile: 10.5 Maximum: " "11.4  Leaks - L/min Median: 2.8 95th percentile: 10.6 Maximum: 14.6  Events per hour AI: 0.9 HI: 0.0 AHI: 0.9  Apnea Index Central: 0.0 Obstructive: 0.8 Unknown: 0.0  RERA Index 0.0        Impression: The fourth sentence in the impression should read "Follow-up evaluation recommended in 6 months."     Finalized on: 11/30/2023 1:13 PM By:  Supa Jo MD  [facility]# [id number]      2023-11-30 13:15:52.924    [facility]  Signed by Ang Jo MD (Timothy) on 11/30/2023 13:15  Narrative & Impression  EXAM:  LOW-DOSE LUNG CANCER SCREENING CT     DATE: 11/30/2023 9:45 AM     CLINICAL HISTORY: Risk factors for lung cancer.  Tobacco use.        COMPARISON: None.     TECHNIQUE: Axial CT imaging was performed of the chest utilizing a low-dose protocol.     Computed tomography dose index (CTDI):  6.42 mGy.     Dose-length product (DLP):  237.77 mGy-cm.     FINDINGS:     6 mm nodule right lower lobe adjacent to the major fissure.  Image 231 of series 4.  6.5 mm nodule in the segment left upper lobe image 283 of series 4.  5 mm right middle lobe subpleural nodule image 207 of series 4.  Right upper lobe nodule measuring 4.5 mm image 83 of series 4.     Atherosclerosis of thoracic aorta.  Coronary artery calcifications are present.  No pericardial effusions no pleural effusions.        Impression:   Right lower lobe 6 mm nodule.  6.5 mm left upper lobe nodule.  There couple other smaller nodules noted on the right.  Doppler evaluation recommended 6 months.     CATEGORY (ACR Lung-RADS Version 1.0):     LungRADS 3     MODIFIER:     (S):  Significant or potentially significant finding (non-lung cancer).     All CT scans at [this location] are performed using dose modulation techniques as appropriate to a performed exam including the following:  Automated exposure control; adjustment of the mA and/or kV according to patient size (this includes techniques or standardized protocols for targeted exams where dose is matched to indication / reason " for exam; i.e. extremities or head); use of iterative reconstruction technique.     Finalized on: 11/30/2023 10:11 AM By:  Supa Jo MD  [facility]# [id number]      2023-11-30 10:13:21.235    [facility]     Assessment/Plan:     Problem List Items Addressed This Visit       Type 2 diabetes mellitus with hyperglycemia, without long-term current use of insulin (Chronic)    Hyperlipidemia associated with type 2 diabetes mellitus (Chronic)    Hypertension associated with diabetes (Chronic)    HFrEF (heart failure with reduced ejection fraction)    PAF (paroxysmal atrial fibrillation)    KENYETTA (obstructive sleep apnea)     [place] score 6  Mask FFM  Resmed airview  Data 10/28/2023 to 11/26/2023  Usage > 4 hrs was 97%  APAP 6-14  AHI 0.9    USING AND BENEFITS         COPD, group A, by GOLD 2017 classification - Primary     COPD score 13  FEV1: 1.57L( 72.8%), FEV1/FVC 65  UTD immunization  Stable  SPIRIVA  Needs RSV         Pulmonary nodules     Followup scan in 6 months  Quit smoking > 5 years ago         Relevant Orders    CT Chest Without Contrast        Nodules 6  mm: f/u imaging 6 months  Spiriva helps  Using CPAP and benefits     Follow up in about 6 months (around 5/30/2024), or chest CT.    This note was prepared using voice recognition system and is likely to have sound alike errors that may have been overlooked even after proof reading.  Please call me with any questions    Discussed diagnosis, its evaluation, treatment and usual course. All questions answered.    Thank you for the courtesy of participating in the care of this patient    Oliver Mederos MD      Personal Diagnostic Review  []  CXR    []  ECHO    []  ONSAT    []  6MWD    []  LABS    []  CHEST CT    []  PET CT    []  Biopsy results

## 2023-11-30 NOTE — ASSESSMENT & PLAN NOTE
[place] score 6  Mask FFM  Resmed airview  Data 10/28/2023 to 11/26/2023  Usage > 4 hrs was 97%  APAP 6-14  AHI 0.9    USING AND BENEFITS

## 2023-12-07 ENCOUNTER — LAB VISIT (OUTPATIENT)
Dept: LAB | Facility: HOSPITAL | Age: 68
End: 2023-12-07
Attending: INTERNAL MEDICINE
Payer: MEDICARE

## 2023-12-07 DIAGNOSIS — E78.2 MIXED HYPERLIPIDEMIA: ICD-10-CM

## 2023-12-07 LAB
ALBUMIN SERPL BCP-MCNC: 3.6 G/DL (ref 3.5–5.2)
ALP SERPL-CCNC: 54 U/L (ref 55–135)
ALT SERPL W/O P-5'-P-CCNC: 20 U/L (ref 10–44)
ANION GAP SERPL CALC-SCNC: 8 MMOL/L (ref 8–16)
AST SERPL-CCNC: 14 U/L (ref 10–40)
BILIRUB SERPL-MCNC: 0.5 MG/DL (ref 0.1–1)
BUN SERPL-MCNC: 20 MG/DL (ref 8–23)
CALCIUM SERPL-MCNC: 9 MG/DL (ref 8.7–10.5)
CHLORIDE SERPL-SCNC: 104 MMOL/L (ref 95–110)
CHOLEST SERPL-MCNC: 150 MG/DL (ref 120–199)
CHOLEST/HDLC SERPL: 2.9 {RATIO} (ref 2–5)
CO2 SERPL-SCNC: 27 MMOL/L (ref 23–29)
CREAT SERPL-MCNC: 1 MG/DL (ref 0.5–1.4)
EST. GFR  (NO RACE VARIABLE): >60 ML/MIN/1.73 M^2
GLUCOSE SERPL-MCNC: 174 MG/DL (ref 70–110)
HDLC SERPL-MCNC: 51 MG/DL (ref 40–75)
HDLC SERPL: 34 % (ref 20–50)
LDLC SERPL CALC-MCNC: 81.6 MG/DL (ref 63–159)
NONHDLC SERPL-MCNC: 99 MG/DL
POTASSIUM SERPL-SCNC: 4.7 MMOL/L (ref 3.5–5.1)
PROT SERPL-MCNC: 7.2 G/DL (ref 6–8.4)
SODIUM SERPL-SCNC: 139 MMOL/L (ref 136–145)
TRIGL SERPL-MCNC: 87 MG/DL (ref 30–150)

## 2023-12-07 PROCEDURE — 80053 COMPREHEN METABOLIC PANEL: CPT | Performed by: INTERNAL MEDICINE

## 2023-12-07 PROCEDURE — 80061 LIPID PANEL: CPT | Performed by: INTERNAL MEDICINE

## 2023-12-07 PROCEDURE — 36415 COLL VENOUS BLD VENIPUNCTURE: CPT | Mod: PO | Performed by: INTERNAL MEDICINE

## 2023-12-25 ENCOUNTER — PATIENT MESSAGE (OUTPATIENT)
Dept: ADMINISTRATIVE | Facility: OTHER | Age: 68
End: 2023-12-25
Payer: MEDICARE

## 2023-12-26 ENCOUNTER — LAB VISIT (OUTPATIENT)
Dept: LAB | Facility: HOSPITAL | Age: 68
End: 2023-12-26
Attending: FAMILY MEDICINE
Payer: MEDICARE

## 2023-12-26 DIAGNOSIS — Z79.899 ENCOUNTER FOR LONG-TERM (CURRENT) USE OF MEDICATIONS: ICD-10-CM

## 2023-12-26 DIAGNOSIS — Z00.00 ANNUAL PHYSICAL EXAM: ICD-10-CM

## 2023-12-26 LAB — TSH SERPL DL<=0.005 MIU/L-ACNC: 2.09 UIU/ML (ref 0.4–4)

## 2023-12-26 PROCEDURE — 36415 COLL VENOUS BLD VENIPUNCTURE: CPT | Mod: PO | Performed by: FAMILY MEDICINE

## 2023-12-26 PROCEDURE — 84443 ASSAY THYROID STIM HORMONE: CPT | Performed by: FAMILY MEDICINE

## 2023-12-26 NOTE — PROGRESS NOTES
Make follow-up lab appointment per recommendation below.  Check to see if patient has seen the results through my chart.  If not then,  #CALL THE PATIENT# to discuss results/see if they have questions and document verification of contact. Make F/U appt if needed. [phone]    #My interpretation that was sent to them through [employer]:  Crystal, I have reviewed your recent blood work.     Thyroid study is improved now normal.  Continue levothyroxine.  =========================  Also please address any outstanding health maintenance that may be due: Shingles Vaccine(1 of 2) Never done  RSV Vaccine (Age 60+ and Pregnant patients)(1 - 1-dose 60+ series) Never done  COVID-19 Vaccine(3 - 2023-24 season) due on 09/01/2023  Foot Exam due on 12/01/2023  Mammogram due on 01/19/2024  Hemoglobin A1c due on 01/26/2024

## 2024-01-23 ENCOUNTER — PATIENT MESSAGE (OUTPATIENT)
Dept: ADMINISTRATIVE | Facility: OTHER | Age: 69
End: 2024-01-23
Payer: MEDICARE

## 2024-02-01 ENCOUNTER — PATIENT MESSAGE (OUTPATIENT)
Dept: FAMILY MEDICINE | Facility: CLINIC | Age: 69
End: 2024-02-01

## 2024-02-01 ENCOUNTER — E-VISIT (OUTPATIENT)
Dept: FAMILY MEDICINE | Facility: CLINIC | Age: 69
End: 2024-02-01
Payer: MEDICARE

## 2024-02-01 DIAGNOSIS — R35.0 URINARY FREQUENCY: Primary | ICD-10-CM

## 2024-02-01 DIAGNOSIS — R30.0 DYSURIA: ICD-10-CM

## 2024-02-01 PROCEDURE — 99421 OL DIG E/M SVC 5-10 MIN: CPT | Mod: ,,, | Performed by: FAMILY MEDICINE

## 2024-02-01 RX ORDER — PHENAZOPYRIDINE HYDROCHLORIDE 200 MG/1
200 TABLET, FILM COATED ORAL 3 TIMES DAILY PRN
Qty: 9 TABLET | Refills: 0 | Status: SHIPPED | OUTPATIENT
Start: 2024-02-01 | End: 2024-02-06

## 2024-02-01 NOTE — PROGRESS NOTES
Patient ID: Crystal Acuna is a 68 y.o. female.    Chief Complaint: Urinary Tract Infection (Entered automatically based on patient selection in Patient Portal.)    The patient initiated a request through [facility] on 2/1/2024 for evaluation and management with a chief complaint of Urinary Tract Infection (Entered automatically based on patient selection in Patient Portal.)     I evaluated the questionnaire submission on 02/01/2024    .    [facility] Peq Evisit Uti Questionnaire    2/1/2024 11:04 AM CST - Filed by Patient   Do you agree to participate in an E-Visit? Yes   If you have any of the following problems, please present to your local ER or call 911:  I acknowledge   What is the main issue that you would like for your doctor to address today? Frequent urination for almost a week   Are you able to take your vital signs? No   What symptoms do you currently have? Pain while passing urine   When did your symptoms first appear? 1/26/2024   List what you have done or taken to help your symptoms. Nothing frequent urination   Please indicate whether you have had the following symptoms during the past 24 hours     Urgent urination (a sudden and uncontrollable urge to urinate) Mild   Frequent urination of small amounts of urine (going to the toilet very often) Moderate   Burning pain when urinating None   Incomplete bladder emptying (still feel like you need to urinate again after urination) Mild   Pain not associated with urination in the lower abdomen below the belly button) None   What does your urine look like? I am not sure   Blood seen in the urine None   Flank pain (pain in one or both sides of the lower back) None   Abnormal Vaginal Discharge (abnormal amount, color and/or odor) None   Discharge from the urethra (urinary opening) without urination None   High body temperature/fever? None-<99.5   Please rate how much discomfort you have experience because of the symptoms in the past 24 hours: Mild   Please indicate  how the symptoms have interfered with your every day activities/work in the past 24 hours: Moderate   Please indicate how these symptoms have interfered with your social activities (visiting people, meeting with friends, etc.) in the past 24 hours? Moderate   Are you a diabetic? Yes   Please indicate whether you have the following at the time of completion of this questionnaire: None of the above   Provide any information you feel is important to your history not asked above    Please attach any relevant images or files (if you have performed a home test for UTI, please submit a photo of results)           Active Problem List with Overview Notes    Diagnosis Date Noted    Pulmonary nodules 11/30/2023    Chronic bronchitis 08/10/2023    COPD, group A, by GOLD 2017 classification 08/10/2023    Bradycardia 07/18/2023    CAD (coronary artery disease) 07/18/2023    Chest pain 07/18/2023    Chronic systolic congestive heart failure 07/18/2023    PAF (paroxysmal atrial fibrillation) 07/18/2023    Otitis externa 06/03/2023    Status post primary angioplasty with coronary stent 04/21/2023     Patient status post 4 stents    See Cardiology note.            Former smoker 04/08/2023    HFrEF (heart failure with reduced ejection fraction) 04/08/2023    Hyponatremia 04/08/2023    Stricture of artery  05/31/2022     PATIENT WITH RECENT MI.  SEE HOSPITAL FOLLOW-UP.      KENYETTA (obstructive sleep apnea) 03/11/2022 July 2023: Patient has not gotten in with sleep medicine yet.  Referral placed.  May 2022: Wearing [id number]% and it is working great.  Patient states that her fatigue has improved.  Lab Results   Component Value Date    WBC 5.68 01/19/2023    HGB 15.4 01/19/2023    HCT 49.3 (H) 01/19/2023    MCV 93 01/19/2023     01/19/2023 March 2022:  New problem.  Patient was recently diagnosed with sleep apnea.  She has never worn a CPAP device.  Patient reports she is willing to try a device and wear it every night.    Home  Sleep Studies     Date/Time: 3/9/2022 8:00 AM  Performed by: Oliver Mederos MD  Authorized by: Eh Sow MD        1 night study  MODERATE OBSTRUCTIVE SLEEP APNEA with overall AHI 21.7/hr ( 120 events): night #1  Oxygen desaturation: 82%. SpO2 between 80% to 84% for 10 min.  SpO2 was below 90%: 69% time  Patient snored 98% time above 50 .  Heart rate range: 57 bpm - 103 bpm  REC's:  Please refer to sleep disorders clinic  Therapy with APAP at 4-20 cm WP using mask of choice with heated humidification is an option.  Weight loss/management. with regular exercise per direction of physician.  Avoid drowsy driving.  Follow up in sleep clinic to maximize adherence and ensure resolution of symptoms.      MRSA carrier 01/14/2022 December 22: Patient reports that she had injection with [facility] and that her infection improved tremendously.  September 2022: Patient reports that she has been doing bleach baths multiple times per week to prevent recurrent staph infections.      Hypertension associated with diabetes 11/11/2021     CHRONIC.  July 2023:  Hypertension Medications               ENTRESTO 24-26 mg per tablet Take 1 tablet by mouth 2 (two) times daily.    furosemide (LASIX) 20 MG tablet Take 20 mg by mouth daily as needed.    metoprolol succinate (TOPROL-XL) 25 MG 24 hr tablet Take 25 mg by mouth.    nitroGLYCERIN (NITROSTAT) 0.4 MG SL tablet Place 0.4 mg under the tongue as needed.   April 2023:  Patient has had some low blood pressure.    Patient recent medication changes due to MI with stents at [place].  Patient now following with Cardiology.  STABLE.  Patient on Lasix 20 milligrams daily and metoprolol 25 milligrams daily.   BP Reviewed.  Compliant with BP medications. No SE reported.  December 2022: Well controlled on current regimen.  (-) CP, SOB, palpitations, dizziness, lightheadedness, HA, arm numbness, tingling or weakness, syncope.  Creatinine   Date Value Ref Range Status    07/21/2023 0.87 0.60 - 1.10 mg/dL Final   01/19/2023 0.8 0.5 - 1.4 mg/dL Final         Family history of colonic polyps 04/27/2021     Three of her siblings have had colon polyps.      Colon polyps 04/27/2021    Diverticulosis of colon 04/27/2021    Encounter for long-term (current) use of medications 05/01/2020 July 2023: Reviewed labs.  April 2023: Reviewed labs.  December 2022: Reviewed labs.  CHRONIC. Stable. Compliant with medications for managed conditions. See medication list. No SE reported.   Routine lab analysis is being monitored. Refills were addressed.  Lab Results   Component Value Date    WBC 5.68 01/19/2023    HGB 15.4 01/19/2023    HCT 49.3 (H) 01/19/2023    MCV 93 01/19/2023     01/19/2023       Chemistry        Component Value Date/Time     07/21/2023 0337     01/19/2023 0710    K 3.9 07/21/2023 0337    K 4.2 01/19/2023 0710     01/19/2023 0710    CO2 25 07/21/2023 0337    CO2 25 01/19/2023 0710    BUN 19 07/21/2023 0337    BUN 16 01/19/2023 0710    CREATININE 0.87 07/21/2023 0337    CREATININE 0.8 01/19/2023 0710     (H) 01/19/2023 0710        Component Value Date/Time    CALCIUM 8.9 07/21/2023 0337    CALCIUM 9.9 01/19/2023 0710    ALKPHOS 56 07/21/2023 0337    ALKPHOS 60 01/19/2023 0710    AST 16 07/21/2023 0337    AST 12 01/19/2023 0710    AST 20 03/28/2016 1457    ALT 17 07/21/2023 0337    ALT 15 01/19/2023 0710    BILITOT 0.5 07/21/2023 0337    BILITOT 0.4 01/19/2023 0710    ESTGFRAFRICA >60.0 06/08/2022 0747    EGFRNONAA >60.0 06/08/2022 0747          Lab Results   Component Value Date    TSH 0.976 01/19/2023    FREET4 1.19 10/11/2012         Carotid disease, bilateral 04/19/2018     Narrative & Impression  EXAMINATION:  US CAROTID BILATERAL     CLINICAL HISTORY:  Cardiac murmur, unspecified     TECHNIQUE:  Grayscale and color Doppler ultrasound examination of the carotid and vertebral artery systems bilaterally.  Stenosis estimates are per the NASCET  measurement criteria.     COMPARISON:  08/08/2014     FINDINGS:  Right:     Internal Carotid Artery:     Peak systolic velocity 130 cm/sec     End diastolic velocity 39 cm/sec     IC/CC ratio: 1.9     Plaque formation: Moderate amount of homogeneously plaque present at the carotid bulb.     Vertebral artery: Antegrade flow and normal waveform.     Left:     Internal Carotid Artery:     Peak systolic velocity 116 cm/sec     End diastolic velocity 46 cm/sec     IC/CC ratio: 1.0.     Plaque formation: Moderate amount of homogeneous plaque present at the carotid bulb and proximal ICA with greater than 50% luminal narrowing of the proximal ICA by grayscale analysis     Vertebral artery: Antegrade flow and normal waveform.     IMPRESSION:      Elevated velocities suggesting 50-75% stenosis of the right ICA.     Atherosclerosis with greater than 50-75 luminal narrowing of the proximal left ICA visually with somewhat discordant normal velocities.  Further characterization could be obtained with CTA or MRA as clinically warranted.        Electronically signed by: Elliot Patel MD  Date:                                            04/05/2018  Time:                                           14:49             Exam Ended: 04/05/18 13:20                 Hyperlipidemia associated with type 2 diabetes mellitus 06/08/2017 April 2023: Patient with recent MI with stents.  Patient currently on atorvastatin 80 milligrams daily.  CHRONIC. STABLE. Lab analysis reviewed.   (-) CP, SOB, abdominal pain, N/V/D, constipation, jaundice, skin changes.  (-) Myalgias  Lab Results   Component Value Date    CHOL 136 04/08/2023    CHOL 192 01/19/2023    CHOL 189 06/08/2022     Lab Results   Component Value Date    HDL 37 04/08/2023    HDL 44 01/19/2023    HDL 56 06/08/2022     Lab Results   Component Value Date    LDLCALC 76 04/08/2023    LDLCALC 120.0 01/19/2023    LDLCALC 114.0 06/08/2022     Lab Results   Component Value Date    TRIG 115  04/08/2023    TRIG 140 01/19/2023    TRIG 95 06/08/2022     Lab Results   Component Value Date    CHOLHDL 3.7 04/08/2023    CHOLHDL 22.9 01/19/2023    CHOLHDL 29.6 06/08/2022     Lab Results   Component Value Date    TOTALCHOLEST 4.4 01/19/2023    TOTALCHOLEST 3.4 06/08/2022    TOTALCHOLEST 3.5 08/05/2021     Lab Results   Component Value Date    ALT 15 01/19/2023    AST 12 01/19/2023    ALKPHOS 60 01/19/2023    BILITOT 0.4 01/19/2023   ======================================================  The ASCVD Risk score (Mary Lou ECHAVARRIA, et al., 2019) failed to calculate for the following reasons:    The patient has a prior MI or stroke diagnosis        Type 2 diabetes mellitus with hyperglycemia, without long-term current use of insulin 06/08/2017 July 2023:  Patient reports she had issues with Ozempic causing stomach issues.  Diabetes Medications               SITagliptin phosphate (JANUVIA) 100 MG Tab Take 1 tablet (100 mg total) by mouth once daily.   April 2023:  Patient has well-controlled blood sugar on Januvia.  December 2022:  Patient doing well on Januvia.  Patient has side effects to GL P 1. She cannot tolerate metformin due to GI side effects.  Patient currently having increased use infections and recurrent infections on Farxiga.Diabetes Management StatusStatin: TakingACE/ARB: Not taking  Diabetes Management Status    Statin: Taking  ACE/ARB: Not taking    Screening or Prevention Patient's value Goal Complete/Controlled?   HgA1C Testing and Control   Lab Results   Component Value Date    HGBA1C 7.2 (H) 04/09/2023      Annually/Less than 8% Yes   Lipid profile : 04/08/2023 Annually Yes   LDL control Lab Results   Component Value Date    LDLCALC 76 04/08/2023    Annually/Less than 100 mg/dl  Yes   Nephropathy screening Lab Results   Component Value Date    LABMICR <5.0 06/08/2022     Lab Results   Component Value Date    PROTEINUA Negative 01/09/2023     No results found for: UTPCR   Annually Yes   Blood pressure BP  Readings from Last 1 Encounters:   07/26/23 100/70    Less than 140/90 Yes   Dilated retinal exam : 11/18/2022 Annually Yes   Foot exam   : 12/01/2022 Annually Yes           Recent Labs Obtained:  No visits with results within 7 Day(s) from this visit.   Latest known visit with results is:   Lab Visit on 12/26/2023   Component Date Value Ref Range Status    TSH 12/26/2023 2.088  0.400 - 4.000 uIU/mL Final       Encounter Diagnoses   Name Primary?    Urinary frequency Yes    Dysuria         Orders Placed This Encounter   Procedures    Urinalysis, Reflex to Urine Culture Urine, Clean Catch     Standing Status:   Future     Standing Expiration Date:   8/1/2025     Order Specific Question:   Preferred Collection Type     Answer:   Urine, Clean Catch     Order Specific Question:   Specimen Source     Answer:   Urine      Medications Ordered This Encounter   Medications    phenazopyridine (PYRIDIUM) 200 MG tablet     Sig: Take 1 tablet (200 mg total) by mouth 3 (three) times daily as needed for Pain.     Dispense:  9 tablet     Refill:  0        E-Visit Time Tracking:

## 2024-02-02 ENCOUNTER — LAB VISIT (OUTPATIENT)
Dept: LAB | Facility: HOSPITAL | Age: 69
End: 2024-02-02
Attending: FAMILY MEDICINE
Payer: MEDICARE

## 2024-02-02 DIAGNOSIS — R35.0 URINARY FREQUENCY: ICD-10-CM

## 2024-02-02 DIAGNOSIS — N39.0 URINARY TRACT INFECTION WITHOUT HEMATURIA, SITE UNSPECIFIED: Primary | ICD-10-CM

## 2024-02-02 LAB
BACTERIA #/AREA URNS HPF: ABNORMAL /HPF
BILIRUB UR QL STRIP: NEGATIVE
CLARITY UR: CLEAR
COLOR UR: ABNORMAL
GLUCOSE UR QL STRIP: NEGATIVE
HGB UR QL STRIP: ABNORMAL
KETONES UR QL STRIP: NEGATIVE
LEUKOCYTE ESTERASE UR QL STRIP: ABNORMAL
MICROSCOPIC COMMENT: ABNORMAL
NITRITE UR QL STRIP: POSITIVE
PH UR STRIP: 5 [PH] (ref 5–8)
PROT UR QL STRIP: NEGATIVE
RBC #/AREA URNS HPF: 2 /HPF (ref 0–4)
SP GR UR STRIP: 1.03 (ref 1–1.03)
SQUAMOUS #/AREA URNS HPF: 2 /HPF
URN SPEC COLLECT METH UR: ABNORMAL
WBC #/AREA URNS HPF: 15 /HPF (ref 0–5)

## 2024-02-02 PROCEDURE — 87077 CULTURE AEROBIC IDENTIFY: CPT | Performed by: FAMILY MEDICINE

## 2024-02-02 PROCEDURE — 87086 URINE CULTURE/COLONY COUNT: CPT | Performed by: FAMILY MEDICINE

## 2024-02-02 PROCEDURE — 81000 URINALYSIS NONAUTO W/SCOPE: CPT | Mod: PO | Performed by: FAMILY MEDICINE

## 2024-02-02 PROCEDURE — 87088 URINE BACTERIA CULTURE: CPT | Performed by: FAMILY MEDICINE

## 2024-02-02 PROCEDURE — 87186 SC STD MICRODIL/AGAR DIL: CPT | Performed by: FAMILY MEDICINE

## 2024-02-02 RX ORDER — CIPROFLOXACIN 500 MG/1
500 TABLET ORAL 2 TIMES DAILY
Qty: 14 TABLET | Refills: 0 | Status: SHIPPED | OUTPATIENT
Start: 2024-02-02 | End: 2024-02-09

## 2024-02-02 NOTE — Clinical Note
Crystal, Your urinalysis is abnormal.  Showing infection.  I am sending the urine off for culture.  I have also sent antibiotic to the pharmacy.

## 2024-02-02 NOTE — PROGRESS NOTES
Please call to make sure patient get the results.    [phone], Your urinalysis is abnormal.  Showing infection.  I am sending the urine off for culture.  I have also sent antibiotic to the pharmacy.

## 2024-02-04 NOTE — PROGRESS NOTES
Urine 1st check to see if patient has seen the results.  If not then  CALL patient with results and Document verification.  Schedule follow-up if needed.  [phone]  Urine culture is starting to grow significant bacteria.  Please make sure that you are taking the antibiotic as prescribed.  Follow-up sooner if no improvement in symptoms.  Further information to follow incubation of urine culture.

## 2024-02-06 ENCOUNTER — HOSPITAL ENCOUNTER (OUTPATIENT)
Dept: RADIOLOGY | Facility: HOSPITAL | Age: 69
Discharge: HOME | End: 2024-02-06
Attending: FAMILY MEDICINE
Payer: MEDICARE

## 2024-02-06 ENCOUNTER — OFFICE VISIT (OUTPATIENT)
Dept: FAMILY MEDICINE | Facility: CLINIC | Age: 69
End: 2024-02-06
Payer: MEDICARE

## 2024-02-06 VITALS
SYSTOLIC BLOOD PRESSURE: 112 MMHG | HEART RATE: 63 BPM | HEIGHT: 62 IN | OXYGEN SATURATION: 97 % | WEIGHT: 209.63 LBS | BODY MASS INDEX: 38.58 KG/M2 | DIASTOLIC BLOOD PRESSURE: 72 MMHG

## 2024-02-06 DIAGNOSIS — E11.69 HYPERLIPIDEMIA ASSOCIATED WITH TYPE 2 DIABETES MELLITUS: Chronic | ICD-10-CM

## 2024-02-06 DIAGNOSIS — Z12.31 SCREENING MAMMOGRAM FOR BREAST CANCER: ICD-10-CM

## 2024-02-06 DIAGNOSIS — E78.5 HYPERLIPIDEMIA ASSOCIATED WITH TYPE 2 DIABETES MELLITUS: Chronic | ICD-10-CM

## 2024-02-06 DIAGNOSIS — E66.2 CLASS 2 OBESITY WITH ALVEOLAR HYPOVENTILATION, SERIOUS COMORBIDITY, AND BODY MASS INDEX (BMI) OF 38.0 TO 38.9 IN ADULT: ICD-10-CM

## 2024-02-06 DIAGNOSIS — N39.0 E-COLI UTI: ICD-10-CM

## 2024-02-06 DIAGNOSIS — Z79.899 ENCOUNTER FOR LONG-TERM (CURRENT) USE OF MEDICATIONS: ICD-10-CM

## 2024-02-06 DIAGNOSIS — E11.65 TYPE 2 DIABETES MELLITUS WITH HYPERGLYCEMIA, WITHOUT LONG-TERM CURRENT USE OF INSULIN: Primary | ICD-10-CM

## 2024-02-06 DIAGNOSIS — B96.20 E-COLI UTI: ICD-10-CM

## 2024-02-06 DIAGNOSIS — E03.9 HYPOTHYROIDISM, UNSPECIFIED TYPE: ICD-10-CM

## 2024-02-06 PROBLEM — R07.9 CHEST PAIN: Status: RESOLVED | Noted: 2023-07-18 | Resolved: 2024-02-06

## 2024-02-06 PROBLEM — E66.812 CLASS 2 OBESITY WITH ALVEOLAR HYPOVENTILATION, SERIOUS COMORBIDITY, AND BODY MASS INDEX (BMI) OF 38.0 TO 38.9 IN ADULT: Status: ACTIVE | Noted: 2024-02-06

## 2024-02-06 LAB — BACTERIA UR CULT: ABNORMAL

## 2024-02-06 PROCEDURE — 77067 SCR MAMMO BI INCL CAD: CPT | Mod: 26,,, | Performed by: RADIOLOGY

## 2024-02-06 PROCEDURE — 99999 PR PBB SHADOW E&M-EST. PATIENT-LVL V: CPT | Mod: PBBFAC,,, | Performed by: FAMILY MEDICINE

## 2024-02-06 PROCEDURE — 99214 OFFICE O/P EST MOD 30 MIN: CPT | Mod: S$GLB,,, | Performed by: FAMILY MEDICINE

## 2024-02-06 PROCEDURE — 77063 BREAST TOMOSYNTHESIS BI: CPT | Mod: 26,,, | Performed by: RADIOLOGY

## 2024-02-06 PROCEDURE — 77067 SCR MAMMO BI INCL CAD: CPT | Mod: TC,PO

## 2024-02-06 RX ORDER — MUPIROCIN 20 MG/G
OINTMENT TOPICAL 3 TIMES DAILY
Qty: 22 G | Refills: 2 | Status: SHIPPED | OUTPATIENT
Start: 2024-02-06

## 2024-02-06 RX ORDER — TIRZEPATIDE 2.5 MG/.5ML
2.5 INJECTION, SOLUTION SUBCUTANEOUS
Qty: 4 PEN | Refills: 0 | Status: SHIPPED | OUTPATIENT
Start: 2024-02-06 | End: 2024-02-23

## 2024-02-06 RX ORDER — AMOXICILLIN AND CLAVULANATE POTASSIUM 500; 125 MG/1; MG/1
TABLET, FILM COATED ORAL
COMMUNITY
Start: 2023-12-21 | End: 2024-02-06

## 2024-02-06 NOTE — ASSESSMENT & PLAN NOTE
Complete history and physical was completed today.  Complete and thorough medication reconciliation was performed.  Discussed risks and benefits of medications.  Advised patient on orders and health maintenance.  We discussed old records and old labs if available.  Will request any records not available through epic.  Continue current medications listed on your summary sheet.

## 2024-02-06 NOTE — PROGRESS NOTES
PLAN:      Problem List Items Addressed This Visit       Hyperlipidemia associated with type 2 diabetes mellitus (Chronic)     LDL near goal however not less than 70.  Discussed potentially adding Zetia.  Also recommend short.  Off of the atorvastatin to see if her back pain is related to the medication.  Patient will talk to her cardiologist about this today.    Counseled on hyperlipidemia disease course, healthy diet and increased need for exercise.  Please be advised of the risk of cardiovascular disease, increase stroke and heart attack risk with uncontrolled/untreated hyperlipidemia.     Patient voiced understanding and understood the treatment plan. All questions were answered.            Relevant Orders    Comprehensive Metabolic Panel    Type 2 diabetes mellitus with hyperglycemia, without long-term current use of insulin - Primary (Chronic)     Check labs.  Stop Januvia.  Start MOUNJARO.  We will plan to monitor hemoglobin A1c at designated intervals 3 to 6 months.  I recommend ongoing Education for diabetic diet and exercise protocol.  We will continue to monitor for side effects.    Please be advised of symptoms to monitor for and to notify me immediately if persistent or worsening.  Follow up with Ophthalmology/Optometry and Podiatry at least annually.  GLP 1 risk and benefits and common side effects of medication discussed with the patient at length.  All questions were answered.  Discussed with patient at length about potential pharmacologic options.  Patient desires consideration for MOUNJARO .  The risks, benefits and, side effects of this GLP 1 medication including nausea , constipation, diarrhea and pain injection site, etcetera.  She is aware she should not get pregnant while taking this medication and it is not approved while breastfeeding.  Patient reports no personal or family history of pancreatitis, MEN or medullary thyroid cancer.  There is potential for gallbladder issues while taking this  medication if not already removed.  Patient should be advised to caution with taking this medication if having history of thyroid cancer or biliary disease/gallstones.  Patient should be aware of risk of diabetic retinopathy if blood sugars lowered too quickly.  Patient is aware that weight loss can and will most likely occur quickly.  Discussed GLP 1 mechanism of action.         Relevant Medications    tirzepatide (MOUNJARO) 2.5 mg/0.5 mL PnIj    Other Relevant Orders    HEMOGLOBIN A1C    Comprehensive Metabolic Panel    Encounter for long-term (current) use of medications (Chronic)     Complete history and physical was completed today.  Complete and thorough medication reconciliation was performed.  Discussed risks and benefits of medications.  Advised patient on orders and health maintenance.  We discussed old records and old labs if available.  Will request any records not available through epic.  Continue current medications listed on your summary sheet.             Relevant Orders    Comprehensive Metabolic Panel    Hypothyroidism (Chronic)     Please be advised of hypothyroidism disease course.  We will plan to monitor thyroid labs at routine intervals.  Please be advised of risks and benefits of medication use, see medication insert for complete details.  ER precautions.           Relevant Orders    TSH    T3, Free    T4, Free    Comprehensive Metabolic Panel    Class 2 obesity with alveolar hypoventilation, serious comorbidity, and body mass index (BMI) of 38.0 to 38.9 in adult     Starting MOUNJARO.  Also discuss lifestyle modification with diet and exercise.  Start probiotics with digestive enzymes.  Monitoring BMI.         E-coli UTI     Increase hydration with water.  Start probiotic.  Patient is currently on ciprofloxacin.  Urine culture shows sensitivity.  Patient will be referred to Urology for further evaluation treatment.         Relevant Orders    Ambulatory referral/consult to Urology     Future  Appointments       Date Provider Specialty Appt Notes    2/6/2024  Lab .    2/20/2024  Pulmonology Follow up COPD    5/30/2024  Radiology     5/31/2024 Oliver Mederos MD Pulmonology Follow up in about 6 months (around 5/30/2024), or chest CT.           Medication Management for assessment above:   Medication List with Changes/Refills   New Medications    TIRZEPATIDE (MOUNJARO) 2.5 MG/0.5 ML PNIJ    Inject 2.5 mg into the skin every 7 days. Starter dose.  Notify me if tolerating the medication at three weeks to increase to 5 milligrams weekly.   Current Medications    AMIODARONE (PACERONE) 200 MG TAB    Take 200 mg by mouth.    ATORVASTATIN (LIPITOR) 80 MG TABLET    Take 80 mg by mouth every evening.    BLOOD SUGAR DIAGNOSTIC STRP    To check BG 2 times daily, to use with insurance preferred meter    BLOOD-GLUCOSE METER KIT    To check BG 2 times daily, to use with insurance preferred meter    CHLORHEXIDINE (HIBICLENS) 4 % EXTERNAL LIQUID    Apply topically daily as needed.    CIPROFLOXACIN HCL (CIPRO) 500 MG TABLET    Take 1 tablet (500 mg total) by mouth 2 (two) times daily. for 7 days    CLOPIDOGREL (PLAVIX) 75 MG TABLET    Take 75 mg by mouth.    ELIQUIS 5 MG TAB    Take 5 mg by mouth 2 (two) times daily.    ENTRESTO 24-26 MG PER TABLET    Take 1 tablet by mouth 2 (two) times daily.    FAMOTIDINE (PEPCID) 40 MG TABLET    Take 1 tablet (40 mg total) by mouth once daily.    FLUOCINOLONE ACETONIDE OIL (DERMOTIC OIL) 0.01 % DROP    Place 3 drops in ear(s) 2 (two) times daily.    FLUOROMETHOLONE 0.1% (FML) 0.1 % DRPS    INSTILL 1 DROP INTO EACH EYE THREE TIMES DAILY AS DIRECTED    FUROSEMIDE (LASIX) 20 MG TABLET    Take 20 mg by mouth daily as needed.    HYDROCORTISONE 1 % CREAM    Apply to affected area 2 times daily    LANCETS MISC    To check BG 2 times daily, to use with insurance preferred meter    LEVALBUTEROL (XOPENEX HFA) 45 MCG/ACTUATION INHALER    INHALE ONE PUFF INTO THE LUNGS EVERY 4 HOURS AS NEEDED  FOR WHEEZING    LEVOTHYROXINE (SYNTHROID) 25 MCG TABLET    Take 1 tablet (25 mcg total) by mouth before breakfast.    METOPROLOL SUCCINATE (TOPROL-XL) 25 MG 24 HR TABLET    Take 25 mg by mouth.    MUPIROCIN (BACTROBAN) 2 % OINTMENT    Apply topically 3 (three) times daily.    NITROGLYCERIN (NITROSTAT) 0.4 MG SL TABLET    Place 0.4 mg under the tongue as needed.    TIOTROPIUM BROMIDE (SPIRIVA RESPIMAT) 2.5 MCG/ACTUATION INHALER    Inhale 2 puffs into the lungs once daily. Controller   Discontinued Medications    AMOXICILLIN-CLAVULANATE 500-125MG (AUGMENTIN) 500-125 MG TAB    TAKE ONE TABLET BY MOUTH THREE TIMES DAILY AS DIRECTED    JANUVIA 100 MG TAB    TAKE ONE TABLET BY MOUTH EVERY DAY    PHENAZOPYRIDINE (PYRIDIUM) 200 MG TABLET    Take 1 tablet (200 mg total) by mouth 3 (three) times daily as needed for Pain.       Eh Sow M.D.  ==========================================================================  Subjective:   Patient ID: Crystal Acuna is a 68 y.o. female.  has a past medical history of Allergy, Bronchitis, Family history of colonic polyps (04/27/2021), Hallux abductovalgus, Herniated lumbar intervertebral disc, Hyperlipidemia, Hypertension, Smoker, and Staph aureus infection.   Chief Complaint: No chief complaint on file.      Problem List Items Addressed This Visit       Hyperlipidemia associated with type 2 diabetes mellitus (Chronic)    Overview     February 2024: Patient reports she is having significant back pain on atorvastatin 80 milligrams daily.  She is following up with her cardiologist today.    April 2023: Patient with recent MI with stents.  Patient currently on atorvastatin 80 milligrams daily.  CHRONIC. STABLE. Lab analysis reviewed.   (-) CP, SOB, abdominal pain, N/V/D, constipation, jaundice, skin changes.  (-) Myalgias  Lab Results   Component Value Date    CHOL 150 12/07/2023    CHOL 151 07/26/2023    CHOL 136 04/08/2023     Lab Results   Component Value Date    HDL 51  12/07/2023    HDL 47 07/26/2023    HDL 37 04/08/2023     Lab Results   Component Value Date    LDLCALC 81.6 12/07/2023    LDLCALC 87.8 07/26/2023    LDLCALC 76 04/08/2023     Lab Results   Component Value Date    TRIG 87 12/07/2023    TRIG 81 07/26/2023    TRIG 115 04/08/2023     Lab Results   Component Value Date    CHOLHDL 34.0 12/07/2023    CHOLHDL 31.1 07/26/2023    CHOLHDL 3.7 04/08/2023     Lab Results   Component Value Date    TOTALCHOLEST 2.9 12/07/2023    TOTALCHOLEST 3.2 07/26/2023    TOTALCHOLEST 4.4 01/19/2023     Lab Results   Component Value Date    ALT 20 12/07/2023    AST 14 12/07/2023    ALKPHOS 54 (L) 12/07/2023    BILITOT 0.5 12/07/2023   ======================================================  The 10-year ASCVD risk score (Mary Lou ECHAVARRIA, et al., 2019) is: 10.8%    Values used to calculate the score:      Age: 68 years      Sex: Female      Is Non- : No      Diabetic: Yes      Tobacco smoker: No      Systolic Blood Pressure: 112 mmHg      Is BP treated: Yes      HDL Cholesterol: 51 mg/dL      Total Cholesterol: 150 mg/dL           Current Assessment & Plan     LDL near goal however not less than 70.  Discussed potentially adding Zetia.  Also recommend short.  Off of the atorvastatin to see if her back pain is related to the medication.  Patient will talk to her cardiologist about this today.    Counseled on hyperlipidemia disease course, healthy diet and increased need for exercise.  Please be advised of the risk of cardiovascular disease, increase stroke and heart attack risk with uncontrolled/untreated hyperlipidemia.     Patient voiced understanding and understood the treatment plan. All questions were answered.            Type 2 diabetes mellitus with hyperglycemia, without long-term current use of insulin - Primary (Chronic)    Overview     February 2024:  Patient reports she has gained significant amount of weight over the last few months.  BMI Readings from Last 10  "Encounters:   02/06/24 38.34 kg/m²   11/30/23 37.66 kg/m²   08/23/23 33.95 kg/m²   08/10/23 33.95 kg/m²   08/10/23 33.95 kg/m²   07/26/23 33.36 kg/m²   06/13/23 31.68 kg/m²   01/19/23 31.69 kg/m²   01/19/23 31.69 kg/m²   01/09/23 31.57 kg/m²       July 2023:  Patient reports she had issues with Ozempic causing stomach issues.  Diabetes Medications               SITagliptin phosphate (JANUVIA) 100 MG Tab Take 1 tablet (100 mg total) by mouth once daily.        April 2023:  Patient has well-controlled blood sugar on Januvia.  December 2022:  Patient doing well on Januvia.  Patient has side effects to GL P 1. She cannot tolerate metformin due to GI side effects.  Patient currently having increased use infections and recurrent infections on Farxiga.Diabetes Management StatusStatin: TakingACE/ARB: Not taking  Diabetes Management Status    Statin: Taking  ACE/ARB: Not taking    Screening or Prevention Patient's value Goal Complete/Controlled?   HgA1C Testing and Control   Lab Results   Component Value Date    HGBA1C 6.7 (H) 07/26/2023      Annually/Less than 8% Yes   Lipid profile : 12/07/2023 Annually Yes   LDL control Lab Results   Component Value Date    LDLCALC 81.6 12/07/2023    Annually/Less than 100 mg/dl  Yes   Nephropathy screening Lab Results   Component Value Date    LABMICR 9.0 07/26/2023     Lab Results   Component Value Date    PROTEINUA Negative 02/02/2024     No results found for: "UTPCR"   Annually Yes   Blood pressure BP Readings from Last 1 Encounters:   02/06/24 112/72    Less than 140/90 Yes   Dilated retinal exam : 11/16/2023 Annually Yes   Foot exam   : 12/01/2022 Annually No            Current Assessment & Plan     Check labs.  Stop Januvia.  Start MOUNJARO.  We will plan to monitor hemoglobin A1c at designated intervals 3 to 6 months.  I recommend ongoing Education for diabetic diet and exercise protocol.  We will continue to monitor for side effects.    Please be advised of symptoms to monitor " for and to notify me immediately if persistent or worsening.  Follow up with Ophthalmology/Optometry and Podiatry at least annually.  GLP 1 risk and benefits and common side effects of medication discussed with the patient at length.  All questions were answered.  Discussed with patient at length about potential pharmacologic options.  Patient desires consideration for MOUNJARO .  The risks, benefits and, side effects of this GLP 1 medication including nausea , constipation, diarrhea and pain injection site, etcetera.  She is aware she should not get pregnant while taking this medication and it is not approved while breastfeeding.  Patient reports no personal or family history of pancreatitis, MEN or medullary thyroid cancer.  There is potential for gallbladder issues while taking this medication if not already removed.  Patient should be advised to caution with taking this medication if having history of thyroid cancer or biliary disease/gallstones.  Patient should be aware of risk of diabetic retinopathy if blood sugars lowered too quickly.  Patient is aware that weight loss can and will most likely occur quickly.  Discussed GLP 1 mechanism of action.         Encounter for long-term (current) use of medications (Chronic)    Overview     February 2024: Reviewed labs.  July 2023: Reviewed labs.  April 2023: Reviewed labs.  December 2022: Reviewed labs.  CHRONIC. Stable. Compliant with medications for managed conditions. See medication list. No SE reported.   Routine lab analysis is being monitored. Refills were addressed.  Lab Results   Component Value Date    WBC 5.25 07/26/2023    HGB 14.0 07/26/2023    HCT 45.4 07/26/2023    MCV 91 07/26/2023     07/26/2023       Chemistry        Component Value Date/Time     12/07/2023 0744    K 4.7 12/07/2023 0744     12/07/2023 0744    CO2 27 12/07/2023 0744    BUN 20 12/07/2023 0744    CREATININE 1.0 12/07/2023 0744     (H) 12/07/2023 0744         Component Value Date/Time    CALCIUM 9.0 12/07/2023 0744    ALKPHOS 54 (L) 12/07/2023 0744    AST 14 12/07/2023 0744    AST 20 03/28/2016 1457    ALT 20 12/07/2023 0744    BILITOT 0.5 12/07/2023 0744    ESTGFRAFRICA >60.0 06/08/2022 0747    EGFRNONAA >60.0 06/08/2022 0747        Lab Results   Component Value Date    TSH 2.088 12/26/2023    FREET4 0.90 09/22/2023            Current Assessment & Plan     Complete history and physical was completed today.  Complete and thorough medication reconciliation was performed.  Discussed risks and benefits of medications.  Advised patient on orders and health maintenance.  We discussed old records and old labs if available.  Will request any records not available through epic.  Continue current medications listed on your summary sheet.             Hypothyroidism (Chronic)    Overview     Chronic.Patient reports weight gain.  No improvement.  She is on levothyroxine 25 micrograms daily.    Lab Results   Component Value Date    TSH 2.088 12/26/2023    FREET4 0.90 09/22/2023            Current Assessment & Plan     Please be advised of hypothyroidism disease course.  We will plan to monitor thyroid labs at routine intervals.  Please be advised of risks and benefits of medication use, see medication insert for complete details.  ER precautions.           Class 2 obesity with alveolar hypoventilation, serious comorbidity, and body mass index (BMI) of 38.0 to 38.9 in adult    Overview     BMI Readings from Last 10 Encounters:   02/06/24 38.34 kg/m²   11/30/23 37.66 kg/m²   08/23/23 33.95 kg/m²   08/10/23 33.95 kg/m²   08/10/23 33.95 kg/m²   07/26/23 33.36 kg/m²   06/13/23 31.68 kg/m²   01/19/23 31.69 kg/m²   01/19/23 31.69 kg/m²   01/09/23 31.57 kg/m²            Current Assessment & Plan     Starting MOUNJARO.  Also discuss lifestyle modification with diet and exercise.  Start probiotics with digestive enzymes.  Monitoring BMI.         E-coli UTI    Overview     Chronic.   Recurrent.  Patient was having a lot of issues with UTIs on Farxiga.  However she has no longer on this medication.  Patient reports that she does not drink much water.  She likes to drink tea and diet drinks.    Component 4 d ago   Urine Culture, Routine  Abnormal   ESCHERICHIA COLI  >100,000 cfu/ml    Resulting Agency [facility]        Susceptibility     Escherichia coli     CULTURE, URINE     Amox/K Clav'ate <=8/4 mcg/mL Sensitive     Amp/Sulbactam <=8/4 mcg/mL Sensitive     Ampicillin <=8 mcg/mL Sensitive     Cefazolin <=2 mcg/mL Sensitive     Cefepime <=2 mcg/mL Sensitive     Ceftriaxone <=1 mcg/mL Sensitive     Ciprofloxacin <=1 mcg/mL Sensitive     Ertapenem <=0.5 mcg/mL Sensitive     Gentamicin <=4 mcg/mL Sensitive     Levofloxacin <=2 mcg/mL Sensitive     Meropenem <=1 mcg/mL Sensitive     Nitrofurantoin <=32 mcg/mL Sensitive     Piperacillin/Tazo <=16 mcg/mL Sensitive     Tobramycin <=4 mcg/mL Sensitive     Trimeth/Sulfa <=2/38 mcg/mL Sensitive               Linear View         Narrative  Performed by: [facility]  Specimen Source->Urine      Specimen Collected: 02/02/24 08:18                Current Assessment & Plan     Increase hydration with water.  Start probiotic.  Patient is currently on ciprofloxacin.  Urine culture shows sensitivity.  Patient will be referred to Urology for further evaluation treatment.             Review of patient's allergies indicates:   Allergen Reactions    Metformin     Perflutren lipid microspheres Other (See Comments) and Shortness Of Breath     Back pain, neck pain, arm pain/cramping, and flush face.    Farxiga [dapagliflozin]      Yeast infection      Latex, natural rubber     Ozempic [semaglutide]      Stomach pain      Bactrim [sulfamethoxazole-trimethoprim] Rash     Current Outpatient Medications   Medication Instructions    amiodarone (PACERONE) 200 mg, Oral    atorvastatin (LIPITOR) 80 mg, Oral, Nightly    blood sugar diagnostic Strp To check BG 2 times daily, to use with  insurance preferred meter    blood-glucose meter kit To check BG 2 times daily, to use with insurance preferred meter    chlorhexidine (HIBICLENS) 4 % external liquid Topical (Top), Daily PRN    ciprofloxacin HCl (CIPRO) 500 mg, Oral, 2 times daily    clopidogreL (PLAVIX) 75 mg, Oral    ELIQUIS 5 mg, Oral, 2 times daily    ENTRESTO 24-26 mg per tablet 1 tablet, Oral, 2 times daily    famotidine (PEPCID) 40 mg, Oral, Daily    fluocinolone acetonide oiL (DERMOTIC OIL) 0.01 % Drop 3 drops, Otic, 2 times daily    fluorometholone 0.1% (FML) 0.1 % DrpS INSTILL 1 DROP INTO EACH EYE THREE TIMES DAILY AS DIRECTED    furosemide (LASIX) 20 mg, Oral, Daily PRN    hydrocortisone 1 % cream Apply to affected area 2 times daily    lancets Misc To check BG 2 times daily, to use with insurance preferred meter    levalbuterol (XOPENEX HFA) 45 mcg/actuation inhaler INHALE ONE PUFF INTO THE LUNGS EVERY 4 HOURS AS NEEDED FOR WHEEZING    levothyroxine (SYNTHROID) 25 mcg, Oral, Before breakfast    metoprolol succinate (TOPROL-XL) 25 mg, Oral    MOUNJARO 2.5 mg, Subcutaneous, Every 7 days, Starter dose.  Notify me if tolerating the medication at three weeks to increase to 5 milligrams weekly.    mupirocin (BACTROBAN) 2 % ointment Topical (Top), 3 times daily    nitroGLYCERIN (NITROSTAT) 0.4 mg, Sublingual, As needed (PRN)    tiotropium bromide (SPIRIVA RESPIMAT) 2.5 mcg/actuation inhaler 2 puffs, Inhalation, Daily, Controller      I have reviewed the PMH, social history, FamilyHx, surgical history, allergies and medications documented / confirmed by the patient at the time of this visit.  Review of Systems   Constitutional:  Positive for fatigue. Negative for activity change and unexpected weight change.   HENT:  Negative for hearing loss, rhinorrhea and trouble swallowing.    Eyes:  Negative for discharge and visual disturbance.   Respiratory:  Negative for chest tightness and wheezing.    Cardiovascular:  Negative for chest pain and  "palpitations.   Gastrointestinal:  Negative for blood in stool, constipation, diarrhea and vomiting.   Endocrine: Negative for polydipsia and polyuria.   Genitourinary:  Negative for difficulty urinating, dysuria, hematuria and menstrual problem.   Musculoskeletal:  Negative for arthralgias, joint swelling and neck pain.   Neurological:  Negative for weakness and headaches.   Psychiatric/Behavioral:  Negative for confusion and dysphoric mood.      Objective:   /72   Pulse 63   Ht 5' 2" (1.575 m)   Wt 95.1 kg (209 lb 9.6 oz)   SpO2 97%   BMI 38.34 kg/m²   Physical Exam  Vitals and nursing note reviewed.   Constitutional:       General: She is not in acute distress.     Appearance: She is well-developed. She is not ill-appearing, toxic-appearing or diaphoretic.   HENT:      Head: Normocephalic and atraumatic.      Right Ear: Hearing and external ear normal.      Left Ear: Hearing and external ear normal.      Nose: Nose normal. No rhinorrhea.   Eyes:      General: Lids are normal.      Extraocular Movements: Extraocular movements intact.      Conjunctiva/sclera: Conjunctivae normal.      Pupils: Pupils are equal, round, and reactive to light.   Cardiovascular:      Rate and Rhythm: Normal rate and regular rhythm.      Pulses: Normal pulses.           Posterior tibial pulses are 2+ on the right side and 2+ on the left side.      Heart sounds: Normal heart sounds. No murmur heard.  Pulmonary:      Effort: Pulmonary effort is normal. No respiratory distress.      Breath sounds: Normal breath sounds. No wheezing.   Abdominal:      General: Bowel sounds are normal.      Palpations: Abdomen is soft.   Musculoskeletal:         General: Normal range of motion.      Cervical back: Normal range of motion and neck supple.      Right foot: Normal range of motion. No deformity.      Left foot: Normal range of motion. No deformity.   Feet:      Right foot:      Protective Sensation: 10 sites tested.  10 sites sensed.     "  Skin integrity: No ulcer, blister, skin breakdown, erythema, warmth, callus or dry skin.      Left foot:      Protective Sensation: 10 sites tested.  10 sites sensed.      Skin integrity: No ulcer, blister, skin breakdown, erythema, warmth, callus or dry skin.   Skin:     General: Skin is warm and dry.      Capillary Refill: Capillary refill takes less than 2 seconds.      Coloration: Skin is not pale.   Neurological:      General: No focal deficit present.      Mental Status: She is alert and oriented to person, place, and time. Mental status is at baseline. She is not disoriented.      Cranial Nerves: No cranial nerve deficit.      Motor: No weakness.      Gait: Gait normal.   Psychiatric:         Attention and Perception: She is attentive.         Mood and Affect: Mood normal. Mood is not anxious or depressed.         Speech: Speech is not rapid and pressured or slurred.         Behavior: Behavior normal. Behavior is not agitated, aggressive or hyperactive. Behavior is cooperative.         Thought Content: Thought content normal. Thought content is not paranoid or delusional. Thought content does not include homicidal or suicidal ideation. Thought content does not include homicidal or suicidal plan.         Cognition and Memory: Memory is not impaired.         Judgment: Judgment normal.         Assessment:     1. Type 2 diabetes mellitus with hyperglycemia, without long-term current use of insulin    2. Hypothyroidism, unspecified type    3. Hyperlipidemia associated with type 2 diabetes mellitus    4. Encounter for long-term (current) use of medications    5. Class 2 obesity with alveolar hypoventilation, serious comorbidity, and body mass index (BMI) of 38.0 to 38.9 in adult    6. E-coli UTI      MDM:   Moderate medical complexity.  Moderate  risk.  Total time: 30 minutes.  This includes total time spent on the encounter, which includes face to face time and non-face to face time preparing to see the patient  (eg, review of previous medical records, tests), Obtaining and/or reviewing separately obtained history, documenting clinical information in the electronic or other health record, independently interpreting results (not separately reported)/communicating results to the patient/family/caregiver, and/or care coordination (not separately reported).    I have Reviewed and summarized old records.  I have performed thorough medication reconciliation today and discussed risk and benefits of medications.  I have reviewed labs and discussed with patient.  All questions were answered.  I am requesting old records and will review them once they are available.  Cardiology    I have signed for the following orders AND/OR meds.  Orders Placed This Encounter   Procedures    TSH     Standing Status:   Future     Number of Occurrences:   1     Standing Expiration Date:   4/6/2025    T3, Free     Standing Status:   Future     Number of Occurrences:   1     Standing Expiration Date:   4/6/2025    T4, Free     Standing Status:   Future     Number of Occurrences:   1     Standing Expiration Date:   4/6/2025    HEMOGLOBIN A1C     Standing Status:   Future     Number of Occurrences:   1     Standing Expiration Date:   4/6/2025    Comprehensive Metabolic Panel     Standing Status:   Future     Number of Occurrences:   1     Standing Expiration Date:   4/6/2025    Ambulatory referral/consult to Urology     Standing Status:   Future     Standing Expiration Date:   3/6/2025     Referral Priority:   Routine     Referral Type:   Consultation     Referral Reason:   Specialty Services Required     Referred to Provider:   Mauro Crowell MD     Requested Specialty:   Urology     Number of Visits Requested:   1     Medications Ordered This Encounter   Medications    tirzepatide (MOUNJARO) 2.5 mg/0.5 mL PnIj     Sig: Inject 2.5 mg into the skin every 7 days. Starter dose.  Notify me if tolerating the medication at three weeks to increase to 5  milligrams weekly.     Dispense:  4 pen      Refill:  0        Follow up in about 3 months (around 5/6/2024), or if symptoms worsen or fail to improve, for DM, Med refills, LAB RESULTS.  Future Appointments       Date Provider Specialty Appt Notes    2/6/2024  Lab .    2/20/2024  Pulmonology Follow up COPD    5/30/2024  Radiology     5/31/2024 Oliver Mederos MD Pulmonology Follow up in about 6 months (around 5/30/2024), or chest CT.          If no improvement in symptoms or symptoms worsen, advised to call/follow-up at clinic or go to ER. Patient voiced understanding and all questions/concerns were addressed.   DISCLAIMER: This note was compiled by using a speech recognition dictation system and therefore please be aware that typographical / speech recognition errors can and do occur.  Please contact me if you see any errors specifically.    Eh Sow M.D.       Office: [phone] [address] [place] [address]  FAX: [phone]

## 2024-02-06 NOTE — ASSESSMENT & PLAN NOTE
Starting MOUNJARO.  Also discuss lifestyle modification with diet and exercise.  Start probiotics with digestive enzymes.  Monitoring BMI.

## 2024-02-06 NOTE — ASSESSMENT & PLAN NOTE
LDL near goal however not less than 70.  Discussed potentially adding Zetia.  Also recommend short.  Off of the atorvastatin to see if her back pain is related to the medication.  Patient will talk to her cardiologist about this today.    Counseled on hyperlipidemia disease course, healthy diet and increased need for exercise.  Please be advised of the risk of cardiovascular disease, increase stroke and heart attack risk with uncontrolled/untreated hyperlipidemia.     Patient voiced understanding and understood the treatment plan. All questions were answered.

## 2024-02-06 NOTE — ASSESSMENT & PLAN NOTE
Increase hydration with water.  Start probiotic.  Patient is currently on ciprofloxacin.  Urine culture shows sensitivity.  Patient will be referred to Urology for further evaluation treatment.

## 2024-02-06 NOTE — ASSESSMENT & PLAN NOTE
Check labs.  Stop Januvia.  Start MOUNJARO.  We will plan to monitor hemoglobin A1c at designated intervals 3 to 6 months.  I recommend ongoing Education for diabetic diet and exercise protocol.  We will continue to monitor for side effects.    Please be advised of symptoms to monitor for and to notify me immediately if persistent or worsening.  Follow up with Ophthalmology/Optometry and Podiatry at least annually.  GLP 1 risk and benefits and common side effects of medication discussed with the patient at length.  All questions were answered.  Discussed with patient at length about potential pharmacologic options.  Patient desires consideration for MOUNJARO .  The risks, benefits and, side effects of this GLP 1 medication including nausea , constipation, diarrhea and pain injection site, etcetera.  She is aware she should not get pregnant while taking this medication and it is not approved while breastfeeding.  Patient reports no personal or family history of pancreatitis, MEN or medullary thyroid cancer.  There is potential for gallbladder issues while taking this medication if not already removed.  Patient should be advised to caution with taking this medication if having history of thyroid cancer or biliary disease/gallstones.  Patient should be aware of risk of diabetic retinopathy if blood sugars lowered too quickly.  Patient is aware that weight loss can and will most likely occur quickly.  Discussed GLP 1 mechanism of action.

## 2024-02-06 NOTE — TELEPHONE ENCOUNTER
Refill Routing Note   Medication(s) are not appropriate for processing by [facility] for the following reason(s):        Outside of protocol    [facility] action(s):  Route               Appointments  past 12m or future 3m with PCP    Date Provider   Last Visit   2/6/2024 Eh Sow MD   Next Visit   Visit date not found Eh Sow MD   ED visits in past 90 days: 0        Note composed:2:19 PM 02/06/2024

## 2024-02-06 NOTE — PATIENT INSTRUCTIONS
Jimy Rojas,     If you are due for any health screening(s) below please notify me so we can arrange them to be ordered and scheduled. Most healthy patients at your age complete them, but you are free to accept or refuse.     If you can't do it, I'll definitely understand. If you can, I'd certainly appreciate it!    Tests to Keep You Healthy    Mammogram: SCHEDULED  Eye Exam: Met on 11/16/2023  Colon Cancer Screening: Met on 4/27/2021  Last Blood Pressure <= 139/89 (2/6/2024): Yes  Last HbA1c < 8 (07/26/2023): Yes      Schedule your breast cancer screening today     Breast cancer is the second most common cancer in women,  and the second leading cause of death from cancer. Mammograms can detect breast cancer early, which significantly increases the chances of curing the cancer.       Our records indicate that you may be overdue for breast cancer screening. Cancer screenings save lives, so schedule yours today to stay healthy.     If you recently had a mammogram performed outside of [facility], please let your Health care team know so that they can update your health record.

## 2024-02-07 ENCOUNTER — PATIENT MESSAGE (OUTPATIENT)
Dept: FAMILY MEDICINE | Facility: CLINIC | Age: 69
End: 2024-02-07
Payer: MEDICARE

## 2024-02-07 DIAGNOSIS — E11.65 TYPE 2 DIABETES MELLITUS WITH HYPERGLYCEMIA, WITHOUT LONG-TERM CURRENT USE OF INSULIN: Primary | ICD-10-CM

## 2024-02-07 DIAGNOSIS — E87.5 HYPERKALEMIA: Primary | ICD-10-CM

## 2024-02-08 NOTE — TELEPHONE ENCOUNTER
I received your message which was reviewed along with the the medication list and allergies that we have below.  Please review it for accuracy to make sure that we have the most recent records on your history.     Based on this, the following orders were placed AND/OR medicines were sent in.     Orders Placed This Encounter   Procedures    Ambulatory referral/consult to Endocrinology     Standing Status:   Future     Standing Expiration Date:   3/8/2025     Referral Priority:   Routine     Referral Type:   Consultation     Referred to Provider:   John Last MD     Requested Specialty:   Endocrinology     Number of Visits Requested:   1       Medications written and sent at this time include:       Your pharmacy(ies) of choice at this time on record include the list below and any medications would have been sent to the one at the top.    [facility] [address] [address]  Phone: [phone] Fax: [phone]      Thank you for choosing us as your healthcare provider!  Dr. Eh Sow    ALLERGY LIST  Review of patient's allergies indicates:   Allergen Reactions    Metformin     Perflutren lipid microspheres Other (See Comments) and Shortness Of Breath     Back pain, neck pain, arm pain/cramping, and flush face.    Farxiga [dapagliflozin]      Yeast infection      Latex, natural rubber     Ozempic [semaglutide]      Stomach pain      Bactrim [sulfamethoxazole-trimethoprim] Rash       MEDICATION LIST  Current Outpatient Medications on File Prior to Visit   Medication Sig Dispense Refill    amiodarone (PACERONE) 200 MG Tab Take 200 mg by mouth.      atorvastatin (LIPITOR) 80 MG tablet Take 80 mg by mouth every evening.      blood sugar diagnostic Strp To check BG 2 times daily, to use with insurance preferred meter 200 each 5    blood-glucose meter kit To check BG 2 times daily, to use with insurance preferred meter 1 each 0    chlorhexidine (HIBICLENS)  4 % external liquid Apply topically daily as needed. 473 mL 0    ciprofloxacin HCl (CIPRO) 500 MG tablet Take 1 tablet (500 mg total) by mouth 2 (two) times daily. for 7 days 14 tablet 0    clopidogreL (PLAVIX) 75 mg tablet Take 75 mg by mouth.      ELIQUIS 5 mg Tab Take 5 mg by mouth 2 (two) times daily.      ENTRESTO 24-26 mg per tablet Take 1 tablet by mouth 2 (two) times daily.      famotidine (PEPCID) 40 MG tablet Take 1 tablet (40 mg total) by mouth once daily. 30 tablet 11    fluocinolone acetonide oiL (DERMOTIC OIL) 0.01 % Drop Place 3 drops in ear(s) 2 (two) times daily. 20 mL 5    fluorometholone 0.1% (FML) 0.1 % DrpS INSTILL 1 DROP INTO EACH EYE THREE TIMES DAILY AS DIRECTED      furosemide (LASIX) 20 MG tablet Take 20 mg by mouth daily as needed.      hydrocortisone 1 % cream Apply to affected area 2 times daily 30 g 0    lancets Misc To check BG 2 times daily, to use with insurance preferred meter 200 each 99    levalbuterol (XOPENEX HFA) 45 mcg/actuation inhaler INHALE ONE PUFF INTO THE LUNGS EVERY 4 HOURS AS NEEDED FOR WHEEZING 45 g 1    levothyroxine (SYNTHROID) 25 MCG tablet Take 1 tablet (25 mcg total) by mouth before breakfast. 30 tablet 11    metoprolol succinate (TOPROL-XL) 25 MG 24 hr tablet Take 25 mg by mouth.      mupirocin (BACTROBAN) 2 % ointment Apply topically 3 (three) times daily. 22 g 2    nitroGLYCERIN (NITROSTAT) 0.4 MG SL tablet Place 0.4 mg under the tongue as needed.      tiotropium bromide (SPIRIVA RESPIMAT) 2.5 mcg/actuation inhaler Inhale 2 puffs into the lungs once daily. Controller 4 g 11    tirzepatide (MOUNJARO) 2.5 mg/0.5 mL PnIj Inject 2.5 mg into the skin every 7 days. Starter dose.  Notify me if tolerating the medication at three weeks to increase to 5 milligrams weekly. 4 pen 0     No current facility-administered medications on file prior to visit.       HEALTH MAINTENANCE THAT IS OVERDUE OR NEEDS TO BE UPDATED ON OUR CHART IS LISTED BELOW.  IF YOU HAVE HAD IT DONE  ELSEWHERE, PLEASE SEND US DATES AND RECORDS IF YOU HAVE THEM TO MAKE YOUR CHART ACCURATE.  IF YOU HAVE NOT HAD THESE DONE AND ARE READY FOR US TO SCHEDULE THEM, PLEASE SEND US A MESSAGE.  Health Maintenance Due   Topic Date Due    Shingles Vaccine (1 of 2) Never done    RSV Vaccine (Age 60+ and Pregnant patients) (1 - 1-dose 60+ series) Never done    COVID-19 Vaccine (3 - 2023-24 season) 09/01/2023       DISCLAIMER: This note was compiled by using a speech recognition dictation system and therefore please be aware that typographical / speech recognition errors can and do occur.  Please contact me if you see any errors specifically.    Eh Sow MD  We Offer Telehealth & Same Day Appointments!   Book your Telehealth appointment with me through my nurse or   Clinic appointments on [employer]!  [phone]     Check out my Facebook Page and Follow Me at: CLICK HERE    Check out my website at [email or web address] by clicking on: CLICK HERE    To Schedule appointments online, go to [employer]: CLICK HERE     Location: [email or web address]    [address] [place] [address]    FAX: [phone]

## 2024-02-16 ENCOUNTER — LAB VISIT (OUTPATIENT)
Dept: LAB | Facility: HOSPITAL | Age: 69
End: 2024-02-16
Attending: FAMILY MEDICINE
Payer: MEDICARE

## 2024-02-16 DIAGNOSIS — Z00.00 ANNUAL PHYSICAL EXAM: ICD-10-CM

## 2024-02-16 DIAGNOSIS — Z79.899 ENCOUNTER FOR LONG-TERM (CURRENT) USE OF MEDICATIONS: ICD-10-CM

## 2024-02-16 LAB
ALBUMIN SERPL BCP-MCNC: 3.8 G/DL (ref 3.5–5.2)
ALP SERPL-CCNC: 50 U/L (ref 55–135)
ALT SERPL W/O P-5'-P-CCNC: 22 U/L (ref 10–44)
ANION GAP SERPL CALC-SCNC: 8 MMOL/L (ref 8–16)
AST SERPL-CCNC: 18 U/L (ref 10–40)
BILIRUB SERPL-MCNC: 0.5 MG/DL (ref 0.1–1)
BUN SERPL-MCNC: 18 MG/DL (ref 8–23)
CALCIUM SERPL-MCNC: 9.6 MG/DL (ref 8.7–10.5)
CHLORIDE SERPL-SCNC: 104 MMOL/L (ref 95–110)
CO2 SERPL-SCNC: 26 MMOL/L (ref 23–29)
CREAT SERPL-MCNC: 0.9 MG/DL (ref 0.5–1.4)
EST. GFR  (NO RACE VARIABLE): >60 ML/MIN/1.73 M^2
GLUCOSE SERPL-MCNC: 142 MG/DL (ref 70–110)
POTASSIUM SERPL-SCNC: 4.5 MMOL/L (ref 3.5–5.1)
PROT SERPL-MCNC: 6.8 G/DL (ref 6–8.4)
SODIUM SERPL-SCNC: 138 MMOL/L (ref 136–145)

## 2024-02-16 PROCEDURE — 80053 COMPREHEN METABOLIC PANEL: CPT | Performed by: FAMILY MEDICINE

## 2024-02-16 PROCEDURE — 36415 COLL VENOUS BLD VENIPUNCTURE: CPT | Mod: PO | Performed by: FAMILY MEDICINE

## 2024-02-19 NOTE — PROGRESS NOTES
1st check to see if patient has seen the results.  If not then  CALL patient with results and Document verification.  Schedule follow-up if needed.  [phone]  Potassium level is now normal.  Kidney function has improved.

## 2024-02-20 ENCOUNTER — CLINICAL SUPPORT (OUTPATIENT)
Dept: PULMONOLOGY | Facility: CLINIC | Age: 69
End: 2024-02-20
Payer: MEDICARE

## 2024-02-20 VITALS — RESPIRATION RATE: 16 BRPM | BODY MASS INDEX: 38.23 KG/M2 | WEIGHT: 209 LBS

## 2024-02-20 DIAGNOSIS — J44.9 COPD, GROUP A, BY GOLD 2017 CLASSIFICATION: Primary | ICD-10-CM

## 2024-02-20 NOTE — PATIENT INSTRUCTIONS
What is COPD?  COPD stands for chronic obstructive pulmonary disease. It means the airways in your lungs are blocked (obstructed). Because of this, it is hard to breathe. You may have trouble with daily activities because of shortness of breath. Over time the shortness of breath usually worsens making it more and more difficult to take care of yourself and take part in activities. Chronic bronchitis and emphysema are two common types of COPD.  What happens in chronic bronchitis?  The cells in the airways make more mucus than normal. The mucus builds up, narrowing the airways. This means less air travels into and out of the lungs. The lining of the airways may also become inflamed (swollen) and causes the airways to narrow even more.            What happens in emphysema?  The small airways are damaged and lose their stretchiness. The airways collapse when you exhale, causing air to get trapped in the air sacs. This means that less oxygen enters the blood vessels and less oxygen is delivered to all of the cells of your body. This makes it hard to breathe.         Damage to cilia  Cilia are small hairs that line and protect the airways. Smoking damages the cilia. Damaged cilia can't sweep mucus and particles away. Some of the cilia are destroyed. This damage worsens COPD.      How did I get COPD?  Most people get COPD from smoking. Cigarette smoke damages lungs, which can develop into COPD over many years.  How COPD affects you  COPD makes you work harder to breathe. Air may get trapped in the lungs, which prevents your lungs from filling completely with fresh oxygen-filled air when you inhale (breathe in). It's harder to take deep breaths especially when you are active and start breathing faster. Over time, your lungs may become enlarged filling the lung with air that does not transfer oxygen into the blood. These problems cause you to have shortness of breath (also called dyspnea). Wheezing (hoarse, whistling  breathing), chronic cough, and fatigue (feeling tired and worn out) are also common.   © 9939-1816 [employer]. [address] [place] [address], [place] [address]. All rights reserved. This information is not intended as a substitute for professional medical care. Always follow your healthcare professional's instructions.          Using an Inhaler with a Spacer  To control asthma, you need to use your medicines the right way. Some medicines are inhaled using a device called a metered-dose inhaler (MDI). Metered-dose inhalers deliver medicine with a fine spray. You may be asked to use a spacer (holding tube) with your inhaler. The spacer helps make sure all the medicine you need goes into your lungs.   Steps for using an inhaler with a spacer  Step 1:  Remove the caps from the inhaler and spacer.  Shake the inhaler well and attach the spacer. If the inhaler is being used for the first time or has not been used for a while, prime it as directed by the product maker.  Step 2:  Breathe out normally.  Put the spacer between your teeth. Close your lips tightly around it.  Keep your chin up.  Step 3:  Spray 1 puff into the spacer by pressing down on the inhaler.  Then breathe in through your mouth as slowly and deeply as you can. This should take about 5-10 seconds. If you breathe too quickly, you may hear a whistling sound in certain spacers.  Step 4:  Take the spacer out of your mouth.  Hold your breath for a count of 10.  Then hold your lips together and slowly breathe out through your [id number]  Shortness of Breath: Maximizing Your Energy    Fear of shortness of breath may stop you from being as active as you once were. You don't have to live this way. Managing your time and pacing yourself can help you conserve energy and do more. It's even OK if you're short of breath sometimes. You can learn to work through this without limiting your activities.  Manage your time  Shortness of breath can make everyday tasks  take longer. This means there's less time to do the things you enjoy. You can help prevent this by managing your time. Try these tips:  Plan ahead so your tasks are spaced throughout the day. As you plan, keep in mind the times of day you tend to have the most energy.  Do only one thing at a time. Finish one task before starting another.  Gather everything you need before you start a task. This cuts out unneeded steps while you're working.  Think about what you really need to do. Be realistic about what you can get done in a day.      Balance activity and rest  When you're tired, your activities will take longer. Fatigue also makes you more likely to get an infection. Plan your day so that your tasks are spaced throughout the day. To have more energy:  Stop and rest when you need to. Don't wait until you're overtired.  Switch back and forth between hard tasks and easy ones.  Give yourself plenty of time for each task, so you don't have to hurry.  Take 20- to 30-minute rest breaks after meals and throughout the day.  If an activity takes a lot of energy, break it into smaller parts. For instance, fold the laundry first. Then take a break before putting it away.  Try not to exert yourself in extreme cold or heat.       Find ways to conserve energy  Conserving your energy can help you stay active and breathe better. The way you use your body during a task can help you conserve energy. Think of ways to make things easier, and take your time to ease shortness of breath.  For some tasks, you can also use special aids designed to reduce the amount of energy needed. Here are some tips:  Sit whenever possible, and keep your arms close to your body. Use slow, smooth motions.  Sit to dress and undress, shave, brush your teeth, and comb your hair. Use a long-handled reacher to pull on socks and shoes, and long-handled items like shoe horns.  Sit on a bench to shower. Use warm water, not hot. (Steam can make it harder to  breathe.) Dry off by wrapping yourself in a terrycloth robe.  Use energy-saving appliances, such as an electric can opener, a power toothbrush, and a .  Use a cart with wheels to move groceries, laundry, dishes, and other items around the house. Some carts have seats so you can rest when you need to.  Use lightweight, nonstick pots and pans to cook. Soak dirty dishes instead of scrubbing them. Air-dry dishes, or use a .  Mix, cook, serve, and store foods in the same dish.  Keep the things you use most at waist level, so you can get them without reaching or bending.  Use steps slowly, pausing at each step. If you have steps outside or in your home, think about adding ramps or stair lifts.  Think about ways that others can help you. You might get help from friends, family members, or home health aides.      Remember to breathe  Sometimes people with an illness or condition that affects the lungs try to rush through tasks so they won't get short of breath. This uses more energy and can actually increase shortness of breath. Instead, slow down and pace your breathing. These tips may help:  Move slowly during tasks that take a lot of effort, such as climbing stairs or pushing a shopping cart.  Use pursed-lip and diaphragmatic breathing while you go about a task.  Breathe out (exhale) when you exert effort. For example, breathe out as you lift up a grocery bag. Once you're holding the bag, breathe in. Ask the  at the [employer] to pack your grocery bags so they are light and easy to carry.  Focus on taking slow, deep breaths. If your breathing is shallow, you don't take in as much air.  Remember that it's OK to be short of breath. Just pace yourself and do pursed-lip breathing.      Talk with your healthcare provider about:  If you should use supplemental oxygen  If you need a referral to occupational and physical therapy. Therapists can help you with exercise and daily activities, and how to  make things easier.      Pursed-lip breathing  This type of breathing helps you exhale better. Breathing this way during activity will help you reduce shortness of breath:  Relax your neck and shoulder muscles. Breathe in (inhale) slowly through your nose for 2 counts or more.  Pucker your lips as if you are going to blow out a candle. Breathe out slowly and gently through your lips for at least twice as long as you inhaled.ACTION PLAN    GREEN ZONE  My sputum is clear/white/usual color and easily cleared.  My breathing is no harder than usual.  I can do my usual activities.  I can think clearly.   Take your usual medicines, including oxygen, as you are told to do so by your health care provider.   YELLOW ZONE  My sputum has change (color, thickness, amount).  I am more short of breath than usual.  I cough or wheeze more.  I weigh more and my legs/feet swell.  I cannot do my usual activities without resting.   Call your health care provider. You will probably be told to begin taking an antibiotic and prednisone. Have your pharmacy phone number available.   RED ZONE  I cannot cough out my sputum.  I am much more short of breath than normal.  I need to sit up to breathe  I cannot do my usual activities.  I am unable to speak more than one or two words at a time.  I am confused.   Call your health care provider. You may be asked to come in to be seen, told to go to the emergency room, or told to call 9-1-1.

## 2024-02-20 NOTE — PROGRESS NOTES
Pulmonary Disease Management  [facility]  Follow up Visit-Virtual Visit   Chronic Care Management    Crystal Acuna  was seen 2/20/2024  11:00 AM in the Pulmonary Disease management clinic for evaluation, education, reinforcement of self management techniques and exacerbation action plan.    The patient location is:   [address] [address] [facility] [address]  Visit type: Virtual visit with synchronous audio and video     Amanuel Russell    Past Medical History:   Diagnosis Date    Allergy     Bronchitis     Family history of colonic polyps 04/27/2021    Three of her siblings have had colon polyps.    Hallux abductovalgus     Herniated lumbar intervertebral disc     Hyperlipidemia     Diet controlled    Hypertension     Smoker     Staph aureus infection        Patient's Medications   New Prescriptions    No medications on file   Previous Medications    AMIODARONE (PACERONE) 200 MG TAB    Take 200 mg by mouth.    ATORVASTATIN (LIPITOR) 80 MG TABLET    Take 80 mg by mouth every evening.    BLOOD SUGAR DIAGNOSTIC STRP    To check BG 2 times daily, to use with insurance preferred meter    BLOOD-GLUCOSE METER KIT    To check BG 2 times daily, to use with insurance preferred meter    CHLORHEXIDINE (HIBICLENS) 4 % EXTERNAL LIQUID    Apply topically daily as needed.    CLOPIDOGREL (PLAVIX) 75 MG TABLET    Take 75 mg by mouth.    ELIQUIS 5 MG TAB    Take 5 mg by mouth 2 (two) times daily.    ENTRESTO 24-26 MG PER TABLET    Take 1 tablet by mouth 2 (two) times daily.    FAMOTIDINE (PEPCID) 40 MG TABLET    Take 1 tablet (40 mg total) by mouth once daily.    FLUOCINOLONE ACETONIDE OIL (DERMOTIC OIL) 0.01 % DROP    Place 3 drops in ear(s) 2 (two) times daily.    FLUOROMETHOLONE 0.1% (FML) 0.1 % DRPS    INSTILL 1 DROP INTO EACH EYE THREE TIMES DAILY AS DIRECTED    FUROSEMIDE (LASIX) 20 MG TABLET    Take 20 mg by mouth daily as needed.    HYDROCORTISONE 1 % CREAM    Apply to affected area 2 times daily    LANCETS MISC    " To check BG 2 times daily, to use with insurance preferred meter    LEVALBUTEROL (XOPENEX HFA) 45 MCG/ACTUATION INHALER    INHALE ONE PUFF INTO THE LUNGS EVERY 4 HOURS AS NEEDED FOR WHEEZING    LEVOTHYROXINE (SYNTHROID) 25 MCG TABLET    Take 1 tablet (25 mcg total) by mouth before breakfast.    METOPROLOL SUCCINATE (TOPROL-XL) 25 MG 24 HR TABLET    Take 25 mg by mouth.    MUPIROCIN (BACTROBAN) 2 % OINTMENT    Apply topically 3 (three) times daily.    NITROGLYCERIN (NITROSTAT) 0.4 MG SL TABLET    Place 0.4 mg under the tongue as needed.    TIOTROPIUM BROMIDE (SPIRIVA RESPIMAT) 2.5 MCG/ACTUATION INHALER    Inhale 2 puffs into the lungs once daily. Controller    TIRZEPATIDE (MOUNJARO) 2.5 MG/0.5 ML PNIJ    Inject 2.5 mg into the skin every 7 days. Starter dose.  Notify me if tolerating the medication at three weeks to increase to 5 milligrams weekly.   Modified Medications    No medications on file   Discontinued Medications    No medications on file       Office Spirometry Results:         2/20/2024    11:00 AM 2/6/2024     9:04 AM 11/30/2023    10:46 AM 8/23/2023    11:01 AM 8/10/2023    11:17 AM 8/10/2023     8:44 AM 7/26/2023     8:35 AM   Pulmonary Function Tests   SpO2  97 % 98 %   96 % 97 %   Ordering Provider          Performing nurse/tech/RT     MIGUEL Boswell, RRT     Diagnosis     Shortness of Breath     Height  5' 2" (1.575 m) 5' 2" (1.575 m) 5' 2" (1.575 m) 5' 2" (1.575 m) 5' 2" (1.575 m) 5' 2" (1.575 m)   Weight 94.8 kg (209 lb) 95.1 kg (209 lb 9.6 oz) 93.4 kg (205 lb 14.6 oz)  84.2 kg (185 lb 10 oz) 84.2 kg (185 lb 10 oz) 82.7 kg (182 lb 6.4 oz)   BMI (Calculated)  38.3 37.7  33.9 33.9 33.4   Patient Race          6MWT Status     completed without stopping     Patient Reported     Dyspnea;Leg pain     Was O2 used?     No     6MW Distance walked (feet)     1200 feet     Distance walked (meters)     365.76 meters     Did patient stop?     No     Type of assistive device(s) used?     no " assistive devices     Oxygen Saturation     95 %     Supplemental Oxygen     Room Air     Heart Rate     50 bpm     Blood Pressure     121/63     [place] Dyspnea Rating      nothing at all     Oxygen Saturation     96 %     Supplemental Oxygen     Room Air     Heart Rate     79 bpm     Blood Pressure     133/57     [place] Dyspnea Rating      light     Recovery Time (seconds)     240 seconds     Oxygen Saturation     97 %     Supplemental Oxygen     Room Air     Heart Rate     54 bpm     Blood Pressure     115/54     [place] Dyspnea Rating      nothing at all     Is procedure ready for interpretation?     Yes     Oxygen Qualification?     No           2/20/2024    11:00 AM   Pulmonary Studies Review   Weight 94.8 kg (209 lb)   Predicted Distance 493.22                 Educational assessment:   [x]            Good  []            Fair  []            Poor    Readiness to learn:   [x]            Good  []            Fair  []            Poor    Vision Status:   [x]            Good  []            Fair  []            Poor    Reading Ability:  [x]            Good  []            Fair  []            Poor    Knowledge of condition:   [x]            Good  []            Fair  []            Poor    Language Barriers:   []            Good  []            Fair  []            Poor  [x]            None    Cognitive/ Physical Barriers:   []            Good  []            Fair  []            Poor  [x]            None    Learning best by:                       []            Seeing  [x]            Hearing  []            Reading                         [x]            Doing    Describe any barrier /Limitation or financial implications of care choices identified     []            Financial  []            Emotional  []            Education  []            Vision/Hearing  []            Physical  [x]            None  []                TOPIC /CONTENT FOR TODAY:    [x]            MDI with or without spacer  []            Dry powder inhaler  []             Acapella   []           Peak Flow meter  [x]            COPD action plan  []            Nebulizer use  []            Oxygen use safety  [x]            Breathing and cough techniques  [x]            Energy conservation  [x]            Infection prevention  [x]           CPAP use        Learner:    [x]            Patient   []            Caregiver    Method:    [x]            Verbal explanation  [x]            Audio visual    [x]            Literature  [x]            Teach back      Evaluation:    [x]            Teach back  [x]            Demonstrate  [x]            Follow up phone call    []            2 weeks     [x]            4 weeks   [x]            PRN      Therapist comments:   Patient was seen today to review respiratory medication purpose and proper technique for use of inhalers. Patient practiced proper technique using MDI with valved holding chamber (spacer) and DPI inhalers. Patient demonstrated understanding. Literature was given to patient.    Reviewed breathing techniques such as pursed-lip breathing, cobb-cough technique, and diaphragmatic breathing. Patient practiced proper technique and verbalized understanding. Literature given to patient.     Discussed therapeutic goals for positive airway pressure therapy(CPAP or BiPAP): Ideal is usage 100% of nights for 6 - 8 hours per night. Minimum usage is 70% of night for at least 4 hours per night used. Reviewed CPAP habituation plan with patient. Patient expressed understanding.      Discussed complications of untreated sleep apnea with patient, including but not limited to high blood pressure, heart attack, stroke, obesity, diabetes and worsening heart failure. Patient voiced understanding.      Asthma trigger checklist was verbally reviewed and literature given to patient.     Infection prevention was discussed. Patient was reminded to get RSV vaccine. Hand hygiene and cleaning of respiratory equipment was also discussed. Patient verbalized understanding.       COPD action plan was reviewed and literature was given to patient. Patient verbalized understanding.     Plan:  Monthly Pulmonary Disease Management Questionnaire  Follow-up PDM appointment scheduled for 8/20/24  Reinforce education  Meds: Spiriva, Xopenex   DME Needs: [facility]   Action Plan: COPD   Immunization: Pneumococcal- current, Flu-current, RSV-DUE   Next Provider Visit: 5/31/24  Next Spirometry/CPFT: Not scheduled   Approximate time spent with patient: 30 mins

## 2024-02-22 ENCOUNTER — PATIENT MESSAGE (OUTPATIENT)
Dept: ADMINISTRATIVE | Facility: OTHER | Age: 69
End: 2024-02-22
Payer: MEDICARE

## 2024-02-22 DIAGNOSIS — E11.65 TYPE 2 DIABETES MELLITUS WITH HYPERGLYCEMIA, WITHOUT LONG-TERM CURRENT USE OF INSULIN: ICD-10-CM

## 2024-02-22 RX ORDER — SITAGLIPTIN 100 MG/1
100 TABLET, FILM COATED ORAL
Qty: 90 TABLET | Refills: 0 | OUTPATIENT
Start: 2024-02-22

## 2024-02-22 NOTE — TELEPHONE ENCOUNTER
No care due was identified.  [facility] Embedded Care Due Messages. Reference number: [id number].   2/22/2024 10:42:13 AM CST

## 2024-02-22 NOTE — TELEPHONE ENCOUNTER
Refill Decision Note  Darwin DC. Inappropriate Request       Crystal Acuna  is requesting a refill authorization.  Brief Assessment and Rationale for Refill:  Quick Discontinue     Medication Therapy Plan:  JANUVIA 100 mg Tab was discontinued on 2/6/2024 by Eh Sow      Comments:     Note composed:10:46 AM 02/22/2024

## 2024-02-23 ENCOUNTER — PATIENT MESSAGE (OUTPATIENT)
Dept: FAMILY MEDICINE | Facility: CLINIC | Age: 69
End: 2024-02-23
Payer: MEDICARE

## 2024-02-23 DIAGNOSIS — E11.65 TYPE 2 DIABETES MELLITUS WITH HYPERGLYCEMIA, WITHOUT LONG-TERM CURRENT USE OF INSULIN: ICD-10-CM

## 2024-02-23 DIAGNOSIS — E11.65 TYPE 2 DIABETES MELLITUS WITH HYPERGLYCEMIA, WITHOUT LONG-TERM CURRENT USE OF INSULIN: Primary | ICD-10-CM

## 2024-02-23 RX ORDER — SITAGLIPTIN 100 MG/1
100 TABLET, FILM COATED ORAL
Qty: 90 TABLET | Refills: 0 | OUTPATIENT
Start: 2024-02-23

## 2024-02-23 RX ORDER — TIRZEPATIDE 5 MG/.5ML
5 INJECTION, SOLUTION SUBCUTANEOUS
Qty: 4 PEN | Refills: 1 | Status: SHIPPED | OUTPATIENT
Start: 2024-02-23 | End: 2024-05-21

## 2024-02-23 NOTE — TELEPHONE ENCOUNTER
No care due was identified.  [facility] Embedded Care Due Messages. Reference number: [id number].   2/23/2024 5:20:35 PM CST

## 2024-02-23 NOTE — TELEPHONE ENCOUNTER
I received your message which was reviewed along with the the medication list and allergies that we have below.  Please review it for accuracy to make sure that we have the most recent records on your history.     Based on this, the following orders were placed AND/OR medicines were sent in.     No orders of the defined types were placed in this encounter.      Medications written and sent at this time include:  Medications Ordered This Encounter   Medications    tirzepatide (MOUNJARO) 5 mg/0.5 mL PnIj     Sig: Inject 5 mg into the skin every 7 days.     Dispense:  4 pen      Refill:  1       Your pharmacy(ies) of choice at this time on record include the list below and any medications would have been sent to the one at the top.    [facility] [address] [address]  Phone: [phone] Fax: [phone]      Thank you for choosing us as your healthcare provider!  Dr. Eh Sow    ALLERGY LIST  Review of patient's allergies indicates:   Allergen Reactions    Metformin     Perflutren lipid microspheres Other (See Comments) and Shortness Of Breath     Back pain, neck pain, arm pain/cramping, and flush face.    Farxiga [dapagliflozin]      Yeast infection      Latex, natural rubber     Ozempic [semaglutide]      Stomach pain      Bactrim [sulfamethoxazole-trimethoprim] Rash       MEDICATION LIST  Current Outpatient Medications on File Prior to Visit   Medication Sig Dispense Refill    amiodarone (PACERONE) 200 MG Tab Take 200 mg by mouth.      atorvastatin (LIPITOR) 80 MG tablet Take 80 mg by mouth every evening.      blood sugar diagnostic Strp To check BG 2 times daily, to use with insurance preferred meter 200 each 5    blood-glucose meter kit To check BG 2 times daily, to use with insurance preferred meter 1 each 0    chlorhexidine (HIBICLENS) 4 % external liquid Apply topically daily as needed. 473 mL 0    clopidogreL (PLAVIX) 75 mg tablet Take 75 mg by  mouth.      ELIQUIS 5 mg Tab Take 5 mg by mouth 2 (two) times daily.      ENTRESTO 24-26 mg per tablet Take 1 tablet by mouth 2 (two) times daily.      famotidine (PEPCID) 40 MG tablet Take 1 tablet (40 mg total) by mouth once daily. 30 tablet 11    fluocinolone acetonide oiL (DERMOTIC OIL) 0.01 % Drop Place 3 drops in ear(s) 2 (two) times daily. 20 mL 5    fluorometholone 0.1% (FML) 0.1 % DrpS INSTILL 1 DROP INTO EACH EYE THREE TIMES DAILY AS DIRECTED      furosemide (LASIX) 20 MG tablet Take 20 mg by mouth daily as needed.      hydrocortisone 1 % cream Apply to affected area 2 times daily 30 g 0    lancets Misc To check BG 2 times daily, to use with insurance preferred meter 200 each 99    levalbuterol (XOPENEX HFA) 45 mcg/actuation inhaler INHALE ONE PUFF INTO THE LUNGS EVERY 4 HOURS AS NEEDED FOR WHEEZING 45 g 1    levothyroxine (SYNTHROID) 25 MCG tablet Take 1 tablet (25 mcg total) by mouth before breakfast. 30 tablet 11    metoprolol succinate (TOPROL-XL) 25 MG 24 hr tablet Take 25 mg by mouth.      mupirocin (BACTROBAN) 2 % ointment Apply topically 3 (three) times daily. 22 g 2    nitroGLYCERIN (NITROSTAT) 0.4 MG SL tablet Place 0.4 mg under the tongue as needed.      tiotropium bromide (SPIRIVA RESPIMAT) 2.5 mcg/actuation inhaler Inhale 2 puffs into the lungs once daily. Controller 4 g 11    [DISCONTINUED] tirzepatide (MOUNJARO) 2.5 mg/0.5 mL PnIj Inject 2.5 mg into the skin every 7 days. Starter dose.  Notify me if tolerating the medication at three weeks to increase to 5 milligrams weekly. 4 pen 0     No current facility-administered medications on file prior to visit.       HEALTH MAINTENANCE THAT IS OVERDUE OR NEEDS TO BE UPDATED ON OUR CHART IS LISTED BELOW.  IF YOU HAVE HAD IT DONE ELSEWHERE, PLEASE SEND US DATES AND RECORDS IF YOU HAVE THEM TO MAKE YOUR CHART ACCURATE.  IF YOU HAVE NOT HAD THESE DONE AND ARE READY FOR US TO SCHEDULE THEM, PLEASE SEND US A MESSAGE.  Health Maintenance Due   Topic Date  Due    Shingles Vaccine (1 of 2) Never done    RSV Vaccine (Age 60+ and Pregnant patients) (1 - 1-dose 60+ series) Never done    COVID-19 Vaccine (3 - 2023-24 season) 09/01/2023       DISCLAIMER: This note was compiled by using a speech recognition dictation system and therefore please be aware that typographical / speech recognition errors can and do occur.  Please contact me if you see any errors specifically.    Eh Sow MD  We Offer Telehealth & Same Day Appointments!   Book your Telehealth appointment with me through my nurse or   Clinic appointments on [employer]!  [phone]     Check out my Facebook Page and Follow Me at: CLICK HERE    Check out my website at [employer] by clicking on: CLICK HERE    To Schedule appointments online, go to [employer]: CLICK HERE     Location: [email or web address]    [id number] [place] [address]    FAX: [phone]

## 2024-02-24 NOTE — TELEPHONE ENCOUNTER
Refill Decision Note   Crystal Acuna  is requesting a refill authorization.  Brief Assessment and Rationale for Refill:  Quick Discontinue     Medication Therapy Plan:  The original prescription was discontinued on 2/6/2024 by Eh Sow MD.      Comments:     Note composed:7:08 PM 02/23/2024

## 2024-03-04 ENCOUNTER — PATIENT MESSAGE (OUTPATIENT)
Dept: SLEEP MEDICINE | Facility: CLINIC | Age: 69
End: 2024-03-04
Payer: MEDICARE

## 2024-03-04 ENCOUNTER — PATIENT MESSAGE (OUTPATIENT)
Dept: FAMILY MEDICINE | Facility: CLINIC | Age: 69
End: 2024-03-04
Payer: MEDICARE

## 2024-03-06 ENCOUNTER — TELEPHONE (OUTPATIENT)
Dept: PULMONOLOGY | Facility: CLINIC | Age: 69
End: 2024-03-06
Payer: MEDICARE

## 2024-03-06 DIAGNOSIS — R06.02 SOB (SHORTNESS OF BREATH): Primary | ICD-10-CM

## 2024-03-06 NOTE — TELEPHONE ENCOUNTER
Orders Placed This Encounter   Procedures    CPAP/BIPAP SUPPLIES     Order Specific Question:   Length of need (1-99 months):     Answer:   99     Order Specific Question:   Choose ONE mask type and its corresponding cushions and/or pillows:     Answer:    Full Face Mask, 1 per 90 days:  Full Face Cushion, (3 per 90 days)     Order Specific Question:   Choose EITHER Heated or Non-Heated Tubjing     Answer:    Non-Heated Tubing, 1 per 90 days     Order Specific Question:   All other supplies as needed as listed below:     Answer:    Headgear, 1 per 180 days     Order Specific Question:   All other supplies as needed as listed below:     Answer:    Disposable Filter, 6 per 90 days     Order Specific Question:   All other supplies as needed as listed below:     Answer:    Chin Strap, 1 per 180 days     Order Specific Question:   All other supplies as needed as listed below:     Answer:    Non-Disposable Filter, 1 per 180 days     Order Specific Question:   All other supplies as needed as listed below:     Answer:    Humidifier Chamber, 1 per 180 days

## 2024-03-13 ENCOUNTER — PATIENT MESSAGE (OUTPATIENT)
Dept: SLEEP MEDICINE | Facility: CLINIC | Age: 69
End: 2024-03-13
Payer: MEDICARE

## 2024-03-20 ENCOUNTER — OFFICE VISIT (OUTPATIENT)
Dept: PULMONOLOGY | Facility: CLINIC | Age: 69
End: 2024-03-20
Payer: MEDICARE

## 2024-03-20 VITALS — HEIGHT: 62 IN | WEIGHT: 199 LBS | BODY MASS INDEX: 36.62 KG/M2

## 2024-03-20 DIAGNOSIS — E78.5 HYPERLIPIDEMIA ASSOCIATED WITH TYPE 2 DIABETES MELLITUS: Chronic | ICD-10-CM

## 2024-03-20 DIAGNOSIS — E11.69 HYPERLIPIDEMIA ASSOCIATED WITH TYPE 2 DIABETES MELLITUS: Chronic | ICD-10-CM

## 2024-03-20 DIAGNOSIS — I48.0 PAF (PAROXYSMAL ATRIAL FIBRILLATION): ICD-10-CM

## 2024-03-20 DIAGNOSIS — E66.2 CLASS 2 OBESITY WITH ALVEOLAR HYPOVENTILATION, SERIOUS COMORBIDITY, AND BODY MASS INDEX (BMI) OF 38.0 TO 38.9 IN ADULT: ICD-10-CM

## 2024-03-20 DIAGNOSIS — E03.9 HYPOTHYROIDISM, UNSPECIFIED TYPE: Chronic | ICD-10-CM

## 2024-03-20 DIAGNOSIS — J44.9 COPD, GROUP A, BY GOLD 2017 CLASSIFICATION: Primary | ICD-10-CM

## 2024-03-20 DIAGNOSIS — E11.65 TYPE 2 DIABETES MELLITUS WITH HYPERGLYCEMIA, WITHOUT LONG-TERM CURRENT USE OF INSULIN: Chronic | ICD-10-CM

## 2024-03-20 DIAGNOSIS — I50.22 CHRONIC SYSTOLIC CONGESTIVE HEART FAILURE: ICD-10-CM

## 2024-03-20 DIAGNOSIS — I15.2 HYPERTENSION ASSOCIATED WITH DIABETES: Chronic | ICD-10-CM

## 2024-03-20 DIAGNOSIS — E11.59 HYPERTENSION ASSOCIATED WITH DIABETES: Chronic | ICD-10-CM

## 2024-03-20 DIAGNOSIS — G47.33 OSA (OBSTRUCTIVE SLEEP APNEA): ICD-10-CM

## 2024-03-20 DIAGNOSIS — R91.8 PULMONARY NODULES: ICD-10-CM

## 2024-03-20 PROBLEM — J42 CHRONIC BRONCHITIS: Status: RESOLVED | Noted: 2023-08-10 | Resolved: 2024-03-20

## 2024-03-20 PROCEDURE — 99213 OFFICE O/P EST LOW 20 MIN: CPT | Mod: 95,,, | Performed by: INTERNAL MEDICINE

## 2024-03-20 NOTE — ASSESSMENT & PLAN NOTE
[place]    Data 12/21/2023 to 03/19/2024  Usage > 4 hrs was  96%  APAP 6-14 cmwp  AHI was 0.9    USING and benefits

## 2024-03-20 NOTE — PROGRESS NOTES
The patient location is: [facility]  The chief complaint leading to consultation is:   Chief Complaint   Patient presents with    Sleep Apnea        Visit type: audiovisual    Face to Face time with patient: 6 mins  30 minutes of total time spent on the encounter, which includes face to face time and non-face to face time preparing to see the patient (eg, review of tests), Obtaining and/or reviewing separately obtained history, Documenting clinical information in the electronic or other health record, Independently interpreting results (not separately reported) and communicating results to the patient/family/caregiver, or Care coordination (not separately reported).         Each patient to whom he or she provides medical services by telemedicine is:  (1) informed of the relationship between the physician and patient and the respective role of any other health care provider with respect to management of the patient; and (2) notified that he or she may decline to receive medical services by telemedicine and may withdraw from such care at any time.    Notes:                                               Pulmonary Outpatient  Visit     Subjective:       Patient ID: Crystal Acuna is a 68 y.o. female.    Social History     Tobacco Use   Smoking Status Former    Current packs/day: 0.75    Average packs/day: 0.8 packs/day for 39.0 years (29.3 ttl pk-yrs)    Types: Cigarettes   Smokeless Tobacco Never   Tobacco Comments    quit 2 yrs ago            Chief Complaint: Sleep Apnea      Crystal Acuna is 67 y.o.  Referred by Eh Sow MD  Diagnosis KENYETTA based on HSAt  Has CPAP : > 1 year, [facility], DME [facility]  Download reviewed  Heavy smoker in past:  Prescribed Inhaler  Recent acute MI: 3 stent  Inhaler was added by cardiology: no prior spirometry  Last 2 view CXR 2021  No occupational exposure  On Amio and elliquis  UTD immunisation  No cough, No wheezing or hemoptysis  Quit smoking 7 years  Last Echo Ejection  Fraction = 30-35%  Currently on APAP 4-20  AHI 1.0  Using and benefits      11/30/2023  Followup  Here with daughter  Stable COPD  Reveiwed LDCT  Nodules 6.5 mm and 6 mm  Stable on Spiriva        03/20/2024  Followup  Doing well  Unable to get supplies  DME: [facility]  Bed time: 11:30 pm  Wake time: 7 am  Device used and benefits        COPD Questionnaire  How often do you cough?: Some of the time  How often do you have phlegm (mucus) in your chest?: Some of the time  How often does your chest feel tight?: Never  When you walk up a hill or one flight of stairs, how often are you breathless?: All of the time  How often are you limited doing any activities at home?: Some of the time  How often are you confident leaving the house despite your lung condition?: A little of the time  How often do you sleep soundly?: Most of the time  How often do you have energy?: Most of the time  Total score: 19             3/20/2024     2:36 PM   EPWORTH SLEEPINESS SCALE   Sitting and reading 3   Watching TV 3   Sitting, inactive in a public place (e.g. a theatre or a meeting) 3   As a passenger in a car for an hour without a break 3   Lying down to rest in the afternoon when circumstances permit 3   Sitting and talking to someone 0   Sitting quietly after a lunch without alcohol 3   In a car, while stopped for a few minutes in traffic 0   Total score 18               Review of Systems   Constitutional:  Positive for fatigue.   Respiratory:  Negative for apnea, snoring, cough, sputum production, choking, chest tightness, wheezing, dyspnea on extertion and use of rescue inhaler.    Psychiatric/Behavioral:  Negative for sleep disturbance.    All other systems reviewed and are negative.      Outpatient Encounter Medications as of 3/20/2024   Medication Sig Dispense Refill    amiodarone (PACERONE) 200 MG Tab Take 200 mg by mouth.      atorvastatin (LIPITOR) 80 MG tablet Take 80 mg by mouth every evening.      blood sugar diagnostic Strp To  check BG 2 times daily, to use with insurance preferred meter 200 each 5    chlorhexidine (HIBICLENS) 4 % external liquid Apply topically daily as needed. 473 mL 0    clopidogreL (PLAVIX) 75 mg tablet Take 75 mg by mouth.      ELIQUIS 5 mg Tab Take 5 mg by mouth 2 (two) times daily.      ENTRESTO 24-26 mg per tablet Take 1 tablet by mouth 2 (two) times daily.      famotidine (PEPCID) 40 MG tablet Take 1 tablet (40 mg total) by mouth once daily. 30 tablet 11    fluocinolone acetonide oiL (DERMOTIC OIL) 0.01 % Drop Place 3 drops in ear(s) 2 (two) times daily. 20 mL 5    fluorometholone 0.1% (FML) 0.1 % DrpS INSTILL 1 DROP INTO EACH EYE THREE TIMES DAILY AS DIRECTED      furosemide (LASIX) 20 MG tablet Take 20 mg by mouth daily as needed.      hydrocortisone 1 % cream Apply to affected area 2 times daily 30 g 0    lancets Misc To check BG 2 times daily, to use with insurance preferred meter 200 each 99    levalbuterol (XOPENEX HFA) 45 mcg/actuation inhaler INHALE ONE PUFF INTO THE LUNGS EVERY 4 HOURS AS NEEDED FOR WHEEZING 45 g 1    levothyroxine (SYNTHROID) 25 MCG tablet Take 1 tablet (25 mcg total) by mouth before breakfast. 30 tablet 11    metoprolol succinate (TOPROL-XL) 25 MG 24 hr tablet Take 25 mg by mouth.      mupirocin (BACTROBAN) 2 % ointment Apply topically 3 (three) times daily. 22 g 2    nitroGLYCERIN (NITROSTAT) 0.4 MG SL tablet Place 0.4 mg under the tongue as needed.      tiotropium bromide (SPIRIVA RESPIMAT) 2.5 mcg/actuation inhaler Inhale 2 puffs into the lungs once daily. Controller 4 g 11    tirzepatide (MOUNJARO) 5 mg/0.5 mL PnIj Inject 5 mg into the skin every 7 days. 4 pen 1    blood-glucose meter kit To check BG 2 times daily, to use with insurance preferred meter 1 each 0     No facility-administered encounter medications on file as of 3/20/2024.       The following portions of the patient's history were reviewed and updated as appropriate: She  has a past medical history of Allergy,  Bronchitis, Family history of colonic polyps (2021), Hallux abductovalgus, Herniated lumbar intervertebral disc, Hyperlipidemia, Hypertension, Smoker, and Staph aureus infection.  She does not have any pertinent problems on file.  She  has a past surgical history that includes Cholecystectomy (2010);  section; Cyst Removal; Back surgery; Endometrial ablation (2009); Carpal tunnel release; Foot fracture surgery; Colonoscopy (N/A, 2021); Eye surgery; Spine surgery; Tonsillectomy; Joint replacement; and Breast biopsy.  Her family history includes Breast cancer in her maternal aunt; COPD in her mother; Heart disease in her father and mother.  She  reports that she has quit smoking. Her smoking use included cigarettes. She has a 29.3 pack-year smoking history. She has never used smokeless tobacco. She reports that she does not drink alcohol and does not use drugs.  She has a current medication list which includes the following prescription(s): amiodarone, atorvastatin, blood sugar diagnostic, chlorhexidine, clopidogrel, eliquis, entresto, famotidine, fluocinolone acetonide oil, fluorometholone 0.1%, furosemide, hydrocortisone, lancets, levalbuterol, levothyroxine, metoprolol succinate, mupirocin, nitroglycerin, tiotropium bromide, mounjaro, and blood-glucose meter.  Current Outpatient Medications on File Prior to Visit   Medication Sig Dispense Refill    amiodarone (PACERONE) 200 MG Tab Take 200 mg by mouth.      atorvastatin (LIPITOR) 80 MG tablet Take 80 mg by mouth every evening.      blood sugar diagnostic Strp To check BG 2 times daily, to use with insurance preferred meter 200 each 5    chlorhexidine (HIBICLENS) 4 % external liquid Apply topically daily as needed. 473 mL 0    clopidogreL (PLAVIX) 75 mg tablet Take 75 mg by mouth.      ELIQUIS 5 mg Tab Take 5 mg by mouth 2 (two) times daily.      ENTRESTO 24-26 mg per tablet Take 1 tablet by mouth 2 (two) times daily.      famotidine  (PEPCID) 40 MG tablet Take 1 tablet (40 mg total) by mouth once daily. 30 tablet 11    fluocinolone acetonide oiL (DERMOTIC OIL) 0.01 % Drop Place 3 drops in ear(s) 2 (two) times daily. 20 mL 5    fluorometholone 0.1% (FML) 0.1 % DrpS INSTILL 1 DROP INTO EACH EYE THREE TIMES DAILY AS DIRECTED      furosemide (LASIX) 20 MG tablet Take 20 mg by mouth daily as needed.      hydrocortisone 1 % cream Apply to affected area 2 times daily 30 g 0    lancets Misc To check BG 2 times daily, to use with insurance preferred meter 200 each 99    levalbuterol (XOPENEX HFA) 45 mcg/actuation inhaler INHALE ONE PUFF INTO THE LUNGS EVERY 4 HOURS AS NEEDED FOR WHEEZING 45 g 1    levothyroxine (SYNTHROID) 25 MCG tablet Take 1 tablet (25 mcg total) by mouth before breakfast. 30 tablet 11    metoprolol succinate (TOPROL-XL) 25 MG 24 hr tablet Take 25 mg by mouth.      mupirocin (BACTROBAN) 2 % ointment Apply topically 3 (three) times daily. 22 g 2    nitroGLYCERIN (NITROSTAT) 0.4 MG SL tablet Place 0.4 mg under the tongue as needed.      tiotropium bromide (SPIRIVA RESPIMAT) 2.5 mcg/actuation inhaler Inhale 2 puffs into the lungs once daily. Controller 4 g 11    tirzepatide (MOUNJARO) 5 mg/0.5 mL PnIj Inject 5 mg into the skin every 7 days. 4 pen 1    blood-glucose meter kit To check BG 2 times daily, to use with insurance preferred meter 1 each 0     No current facility-administered medications on file prior to visit.     She is allergic to metformin; perflutren lipid microspheres; farxiga [dapagliflozin]; latex, natural rubber; ozempic [semaglutide]; and bactrim [sulfamethoxazole-trimethoprim]..      BP Readings from Last 3 Encounters:   02/06/24 112/72   11/30/23 126/74   08/10/23 98/66     Snoring      MMRC Dyspnea Scale (4 is worst)     [] MMRC 0: Dyspneic on strenuous excercise (0 points)    [x] MMRC 1: Dyspneic on walking a slight hill (0 points)    [] MMRC 2: Dyspneic on walking level ground; must stop occasionally due to  "breathlessness (1 point)    [] MMRC 3: Must stop for breathlessness after walking 100 yards or after a few minutes (2 points)    [] MMRC 4: Cannot leave house; breathless on dressing/undressing (3 points)              COPD Questionnaire  How often do you cough?: Some of the time  How often do you have phlegm (mucus) in your chest?: Some of the time  How often does your chest feel tight?: Never  When you walk up a hill or one flight of stairs, how often are you breathless?: All of the time  How often are you limited doing any activities at home?: Some of the time  How often are you confident leaving the house despite your lung condition?: A little of the time  How often do you sleep soundly?: Most of the time  How often do you have energy?: Most of the time  Total score: 19              Objective:     Vital Signs (Most Recent)  Height and Weight  Height: 5' 2" (157.5 cm)  Weight: 90.3 kg (199 lb)  BSA (Calculated - sq m): 1.99 sq meters  BMI (Calculated): 36.4  Weight in (lb) to have BMI = 25: 136.4]  Wt Readings from Last 2 Encounters:   03/20/24 90.3 kg (199 lb)   02/20/24 94.8 kg (209 lb)       Physical Exam  Vitals and nursing note reviewed.   Constitutional:       Appearance: She is normal weight.   HENT:      Head: Normocephalic and atraumatic.      Nose: Nose normal.   Eyes:      Pupils: Pupils are equal, round, and reactive to light.   Cardiovascular:      Rate and Rhythm: Normal rate and regular rhythm.      Pulses: Normal pulses.      Heart sounds: Normal heart sounds.   Pulmonary:      Effort: Pulmonary effort is normal.      Breath sounds: Normal breath sounds.   Abdominal:      General: Bowel sounds are normal.      Palpations: Abdomen is soft.   Musculoskeletal:      Cervical back: Normal range of motion.   Skin:     General: Skin is warm.   Neurological:      General: No focal deficit present.      Mental Status: She is alert and oriented to person, place, and time.   Psychiatric:         Mood and " "Affect: Mood normal.          Laboratory  Lab Results   Component Value Date    WBC 5.25 2023    RBC 4.98 2023    HGB 14.0 2023    HCT 45.4 2023    MCV 91 2023    MCH 28.1 2023    MCHC 30.8 (L) 2023    RDW 16.7 (H) 2023     2023    MPV 11.8 2023    GRAN 3.6 2022    GRAN 52.3 2022    LYMPH 2.5 2022    LYMPH 36.8 2022    MONO 0.5 2022    MONO 7.5 2022    EOS 0.2 2022    BASO 0.05 2022    EOSINOPHIL 2.3 2022    BASOPHIL 0.7 2022       BMP  Lab Results   Component Value Date     2024    K 4.5 2024     2024    CO2 26 2024    BUN 18 2024    CREATININE 0.9 2024    CALCIUM 9.6 2024    ANIONGAP 8 2024    ESTGFRAFRICA >60.0 2022    EGFRNONAA >60.0 2022    AST 18 2024    ALT 22 2024    PROT 6.8 2024          No results found for: "IGE"     No results found for: "ASPERGILLUS"  No results found for: "AFUMIGATUSCL"     No results found for: "ACE"     Diagnostic Results:  I have personally reviewed today the following studies:        Crystal Acuna  2023 - 2024  Patient ID: [id number]  : 1955  Age: 68 years  Gender: Female  [facility]  [address] [place]  [place], [address]  Compliance Report  Compliance  Payor Standard  Usage 2023 - 2024  Usage days 90/90 days (100%)  >= 4 hours 86 days (96%)  < 4 hours 4 days (4%)  Usage hours 645 hours 23 minutes  Average usage (total days) 7 hours 10 minutes  Average usage (days used) 7 hours 10 minutes  Median usage (days used) 7 hours 29 minutes  Total used hours (value since last reset - 2024) 5,041 hours  AirSense 11 AutoSet  Serial number [id number]  Mode AutoSet  Min Pressure 6 cmH2O  Max Pressure 14 cmH2O  EPR Fulltime  EPR level 3  Response Standard  Therapy  Pressure - cmH2O Median: 7.6 95th percentile: 9.9 " Maximum: 10.9  Leaks - L/min Median: 3.8 95th percentile: 11.7 Maximum: 16.7  Events per hour AI: 0.9 HI: 0.0 AHI: 0.9  Apnea Index Central: 0.0 Obstructive: 0.8 Unknown: 0.0  RERA Index 0.0     Assessment/Plan:     Problem List Items Addressed This Visit       Hypertension associated with diabetes (Chronic)    Hyperlipidemia associated with type 2 diabetes mellitus (Chronic)    Type 2 diabetes mellitus with hyperglycemia, without long-term current use of insulin (Chronic)    Hypothyroidism (Chronic)    Chronic systolic congestive heart failure    PAF (paroxysmal atrial fibrillation)    COPD, group A, by GOLD 2017 classification - Primary    Pulmonary nodules    Class 2 obesity with alveolar hypoventilation, serious comorbidity, and body mass index (BMI) of 38.0 to 38.9 in adult    KENYETTA (obstructive sleep apnea)     [facility]    Data 12/21/2023 to 03/19/2024  Usage > 4 hrs was  96%  APAP 6-14 cmwp  AHI was 0.9    USING and benefits               Nodules 6  mm: f/u imaging 6 months: may 2024  Spiriva helps  Using CPAP and benefits     Follow up if symptoms worsen or fail to improve, for has appt in May 2024 with chest CT, CPAP supplies from [facility].    This note was prepared using voice recognition system and is likely to have sound alike errors that may have been overlooked even after proof reading.  Please call me with any questions    Discussed diagnosis, its evaluation, treatment and usual course. All questions answered.    Thank you for the courtesy of participating in the care of this patient    Oliver Mederos MD      Personal Diagnostic Review  []  CXR    []  ECHO    []  ONSAT    []  6MWD    []  LABS    []  CHEST CT    []  PET CT    []  Biopsy results

## 2024-03-24 ENCOUNTER — PATIENT MESSAGE (OUTPATIENT)
Dept: ADMINISTRATIVE | Facility: OTHER | Age: 69
End: 2024-03-24
Payer: MEDICARE

## 2024-03-26 ENCOUNTER — PATIENT MESSAGE (OUTPATIENT)
Dept: PULMONOLOGY | Facility: CLINIC | Age: 69
End: 2024-03-26
Payer: MEDICARE

## 2024-04-02 ENCOUNTER — TELEPHONE (OUTPATIENT)
Dept: PULMONOLOGY | Facility: CLINIC | Age: 69
End: 2024-04-02
Payer: MEDICARE

## 2024-04-02 NOTE — TELEPHONE ENCOUNTER
----- Message from Dee Miranda sent at 4/2/2024  1:58 PM CDT -----  Regarding: Medical Records  Contact: Crystal    Type:  Needs Medical Advice    Who Called: crystal   Symptoms (please be specific):    How long has patient had these symptoms:    Pharmacy name and phone #:    Would the patient rather a call back or a response via [facility]? call  Best Call Back Number:  [phone] with reference number: [id number]   Additional Information:  Crystal from [facility] is requesting office notes. Fax  [phone]

## 2024-05-13 PROBLEM — N39.0 E-COLI UTI: Status: RESOLVED | Noted: 2024-02-06 | Resolved: 2024-05-13

## 2024-05-13 PROBLEM — B96.20 E-COLI UTI: Status: RESOLVED | Noted: 2024-02-06 | Resolved: 2024-05-13

## 2024-05-18 ENCOUNTER — PATIENT MESSAGE (OUTPATIENT)
Dept: FAMILY MEDICINE | Facility: CLINIC | Age: 69
End: 2024-05-18
Payer: MEDICARE

## 2024-05-21 DIAGNOSIS — E11.65 TYPE 2 DIABETES MELLITUS WITH HYPERGLYCEMIA, WITHOUT LONG-TERM CURRENT USE OF INSULIN: ICD-10-CM

## 2024-05-21 RX ORDER — TIRZEPATIDE 5 MG/.5ML
INJECTION, SOLUTION SUBCUTANEOUS
Qty: 6 ML | Refills: 0 | Status: SHIPPED | OUTPATIENT
Start: 2024-05-21 | End: 2024-06-14

## 2024-05-21 NOTE — TELEPHONE ENCOUNTER
Care Due:                  Date            Visit Type   Department     Provider  --------------------------------------------------------------------------------                                MYCHART                              ANNUAL                              CHECKUP/PHY  [facility]  Last Visit: 02-      S            ARABELLA Sow  Next Visit: None Scheduled  None         None Found                                                            Last  Test          Frequency    Reason                     Performed    Due Date  --------------------------------------------------------------------------------    HBA1C.......  6 months...  tirzepatide..............  02- 08-    Health [facility] Embedded Care Due Messages. Reference number: [id number].   5/21/2024 8:01:52 AM CDT

## 2024-05-21 NOTE — TELEPHONE ENCOUNTER
Refill Routing Note   Medication(s) are not appropriate for processing by [facility] for the following reason(s):        New or recently adjusted medication    [facility] action(s):  Defer        Medication Therapy Plan: New start (2/23/24)      Appointments  past 12m or future 3m with PCP    Date Provider   Last Visit   2/6/2024 Eh Sow MD   Next Visit   Visit date not found Eh Sow MD   ED visits in past 90 days: 0        Note composed:4:41 PM 05/21/2024

## 2024-05-22 ENCOUNTER — PATIENT MESSAGE (OUTPATIENT)
Dept: ADMINISTRATIVE | Facility: OTHER | Age: 69
End: 2024-05-22
Payer: MEDICARE

## 2024-05-30 ENCOUNTER — HOSPITAL ENCOUNTER (OUTPATIENT)
Dept: RADIOLOGY | Facility: HOSPITAL | Age: 69
Discharge: HOME | End: 2024-05-30
Attending: INTERNAL MEDICINE
Payer: MEDICARE

## 2024-05-30 DIAGNOSIS — R91.8 PULMONARY NODULES: ICD-10-CM

## 2024-05-30 PROCEDURE — 71250 CT THORAX DX C-: CPT | Mod: 26,,, | Performed by: RADIOLOGY

## 2024-05-30 PROCEDURE — 71250 CT THORAX DX C-: CPT | Mod: TC,PO

## 2024-05-31 ENCOUNTER — OFFICE VISIT (OUTPATIENT)
Dept: PULMONOLOGY | Facility: CLINIC | Age: 69
End: 2024-05-31
Payer: MEDICARE

## 2024-05-31 ENCOUNTER — PATIENT MESSAGE (OUTPATIENT)
Dept: PULMONOLOGY | Facility: CLINIC | Age: 69
End: 2024-05-31

## 2024-05-31 VITALS — BODY MASS INDEX: 36.4 KG/M2 | HEIGHT: 62 IN

## 2024-05-31 DIAGNOSIS — E78.5 HYPERLIPIDEMIA ASSOCIATED WITH TYPE 2 DIABETES MELLITUS: Chronic | ICD-10-CM

## 2024-05-31 DIAGNOSIS — E11.59 HYPERTENSION ASSOCIATED WITH DIABETES: Chronic | ICD-10-CM

## 2024-05-31 DIAGNOSIS — E03.9 HYPOTHYROIDISM, UNSPECIFIED TYPE: Chronic | ICD-10-CM

## 2024-05-31 DIAGNOSIS — I48.0 PAF (PAROXYSMAL ATRIAL FIBRILLATION): ICD-10-CM

## 2024-05-31 DIAGNOSIS — I50.22 CHRONIC SYSTOLIC CONGESTIVE HEART FAILURE: ICD-10-CM

## 2024-05-31 DIAGNOSIS — I50.20 HFREF (HEART FAILURE WITH REDUCED EJECTION FRACTION): ICD-10-CM

## 2024-05-31 DIAGNOSIS — R91.8 PULMONARY NODULES: ICD-10-CM

## 2024-05-31 DIAGNOSIS — G47.33 OSA (OBSTRUCTIVE SLEEP APNEA): ICD-10-CM

## 2024-05-31 DIAGNOSIS — E11.69 HYPERLIPIDEMIA ASSOCIATED WITH TYPE 2 DIABETES MELLITUS: Chronic | ICD-10-CM

## 2024-05-31 DIAGNOSIS — J44.9 COPD, GROUP A, BY GOLD 2017 CLASSIFICATION: ICD-10-CM

## 2024-05-31 DIAGNOSIS — E11.65 TYPE 2 DIABETES MELLITUS WITH HYPERGLYCEMIA, WITHOUT LONG-TERM CURRENT USE OF INSULIN: Chronic | ICD-10-CM

## 2024-05-31 DIAGNOSIS — E66.2 CLASS 2 OBESITY WITH ALVEOLAR HYPOVENTILATION, SERIOUS COMORBIDITY, AND BODY MASS INDEX (BMI) OF 38.0 TO 38.9 IN ADULT: ICD-10-CM

## 2024-05-31 DIAGNOSIS — R91.8 GROUND GLASS OPACITY PRESENT ON IMAGING OF LUNG: Primary | ICD-10-CM

## 2024-05-31 DIAGNOSIS — I15.2 HYPERTENSION ASSOCIATED WITH DIABETES: Chronic | ICD-10-CM

## 2024-05-31 PROCEDURE — 1160F RVW MEDS BY RX/DR IN RCRD: CPT | Mod: CPTII,95,, | Performed by: INTERNAL MEDICINE

## 2024-05-31 PROCEDURE — 1101F PT FALLS ASSESS-DOCD LE1/YR: CPT | Mod: CPTII,95,, | Performed by: INTERNAL MEDICINE

## 2024-05-31 PROCEDURE — 3008F BODY MASS INDEX DOCD: CPT | Mod: CPTII,95,, | Performed by: INTERNAL MEDICINE

## 2024-05-31 PROCEDURE — 1159F MED LIST DOCD IN RCRD: CPT | Mod: CPTII,95,, | Performed by: INTERNAL MEDICINE

## 2024-05-31 PROCEDURE — 99214 OFFICE O/P EST MOD 30 MIN: CPT | Mod: 95,,, | Performed by: INTERNAL MEDICINE

## 2024-05-31 PROCEDURE — 4010F ACE/ARB THERAPY RXD/TAKEN: CPT | Mod: CPTII,95,, | Performed by: INTERNAL MEDICINE

## 2024-05-31 PROCEDURE — 3288F FALL RISK ASSESSMENT DOCD: CPT | Mod: CPTII,95,, | Performed by: INTERNAL MEDICINE

## 2024-05-31 PROCEDURE — 3052F HG A1C>EQUAL 8.0%<EQUAL 9.0%: CPT | Mod: CPTII,95,, | Performed by: INTERNAL MEDICINE

## 2024-05-31 RX ORDER — DOXYCYCLINE 100 MG/1
100 CAPSULE ORAL EVERY 12 HOURS
Qty: 20 CAPSULE | Refills: 1 | Status: SHIPPED | OUTPATIENT
Start: 2024-05-31

## 2024-05-31 RX ORDER — PREDNISONE 10 MG/1
TABLET ORAL
Qty: 49 TABLET | Refills: 0 | Status: SHIPPED | OUTPATIENT
Start: 2024-05-31 | End: 2024-07-12

## 2024-05-31 RX ORDER — PREDNISONE 10 MG/1
TABLET ORAL
Qty: 10 TABLET | Refills: 0 | Status: SHIPPED | OUTPATIENT
Start: 2024-05-31 | End: 2024-05-31 | Stop reason: SDUPTHER

## 2024-05-31 NOTE — PROGRESS NOTES
The patient location is: [facility]  The chief complaint leading to consultation is:   Chief Complaint   Patient presents with    COPD    Review CT results.        Visit type: audiovisual    Face to Face time with patient: 12 mins  30 minutes of total time spent on the encounter, which includes face to face time and non-face to face time preparing to see the patient (eg, review of tests), Obtaining and/or reviewing separately obtained history, Documenting clinical information in the electronic or other health record, Independently interpreting results (not separately reported) and communicating results to the patient/family/caregiver, or Care coordination (not separately reported).         Each patient to whom he or she provides medical services by telemedicine is:  (1) informed of the relationship between the physician and patient and the respective role of any other health care provider with respect to management of the patient; and (2) notified that he or she may decline to receive medical services by telemedicine and may withdraw from such care at any time.    Notes:                                               Pulmonary Outpatient  Visit     Subjective:       Patient ID: Crystal Acuna is a 68 y.o. female.    Social History     Tobacco Use   Smoking Status Former    Current packs/day: 0.75    Average packs/day: 0.8 packs/day for 39.0 years (29.3 ttl pk-yrs)    Types: Cigarettes   Smokeless Tobacco Never   Tobacco Comments    quit 2 yrs ago            Chief Complaint: COPD and Review CT results.      Crystal Acuna is 67 y.o.  Referred by Eh Sow MD  Diagnosis KENYETTA based on HSAt  Has CPAP : > 1 year, [facility], DME [facility]  Download reviewed  Heavy smoker in past:  Prescribed Inhaler  Recent acute MI: 3 stent  Inhaler was added by cardiology: no prior spirometry  Last 2 view CXR 2021  No occupational exposure  On Amio and elliquis  UTD immunisation  No cough, No wheezing or hemoptysis  Quit  smoking 7 years  Last Echo Ejection Fraction = 30-35%  Currently on APAP 4-20  AHI 1.0  Using and benefits      11/30/2023  Followup  Here with daughter  Stable COPD  Reveiwed LDCT  Nodules 6.5 mm and 6 mm  Stable on Spiriva        03/20/2024  Followup  Doing well  Unable to get supplies  DME: [facility]  Bed time: 11:30 pm  Wake time: 7 am  Device used and benefits      05/31/2024   Follow-up visit   Son-in-law on the visit   Review of CT scan   The scan was ordered based on study November that showed small nodules   No ground-glass opacities were seen in that study  No cough no wheezing no shortness a breath   Chest CT scan reviewed   Areas of ground-glass opacity   Former smoker   Inflammatory labs have been requested   Patient has a history of atrial fibrillation she is on amiodarone 200 mg   We will check a meter and labeled   We will check lung volumes and DLCO   Steroid taper and antibiotics           COPD Questionnaire  How often do you cough?: A little of the time  How often do you have phlegm (mucus) in your chest?: A little of the time  How often does your chest feel tight?: Never  When you walk up a hill or one flight of stairs, how often are you breathless?: Some of the time  How often are you limited doing any activities at home?: All of the time  How often are you confident leaving the house despite your lung condition?: All of the time  How often do you sleep soundly?: Most of the time  How often do you have energy?: Some of the time  Total score: 15                3/20/2024     2:36 PM   EPWORTH SLEEPINESS SCALE   Sitting and reading 3   Watching TV 3   Sitting, inactive in a public place (e.g. a theatre or a meeting) 3   As a passenger in a car for an hour without a break 3   Lying down to rest in the afternoon when circumstances permit 3   Sitting and talking to someone 0   Sitting quietly after a lunch without alcohol 3   In a car, while stopped for a few minutes in traffic 0   Total score 18             Review of Systems   Constitutional:  Positive for fatigue.   Respiratory:  Negative for apnea, snoring, cough, sputum production, choking, chest tightness, wheezing, dyspnea on extertion and use of rescue inhaler.    Psychiatric/Behavioral:  Negative for sleep disturbance.    All other systems reviewed and are negative.      Outpatient Encounter Medications as of 5/31/2024   Medication Sig Dispense Refill    amiodarone (PACERONE) 200 MG Tab Take 200 mg by mouth.      atorvastatin (LIPITOR) 80 MG tablet Take 80 mg by mouth every evening.      blood sugar diagnostic Strp To check BG 2 times daily, to use with insurance preferred meter 200 each 5    chlorhexidine (HIBICLENS) 4 % external liquid Apply topically daily as needed. 473 mL 0    clopidogreL (PLAVIX) 75 mg tablet Take 75 mg by mouth.      ELIQUIS 5 mg Tab Take 5 mg by mouth 2 (two) times daily.      ENTRESTO 24-26 mg per tablet Take 1 tablet by mouth 2 (two) times daily.      famotidine (PEPCID) 40 MG tablet Take 1 tablet (40 mg total) by mouth once daily. 30 tablet 11    fluocinolone acetonide oiL (DERMOTIC OIL) 0.01 % Drop Place 3 drops in ear(s) 2 (two) times daily. 20 mL 5    fluorometholone 0.1% (FML) 0.1 % DrpS INSTILL 1 DROP INTO EACH EYE THREE TIMES DAILY AS DIRECTED      furosemide (LASIX) 20 MG tablet Take 20 mg by mouth daily as needed.      hydrocortisone 1 % cream Apply to affected area 2 times daily 30 g 0    lancets Misc To check BG 2 times daily, to use with insurance preferred meter 200 each 99    levalbuterol (XOPENEX HFA) 45 mcg/actuation inhaler INHALE ONE PUFF INTO THE LUNGS EVERY 4 HOURS AS NEEDED FOR WHEEZING 45 g 1    levothyroxine (SYNTHROID) 25 MCG tablet Take 1 tablet (25 mcg total) by mouth before breakfast. 30 tablet 11    metoprolol succinate (TOPROL-XL) 25 MG 24 hr tablet Take 25 mg by mouth.      MOUNJARO 5 mg/0.5 mL PnIj INJECT FIVE MG into the skin EVERY 7 DAYS 6 mL 0    mupirocin (BACTROBAN) 2 % ointment Apply topically 3  (three) times daily. 22 g 2    nitroGLYCERIN (NITROSTAT) 0.4 MG SL tablet Place 0.4 mg under the tongue as needed.      tiotropium bromide (SPIRIVA RESPIMAT) 2.5 mcg/actuation inhaler Inhale 2 puffs into the lungs once daily. Controller 4 g 11    blood-glucose meter kit To check BG 2 times daily, to use with insurance preferred meter 1 each 0    doxycycline (MONODOX) 100 MG capsule Take 1 capsule (100 mg total) by mouth every 12 (twelve) hours. 20 capsule 1    predniSONE (DELTASONE) 10 MG tablet Take 2 tablets (20 mg total) by mouth once daily for 14 days, THEN 1 tablet (10 mg total) once daily for 14 days, THEN 0.5 tablets (5 mg total) once daily for 14 days. 49 tablet 0    [DISCONTINUED] predniSONE (DELTASONE) 10 MG tablet Take 2 tablets (20 mg total) by mouth once daily for 14 days, THEN 1 tablet (10 mg total) once daily for 14 days, THEN 0.5 tablets (5 mg total) once daily for 14 days. 10 tablet 0    [DISCONTINUED] tirzepatide (MOUNJARO) 5 mg/0.5 mL PnIj Inject 5 mg into the skin every 7 days. 4 pen 1     No facility-administered encounter medications on file as of 2024.       The following portions of the patient's history were reviewed and updated as appropriate: She  has a past medical history of Allergy, Bronchitis, Family history of colonic polyps (2021), Hallux abductovalgus, Herniated lumbar intervertebral disc, Hyperlipidemia, Hypertension, Smoker, and Staph aureus infection.  She does not have any pertinent problems on file.  She  has a past surgical history that includes Cholecystectomy (2010);  section; Cyst Removal; Back surgery; Endometrial ablation (2009); Carpal tunnel release; Foot fracture surgery; Colonoscopy (N/A, 2021); Eye surgery; Spine surgery; Tonsillectomy; Joint replacement; and Breast biopsy.  Her family history includes Breast cancer in her maternal aunt; COPD in her mother; Heart disease in her father and mother.  She  reports that she has quit  smoking. Her smoking use included cigarettes. She has a 29.3 pack-year smoking history. She has never used smokeless tobacco. She reports that she does not drink alcohol and does not use drugs.  She has a current medication list which includes the following prescription(s): amiodarone, atorvastatin, blood sugar diagnostic, chlorhexidine, clopidogrel, eliquis, entresto, famotidine, fluocinolone acetonide oil, fluorometholone 0.1%, furosemide, hydrocortisone, lancets, levalbuterol, levothyroxine, metoprolol succinate, mounjaro, mupirocin, nitroglycerin, tiotropium bromide, blood-glucose meter, doxycycline, and prednisone.  Current Outpatient Medications on File Prior to Visit   Medication Sig Dispense Refill    amiodarone (PACERONE) 200 MG Tab Take 200 mg by mouth.      atorvastatin (LIPITOR) 80 MG tablet Take 80 mg by mouth every evening.      blood sugar diagnostic Strp To check BG 2 times daily, to use with insurance preferred meter 200 each 5    chlorhexidine (HIBICLENS) 4 % external liquid Apply topically daily as needed. 473 mL 0    clopidogreL (PLAVIX) 75 mg tablet Take 75 mg by mouth.      ELIQUIS 5 mg Tab Take 5 mg by mouth 2 (two) times daily.      ENTRESTO 24-26 mg per tablet Take 1 tablet by mouth 2 (two) times daily.      famotidine (PEPCID) 40 MG tablet Take 1 tablet (40 mg total) by mouth once daily. 30 tablet 11    fluocinolone acetonide oiL (DERMOTIC OIL) 0.01 % Drop Place 3 drops in ear(s) 2 (two) times daily. 20 mL 5    fluorometholone 0.1% (FML) 0.1 % DrpS INSTILL 1 DROP INTO EACH EYE THREE TIMES DAILY AS DIRECTED      furosemide (LASIX) 20 MG tablet Take 20 mg by mouth daily as needed.      hydrocortisone 1 % cream Apply to affected area 2 times daily 30 g 0    lancets Misc To check BG 2 times daily, to use with insurance preferred meter 200 each 99    levalbuterol (XOPENEX HFA) 45 mcg/actuation inhaler INHALE ONE PUFF INTO THE LUNGS EVERY 4 HOURS AS NEEDED FOR WHEEZING 45 g 1    levothyroxine  (SYNTHROID) 25 MCG tablet Take 1 tablet (25 mcg total) by mouth before breakfast. 30 tablet 11    metoprolol succinate (TOPROL-XL) 25 MG 24 hr tablet Take 25 mg by mouth.      MOUNJARO 5 mg/0.5 mL PnIj INJECT FIVE MG into the skin EVERY 7 DAYS 6 mL 0    mupirocin (BACTROBAN) 2 % ointment Apply topically 3 (three) times daily. 22 g 2    nitroGLYCERIN (NITROSTAT) 0.4 MG SL tablet Place 0.4 mg under the tongue as needed.      tiotropium bromide (SPIRIVA RESPIMAT) 2.5 mcg/actuation inhaler Inhale 2 puffs into the lungs once daily. Controller 4 g 11    blood-glucose meter kit To check BG 2 times daily, to use with insurance preferred meter 1 each 0     No current facility-administered medications on file prior to visit.     She is allergic to metformin; perflutren lipid microspheres; farxiga [dapagliflozin]; latex, natural rubber; ozempic [semaglutide]; and bactrim [sulfamethoxazole-trimethoprim]..      BP Readings from Last 3 Encounters:   02/06/24 112/72   11/30/23 126/74   08/10/23 98/66     Snoring      MMRC Dyspnea Scale (4 is worst)     [] MMRC 0: Dyspneic on strenuous excercise (0 points)    [x] MMRC 1: Dyspneic on walking a slight hill (0 points)    [] MMRC 2: Dyspneic on walking level ground; must stop occasionally due to breathlessness (1 point)    [] MMRC 3: Must stop for breathlessness after walking 100 yards or after a few minutes (2 points)    [] MMRC 4: Cannot leave house; breathless on dressing/undressing (3 points)              COPD Questionnaire  How often do you cough?: A little of the time  How often do you have phlegm (mucus) in your chest?: A little of the time  How often does your chest feel tight?: Never  When you walk up a hill or one flight of stairs, how often are you breathless?: Some of the time  How often are you limited doing any activities at home?: All of the time  How often are you confident leaving the house despite your lung condition?: All of the time  How often do you sleep soundly?:  "Most of the time  How often do you have energy?: Some of the time  Total score: 15              Objective:     Vital Signs (Most Recent)  Height and Weight  Height: 5' 2" (157.5 cm)  Weight in (lb) to have BMI = 25: 136.4]  Wt Readings from Last 2 Encounters:   24 90.3 kg (199 lb)   24 94.8 kg (209 lb)       Physical Exam  Vitals and nursing note reviewed.   HENT:      Head: Normocephalic.   Eyes:      Pupils: Pupils are equal, round, and reactive to light.   Neurological:      General: No focal deficit present.      Mental Status: She is oriented to person, place, and time.          Laboratory  Lab Results   Component Value Date    WBC 5.25 2023    RBC 4.98 2023    HGB 14.0 2023    HCT 45.4 2023    MCV 91 2023    MCH 28.1 2023    MCHC 30.8 (L) 2023    RDW 16.7 (H) 2023     2023    MPV 11.8 2023    GRAN 3.6 2022    GRAN 52.3 2022    LYMPH 2.5 2022    LYMPH 36.8 2022    MONO 0.5 2022    MONO 7.5 2022    EOS 0.2 2022    BASO 0.05 2022    EOSINOPHIL 2.3 2022    BASOPHIL 0.7 2022       BMP  Lab Results   Component Value Date     2024    K 4.3 2024     2024    CO2 29 2024    BUN 16 2024    CREATININE 0.91 2024    CALCIUM 9.6 2024    ANIONGAP 7 2024    ESTGFRAFRICA >60.0 2022    EGFRNONAA >60.0 2022    AST 16 2024    ALT 18 2024    PROT 7.0 2024          No results found for: "IGE"     No results found for: "ASPERGILLUS"  No results found for: "AFUMIGATUSCL"     No results found for: "ACE"     Diagnostic Results:  I have personally reviewed today the following studies:        Crystal Acuna  2023 - 2024  Patient ID: [id number]  : 1955  Age: 68 years  Gender: Female  [facility] [address] [place]  [place], [address]  Compliance Report  Compliance  Payor " Standard  Usage 12/21/2023 - 03/19/2024  Usage days 90/90 days (100%)  >= 4 hours 86 days (96%)  < 4 hours 4 days (4%)  Usage hours 645 hours 23 minutes  Average usage (total days) 7 hours 10 minutes  Average usage (days used) 7 hours 10 minutes  Median usage (days used) 7 hours 29 minutes  Total used hours (value since last reset - 03/19/2024) 5,041 hours  AirSense 11 AutoSet  Serial number [id number]  Mode AutoSet  Min Pressure 6 cmH2O  Max Pressure 14 cmH2O  EPR Fulltime  EPR level 3  Response Standard  Therapy  Pressure - cmH2O Median: 7.6 95th percentile: 9.9 Maximum: 10.9  Leaks - L/min Median: 3.8 95th percentile: 11.7 Maximum: 16.7  Events per hour AI: 0.9 HI: 0.0 AHI: 0.9  Apnea Index Central: 0.0 Obstructive: 0.8 Unknown: 0.0  RERA Index 0.0     Assessment/Plan:     Problem List Items Addressed This Visit       Hypertension associated with diabetes (Chronic)    Hyperlipidemia associated with type 2 diabetes mellitus (Chronic)    Type 2 diabetes mellitus with hyperglycemia, without long-term current use of insulin (Chronic)    Hypothyroidism (Chronic)    HFrEF (heart failure with reduced ejection fraction)    Chronic systolic congestive heart failure    PAF (paroxysmal atrial fibrillation)    COPD, group A, by GOLD 2017 classification    Pulmonary nodules    Relevant Medications    doxycycline (MONODOX) 100 MG capsule    predniSONE (DELTASONE) 10 MG tablet    Other Relevant Orders    FUNGAL IMMUNODIFFUSION - BLOOD    ISSA Screen w/Reflex    Sedimentation rate    CYCLIC CITRUL PEPTIDE ANTIBODY, IGG    RHEUMATOID FACTOR    Aspergillus Antigen    IgE    QUANTIFERON GOLD TB    CT Chest Without Contrast    AMIODARONE & METABOLITE    Complete PFT with bronchodilator    Class 2 obesity with alveolar hypoventilation, serious comorbidity, and body mass index (BMI) of 38.0 to 38.9 in adult    KENYETTA (obstructive sleep apnea)     Other Visit Diagnoses       Ground glass opacity present on imaging of lung    -  Primary     Relevant Medications    doxycycline (MONODOX) 100 MG capsule    predniSONE (DELTASONE) 10 MG tablet    Other Relevant Orders    FUNGAL IMMUNODIFFUSION - BLOOD    ISSA Screen w/Reflex    Sedimentation rate    CYCLIC CITRUL PEPTIDE ANTIBODY, IGG    RHEUMATOID FACTOR    Aspergillus Antigen    IgE    QUANTIFERON GOLD TB    CT Chest Without Contrast    AMIODARONE & METABOLITE    Complete PFT with bronchodilator              Labs ordered   Short course of steroids antibiotics for possible inflammatory infectious differentials   It is noted that patient is on amiodarone we will check drug levels   PFTs to confirm status of lung volumes and DLCO        Follow up in about 3 months (around 8/31/2024), or labs in Sha, Doxy, Prednisone, chest CT, PFT.    This note was prepared using voice recognition system and is likely to have sound alike errors that may have been overlooked even after proof reading.  Please call me with any questions    Discussed diagnosis, its evaluation, treatment and usual course. All questions answered.    Thank you for the courtesy of participating in the care of this patient    Oliver Mederos MD      Personal Diagnostic Review  []  CXR    []  ECHO    []  ONSAT    []  6MWD    []  LABS    []  CHEST CT    []  PET CT    []  Biopsy results

## 2024-06-03 ENCOUNTER — PATIENT MESSAGE (OUTPATIENT)
Dept: PULMONOLOGY | Facility: CLINIC | Age: 69
End: 2024-06-03
Payer: MEDICARE

## 2024-06-03 ENCOUNTER — LAB VISIT (OUTPATIENT)
Dept: LAB | Facility: HOSPITAL | Age: 69
End: 2024-06-03
Attending: INTERNAL MEDICINE
Payer: MEDICARE

## 2024-06-03 ENCOUNTER — PATIENT OUTREACH (OUTPATIENT)
Dept: ADMINISTRATIVE | Facility: HOSPITAL | Age: 69
End: 2024-06-03
Payer: MEDICARE

## 2024-06-03 DIAGNOSIS — E11.65 TYPE 2 DIABETES MELLITUS WITH HYPERGLYCEMIA, WITHOUT LONG-TERM CURRENT USE OF INSULIN: Chronic | ICD-10-CM

## 2024-06-03 DIAGNOSIS — R91.8 GROUND GLASS OPACITY PRESENT ON IMAGING OF LUNG: ICD-10-CM

## 2024-06-03 DIAGNOSIS — Z00.00 ANNUAL PHYSICAL EXAM: ICD-10-CM

## 2024-06-03 DIAGNOSIS — Z79.899 ENCOUNTER FOR LONG-TERM (CURRENT) USE OF MEDICATIONS: ICD-10-CM

## 2024-06-03 DIAGNOSIS — R91.8 PULMONARY NODULES: ICD-10-CM

## 2024-06-03 DIAGNOSIS — E11.9 TYPE 2 DIABETES MELLITUS WITHOUT COMPLICATION, WITHOUT LONG-TERM CURRENT USE OF INSULIN: ICD-10-CM

## 2024-06-03 DIAGNOSIS — E11.65 TYPE 2 DIABETES MELLITUS WITH HYPERGLYCEMIA, WITHOUT LONG-TERM CURRENT USE OF INSULIN: Primary | Chronic | ICD-10-CM

## 2024-06-03 LAB
ALBUMIN/CREAT UR: NORMAL UG/MG (ref 0–30)
CCP AB SER IA-ACNC: 6.5 U/ML
CREAT UR-MCNC: 40 MG/DL (ref 15–325)
ERYTHROCYTE [SEDIMENTATION RATE] IN BLOOD BY WESTERGREN METHOD: 9 MM/HR (ref 0–20)
ESTIMATED AVG GLUCOSE: 174 MG/DL (ref 68–131)
HBA1C MFR BLD: 7.7 % (ref 4–5.6)
MICROALBUMIN UR DL<=1MG/L-MCNC: <5 UG/ML

## 2024-06-03 PROCEDURE — 87305 ASPERGILLUS AG IA: CPT | Performed by: INTERNAL MEDICINE

## 2024-06-03 PROCEDURE — 86635 COCCIDIOIDES ANTIBODY: CPT | Performed by: INTERNAL MEDICINE

## 2024-06-03 PROCEDURE — 83036 HEMOGLOBIN GLYCOSYLATED A1C: CPT | Performed by: FAMILY MEDICINE

## 2024-06-03 PROCEDURE — 86200 CCP ANTIBODY: CPT | Performed by: INTERNAL MEDICINE

## 2024-06-03 PROCEDURE — 80151 DRUG ASSAY AMIODARONE: CPT | Performed by: INTERNAL MEDICINE

## 2024-06-03 PROCEDURE — 82785 ASSAY OF IGE: CPT | Performed by: INTERNAL MEDICINE

## 2024-06-03 PROCEDURE — 86431 RHEUMATOID FACTOR QUANT: CPT | Performed by: INTERNAL MEDICINE

## 2024-06-03 PROCEDURE — 82043 UR ALBUMIN QUANTITATIVE: CPT | Performed by: FAMILY MEDICINE

## 2024-06-03 PROCEDURE — 36415 COLL VENOUS BLD VENIPUNCTURE: CPT | Mod: PO | Performed by: INTERNAL MEDICINE

## 2024-06-03 PROCEDURE — 86038 ANTINUCLEAR ANTIBODIES: CPT | Performed by: INTERNAL MEDICINE

## 2024-06-03 PROCEDURE — 86480 TB TEST CELL IMMUN MEASURE: CPT | Performed by: INTERNAL MEDICINE

## 2024-06-03 PROCEDURE — 85651 RBC SED RATE NONAUTOMATED: CPT | Mod: PO | Performed by: INTERNAL MEDICINE

## 2024-06-03 NOTE — PROGRESS NOTES
Lab visit report: Per chart review, patient has a lab appointment scheduled today, Hemoglobin A1c and urine micro order linked to appointment.

## 2024-06-04 LAB
ANA SER QL IF: NORMAL
IGE SERPL-ACNC: 168 IU/ML (ref 0–100)
RHEUMATOID FACT SERPL-ACNC: <13 IU/ML (ref 0–15)

## 2024-06-05 ENCOUNTER — PATIENT MESSAGE (OUTPATIENT)
Dept: FAMILY MEDICINE | Facility: CLINIC | Age: 69
End: 2024-06-05
Payer: MEDICARE

## 2024-06-05 LAB
GALACTOMANNAN AG SERPL IA-ACNC: <0.5 INDEX
GAMMA INTERFERON BACKGROUND BLD IA-ACNC: 0.08 IU/ML
M TB IFN-G CD4+ BCKGRND COR BLD-ACNC: 0.01 IU/ML
M TB IFN-G CD4+ BCKGRND COR BLD-ACNC: 0.03 IU/ML
MITOGEN IGNF BCKGRD COR BLD-ACNC: 9.92 IU/ML
TB GOLD PLUS: NEGATIVE

## 2024-06-05 NOTE — PROGRESS NOTES
Make follow-up lab appointment per recommendation below.  Check to see if patient has seen the results through my chart.  If not then,  #CALL THE PATIENT# to discuss results/see if they have questions and document verification of contact. Make F/U appt if needed. [phone]    #My interpretation that was sent to them through [employer]:  Crystal, I have reviewed your recent blood work.     Your hemoglobin A1c is improved slightly from previous.  Consider increasing MOUNJARO dosage if tolerated.  This test is gold standard screening test for diabetes.  It is a measures 3 months of your average blood sugar.  =========================  Also please address any outstanding health maintenance that may be due: Shingles Vaccine(1 of 2) Never done  RSV Vaccine (Age 60+ and Pregnant patients)(1 - 1-dose 60+ series) Never done  COVID-19 Vaccine(3 - 2023-24 season) due on 09/01/2023

## 2024-06-07 LAB
AMIODARONE SERPL-SCNC: 0.6 UG/ML (ref 1–3)
N-DESETHYL-AMIODARONE: 0.4 UG/ML

## 2024-06-09 LAB
ASPERGILLUS AB SER QL ID: NOT DETECTED
B DERMAT AB SER QL ID: NOT DETECTED
C IMMITIS AB SER QL ID: NOT DETECTED
H CAPSUL AB SER QL ID: NOT DETECTED

## 2024-06-14 DIAGNOSIS — Z79.899 ENCOUNTER FOR LONG-TERM (CURRENT) USE OF MEDICATIONS: ICD-10-CM

## 2024-06-14 DIAGNOSIS — E11.65 TYPE 2 DIABETES MELLITUS WITH HYPERGLYCEMIA, WITHOUT LONG-TERM CURRENT USE OF INSULIN: ICD-10-CM

## 2024-06-14 RX ORDER — TIRZEPATIDE 5 MG/.5ML
INJECTION, SOLUTION SUBCUTANEOUS
Qty: 12 PEN | Refills: 1 | Status: SHIPPED | OUTPATIENT
Start: 2024-06-14

## 2024-06-14 RX ORDER — FAMOTIDINE 40 MG/1
40 TABLET, FILM COATED ORAL
Qty: 90 TABLET | Refills: 2 | Status: SHIPPED | OUTPATIENT
Start: 2024-06-14

## 2024-06-14 NOTE — TELEPHONE ENCOUNTER
No care due was identified.  [facility] Embedded Care Due Messages. Reference number: [id number].   6/14/2024 9:31:21 AM CDT

## 2024-06-17 DIAGNOSIS — J44.9 COPD, GROUP A, BY GOLD 2017 CLASSIFICATION: ICD-10-CM

## 2024-06-17 RX ORDER — TIOTROPIUM BROMIDE INHALATION SPRAY 3.12 UG/1
SPRAY, METERED RESPIRATORY (INHALATION)
Qty: 4 G | Refills: 11 | Status: SHIPPED | OUTPATIENT
Start: 2024-06-17

## 2024-06-21 ENCOUNTER — PATIENT MESSAGE (OUTPATIENT)
Dept: ADMINISTRATIVE | Facility: OTHER | Age: 69
End: 2024-06-21
Payer: MEDICARE

## 2024-07-09 ENCOUNTER — OFFICE VISIT (OUTPATIENT)
Dept: FAMILY MEDICINE | Facility: CLINIC | Age: 69
End: 2024-07-09
Payer: MEDICARE

## 2024-07-09 VITALS
DIASTOLIC BLOOD PRESSURE: 60 MMHG | WEIGHT: 201.13 LBS | HEIGHT: 62 IN | SYSTOLIC BLOOD PRESSURE: 116 MMHG | BODY MASS INDEX: 37.01 KG/M2 | OXYGEN SATURATION: 97 % | HEART RATE: 50 BPM

## 2024-07-09 DIAGNOSIS — I77.1 STRICTURE OF ARTERY: ICD-10-CM

## 2024-07-09 DIAGNOSIS — B37.0 THRUSH: ICD-10-CM

## 2024-07-09 DIAGNOSIS — Z09 HOSPITAL DISCHARGE FOLLOW-UP: Primary | ICD-10-CM

## 2024-07-09 DIAGNOSIS — K21.00 GASTROESOPHAGEAL REFLUX DISEASE WITH ESOPHAGITIS WITHOUT HEMORRHAGE: ICD-10-CM

## 2024-07-09 PROBLEM — I95.1 ORTHOSTATIC HYPOTENSION: Status: ACTIVE | Noted: 2024-04-11

## 2024-07-09 PROBLEM — R42 LIGHTHEADEDNESS: Status: ACTIVE | Noted: 2024-04-11

## 2024-07-09 PROCEDURE — 3288F FALL RISK ASSESSMENT DOCD: CPT | Mod: CPTII,S$GLB,, | Performed by: FAMILY MEDICINE

## 2024-07-09 PROCEDURE — 99999 PR PBB SHADOW E&M-EST. PATIENT-LVL V: CPT | Mod: PBBFAC,,, | Performed by: FAMILY MEDICINE

## 2024-07-09 PROCEDURE — 1160F RVW MEDS BY RX/DR IN RCRD: CPT | Mod: CPTII,S$GLB,, | Performed by: FAMILY MEDICINE

## 2024-07-09 PROCEDURE — 1101F PT FALLS ASSESS-DOCD LE1/YR: CPT | Mod: CPTII,S$GLB,, | Performed by: FAMILY MEDICINE

## 2024-07-09 PROCEDURE — 3061F NEG MICROALBUMINURIA REV: CPT | Mod: CPTII,S$GLB,, | Performed by: FAMILY MEDICINE

## 2024-07-09 PROCEDURE — 3066F NEPHROPATHY DOC TX: CPT | Mod: CPTII,S$GLB,, | Performed by: FAMILY MEDICINE

## 2024-07-09 PROCEDURE — 1159F MED LIST DOCD IN RCRD: CPT | Mod: CPTII,S$GLB,, | Performed by: FAMILY MEDICINE

## 2024-07-09 PROCEDURE — 99214 OFFICE O/P EST MOD 30 MIN: CPT | Mod: S$GLB,,, | Performed by: FAMILY MEDICINE

## 2024-07-09 PROCEDURE — 3074F SYST BP LT 130 MM HG: CPT | Mod: CPTII,S$GLB,, | Performed by: FAMILY MEDICINE

## 2024-07-09 PROCEDURE — 3078F DIAST BP <80 MM HG: CPT | Mod: CPTII,S$GLB,, | Performed by: FAMILY MEDICINE

## 2024-07-09 PROCEDURE — 3008F BODY MASS INDEX DOCD: CPT | Mod: CPTII,S$GLB,, | Performed by: FAMILY MEDICINE

## 2024-07-09 PROCEDURE — 3051F HG A1C>EQUAL 7.0%<8.0%: CPT | Mod: CPTII,S$GLB,, | Performed by: FAMILY MEDICINE

## 2024-07-09 PROCEDURE — G2211 COMPLEX E/M VISIT ADD ON: HCPCS | Mod: S$GLB,,, | Performed by: FAMILY MEDICINE

## 2024-07-09 PROCEDURE — 1126F AMNT PAIN NOTED NONE PRSNT: CPT | Mod: CPTII,S$GLB,, | Performed by: FAMILY MEDICINE

## 2024-07-09 PROCEDURE — 4010F ACE/ARB THERAPY RXD/TAKEN: CPT | Mod: CPTII,S$GLB,, | Performed by: FAMILY MEDICINE

## 2024-07-09 RX ORDER — PANTOPRAZOLE SODIUM 40 MG/1
40 TABLET, DELAYED RELEASE ORAL DAILY
Qty: 90 TABLET | Refills: 3 | Status: SHIPPED | OUTPATIENT
Start: 2024-07-09 | End: 2025-07-09

## 2024-07-09 RX ORDER — EZETIMIBE 10 MG/1
10 TABLET ORAL
COMMUNITY

## 2024-07-09 RX ORDER — BLOOD-GLUCOSE SENSOR
EACH MISCELLANEOUS
COMMUNITY
Start: 2024-06-23

## 2024-07-09 RX ORDER — INSULIN LISPRO 100 [IU]/ML
0-6 INJECTION, SOLUTION INTRAVENOUS; SUBCUTANEOUS
COMMUNITY
Start: 2024-06-03

## 2024-07-09 RX ORDER — FLUCONAZOLE 150 MG/1
150 TABLET ORAL
Qty: 3 TABLET | Refills: 0 | Status: SHIPPED | OUTPATIENT
Start: 2024-07-09 | End: 2024-07-16

## 2024-07-09 RX ORDER — PEN NEEDLE, DIABETIC 32GX 5/32"
NEEDLE, DISPOSABLE MISCELLANEOUS
COMMUNITY
Start: 2024-06-29

## 2024-07-09 RX ORDER — ISOSORBIDE MONONITRATE 30 MG/1
30 TABLET, EXTENDED RELEASE ORAL DAILY
COMMUNITY
Start: 2024-06-28 | End: 2024-07-09

## 2024-07-09 NOTE — PATIENT INSTRUCTIONS
Follow up in about 1 year (around 7/9/2025), or if symptoms worsen or fail to improve, for Med refills, LAB RESULTS.     Dear patient,   As a result of recent federal legislation (The Federal Cures Act), you may receive lab or pathology results from your visit in your [facility] account before your physician is able to contact you. Your physician or their representative will relay the results to you with their recommendations at their soonest availability.     If no improvement in symptoms or symptoms worsen, please be advised to call MD, follow-up at clinic and/or go to ER if becomes severe.    Eh Sow M.D.        We Offer TELEHEALTH & Same Day Appointments!   Book your Telehealth appointment with me through my nurse or   Clinic appointments on [employer]!    [address] [place] [address]    Office: [phone]   FAX: [phone]    Check out my Facebook Page and Follow Me at: [email or web address]    Check out my website at [email or web address] by clicking on: [email or web address]    To Schedule appointments online, go to [email or web address]: [email or web address]

## 2024-07-09 NOTE — PROGRESS NOTES
PLAN:    Assessment & Plan  1. Post-hospital follow-up.  The patient is advised to discontinue her steroid medication. Protonix has been prescribed for a duration of one month, after which it will be discontinued. The continuation of Pepcid is advised. The continuation of Align is advised. Dietary modifications, including regular exercise, are recommended. Should the patient experience chest pain while on reflux medication, it may be necessary to resume the medication. Diflucan has been prescribed for a few days. If her stomach condition does not improve, a follow-up with a gastroenterologist for a scope will be necessary.  Patient does not want to take Imdur at this time.  She will follow-up with Cardiology.  She will try PPI 1st.  ER precautions for severe chest pain.    Problem List Items Addressed This Visit       Stricture of artery      Continue Plavix.  Follow-up with Cardiology.         Hospital discharge follow-up - Primary     Transitional Care Note    Family and/or Caretaker present at visit?  Yes.  Diagnostic tests reviewed/disposition: I have reviewed all completed as well as pending diagnostic tests at the time of discharge.  Disease/illness education: chest pain/ GERD  Home health/community services discussion/referrals: Patient does not have home health established from hospital visit.  They do not need home health.  If needed, we will set up home health for the patient.   Establishment or re-establishment of referral orders for community resources: No other necessary community resources.   Discussion with other health care providers: No discussion with other health care providers necessary.                Gastroesophageal reflux disease with esophagitis without hemorrhage     Continue Pepcid.  Adding Protonix.  Patient should get off of the steroids as soon as possible but she is currently tapering down.  If no improvement consider GI consult.  GERD RECOMMENDATIONS  Please be advised of condition  course.  - Take PPI in the morning 30-60 minutes before breakfast  - I recommend ongoing Education for lifestyle modifications to help control/reduce reflux/abdominal pain including: avoid large meals, avoid eating within 2-3 hours of bedtime (avoid late night eating & lying down soon after eating), elevate head of bed if nocturnal symptoms are present, smoking cessation (if current smoker), & weight loss (if overweight).   - please be advised to avoid known foods which trigger reflux symptoms & to minimize/avoid high-fat foods, chocolate, caffeine, citrus, alcohol, & tomato products.  - Advised to avoid/limit use of NSAID's, since they can cause GI upset, bleeding, and/or ulcers. If needed, take with food.          Relevant Medications    pantoprazole (PROTONIX) 40 MG tablet    Thrush     Physical exam shows erythema in the posterior pharynx.  No white exudates.  Start Diflucan.  Discussed condition course and signs and symptoms to expect.  Patient advised take  Tylenol for pain or fever.  ER precautions.  Call MD or follow-up to clinic if not improving or worsening symptoms.           Relevant Medications    fluconazole (DIFLUCAN) 150 MG Tab     Future Appointments       Date Provider Specialty Appt Notes    8/20/2024  Pulmonology Final f/u visit    9/24/2024  Radiology Follow up in about 3 months (around 8/31/2024), or labs in [place], Doxy, Prednisone, chest CT, PFT.    9/24/2024  Pulmonology Follow up in about 3 months (around 8/31/2024), or labs in [place], Doxy, Prednisone, chest CT, PFT.    9/24/2024 Oliver Mederos MD Pulmonology Follow up in about 3 months (around 8/31/2024), or labs in [place], Doxy, Prednisone, chest CT, PFT.    2/6/2025 Eh Sow MD Family Medicine Annual           Medication Management for assessment above:   Medication List with Changes/Refills   New Medications    FLUCONAZOLE (DIFLUCAN) 150 MG TAB    Take 1 tablet (150 mg total) by mouth every 72 hours. for 3 doses     "PANTOPRAZOLE (PROTONIX) 40 MG TABLET    Take 1 tablet (40 mg total) by mouth once daily.   Current Medications    AMIODARONE (PACERONE) 200 MG TAB    Take 200 mg by mouth. 100 mg    ATORVASTATIN (LIPITOR) 80 MG TABLET    Take 80 mg by mouth every evening.    BD SOMMER 2ND GEN PEN NEEDLE 32 GAUGE X 5/32" NDLE    use as directed    BLOOD SUGAR DIAGNOSTIC STRP    To check BG 2 times daily, to use with insurance preferred meter    BLOOD-GLUCOSE METER KIT    To check BG 2 times daily, to use with insurance preferred meter    CHLORHEXIDINE (HIBICLENS) 4 % EXTERNAL LIQUID    Apply topically daily as needed.    CLOPIDOGREL (PLAVIX) 75 MG TABLET    Take 75 mg by mouth.    ELIQUIS 5 MG TAB    Take 5 mg by mouth 2 (two) times daily.    ENTRESTO 24-26 MG PER TABLET    Take 1 tablet by mouth 2 (two) times daily.    EZETIMIBE (ZETIA) 10 MG TABLET    Take 10 mg by mouth.    FAMOTIDINE (PEPCID) 40 MG TABLET    TAKE ONE TABLET BY MOUTH EVERY DAY    FLUOCINOLONE ACETONIDE OIL (DERMOTIC OIL) 0.01 % DROP    Place 3 drops in ear(s) 2 (two) times daily.    FLUOROMETHOLONE 0.1% (FML) 0.1 % DRPS    INSTILL 1 DROP INTO EACH EYE THREE TIMES DAILY AS DIRECTED    FREESTYLE MONIKA 3 SENSOR ATILIO    Use to check glucose 4x a day. Change sensor every 14 days.    FUROSEMIDE (LASIX) 20 MG TABLET    Take 20 mg by mouth daily as needed.    HYDROCORTISONE 1 % CREAM    Apply to affected area 2 times daily    INSULIN LISPRO 100 UNIT/ML PEN    Inject 0-6 Units into the skin.    LANCETS MISC    To check BG 2 times daily, to use with insurance preferred meter    LEVALBUTEROL (XOPENEX HFA) 45 MCG/ACTUATION INHALER    INHALE ONE PUFF INTO THE LUNGS EVERY 4 HOURS AS NEEDED FOR WHEEZING    LEVOTHYROXINE (SYNTHROID) 25 MCG TABLET    Take 1 tablet (25 mcg total) by mouth before breakfast.    METOPROLOL SUCCINATE (TOPROL-XL) 25 MG 24 HR TABLET    Take 25 mg by mouth. Pt take 12.5mg    MOUNJARO 5 MG/0.5 ML PNIJ    INJECT FIVE MG into the skin EVERY 7 DAYS    MUPIROCIN " (BACTROBAN) 2 % OINTMENT    Apply topically 3 (three) times daily.    NITROGLYCERIN (NITROSTAT) 0.4 MG SL TABLET    Place 0.4 mg under the tongue as needed.    PREDNISONE (DELTASONE) 10 MG TABLET    Take 2 tablets (20 mg total) by mouth once daily for 14 days, THEN 1 tablet (10 mg total) once daily for 14 days, THEN 0.5 tablets (5 mg total) once daily for 14 days.    SITAGLIPTIN PHOSPHATE (JANUVIA) 100 MG TAB    Take 100 mg by mouth.    SPIRIVA RESPIMAT 2.5 MCG/ACTUATION INHALER    INHALE TWO PUFFS DAILY   Discontinued Medications    DOXYCYCLINE (MONODOX) 100 MG CAPSULE    Take 1 capsule (100 mg total) by mouth every 12 (twelve) hours.    ISOSORBIDE MONONITRATE (IMDUR) 30 MG 24 HR TABLET    Take 30 mg by mouth once daily.       Eh Sow M.D.  ==========================================================================  Subjective:   Patient ID: Crystal Acuna is a 68 y.o. female.  has a past medical history of Allergy, Bronchitis, Family history of colonic polyps (04/27/2021), Hallux abductovalgus, Herniated lumbar intervertebral disc, Hyperlipidemia, Hypertension, Smoker, and Staph aureus infection.   Chief Complaint: Hospital Follow Up ([facility])      History of Present Illness  The patient is a 68-year-old female here for hospital follow-up. She had an episode of chest pain, was ruled out for acute coronary syndrome versus reflux. She reports that she is still having some burning. She is accompanied by her daughter.    The patient sought emergency care due to severe heartburn, reminiscent of her previous experience with a myocardial infarction. She is under the care of a cardiologist, who performed a stress test and admitted her. The etiology of her chest pain remains uncertain, possibly attributable to steroid use or a myocardial infarction. She is currently on a tapering dose of steroids, prescribed by her pulmonologist, Dr. Briseno, for ground glass opacities in the left lung. The steroid  treatment has alleviated her cough and other symptoms. She is currently on Pepcid and has been on Align since her myocardial infarction. She has previously used Prilosec and Nexium, but discontinued them due to lack of indigestion or heartburn. During her hospital stay, she was treated with Protonix intravenously. She is not currently taking Imdur due to uncertainty about whether her symptoms were indigestion or cardiac-related. Her cardiac evaluation was normal, and she was not treated with Imdur during her hospital stay. The nurse practitioner with cardiology recommended a trial of Imdur. She reports no pain at present. An angiogram was suggested during her hospital stay. She experienced diarrhea during her hospital stay. She has been on a 14-day course of steroids. She has a follow-up appointment with cardiology on 08/06/2024.    The patient reports experiencing oral thrush, which began on Saturday. She suspects the steroid may be the cause.    Problem List Items Addressed This Visit       Stricture of artery     Overview     July 2024:  Patient recently hospital visit for chest pain.  See hospital follow-up.  2023:  PATIENT WITH RECENT MI.  SEE HOSPITAL FOLLOW-UP.         Current Assessment & Plan     Continue Plavix.  Follow-up with Cardiology.         Hospital discharge follow-up - Primary    Overview     [facility] DISCHARGE SUMMARY    Patient ID:  Crystal Acuna  [id number]  68 y.o.  1955    Admit Date:  6/27/2024 8:57 AM    Discharge Date:  Discharge Today: 6/28/2024    Admitting Physician:  Hugo Kitchen MD    Discharge Physician:  CANDICE MELÉNDEZ NP    Consultants/Treatment Team:  Consultants    Provider Service Role Specialty  Candice Meléndez NP  -- Nurse Practitioner Nurse Practitioner Acute Care  Sj Bullock MD  Cardiology Consulting Physician Interventional Cardiology        Reason for Admission/Admission Diagnoses:  Present on Admission:  ACS (acute coronary syndrome) (HCC)    Discharge  Diagnoses:  Active Hospital Problems  Diagnosis Date Noted  ACS (acute coronary syndrome) (HCC) 06/27/2024    Resolved Hospital Problems    History of Present Illness:  68 year old female with past medical history of CAD s/p PCI to LAD after STEMI in April '23 and PCI to RCA in July 2023 now presents to [facility] ER on 6/27/24 with acute onset chest pain and epigastric discomfort. Reports pain/discomfort similar to her STEMI episode last April. Endorses pain radiates to her back. She reports compliance with taking her medications including Plavix. She is on steroids currently after CT chest had shown groundglass opacities; has history of COPD.    Seen by cardio in ER and due to patient being on Eliquis planning for stress test today.      Hospital Course and Treatment:    The patient was admitted to the hospitalist service under observation status and cardiology was consulted. ACS has been ruled out and she underwent a nuclear stress test that showed a large fixed anteroapical and inferior defect that is fixed, no clear reversible ischemia. An echocardiogram was performed that revealed an EF of 35 to 40%. Per cardiology, patient has been compliant with her Plavix and Eliquis for a year and no further ischemic workup will be pursued at this time the patient will be started on long-acting nitrates and if her pain persists, they can discuss an outpatient angiogram. She has been cleared for discharge by cardiology with close outpatient follow-up. I have explained the discharge instructions to the patient, all questions have been addressed.    I discussed the case and discharge plan with Dr. Hugo Kitchen MD    Admission Information    Date & Time  6/27/2024 Provider  Hugo Kitchen MD Department  [facility] Observation Unit Dept. Phone  [phone]            I discussed with the patient and the family disease process and treatment.    I have personally seen and examined the patient, Crystal Acuna, in a face  to face encounter on the date of discharge.    She is cleared for discharge with instructions to follow up as directed.  Total time in the care and discharge planning of this patient was greater than 30 minutes.    Significant Diagnostic Studies:  Recent Imaging and Procedure Results    Procedure Component Value Ref Range Date/Time  Echo Complete [[id number]] Collected: 06/28/2024 0908  Updated: 06/28/2024 1434  PatientHeight 157.48 cm  PatientWeight 90.2664 kg  Heart Rate 71 bpm  Systolic Pressure 126 mmHg  Diastolic Pressure 68 mmHg  BSA 1.9 m  2D/MMode measurements and calculations: MMode 2D Measurements & Calculations  RVDd 2.3 cm  Interventricular Septum in Diastole 1.2 cm  Left Ventricle in Diastole 5.4 cm  Left Ventricle in Systole 4.2 cm  LV post wall in Diastole 1 cm  IVS/LVPW 1.2  FS 21.8 %  EDV(Teich) 141.3 ml  ESV(Teich) 79.6 ml  EF(Teich) 43.7 %  EDV(cubed) 157.5 ml  ESV(cubed) 75.3 ml  EF(cubed) 52.2 %  LV mass(C)d 234.8 grams  LV mass(C)dI 123.1 grams/m  SV(Teich) 61.7 ml  SI(Teich) 32.4 ml/m  SV(cubed) 82.1 ml  SI(cubed) 43.1 ml/m  LA Dimension 3.4 cm  asc Aorta Diam 3.4 cm  LVOT Diameter 2.1 cm  LVOT area 3.4 cm  LVOT area (traced) 3.5 cm  LVLd ap4 9.2 cm  EDV(MOD-sp4) 181 ml  LVLs ap4 8.7 cm  ESV(MOD-sp4) 113 ml  EF(MOD-sp4) 37.6 %  LVLd ap2 8.8 cm  EDV(MOD-sp2) 115 ml  LVLs ap2 8.8 cm  ESV(MOD-sp2) 80.1 ml  EF(MOD-sp2) 30.3 %  SV(MOD-sp4) 68 ml  SI(MOD-sp4) 35.6 ml/m  SV(MOD-sp2) 34.9 ml  SI(MOD-sp2) 18.3 ml/m  Time Measurements Time Measurements  Aortic R-R 0.97 sec  Aortic HR 62 BPM  Doppler measurements and calculations: Doppler Measurements & Calculations  MV e max velocity 68.7 cm/sec  MV a velocity 94.3 cm/sec  MV e/a ratio 0.73  MV P ½ time max danitza 85 cm/sec  MV P ½ 61.1 msec  MVA ( P ½ ) 3.6 cm  MV Dec slope 407.7 cm/sec  MV dec time 0.26 sec  Ao V2 Max 191 cm/sec  Ao max PG 14.6 mmHg  Ao max PG (full) 9.9 mmHg  Ao V2 mean 124 cm/sec  Ao mean PG 7 mmHg  Ao mean PG (full) 5 mmHg  Ao V2 VTI  39 cm  CHRISTOPHER(I,A) 1.8 cm  CHRISTOPHER(I,D) 1.8 cm  CHRISTOPHER(V,A) 1.9 cm  CHRISTOPHER(V,D) 1.9 cm  LV V1 max PG 4.7 mmHg  LV V1 mean PG 2 mmHg  LV V1 max 108 cm/sec  LV V1 mean 69.2 cm/sec  LV V1 VTI 21.2 cm  CO(LVOT) 4.4 l/min  CI(LVOT) 2.3 l/min/m  SV(LVOT) 71.2 ml  SI(LVOT) 37.3 ml/m  PA V2 max 176.5 cm/sec  PA max PG 12.5 mmHg  PA V2 mean 128.1 cm/sec  PA mean PG 7 mmHg  PA V2 VTI 29.4 cm  EF MIN = 35 %  EF MAX = 40 %  Narrative:      Adult Echocardiogram  Name: SUNITHA CANO Study Date: 2024  MRN: [id number] Age: 68 yrs  Patient Location: [facility]^[id number] Height: 62 in  : 1955 Weight: 199 lb  Gender: Female BSA: 1.9 m2  Reason For Study: HFrEF  BP: 126/68 mmHg    Ordering Physician: ION JIMENEZ  Performed By: Felicia Floyd RDCS    Interpretation Summary  The study was technically adequate.  Patient allergic to Definity.  Definity not used.  Ejection Fraction = 35-40%.  Left ventricular systolic function is moderately reduced.  Inf-apical, apical and ant-apical akinesis  The study was technically adequate.    Measurements  RVDd: 2.3 cm  IVSd: 1.2 cm LVIDd: 5.4 cm  LVPWd: 1.0 cm LVIDs: 4.2 cm  LVOT diam: 2.1 cm LA dimension: 3.4 cm    MMode/2D Measurements & Calculations  FS: 21.8 % LVOT area: 3.4 cm2  EDV(Teich): 141.3 ml  ESV(Teich): 79.6 ml  EF(Teich): 43.7 %  LVLd ap4: 9.2 cm EDV(MOD-sp2): 115.0 ml  EDV(MOD-sp4): 181.0 ml  LVLs ap4: 8.7 cm  ESV(MOD-sp4): 113.0 ml  EF(MOD-sp4): 37.6 %  SV(MOD-sp4): 68.0 ml Aortic R-R: 0.97 sec  ______________________________________________________________________________  RA A4Cs_phl: 12.9 cm2 RVIDd/LVIDd_phl: 0.43    ______________________________________________________________________________  TAPSE_phl: 2.4 cm    Time Measurements  Aortic HR: 62.0 BPM MV dec time: 0.26 sec    Doppler Measurements & Calculations  MV E max danitza: 68.7 cm/sec MV P1/2t max danitza: 85.0 cm/sec  MV A max danitza: 94.3 cm/sec MV P1/2t: 61.1 msec  MV E/A: 0.73 MVA(P1/2t): 3.6 cm2  MV dec slope: 407.7  cm/sec2    Ao V2 max: 191.0 cm/sec LV V1 max P.7 mmHg  Ao max P.6 mmHg LV V1 mean P.0 mmHg  Ao V2 mean: 124.0 cm/sec LV V1 max: 108.0 cm/sec  Ao mean P.0 mmHg LV V1 mean: 69.2 cm/sec  Ao V2 VTI: 39.0 cm LV V1 VTI: 21.2 cm  CHRISTOPHER(I,D): 1.8 cm2  CHRISTOPHER(V,D): 1.9 cm2  CO(LVOT): 4.4 l/min PA V2 max: 176.5 cm/sec  CI(LVOT): 2.3 l/min/m2 PA max P.5 mmHg  SV(LVOT): 71.2 ml PA V2 mean: 128.1 cm/sec  PA mean P.0 mmHg  PA V2 VTI: 29.4 cm  ______________________________________________________________________________  AV VR_phl: 0.57 CHRISTOPHER(VTI)/BSA_phl: 0.98    ______________________________________________________________________________  MV P1/2t-pr_phl: 61.1 msec RV S Vel_phl: 18.6 cm/sec    LEFT VENTRICLE    o The left ventricle is normal in size.  o Ejection Fraction = 35-40%.  o Left ventricular systolic function is moderately reduced.  o There is borderline concentric left ventricular hypertrophy.  o Inf-apical, apical and ant-apical akinesis.    DIASTOLOGY    o Lateral e'= '6.42' cm/s.  o Septal e'= '5.66' cm/s.  o E/e' ='11.4'.  o LAESV index = '21.5' ml/m2.  o Normal Diastolic function.    RIGHT VENTRICLE    o The right ventricle is normal in size and function.    ATRIA    o The left atrial size is normal.  o Right atrial size is normal.    MITRAL VALVE    o Mitral valve leaflets appear to be mildly thickened.  o There is trace mitral regurgitation.    TRICUSPID VALVE    o The tricuspid valve is normal in structure and function.  o No tricuspid regurgitation.    AORTIC VALVE    o Aortic sclerosis is mild.  o Trace aortic regurgitation.  o AV MAX PG 14.6 mmHg.    PULMONIC VALVE    o The pulmonic valve is not well visualized.    GREAT VESSELS    o The aortic root is normal size.    PERICARDIUM/PLEURAL EFFUSION    o There is no pericardial effusion.  o There is no pleural effusion.    ______________________________________________________________________________  Electronically signed by: Kulwinder  MD Mauro 06/28/2024 02:33 PM  InterpretingPhysician: Kulwinder Wade MD electronically signed on 2024-06-28 14:33:[id number]        Surgical Procedures during this visit:    Patient's Condition On Discharge:  Stable    Discharge Disposition:  Order for Discharge (From admission, onward)    Start Ordered  06/28/24 1602 Discharge Disposition to: Home or Self Care Once  Expected Discharge Date: 06/28/24    Question: Discharge Disposition to Answer: Home or Self Care  06/28/24 1606                Discharge Orders:  Follow-up Information    Uyen Oliver NP. Go on 7/17/2024.  Specialty: Nurse Practitioner Gerontology  Why: 11:30am  Contact information:  [address]  [address]  [place] [address]  [phone]          Eh Sow MD. Schedule an appointment as soon as possible for a visit in 1 week(s).  Specialty: Family Medicine  Contact information:  [address]  [place] [address]  [phone]                    Future Appointments:  Future Appointments    Provider Department Dept Phone Center  7/1/2024 3:15 PM Zachery Mills MD [facility] [phone] [place]  8/6/2024 1:00 PM Sj Bullock MD [facility] [place] [facility] [phone] [place]        Immunizations Administered for This Admission    No immunizations given this admission.          Medication List      START taking these medications    isosorbide mononitrate 30 MG Tb24 24 hr tablet  Commonly known as: IMDUR  Take 1 tablet (30 mg total) by mouth daily          CHANGE how you take these medications    SITagliptin phosphate 100 MG Tab tablet  Commonly known as: Januvia  Take 1 tablet (100 mg total) by mouth daily  What changed: Another medication with the same name was removed. Continue taking this medication, and follow the directions you see here.          CONTINUE taking these medications    amiodarone 200 MG Tab tablet  Commonly known as: PACERONE  take 1 tablet by mouth daily    apixaban 5 mg  Tab  Commonly known as: ELIQUIS  Take 1 tablet (5 mg total) by mouth 2 (two) times daily    atorvastatin 80 MG Tab tablet  Commonly known as: LIPITOR  take 1 tablet by mouth NIGHTLY    blood sugar diagnostic Strp test strip    clopidogreL 75 mg Tab tablet  Commonly known as: PLAVIX  take 1 tablet by mouth daily    Entresto 24-26 mg Tab per tablet  Generic drug: sacubitriL-valsartan  TAKE ONE TABLET BY MOUTH TWICE DAILY    ezetimibe 10 mg Tab tablet  Commonly known as: Zetia  Take 1 tablet (10 mg total) by mouth daily    famotidine 40 MG Tab tablet  Commonly known as: PEPCID    FreeStyle Nikole 3 Sensor Meseret  Generic drug: blood-glucose sensor  Use to check glucose 4x a day. Change sensor every 14 days.    furosemide 20 MG Tab tablet  Commonly known as: LASIX  take 1 tablet by mouth daily    hydrocortisone 1 % Crea topical cream    insulin lispro 100 unit/mL Inpn insulin pen  Commonly known as: HumaLOG KwikPen Insulin  Inject 0-6 Units into the skin 3 (three) times daily with meals    levalbuterol 45 mcg/actuation Hfaa inhaler  Commonly known as: Xopenex HFA  Inhale 1 puff into the lungs every 4 (four) hours as needed for Wheezing    levothyroxine 25 MCG Tab tablet  Commonly known as: SYNTHROID    mupirocin 2 % Oint topical ointment  Commonly known as: BACTROBAN    nitroglycerin 0.4 MG Subl SL tablet  Commonly known as: Nitrostat  Place 1 tablet (0.4 mg total) under the tongue every 5 (five) minutes as needed for Chest pain    predniSONE 10 MG Tab tablet  Commonly known as: DELTASONE    tiotropium bromide 2.5 mcg/actuation Mist mist inhaler  Commonly known as: SPIRIVA RESPIMAT    Toprol XL 25 MG Tb24 24 hr tablet  Generic drug: metoprolol succinate          STOP taking these medications    doxycycline 100 MG Cap capsule  Commonly known as: MONODOX            Where to Get Your Medications      These medications were sent to [address] [address]    Phone:  [phone]  isosorbide mononitrate 30 MG Tb24 24 hr tablet      Discharge Orders    Future Labs/Procedures Expected by Expires  Activity as tolerated As directed  Diet (Select type) Cardiac/Low Chol/OPAL As directed  Questions:  Diet Type: Cardiac/Low Chol/OPAL  Restriction(s):  Solid Consistency:  Liquid Consistency:  Sodium Restriction:  Fluid restriction:  Follow-up with PCP As directed  Questions:  Schedule for:  when:        Candice Elizabeth NP  [facility] medicine      Electronically signed by Candice Elizabeth NP at 06/28/2024 4:30 PM CDT          Current Assessment & Plan     Transitional Care Note    Family and/or Caretaker present at visit?  Yes.  Diagnostic tests reviewed/disposition: I have reviewed all completed as well as pending diagnostic tests at the time of discharge.  Disease/illness education: chest pain/ GERD  Home health/community services discussion/referrals: Patient does not have home health established from hospital visit.  They do not need home health.  If needed, we will set up home health for the patient.   Establishment or re-establishment of referral orders for community resources: No other necessary community resources.   Discussion with other health care providers: No discussion with other health care providers necessary.                Gastroesophageal reflux disease with esophagitis without hemorrhage    Overview     Chronic.  Currently uncontrolled.  Patient on Pepcid only.  She is also taking steroids at this time along with Plavix.  Recently had hospital visit for chest pain.         Current Assessment & Plan     Continue Pepcid.  Adding Protonix.  Patient should get off of the steroids as soon as possible but she is currently tapering down.  If no improvement consider GI consult.  GERD RECOMMENDATIONS  Please be advised of condition course.  - Take PPI in the morning 30-60 minutes before breakfast  - I recommend ongoing Education for lifestyle modifications to help control/reduce  "reflux/abdominal pain including: avoid large meals, avoid eating within 2-3 hours of bedtime (avoid late night eating & lying down soon after eating), elevate head of bed if nocturnal symptoms are present, smoking cessation (if current smoker), & weight loss (if overweight).   - please be advised to avoid known foods which trigger reflux symptoms & to minimize/avoid high-fat foods, chocolate, caffeine, citrus, alcohol, & tomato products.  - Advised to avoid/limit use of NSAID's, since they can cause GI upset, bleeding, and/or ulcers. If needed, take with food.          Thrush    Overview     Patient reports having difficulty swallowing and feels like she is getting thrush again in her throat.         Current Assessment & Plan     Physical exam shows erythema in the posterior pharynx.  No white exudates.  Start Diflucan.  Discussed condition course and signs and symptoms to expect.  Patient advised take  Tylenol for pain or fever.  ER precautions.  Call MD or follow-up to clinic if not improving or worsening symptoms.               Review of patient's allergies indicates:   Allergen Reactions    Metformin     Perflutren lipid microspheres Other (See Comments) and Shortness Of Breath     Back pain, neck pain, arm pain/cramping, and flush face.    Farxiga [dapagliflozin]      Yeast infection      Latex, natural rubber     Ozempic [semaglutide]      Stomach pain      Bactrim [sulfamethoxazole-trimethoprim] Rash     Current Outpatient Medications   Medication Instructions    amiodarone (PACERONE) 200 mg, Oral, 100 mg     atorvastatin (LIPITOR) 80 mg, Oral, Nightly    BD SOMMER 2ND GEN PEN NEEDLE 32 gauge x 5/32" Ndle use as directed    blood sugar diagnostic Strp To check BG 2 times daily, to use with insurance preferred meter    blood-glucose meter kit To check BG 2 times daily, to use with insurance preferred meter    chlorhexidine (HIBICLENS) 4 % external liquid Topical (Top), Daily PRN    clopidogreL (PLAVIX) 75 mg, " Oral    ELIQUIS 5 mg, Oral, 2 times daily    ENTRESTO 24-26 mg per tablet 1 tablet, Oral, 2 times daily    ezetimibe (ZETIA) 10 mg, Oral    famotidine (PEPCID) 40 mg, Oral    fluconazole (DIFLUCAN) 150 mg, Oral, Every 72 hours    fluocinolone acetonide oiL (DERMOTIC OIL) 0.01 % Drop 3 drops, Otic, 2 times daily    fluorometholone 0.1% (FML) 0.1 % DrpS INSTILL 1 DROP INTO EACH EYE THREE TIMES DAILY AS DIRECTED    FREESTYLE MONIKA 3 SENSOR Meseret Use to check glucose 4x a day. Change sensor every 14 days.    furosemide (LASIX) 20 mg, Oral, Daily PRN    hydrocortisone 1 % cream Apply to affected area 2 times daily    insulin lispro 0-6 Units, Subcutaneous    lancets Misc To check BG 2 times daily, to use with insurance preferred meter    levalbuterol (XOPENEX HFA) 45 mcg/actuation inhaler INHALE ONE PUFF INTO THE LUNGS EVERY 4 HOURS AS NEEDED FOR WHEEZING    levothyroxine (SYNTHROID) 25 mcg, Oral, Before breakfast    metoprolol succinate (TOPROL-XL) 25 mg, Oral, Pt take 12.5mg     MOUNJARO 5 mg/0.5 mL PnIj INJECT FIVE MG into the skin EVERY 7 DAYS    mupirocin (BACTROBAN) 2 % ointment Topical (Top), 3 times daily    nitroGLYCERIN (NITROSTAT) 0.4 mg, Sublingual, As needed (PRN)    pantoprazole (PROTONIX) 40 mg, Oral, Daily    predniSONE (DELTASONE) 10 MG tablet Take 2 tablets (20 mg total) by mouth once daily for 14 days, THEN 1 tablet (10 mg total) once daily for 14 days, THEN 0.5 tablets (5 mg total) once daily for 14 days.    SITagliptin phosphate (JANUVIA) 100 mg, Oral    SPIRIVA RESPIMAT 2.5 mcg/actuation inhaler INHALE TWO PUFFS DAILY      I have reviewed the PMH, social history, FamilyHx, surgical history, allergies and medications documented / confirmed by the patient at the time of this visit.  Review of Systems   Constitutional:  Positive for fatigue. Negative for activity change and unexpected weight change.   HENT:  Negative for hearing loss, rhinorrhea and trouble swallowing.    Eyes:  Negative for discharge  "and visual disturbance.   Respiratory:  Negative for chest tightness and wheezing.    Cardiovascular:  Negative for chest pain and palpitations.   Gastrointestinal:  Negative for blood in stool, constipation, diarrhea and vomiting.   Endocrine: Negative for polydipsia and polyuria.   Genitourinary:  Negative for difficulty urinating, dysuria, hematuria and menstrual problem.   Musculoskeletal:  Negative for arthralgias, joint swelling and neck pain.   Neurological:  Negative for weakness and headaches.   Psychiatric/Behavioral:  Negative for confusion and dysphoric mood.      Objective:   /60   Pulse (!) 50   Ht 5' 2" (1.575 m)   Wt 91.2 kg (201 lb 1.6 oz)   SpO2 97%   BMI 36.78 kg/m²   Physical Exam  Vitals and nursing note reviewed.   Constitutional:       General: She is not in acute distress.     Appearance: She is well-developed. She is obese. She is not ill-appearing, toxic-appearing or diaphoretic.   HENT:      Head: Normocephalic and atraumatic.      Right Ear: Hearing, tympanic membrane, ear canal and external ear normal.      Left Ear: Hearing, tympanic membrane, ear canal and external ear normal.      Nose: Nose normal. No rhinorrhea.      Mouth/Throat:      Pharynx: Posterior oropharyngeal erythema present. No oropharyngeal exudate.   Eyes:      General: Lids are normal.      Extraocular Movements: Extraocular movements intact.      Conjunctiva/sclera: Conjunctivae normal.      Pupils: Pupils are equal, round, and reactive to light.   Neck:      Vascular: No carotid bruit.   Cardiovascular:      Rate and Rhythm: Normal rate.      Pulses: Normal pulses.   Pulmonary:      Effort: Pulmonary effort is normal. No respiratory distress.      Breath sounds: Normal breath sounds.   Abdominal:      General: Bowel sounds are normal.      Palpations: Abdomen is soft.   Musculoskeletal:         General: Normal range of motion.      Cervical back: Normal range of motion and neck supple.   Lymphadenopathy: "      Cervical: No cervical adenopathy.   Skin:     General: Skin is warm and dry.      Capillary Refill: Capillary refill takes less than 2 seconds.      Coloration: Skin is not pale.   Neurological:      General: No focal deficit present.      Mental Status: She is alert and oriented to person, place, and time. She is not disoriented.      Cranial Nerves: No cranial nerve deficit.      Motor: No weakness.      Gait: Gait normal.   Psychiatric:         Attention and Perception: She is attentive.         Mood and Affect: Mood normal. Mood is not anxious or depressed.         Speech: Speech is not rapid and pressured or slurred.         Behavior: Behavior normal. Behavior is not agitated, aggressive or hyperactive. Behavior is cooperative.         Thought Content: Thought content normal. Thought content is not paranoid or delusional. Thought content does not include homicidal or suicidal ideation. Thought content does not include homicidal or suicidal plan.         Cognition and Memory: Memory is not impaired.         Judgment: Judgment normal.       Physical Exam      Results      Assessment:     1. Hospital discharge follow-up    2. Gastroesophageal reflux disease with esophagitis without hemorrhage    3. Stricture of artery    4. Thrush      MDM:   Moderate medical complexity.  Moderate risk.  Total time: 22 minutes.  This includes total time spent on the encounter, which includes face to face time and non-face to face time preparing to see the patient (eg, review of previous medical records, tests), Obtaining and/or reviewing separately obtained history, documenting clinical information in the electronic or other health record, independently interpreting results (not separately reported)/communicating results to the patient/family/caregiver, and/or care coordination (not separately reported).    I have Reviewed and summarized old records.  I have performed thorough medication reconciliation today and discussed  risk and benefits of medications.  I have reviewed labs and discussed with patient.  All questions were answered.  I am requesting old records and will review them once they are available.  Cardiology  Visit today included increased complexity associated with the care of the episodic problem see above assessment addressed and managing the longitudinal care of the patient due to the serious and/or complex managed problem(s) see above.    I have signed for the following orders AND/OR meds.  No orders of the defined types were placed in this encounter.    Medications Ordered This Encounter   Medications    fluconazole (DIFLUCAN) 150 MG Tab     Sig: Take 1 tablet (150 mg total) by mouth every 72 hours. for 3 doses     Dispense:  3 tablet     Refill:  0    pantoprazole (PROTONIX) 40 MG tablet     Sig: Take 1 tablet (40 mg total) by mouth once daily.     Dispense:  90 tablet     Refill:  3        Follow up in about 1 year (around 7/9/2025), or if symptoms worsen or fail to improve, for Med refills, LAB RESULTS.  Future Appointments       Date Provider Specialty Appt Notes    8/20/2024  Pulmonology Final f/u visit    9/24/2024  Radiology Follow up in about 3 months (around 8/31/2024), or labs in [place], Doxy, Prednisone, chest CT, PFT.    9/24/2024  Pulmonology Follow up in about 3 months (around 8/31/2024), or labs in [place], Doxy, Prednisone, chest CT, PFT.    9/24/2024 Oliver Mederos MD Pulmonology Follow up in about 3 months (around 8/31/2024), or labs in [place], Doxy, Prednisone, chest CT, PFT.    2/6/2025 Eh Sow MD Family Medicine Annual          If no improvement in symptoms or symptoms worsen, advised to call/follow-up at clinic or go to ER. Patient voiced understanding and all questions/concerns were addressed.   DISCLAIMER: This note was compiled by using a speech recognition dictation system and therefore please be aware that typographical / speech recognition errors can and do occur.   Please contact me if you see any errors specifically.  Consent was obtained for ANA M recording system prior to the visit.    Eh Sow M.D.       Office: [phone] [address] [place] [address]  FAX: [phone]

## 2024-07-09 NOTE — ASSESSMENT & PLAN NOTE
Transitional Care Note    Family and/or Caretaker present at visit?  Yes.  Diagnostic tests reviewed/disposition: I have reviewed all completed as well as pending diagnostic tests at the time of discharge.  Disease/illness education: chest pain/ GERD  Home health/community services discussion/referrals: Patient does not have home health established from hospital visit.  They do not need home health.  If needed, we will set up home health for the patient.   Establishment or re-establishment of referral orders for community resources: No other necessary community resources.   Discussion with other health care providers: No discussion with other health care providers necessary.

## 2024-07-09 NOTE — ASSESSMENT & PLAN NOTE
Continue Pepcid.  Adding Protonix.  Patient should get off of the steroids as soon as possible but she is currently tapering down.  If no improvement consider GI consult.  GERD RECOMMENDATIONS  Please be advised of condition course.  - Take PPI in the morning 30-60 minutes before breakfast  - I recommend ongoing Education for lifestyle modifications to help control/reduce reflux/abdominal pain including: avoid large meals, avoid eating within 2-3 hours of bedtime (avoid late night eating & lying down soon after eating), elevate head of bed if nocturnal symptoms are present, smoking cessation (if current smoker), & weight loss (if overweight).   - please be advised to avoid known foods which trigger reflux symptoms & to minimize/avoid high-fat foods, chocolate, caffeine, citrus, alcohol, & tomato products.  - Advised to avoid/limit use of NSAID's, since they can cause GI upset, bleeding, and/or ulcers. If needed, take with food.

## 2024-07-09 NOTE — ASSESSMENT & PLAN NOTE
Physical exam shows erythema in the posterior pharynx.  No white exudates.  Start Diflucan.  Discussed condition course and signs and symptoms to expect.  Patient advised take  Tylenol for pain or fever.  ER precautions.  Call MD or follow-up to clinic if not improving or worsening symptoms.

## 2024-07-10 ENCOUNTER — PATIENT OUTREACH (OUTPATIENT)
Dept: PULMONOLOGY | Facility: CLINIC | Age: 69
End: 2024-07-10
Payer: MEDICARE

## 2024-07-10 DIAGNOSIS — E03.9 HYPOTHYROIDISM, UNSPECIFIED TYPE: ICD-10-CM

## 2024-07-10 RX ORDER — LEVOTHYROXINE SODIUM 25 UG/1
25 TABLET ORAL EVERY MORNING
Qty: 90 TABLET | Refills: 2 | Status: SHIPPED | OUTPATIENT
Start: 2024-07-10

## 2024-07-10 NOTE — TELEPHONE ENCOUNTER
No care due was identified.  [facility] Embedded Care Due Messages. Reference number: [id number].   7/10/2024 8:01:38 AM CDT

## 2024-07-10 NOTE — TELEPHONE ENCOUNTER
Refill Decision Note   Crystal Acuna  is requesting a refill authorization.  Brief Assessment and Rationale for Refill:  Approve     Medication Therapy Plan:         Comments:     Note composed:9:06 AM 07/10/2024

## 2024-07-21 ENCOUNTER — PATIENT MESSAGE (OUTPATIENT)
Dept: ADMINISTRATIVE | Facility: OTHER | Age: 69
End: 2024-07-21
Payer: MEDICARE

## 2024-08-01 ENCOUNTER — OFFICE VISIT (OUTPATIENT)
Dept: FAMILY MEDICINE | Facility: CLINIC | Age: 69
End: 2024-08-01
Payer: MEDICARE

## 2024-08-01 ENCOUNTER — HOSPITAL ENCOUNTER (OUTPATIENT)
Dept: RADIOLOGY | Facility: HOSPITAL | Age: 69
Discharge: HOME | End: 2024-08-01
Attending: NURSE PRACTITIONER
Payer: MEDICARE

## 2024-08-01 VITALS
SYSTOLIC BLOOD PRESSURE: 110 MMHG | WEIGHT: 204.38 LBS | RESPIRATION RATE: 16 BRPM | DIASTOLIC BLOOD PRESSURE: 60 MMHG | HEIGHT: 62 IN | BODY MASS INDEX: 37.61 KG/M2 | HEART RATE: 72 BPM | OXYGEN SATURATION: 96 % | TEMPERATURE: 98 F

## 2024-08-01 DIAGNOSIS — U07.1 COVID-19: Primary | ICD-10-CM

## 2024-08-01 DIAGNOSIS — R05.1 ACUTE COUGH: ICD-10-CM

## 2024-08-01 PROCEDURE — 3061F NEG MICROALBUMINURIA REV: CPT | Mod: CPTII,S$GLB,, | Performed by: NURSE PRACTITIONER

## 2024-08-01 PROCEDURE — 3288F FALL RISK ASSESSMENT DOCD: CPT | Mod: CPTII,S$GLB,, | Performed by: NURSE PRACTITIONER

## 2024-08-01 PROCEDURE — 1126F AMNT PAIN NOTED NONE PRSNT: CPT | Mod: CPTII,S$GLB,, | Performed by: NURSE PRACTITIONER

## 2024-08-01 PROCEDURE — 3008F BODY MASS INDEX DOCD: CPT | Mod: CPTII,S$GLB,, | Performed by: NURSE PRACTITIONER

## 2024-08-01 PROCEDURE — 3066F NEPHROPATHY DOC TX: CPT | Mod: CPTII,S$GLB,, | Performed by: NURSE PRACTITIONER

## 2024-08-01 PROCEDURE — 3051F HG A1C>EQUAL 7.0%<8.0%: CPT | Mod: CPTII,S$GLB,, | Performed by: NURSE PRACTITIONER

## 2024-08-01 PROCEDURE — 71046 X-RAY EXAM CHEST 2 VIEWS: CPT | Mod: 26,,, | Performed by: RADIOLOGY

## 2024-08-01 PROCEDURE — 1101F PT FALLS ASSESS-DOCD LE1/YR: CPT | Mod: CPTII,S$GLB,, | Performed by: NURSE PRACTITIONER

## 2024-08-01 PROCEDURE — 1159F MED LIST DOCD IN RCRD: CPT | Mod: CPTII,S$GLB,, | Performed by: NURSE PRACTITIONER

## 2024-08-01 PROCEDURE — 1160F RVW MEDS BY RX/DR IN RCRD: CPT | Mod: CPTII,S$GLB,, | Performed by: NURSE PRACTITIONER

## 2024-08-01 PROCEDURE — 4010F ACE/ARB THERAPY RXD/TAKEN: CPT | Mod: CPTII,S$GLB,, | Performed by: NURSE PRACTITIONER

## 2024-08-01 PROCEDURE — 3074F SYST BP LT 130 MM HG: CPT | Mod: CPTII,S$GLB,, | Performed by: NURSE PRACTITIONER

## 2024-08-01 PROCEDURE — 99214 OFFICE O/P EST MOD 30 MIN: CPT | Mod: S$GLB,,, | Performed by: NURSE PRACTITIONER

## 2024-08-01 PROCEDURE — 3078F DIAST BP <80 MM HG: CPT | Mod: CPTII,S$GLB,, | Performed by: NURSE PRACTITIONER

## 2024-08-01 PROCEDURE — 99999 PR PBB SHADOW E&M-EST. PATIENT-LVL IV: CPT | Mod: PBBFAC,,, | Performed by: NURSE PRACTITIONER

## 2024-08-01 PROCEDURE — 71046 X-RAY EXAM CHEST 2 VIEWS: CPT | Mod: TC,PO

## 2024-08-01 RX ORDER — PROMETHAZINE HYDROCHLORIDE AND DEXTROMETHORPHAN HYDROBROMIDE 6.25; 15 MG/5ML; MG/5ML
5 SYRUP ORAL EVERY 6 HOURS PRN
Qty: 118 ML | Refills: 0 | Status: SHIPPED | OUTPATIENT
Start: 2024-08-01 | End: 2024-08-11

## 2024-08-01 RX ORDER — IPRATROPIUM BROMIDE AND ALBUTEROL SULFATE 2.5; .5 MG/3ML; MG/3ML
3 SOLUTION RESPIRATORY (INHALATION) EVERY 6 HOURS PRN
Qty: 75 ML | Refills: 0 | Status: SHIPPED | OUTPATIENT
Start: 2024-08-01 | End: 2025-08-01

## 2024-08-01 NOTE — PROGRESS NOTES
Assessment/Plan:  Problem List Items Addressed This Visit    None  Visit Diagnoses       COVID-19    -  Primary    Acute cough        Relevant Medications    promethazine-dextromethorphan (PROMETHAZINE-DM) 6.25-15 mg/5 mL Syrp    albuterol-ipratropium (DUO-NEB) 2.5 mg-0.5 mg/3 mL nebulizer solution    Other Relevant Orders    X-Ray Chest PA And Lateral (Completed)    NEBULIZER FOR HOME USE        Patient is high risk due to past medical history. Covid risk score- 7   She is on medication that has contraindications to paxlovid  She is diabetic and was recently on long term steroids- will defer at this time  Will obtain CXR today  Promethazine DM PRN for cough  Continue mucinex, coricidin, tylenol, inhalers, and breathing treatments   She states she does need a new nebulizer - orders placed - fax to [facility]   Supportive care- rest, increase hydration with water, OTC Tylenol for pain/fever.  Advised to quarantine for 5 full days and notify those you have tested positive for covid. Take precautions until day 10.   Follow up with pulmonology   Follow up if symptoms worsen or fail to improve.  ER precautions for severe or worsening symptoms.     Antoinette Curtis NP  _____________________________________________________________________________________________________________________________________________________    CC: covid concerns     HPI: Patient is a 68-year-old female who presents in clinic today as an established patient here for covid. This is a new problem. The current episode started x2 days ago. She states that she tested positive for covid yesterday on a home test. The problem is gradually worsening. There has been low fever, 99F. She is experiencing no pain. Associated symptoms include congestion, postnasal drip, runny nose, cough, chills, sweats, fatigue, body aches, headache. She is a former smoker. Quit around 10 years ago. She does have history of COPD. Followed by pulmonology. Reports recent abnormal CT  scan and was on long term steroids for a few weeks. She does have diabetes. Planning to repeat CT chest in about a month. Also pertinent history of heart attack, heart failure. Followed by cardiology. She is on blood thinners. Pertinent negatives include no ear pain, hoarse voice, neck pain, wheezing, chest pain, shortness of breath, NVD, sneezing, or swollen glands. Past treatments include mucinex, coricidin, tylenol. The treatment provided minimal relief. She thinks she was exposed at [employer] due to several members also recently testing positive.     She is inquiring about elderberry and vitamin C. Advised patient that these supplements would be fine to take.     Lab Results   Component Value Date    HGBA1C 7.7 (H) 2024     X-Ray Chest PA And Lateral  Narrative: EXAMINATION:  XR CHEST PA AND LATERAL    CLINICAL HISTORY:  Acute cough    TECHNIQUE:  PA and lateral views of the chest were performed.    COMPARISON:  Prior radiographs    FINDINGS:  Cardiac silhouette and mediastinal contours are normal.  Lungs are clear.  Osseous structures are intact.  Impression: No acute cardiopulmonary process.    Electronically signed by: Valdo Cameron MD  Date:    2024  Time:    10:31    Past Medical History:  Past Medical History:   Diagnosis Date    Allergy     Bronchitis     Family history of colonic polyps 2021    Three of her siblings have had colon polyps.    Hallux abductovalgus     Herniated lumbar intervertebral disc     Hyperlipidemia     Diet controlled    Hypertension     Smoker     Staph aureus infection      Past Surgical History:   Procedure Laterality Date    BACK SURGERY      L4-L5    BREAST BIOPSY      CARPAL TUNNEL RELEASE      bilateral     SECTION      times 1    CHOLECYSTECTOMY  2010    COLONOSCOPY N/A 2021    Procedure: COLONOSCOPY;  Surgeon: Kevin Goff MD;  Location: [facility];  Service: Endoscopy;  Laterality: N/A;    CYST REMOVAL      right hand     "ENDOMETRIAL ABLATION  06/26/2009    EYE SURGERY      FOOT FRACTURE SURGERY      JOINT REPLACEMENT      Left knee    SPINE SURGERY      TONSILLECTOMY       Review of patient's allergies indicates:   Allergen Reactions    Metformin     Perflutren lipid microspheres Other (See Comments) and Shortness Of Breath     Back pain, neck pain, arm pain/cramping, and flush face.    Farxiga [dapagliflozin]      Yeast infection      Latex, natural rubber     Ozempic [semaglutide]      Stomach pain      Bactrim [sulfamethoxazole-trimethoprim] Rash     Social History     Tobacco Use    Smoking status: Former     Current packs/day: 0.75     Average packs/day: 0.8 packs/day for 39.0 years (29.3 ttl pk-yrs)     Types: Cigarettes    Smokeless tobacco: Never    Tobacco comments:     quit 2 yrs ago   Substance Use Topics    Alcohol use: No    Drug use: No     Family History   Problem Relation Name Age of Onset    COPD Mother Ayse     Heart disease Mother Ayse     Heart disease Father Jose Ramon         MI    Breast cancer Maternal Aunt       Current Outpatient Medications on File Prior to Visit   Medication Sig Dispense Refill    amiodarone (PACERONE) 200 MG Tab Take 200 mg by mouth. 100 mg      atorvastatin (LIPITOR) 80 MG tablet Take 80 mg by mouth every evening.      BD SOMMER 2ND GEN PEN NEEDLE 32 gauge x 5/32" Ndle use as directed      blood sugar diagnostic Strp To check BG 2 times daily, to use with insurance preferred meter 200 each 5    chlorhexidine (HIBICLENS) 4 % external liquid Apply topically daily as needed. 473 mL 0    clopidogreL (PLAVIX) 75 mg tablet Take 75 mg by mouth.      ELIQUIS 5 mg Tab Take 5 mg by mouth 2 (two) times daily.      ENTRESTO 24-26 mg per tablet Take 1 tablet by mouth 2 (two) times daily.      ezetimibe (ZETIA) 10 mg tablet Take 10 mg by mouth.      famotidine (PEPCID) 40 MG tablet TAKE ONE TABLET BY MOUTH EVERY DAY 90 tablet 2    fluocinolone acetonide oiL (DERMOTIC OIL) 0.01 % Drop Place 3 drops in " ear(s) 2 (two) times daily. 20 mL 5    fluorometholone 0.1% (FML) 0.1 % DrpS INSTILL 1 DROP INTO EACH EYE THREE TIMES DAILY AS DIRECTED      FREESTYLE MONIKA 3 SENSOR Meseret Use to check glucose 4x a day. Change sensor every 14 days.      furosemide (LASIX) 20 MG tablet Take 20 mg by mouth daily as needed.      hydrocortisone 1 % cream Apply to affected area 2 times daily 30 g 0    insulin lispro 100 unit/mL pen Inject 0-6 Units into the skin.      lancets Misc To check BG 2 times daily, to use with insurance preferred meter 200 each 99    levalbuterol (XOPENEX HFA) 45 mcg/actuation inhaler INHALE ONE PUFF INTO THE LUNGS EVERY 4 HOURS AS NEEDED FOR WHEEZING 45 g 1    levothyroxine (SYNTHROID) 25 MCG tablet take 1 tablet by mouth EVERY MORNING 90 tablet 2    metoprolol succinate (TOPROL-XL) 25 MG 24 hr tablet Take 25 mg by mouth. Pt take 12.5mg      MOUNJARO 5 mg/0.5 mL PnIj INJECT FIVE MG into the skin EVERY 7 DAYS 12 Pen 1    mupirocin (BACTROBAN) 2 % ointment Apply topically 3 (three) times daily. 22 g 2    nitroGLYCERIN (NITROSTAT) 0.4 MG SL tablet Place 0.4 mg under the tongue as needed.      pantoprazole (PROTONIX) 40 MG tablet Take 1 tablet (40 mg total) by mouth once daily. 90 tablet 3    SITagliptin phosphate (JANUVIA) 100 MG Tab Take 100 mg by mouth.      SPIRIVA RESPIMAT 2.5 mcg/actuation inhaler INHALE TWO PUFFS DAILY 4 g 11    blood-glucose meter kit To check BG 2 times daily, to use with insurance preferred meter 1 each 0     No current facility-administered medications on file prior to visit.     Review of Systems   Constitutional:  Positive for chills, diaphoresis, fatigue and fever. Negative for appetite change.   HENT:  Positive for congestion, postnasal drip, rhinorrhea and sore throat. Negative for ear pain.    Eyes:  Negative for visual disturbance.   Respiratory:  Positive for cough. Negative for shortness of breath and wheezing.    Cardiovascular:  Negative for chest pain, palpitations and leg  "swelling.   Gastrointestinal:  Negative for abdominal pain, diarrhea and vomiting.   Genitourinary:  Negative for difficulty urinating, dysuria and hematuria.   Musculoskeletal:  Positive for arthralgias and myalgias.   Skin:  Negative for rash and wound.   Neurological:  Positive for headaches. Negative for dizziness.   Psychiatric/Behavioral:  Negative for behavioral problems. The patient is not nervous/anxious.      Vitals:    08/01/24 0947   BP: 110/60   Pulse: 72   Resp: 16   Temp: 98.2 °F (36.8 °C)   TempSrc: Oral   SpO2: 96%   Weight: 92.7 kg (204 lb 6.4 oz)   Height: 5' 2" (1.575 m)     Wt Readings from Last 3 Encounters:   08/01/24 92.7 kg (204 lb 6.4 oz)   07/09/24 91.2 kg (201 lb 1.6 oz)   03/20/24 90.3 kg (199 lb)     Physical Exam  Vitals reviewed.   Constitutional:       General: She is not in acute distress.     Appearance: Normal appearance. She is not ill-appearing.   HENT:      Head: Normocephalic and atraumatic.      Right Ear: Tympanic membrane, ear canal and external ear normal.      Left Ear: Tympanic membrane, ear canal and external ear normal.      Nose: Congestion present.      Mouth/Throat:      Mouth: Mucous membranes are moist.      Pharynx: Posterior oropharyngeal erythema present.   Eyes:      Extraocular Movements: Extraocular movements intact.      Conjunctiva/sclera: Conjunctivae normal.   Cardiovascular:      Rate and Rhythm: Normal rate.      Heart sounds: Normal heart sounds.   Pulmonary:      Effort: Pulmonary effort is normal. No respiratory distress.      Breath sounds: No wheezing.      Comments: +coughing on exam  Abdominal:      General: Abdomen is flat. There is no distension.   Musculoskeletal:         General: Normal range of motion.      Cervical back: Normal range of motion and neck supple.   Lymphadenopathy:      Cervical: No cervical adenopathy.   Skin:     General: Skin is warm and dry.      Capillary Refill: Capillary refill takes less than 2 seconds.      " Coloration: Skin is not pale.      Findings: No rash.   Neurological:      General: No focal deficit present.      Mental Status: She is alert and oriented to person, place, and time. Mental status is at baseline.   Psychiatric:         Mood and Affect: Mood normal.         Speech: Speech normal. Speech is not rapid and pressured, delayed or slurred.         Behavior: Behavior normal. Behavior is not agitated, slowed, aggressive, withdrawn, hyperactive or combative. Behavior is cooperative.         Thought Content: Thought content normal.         Judgment: Judgment normal.       Health Maintenance   Topic Date Due    TETANUS VACCINE  Never done    Shingles Vaccine (1 of 2) Never done    Eye Exam  11/16/2024    Hemoglobin A1c  12/03/2024    Lipid Panel  12/07/2024    Foot Exam  02/06/2025    Mammogram  02/06/2025    High Dose Statin  08/01/2025    DEXA Scan  09/28/2025    Colorectal Cancer Screening  04/27/2026    Hepatitis C Screening  Completed

## 2024-08-02 ENCOUNTER — PATIENT MESSAGE (OUTPATIENT)
Dept: PULMONOLOGY | Facility: CLINIC | Age: 69
End: 2024-08-02
Payer: MEDICARE

## 2024-08-02 ENCOUNTER — TELEPHONE (OUTPATIENT)
Dept: FAMILY MEDICINE | Facility: CLINIC | Age: 69
End: 2024-08-02
Payer: MEDICARE

## 2024-08-02 DIAGNOSIS — J44.9 COPD, GROUP A, BY GOLD 2017 CLASSIFICATION: Primary | ICD-10-CM

## 2024-08-02 NOTE — TELEPHONE ENCOUNTER
"Need a favor. Antoinette saw this pt on yesterday, 08/01/2024 and placed an order for a nebulizer for home use and Marilee stated that the order needs to include, "Provide 2 neb kites per month" and add a J code. I have a list of J codes that marilee did provide for me. Can you please addend the order so that I can refax it with the requested information? Thank you much.  "

## 2024-08-02 NOTE — TELEPHONE ENCOUNTER
I have signed for the following orders AND/OR meds.  Please call the patient and ask the patient to schedule the testing AND/OR inform about any medications that were sent.      Orders Placed This Encounter   Procedures    NEBULIZER KIT (SUPPLIES) FOR HOME USE     Provide 2 neb kits per month     Order Specific Question:   Height:     Answer:   5ft2in     Order Specific Question:   Weight:     Answer:   92.7kg     Order Specific Question:   Length of need (1-99 months):     Answer:   99     Order Specific Question:   Mask or Mouthpiece?     Answer:   Mouthpiece

## 2024-08-12 ENCOUNTER — PATIENT MESSAGE (OUTPATIENT)
Dept: FAMILY MEDICINE | Facility: CLINIC | Age: 69
End: 2024-08-12
Payer: MEDICARE

## 2024-08-12 DIAGNOSIS — E11.65 TYPE 2 DIABETES MELLITUS WITH HYPERGLYCEMIA, WITHOUT LONG-TERM CURRENT USE OF INSULIN: Primary | ICD-10-CM

## 2024-08-12 NOTE — TELEPHONE ENCOUNTER
I have signed for the following orders AND/OR meds.  Please call the patient and ask the patient to schedule the testing AND/OR inform about any medications that were sent.     Orders Placed This Encounter   Procedures    Ambulatory referral/consult to Diabetic Advanced Practice Providers (Medical Management)     Standing Status:   Future     Standing Expiration Date:   9/12/2025     Referral Priority:   Routine     Referral Type:   Consultation     Referral Reason:   Specialty Services Required     Referred to Provider:   Romana Shane FNP     Number of Visits Requested:   1

## 2024-08-20 ENCOUNTER — TELEPHONE (OUTPATIENT)
Dept: PULMONOLOGY | Facility: CLINIC | Age: 69
End: 2024-08-20
Payer: MEDICARE

## 2024-08-20 ENCOUNTER — PATIENT MESSAGE (OUTPATIENT)
Dept: ADMINISTRATIVE | Facility: OTHER | Age: 69
End: 2024-08-20
Payer: MEDICARE

## 2024-08-20 NOTE — TELEPHONE ENCOUNTER
Called patient in regard to virtual visit scheduled for today. Patient requests to reschedule visit to same day for upcoming pulmonology appointments. Visit rescheduled.

## 2024-08-27 ENCOUNTER — TELEPHONE (OUTPATIENT)
Dept: DIABETES | Facility: CLINIC | Age: 69
End: 2024-08-27
Payer: MEDICARE

## 2024-08-27 LAB
LEFT EYE DM RETINOPATHY: NEGATIVE
RIGHT EYE DM RETINOPATHY: NEGATIVE

## 2024-08-27 NOTE — TELEPHONE ENCOUNTER
Spoke to patient to schedule new patient appointment with diabetes management, from the referral. Patient stated she would have to call back later because she'll have to get with her daughter to see when she can bring her.

## 2024-08-28 ENCOUNTER — TELEPHONE (OUTPATIENT)
Dept: FAMILY MEDICINE | Facility: CLINIC | Age: 69
End: 2024-08-28
Payer: MEDICARE

## 2024-08-28 ENCOUNTER — TELEPHONE (OUTPATIENT)
Dept: DIABETES | Facility: CLINIC | Age: 69
End: 2024-08-28
Payer: MEDICARE

## 2024-08-28 DIAGNOSIS — R30.0 DYSURIA: Primary | ICD-10-CM

## 2024-08-28 NOTE — TELEPHONE ENCOUNTER
3rd attempt to contact patient to schedule np appointment with diabetes management, from the referral. No answer, voicemail not set up, unable to leave message.

## 2024-08-29 ENCOUNTER — TELEPHONE (OUTPATIENT)
Dept: DIABETES | Facility: CLINIC | Age: 69
End: 2024-08-29
Payer: MEDICARE

## 2024-08-29 ENCOUNTER — LAB VISIT (OUTPATIENT)
Dept: LAB | Facility: HOSPITAL | Age: 69
End: 2024-08-29
Attending: FAMILY MEDICINE
Payer: MEDICARE

## 2024-08-29 ENCOUNTER — E-VISIT (OUTPATIENT)
Dept: FAMILY MEDICINE | Facility: CLINIC | Age: 69
End: 2024-08-29
Payer: MEDICARE

## 2024-08-29 DIAGNOSIS — R30.0 DYSURIA: ICD-10-CM

## 2024-08-29 DIAGNOSIS — R30.0 DYSURIA: Primary | ICD-10-CM

## 2024-08-29 DIAGNOSIS — N39.0 URINARY TRACT INFECTION WITHOUT HEMATURIA, SITE UNSPECIFIED: Primary | ICD-10-CM

## 2024-08-29 LAB
BACTERIA #/AREA URNS HPF: ABNORMAL /HPF
BILIRUB UR QL STRIP: NEGATIVE
CLARITY UR: ABNORMAL
COLOR UR: YELLOW
GLUCOSE UR QL STRIP: ABNORMAL
HGB UR QL STRIP: ABNORMAL
KETONES UR QL STRIP: NEGATIVE
LEUKOCYTE ESTERASE UR QL STRIP: ABNORMAL
MICROSCOPIC COMMENT: ABNORMAL
NITRITE UR QL STRIP: NEGATIVE
PH UR STRIP: 6 [PH] (ref 5–8)
PROT UR QL STRIP: NEGATIVE
RBC #/AREA URNS HPF: 1 /HPF (ref 0–4)
SP GR UR STRIP: 1.01 (ref 1–1.03)
SQUAMOUS #/AREA URNS HPF: 6 /HPF
URN SPEC COLLECT METH UR: ABNORMAL
WBC #/AREA URNS HPF: 12 /HPF (ref 0–5)
YEAST URNS QL MICRO: ABNORMAL

## 2024-08-29 PROCEDURE — 81000 URINALYSIS NONAUTO W/SCOPE: CPT | Mod: PO | Performed by: FAMILY MEDICINE

## 2024-08-29 PROCEDURE — 87086 URINE CULTURE/COLONY COUNT: CPT | Performed by: FAMILY MEDICINE

## 2024-08-29 PROCEDURE — 87088 URINE BACTERIA CULTURE: CPT | Performed by: FAMILY MEDICINE

## 2024-08-29 PROCEDURE — 87186 SC STD MICRODIL/AGAR DIL: CPT | Performed by: FAMILY MEDICINE

## 2024-08-29 RX ORDER — CIPROFLOXACIN 500 MG/1
500 TABLET ORAL 2 TIMES DAILY
Qty: 14 TABLET | Refills: 0 | Status: SHIPPED | OUTPATIENT
Start: 2024-08-29 | End: 2024-09-05

## 2024-08-29 NOTE — PROGRESS NOTES
Patient ID: Crystal Acuna is a 68 y.o. female.    Chief Complaint: Urinary Tract Infection (Entered automatically based on patient selection in [employer].)    The patient initiated a request through [employer] on 8/29/2024 for evaluation and management with a chief complaint of Urinary Tract Infection (Entered automatically based on patient selection in [employer].)     I evaluated the questionnaire submission on 08/29/2024  .    [facility] Peq Evisit Uti Questionnaire    8/29/2024  6:46 AM CDT - Filed by Patient   Do you agree to participate in an E-Visit? Yes   If you have any of the following symptoms, please present to your local emergency room or call 911:  I acknowledge   Choose the state of your primary residence [place]   What is the main issue you would like addressed today? To see if my back pain is because of possible uti   What symptoms do you currently have? Pain while passing urine   When did your symptoms first appear? 8/27/2024   List what you have done or taken to help your symptoms. I do not have pain while passing urine only back pain. It made me choose something.   Please indicate whether you have had the following symptoms during the past 24 hours     Urgent urination (a sudden and uncontrollable urge to urinate) None   Frequent urination of small amounts of urine (going to the toilet very often) None   Burning pain when urinating None   Incomplete bladder emptying (still feel like you need to urinate again after urination) None   Pain not associated with urination in the lower abdomen below the belly button) None   What does your urine look like? Clear   Blood seen in the urine None   Flank pain (pain in one or both sides of the lower back) None   Abnormal Vaginal Discharge (abnormal amount, color and/or odor) None   Discharge from the urethra (urinary opening) without urination None   High body temperature/fever? None-<99.5   Please rate how much discomfort you have experience because of the symptoms  in the past 24 hours: Moderate   Please indicate how the symptoms have interfered with your every day activities/work in the past 24 hours: Moderate   Please indicate how these symptoms have interfered with your social activities (visiting people, meeting with friends, etc.) in the past 24 hours? Mild   Are you a diabetic? Yes   Please indicate whether you have the following at the time of completion of this questionnaire: None of the above   Provide any additional information you feel is important. I started taking jardiance tuesday.   Please attach any relevant images or files (if you have performed a home test for UTI, please submit a photo of results)    Are you able to take your vital signs? Yes   Systolic Blood Pressure:    Diastolic Blood Pressure:    Weight:    Height:    Pulse:    Temperature:    Respiration rate:    Pulse Oxygen:          Encounter Diagnosis   Name Primary?    Dysuria Yes        Orders Placed This Encounter   Procedures    Urinalysis, Reflex to Urine Culture Urine, Clean Catch     Standing Status:   Future     Standing Expiration Date:   10/28/2025     Order Specific Question:   Preferred Collection Type     Answer:   Urine, Clean Catch     Order Specific Question:   Specimen Source     Answer:   Urine    Ambulatory referral/consult to Urology     Standing Status:   Future     Standing Expiration Date:   9/29/2025     Referral Priority:   Routine     Referral Type:   Consultation     Referral Reason:   Specialty Services Required     Requested Specialty:   Urology     Number of Visits Requested:   1            Follow up if symptoms worsen or fail to improve.      E-Visit Time Tracking:    Day 1 Time (in minutes): 7    Total Time (in minutes): 7

## 2024-08-29 NOTE — PATIENT INSTRUCTIONS
Follow up if symptoms worsen or fail to improve.     Dear patient,   As a result of recent federal legislation (The Federal Cures Act), you may receive lab or pathology results from your visit in your [facility] account before your physician is able to contact you. Your physician or their representative will relay the results to you with their recommendations at their soonest availability.     If no improvement in symptoms or symptoms worsen, please be advised to call MD, follow-up at clinic and/or go to ER if becomes severe.    hE Sow M.D.        We Offer TELEHEALTH & Same Day Appointments!   Book your Telehealth appointment with me through my nurse or   Clinic appointments on [employer]!    [address] [place] [address]    Office: [phone]   FAX: [phone]    Check out my Facebook Page and Follow Me at: [email or web address]    Check out my website at [email or web address] by clicking on: [email or web address]    To Schedule appointments online, go to [email or web address]: [email or web address]

## 2024-08-29 NOTE — PROGRESS NOTES
Please call to make sure patient get the results.    [phone], Your urinalysis is abnormal.  Showing infection.  I am sending antibiotic ciprofloxacin to the pharmacy.  I have also placed a referral to Urology please set up an appointment with them soon.

## 2024-08-29 NOTE — TELEPHONE ENCOUNTER
3rd attempt to contact patient to schedule new patient appointment with diabetes management, from the referral. No answer, left voicemail on daughter's cell phone.

## 2024-08-29 NOTE — Clinical Note
Patient with UTI on urine sample.  I have sent ciprofloxacin to the pharmacy.  Please notify the patient pick this up and start taking it as soon as possible.  I have also referred the patient to Urology.  Patient should make an appointment soon.

## 2024-08-30 LAB — BACTERIA UR CULT: ABNORMAL

## 2024-08-31 NOTE — PROGRESS NOTES
1st check to see if patient has seen the results.  If not then  CALL patient with results and Document verification.  Schedule follow-up if needed.  [phone]    TETANUS VACCINE Never done  Shingles Vaccine(1 of 2) Never done  RSV Vaccine (Age 60+ and Pregnant patients)(1 - 1-dose 60+ series) Never done  COVID-19 Vaccine(3 - 2023-24 season) due on 09/01/2023  Eye Exam due on 11/16/2024      The urine culture is starting to grow bacteria.  Further identification and susceptibility is still pending.  Please make sure that you are taking the antibiotic as prescribed.  I recommend that you follow-up with a urologist soon for further evaluation for recurrent UTIs.  ER precautions for severe symptoms.

## 2024-09-11 ENCOUNTER — PATIENT MESSAGE (OUTPATIENT)
Dept: FAMILY MEDICINE | Facility: CLINIC | Age: 69
End: 2024-09-11
Payer: MEDICARE

## 2024-09-12 ENCOUNTER — LAB VISIT (OUTPATIENT)
Dept: LAB | Facility: HOSPITAL | Age: 69
End: 2024-09-12
Attending: INTERNAL MEDICINE
Payer: MEDICARE

## 2024-09-12 DIAGNOSIS — E11.9 DIABETES MELLITUS WITHOUT COMPLICATION: ICD-10-CM

## 2024-09-12 DIAGNOSIS — E78.2 MIXED HYPERLIPIDEMIA: ICD-10-CM

## 2024-09-12 DIAGNOSIS — E03.4 HYPOTHYROIDISM DUE TO ACQUIRED ATROPHY OF THYROID: ICD-10-CM

## 2024-09-12 LAB
ALBUMIN SERPL BCP-MCNC: 3.7 G/DL (ref 3.5–5.2)
ALP SERPL-CCNC: 48 U/L (ref 55–135)
ALT SERPL W/O P-5'-P-CCNC: 21 U/L (ref 10–44)
ANION GAP SERPL CALC-SCNC: 9 MMOL/L (ref 8–16)
AST SERPL-CCNC: 16 U/L (ref 10–40)
BASOPHILS # BLD AUTO: 0.03 K/UL (ref 0–0.2)
BASOPHILS NFR BLD: 0.5 % (ref 0–1.9)
BILIRUB SERPL-MCNC: 0.5 MG/DL (ref 0.1–1)
BUN SERPL-MCNC: 22 MG/DL (ref 8–23)
CALCIUM SERPL-MCNC: 9.5 MG/DL (ref 8.7–10.5)
CHLORIDE SERPL-SCNC: 106 MMOL/L (ref 95–110)
CHOLEST SERPL-MCNC: 133 MG/DL (ref 120–199)
CHOLEST/HDLC SERPL: 2.6 {RATIO} (ref 2–5)
CO2 SERPL-SCNC: 25 MMOL/L (ref 23–29)
CREAT SERPL-MCNC: 0.9 MG/DL (ref 0.5–1.4)
DIFFERENTIAL METHOD BLD: ABNORMAL
EOSINOPHIL # BLD AUTO: 0.2 K/UL (ref 0–0.5)
EOSINOPHIL NFR BLD: 2.9 % (ref 0–8)
ERYTHROCYTE [DISTWIDTH] IN BLOOD BY AUTOMATED COUNT: 15.1 % (ref 11.5–14.5)
EST. GFR  (NO RACE VARIABLE): >60 ML/MIN/1.73 M^2
GLUCOSE SERPL-MCNC: 130 MG/DL (ref 70–110)
HCT VFR BLD AUTO: 44.9 % (ref 37–48.5)
HDLC SERPL-MCNC: 51 MG/DL (ref 40–75)
HDLC SERPL: 38.3 % (ref 20–50)
HGB BLD-MCNC: 14.3 G/DL (ref 12–16)
IMM GRANULOCYTES # BLD AUTO: 0.02 K/UL (ref 0–0.04)
IMM GRANULOCYTES NFR BLD AUTO: 0.3 % (ref 0–0.5)
LDLC SERPL CALC-MCNC: 62.4 MG/DL (ref 63–159)
LYMPHOCYTES # BLD AUTO: 1.8 K/UL (ref 1–4.8)
LYMPHOCYTES NFR BLD: 29.2 % (ref 18–48)
MCH RBC QN AUTO: 29.8 PG (ref 27–31)
MCHC RBC AUTO-ENTMCNC: 31.8 G/DL (ref 32–36)
MCV RBC AUTO: 94 FL (ref 82–98)
MONOCYTES # BLD AUTO: 0.6 K/UL (ref 0.3–1)
MONOCYTES NFR BLD: 9.7 % (ref 4–15)
NEUTROPHILS # BLD AUTO: 3.5 K/UL (ref 1.8–7.7)
NEUTROPHILS NFR BLD: 57.4 % (ref 38–73)
NONHDLC SERPL-MCNC: 82 MG/DL
NRBC BLD-RTO: 0 /100 WBC
PLATELET # BLD AUTO: 158 K/UL (ref 150–450)
PMV BLD AUTO: 11.9 FL (ref 9.2–12.9)
POTASSIUM SERPL-SCNC: 4.3 MMOL/L (ref 3.5–5.1)
PROT SERPL-MCNC: 7 G/DL (ref 6–8.4)
RBC # BLD AUTO: 4.8 M/UL (ref 4–5.4)
SODIUM SERPL-SCNC: 140 MMOL/L (ref 136–145)
T4 FREE SERPL-MCNC: 1.22 NG/DL (ref 0.71–1.51)
TRIGL SERPL-MCNC: 98 MG/DL (ref 30–150)
TSH SERPL DL<=0.005 MIU/L-ACNC: 1.19 UIU/ML (ref 0.4–4)
WBC # BLD AUTO: 6.16 K/UL (ref 3.9–12.7)

## 2024-09-12 PROCEDURE — 84443 ASSAY THYROID STIM HORMONE: CPT | Performed by: INTERNAL MEDICINE

## 2024-09-12 PROCEDURE — 80053 COMPREHEN METABOLIC PANEL: CPT | Performed by: INTERNAL MEDICINE

## 2024-09-12 PROCEDURE — 80061 LIPID PANEL: CPT | Performed by: INTERNAL MEDICINE

## 2024-09-12 PROCEDURE — 85025 COMPLETE CBC W/AUTO DIFF WBC: CPT | Performed by: INTERNAL MEDICINE

## 2024-09-12 PROCEDURE — 36415 COLL VENOUS BLD VENIPUNCTURE: CPT | Mod: PO | Performed by: INTERNAL MEDICINE

## 2024-09-12 PROCEDURE — 84439 ASSAY OF FREE THYROXINE: CPT | Performed by: INTERNAL MEDICINE

## 2024-09-16 ENCOUNTER — PATIENT OUTREACH (OUTPATIENT)
Dept: ADMINISTRATIVE | Facility: HOSPITAL | Age: 69
End: 2024-09-16
Payer: MEDICARE

## 2024-09-19 ENCOUNTER — PATIENT MESSAGE (OUTPATIENT)
Dept: ADMINISTRATIVE | Facility: OTHER | Age: 69
End: 2024-09-19
Payer: MEDICARE

## 2024-09-24 ENCOUNTER — CLINICAL SUPPORT (OUTPATIENT)
Dept: PULMONOLOGY | Facility: CLINIC | Age: 69
End: 2024-09-24
Attending: INTERNAL MEDICINE
Payer: MEDICARE

## 2024-09-24 ENCOUNTER — HOSPITAL ENCOUNTER (OUTPATIENT)
Dept: RADIOLOGY | Facility: HOSPITAL | Age: 69
Discharge: HOME | End: 2024-09-24
Attending: INTERNAL MEDICINE
Payer: MEDICARE

## 2024-09-24 ENCOUNTER — CLINICAL SUPPORT (OUTPATIENT)
Dept: PULMONOLOGY | Facility: CLINIC | Age: 69
End: 2024-09-24
Payer: MEDICARE

## 2024-09-24 VITALS
SYSTOLIC BLOOD PRESSURE: 130 MMHG | HEART RATE: 56 BPM | DIASTOLIC BLOOD PRESSURE: 62 MMHG | OXYGEN SATURATION: 96 % | RESPIRATION RATE: 20 BRPM | WEIGHT: 203 LBS | HEIGHT: 62 IN | BODY MASS INDEX: 37.36 KG/M2

## 2024-09-24 DIAGNOSIS — G47.33 OSA (OBSTRUCTIVE SLEEP APNEA): ICD-10-CM

## 2024-09-24 DIAGNOSIS — R91.8 PULMONARY NODULES: ICD-10-CM

## 2024-09-24 DIAGNOSIS — E78.5 HYPERLIPIDEMIA ASSOCIATED WITH TYPE 2 DIABETES MELLITUS: Chronic | ICD-10-CM

## 2024-09-24 DIAGNOSIS — I50.22 CHRONIC SYSTOLIC CONGESTIVE HEART FAILURE: ICD-10-CM

## 2024-09-24 DIAGNOSIS — E03.9 HYPOTHYROIDISM, UNSPECIFIED TYPE: Chronic | ICD-10-CM

## 2024-09-24 DIAGNOSIS — R91.8 GROUND GLASS OPACITY PRESENT ON IMAGING OF LUNG: ICD-10-CM

## 2024-09-24 DIAGNOSIS — E11.65 TYPE 2 DIABETES MELLITUS WITH HYPERGLYCEMIA, WITHOUT LONG-TERM CURRENT USE OF INSULIN: Chronic | ICD-10-CM

## 2024-09-24 DIAGNOSIS — E11.69 HYPERLIPIDEMIA ASSOCIATED WITH TYPE 2 DIABETES MELLITUS: Chronic | ICD-10-CM

## 2024-09-24 DIAGNOSIS — I50.20 HFREF (HEART FAILURE WITH REDUCED EJECTION FRACTION): ICD-10-CM

## 2024-09-24 DIAGNOSIS — J44.9 COPD, GROUP A, BY GOLD 2017 CLASSIFICATION: Primary | ICD-10-CM

## 2024-09-24 DIAGNOSIS — I48.0 PAF (PAROXYSMAL ATRIAL FIBRILLATION): ICD-10-CM

## 2024-09-24 DIAGNOSIS — I15.2 HYPERTENSION ASSOCIATED WITH DIABETES: Chronic | ICD-10-CM

## 2024-09-24 DIAGNOSIS — E11.59 HYPERTENSION ASSOCIATED WITH DIABETES: Chronic | ICD-10-CM

## 2024-09-24 LAB
BRPFT: ABNORMAL
DLCO ADJ PRE: 13.97 ML/(MIN*MMHG) (ref 14.63–26.1)
DLCO SINGLE BREATH LLN: 14.63
DLCO SINGLE BREATH PRE REF: 70.4 %
DLCO SINGLE BREATH REF: 20.36
DLCOC SBVA LLN: 2.87
DLCOC SBVA PRE REF: 86.2 %
DLCOC SBVA REF: 4.45
DLCOC SINGLE BREATH LLN: 14.63
DLCOC SINGLE BREATH PRE REF: 68.6 %
DLCOC SINGLE BREATH REF: 20.36
DLCOVA LLN: 2.87
DLCOVA PRE REF: 88.5 %
DLCOVA PRE: 3.94 ML/(MIN*MMHG*L) (ref 2.87–6.04)
DLCOVA REF: 4.45
DLVAADJ PRE: 3.84 ML/(MIN*MMHG*L) (ref 2.87–6.04)
ERV LLN: -16449.33
ERV PRE REF: 67.9 %
ERV REF: 0.67
FEF 25 75 CHG: 15 %
FEF 25 75 LLN: 1.21
FEF 25 75 POST REF: 51.2 %
FEF 25 75 PRE REF: 44.5 %
FEF 25 75 REF: 2.6
FET100 CHG: 15.6 %
FEV1 CHG: 9.3 %
FEV1 FVC CHG: 0.9 %
FEV1 FVC LLN: 66
FEV1 FVC POST REF: 91.6 %
FEV1 FVC PRE REF: 90.8 %
FEV1 FVC REF: 79
FEV1 LLN: 1.57
FEV1 POST REF: 91.5 %
FEV1 PRE REF: 83.7 %
FEV1 REF: 2.13
FRCPLETH LLN: 1.76
FRCPLETH PREREF: 115.3 %
FRCPLETH REF: 2.58
FVC CHG: 8.3 %
FVC LLN: 2.01
FVC POST REF: 99.2 %
FVC PRE REF: 91.6 %
FVC REF: 2.72
IVC PRE: 2.03 L (ref 2.01–3.43)
IVC SINGLE BREATH LLN: 2.01
IVC SINGLE BREATH PRE REF: 74.6 %
IVC SINGLE BREATH REF: 2.72
MVV LLN: 66
MVV PRE REF: 77.9 %
MVV REF: 77
PEF CHG: 25 %
PEF LLN: 3.94
PEF POST REF: 131.1 %
PEF PRE REF: 104.9 %
PEF REF: 5.53
POST FEF 25 75: 1.33 L/S (ref 1.21–4)
POST FET 100: 9.24 SEC
POST FEV1 FVC: 72.2 % (ref 65.87–91.72)
POST FEV1: 1.95 L (ref 1.57–2.69)
POST FVC: 2.7 L (ref 2.01–3.43)
POST PEF: 7.25 L/S (ref 3.94–7.13)
PRE DLCO: 14.34 ML/(MIN*MMHG) (ref 14.63–26.1)
PRE ERV: 0.46 L (ref -16449.33–16450.67)
PRE FEF 25 75: 1.16 L/S (ref 1.21–4)
PRE FET 100: 7.99 SEC
PRE FEV1 FVC: 71.53 % (ref 65.87–91.72)
PRE FEV1: 1.78 L (ref 1.57–2.69)
PRE FRC PL: 2.98 L
PRE FVC: 2.49 L (ref 2.01–3.43)
PRE MVV: 60 L/MIN (ref 65.5–88.62)
PRE PEF: 5.8 L/S (ref 3.94–7.13)
PRE RV: 2.55 L (ref 1.34–2.49)
PRE TLC: 5.08 L (ref 3.58–5.56)
RAW LLN: 3.06
RAW PRE REF: 207.9 %
RAW PRE: 6.36 CMH2O*S/L (ref 3.06–3.06)
RAW REF: 3.06
RV LLN: 1.34
RV PRE REF: 133.4 %
RV REF: 1.91
RVTLC LLN: 32
RVTLC PRE REF: 120.4 %
RVTLC PRE: 50.26 % (ref 32.15–51.33)
RVTLC REF: 42
TLC LLN: 3.58
TLC PRE REF: 111.1 %
TLC REF: 4.57
VA PRE: 3.65 L (ref 4.42–4.42)
VA SINGLE BREATH LLN: 4.42
VA SINGLE BREATH PRE REF: 82.5 %
VA SINGLE BREATH REF: 4.42
VC LLN: 2.01
VC PRE REF: 92.8 %
VC PRE: 2.53 L (ref 2.01–3.43)
VC REF: 2.72
VTGRAWPRE: 2.77 L

## 2024-09-24 PROCEDURE — 3288F FALL RISK ASSESSMENT DOCD: CPT | Mod: CPTII,S$GLB,, | Performed by: INTERNAL MEDICINE

## 2024-09-24 PROCEDURE — 3078F DIAST BP <80 MM HG: CPT | Mod: CPTII,S$GLB,, | Performed by: INTERNAL MEDICINE

## 2024-09-24 PROCEDURE — 1159F MED LIST DOCD IN RCRD: CPT | Mod: CPTII,S$GLB,, | Performed by: INTERNAL MEDICINE

## 2024-09-24 PROCEDURE — 3051F HG A1C>EQUAL 7.0%<8.0%: CPT | Mod: CPTII,S$GLB,, | Performed by: INTERNAL MEDICINE

## 2024-09-24 PROCEDURE — 4010F ACE/ARB THERAPY RXD/TAKEN: CPT | Mod: CPTII,S$GLB,, | Performed by: INTERNAL MEDICINE

## 2024-09-24 PROCEDURE — 3066F NEPHROPATHY DOC TX: CPT | Mod: CPTII,S$GLB,, | Performed by: INTERNAL MEDICINE

## 2024-09-24 PROCEDURE — 71250 CT THORAX DX C-: CPT | Mod: 26,,, | Performed by: STUDENT IN AN ORGANIZED HEALTH CARE EDUCATION/TRAINING PROGRAM

## 2024-09-24 PROCEDURE — 71250 CT THORAX DX C-: CPT | Mod: TC

## 2024-09-24 PROCEDURE — 3008F BODY MASS INDEX DOCD: CPT | Mod: CPTII,S$GLB,, | Performed by: INTERNAL MEDICINE

## 2024-09-24 PROCEDURE — 99999 PR PBB SHADOW E&M-EST. PATIENT-LVL III: CPT | Mod: PBBFAC,,, | Performed by: INTERNAL MEDICINE

## 2024-09-24 PROCEDURE — 99214 OFFICE O/P EST MOD 30 MIN: CPT | Mod: 25,S$GLB,, | Performed by: INTERNAL MEDICINE

## 2024-09-24 PROCEDURE — 94060 EVALUATION OF WHEEZING: CPT | Mod: S$GLB,,, | Performed by: INTERNAL MEDICINE

## 2024-09-24 PROCEDURE — 1126F AMNT PAIN NOTED NONE PRSNT: CPT | Mod: CPTII,S$GLB,, | Performed by: INTERNAL MEDICINE

## 2024-09-24 PROCEDURE — 1160F RVW MEDS BY RX/DR IN RCRD: CPT | Mod: CPTII,S$GLB,, | Performed by: INTERNAL MEDICINE

## 2024-09-24 PROCEDURE — 94726 PLETHYSMOGRAPHY LUNG VOLUMES: CPT | Mod: S$GLB,,, | Performed by: INTERNAL MEDICINE

## 2024-09-24 PROCEDURE — 1101F PT FALLS ASSESS-DOCD LE1/YR: CPT | Mod: CPTII,S$GLB,, | Performed by: INTERNAL MEDICINE

## 2024-09-24 PROCEDURE — 3075F SYST BP GE 130 - 139MM HG: CPT | Mod: CPTII,S$GLB,, | Performed by: INTERNAL MEDICINE

## 2024-09-24 PROCEDURE — 94729 DIFFUSING CAPACITY: CPT | Mod: S$GLB,,, | Performed by: INTERNAL MEDICINE

## 2024-09-24 PROCEDURE — 99999 PR PBB SHADOW E&M-EST. PATIENT-LVL II: CPT | Mod: PBBFAC,,,

## 2024-09-24 PROCEDURE — 3061F NEG MICROALBUMINURIA REV: CPT | Mod: CPTII,S$GLB,, | Performed by: INTERNAL MEDICINE

## 2024-09-24 NOTE — ASSESSMENT & PLAN NOTE
9/24/2024    11:33 AM   EPWORTH SLEEPINESS SCALE   Sitting and reading 1   Watching TV 1   Sitting, inactive in a public place (e.g. a theatre or a meeting) 0   As a passenger in a car for an hour without a break 0   Lying down to rest in the afternoon when circumstances permit 2   Sitting and talking to someone 0   Sitting quietly after a lunch without alcohol 0   In a car, while stopped for a few minutes in traffic 0   Total score 4        Data 08/25/2024 to 09/23/2024  Usage > 4 hrs was  97%  APAP 6-14 cmwp  AHI was 0.9    USING and benefits

## 2024-09-24 NOTE — PROGRESS NOTES
"Pulmonary Disease Management  [facility]  Final Follow up Visit  Chronic Care Management    Crystal Acuna  was seen 9/24/2024  1:40 PM in the Pulmonary Disease Management Clinic for evaluation, education, reinforcement of self management techniques and exacerbation action plan.    Cayla Adal    Past Medical History:   Diagnosis Date    Allergy     Bronchitis     Family history of colonic polyps 04/27/2021    Three of her siblings have had colon polyps.    Hallux abductovalgus     Herniated lumbar intervertebral disc     Hyperlipidemia     Diet controlled    Hypertension     Smoker     Staph aureus infection        Patient's Medications   New Prescriptions    No medications on file   Previous Medications    ALBUTEROL-IPRATROPIUM (DUO-NEB) 2.5 MG-0.5 MG/3 ML NEBULIZER SOLUTION    Take 3 mLs by nebulization every 6 (six) hours as needed for Wheezing. Rescue    AMIODARONE (PACERONE) 200 MG TAB    Take 200 mg by mouth. 100 mg    ATORVASTATIN (LIPITOR) 80 MG TABLET    Take 80 mg by mouth every evening.    BD SOMMER 2ND GEN PEN NEEDLE 32 GAUGE X 5/32" NDLE    use as directed    BLOOD SUGAR DIAGNOSTIC STRP    To check BG 2 times daily, to use with insurance preferred meter    BLOOD-GLUCOSE METER KIT    To check BG 2 times daily, to use with insurance preferred meter    CHLORHEXIDINE (HIBICLENS) 4 % EXTERNAL LIQUID    Apply topically daily as needed.    CLOPIDOGREL (PLAVIX) 75 MG TABLET    Take 75 mg by mouth.    ELIQUIS 5 MG TAB    Take 5 mg by mouth 2 (two) times daily.    EMPAGLIFLOZIN (JARDIANCE) 25 MG TABLET    Take 25 mg by mouth.    ENTRESTO 24-26 MG PER TABLET    Take 1 tablet by mouth 2 (two) times daily.    EZETIMIBE (ZETIA) 10 MG TABLET    Take 10 mg by mouth.    FAMOTIDINE (PEPCID) 40 MG TABLET    TAKE ONE TABLET BY MOUTH EVERY DAY    FLUOCINOLONE ACETONIDE OIL (DERMOTIC OIL) 0.01 % DROP    Place 3 drops in ear(s) 2 (two) times daily.    FLUOROMETHOLONE 0.1% (FML) 0.1 % DRPS    INSTILL 1 DROP " INTO EACH EYE THREE TIMES DAILY AS DIRECTED    FREESTYLE MONIKA 3 SENSOR ATILIO    Use to check glucose 4x a day. Change sensor every 14 days.    FUROSEMIDE (LASIX) 20 MG TABLET    Take 20 mg by mouth daily as needed.    HYDROCORTISONE 1 % CREAM    Apply to affected area 2 times daily    INSULIN LISPRO 100 UNIT/ML PEN    Inject 0-6 Units into the skin.    LANCETS MISC    To check BG 2 times daily, to use with insurance preferred meter    LEVALBUTEROL (XOPENEX HFA) 45 MCG/ACTUATION INHALER    INHALE ONE PUFF INTO THE LUNGS EVERY 4 HOURS AS NEEDED FOR WHEEZING    LEVOTHYROXINE (SYNTHROID) 25 MCG TABLET    take 1 tablet by mouth EVERY MORNING    METOPROLOL SUCCINATE (TOPROL-XL) 25 MG 24 HR TABLET    Take 25 mg by mouth. Pt take 12.5mg    MUPIROCIN (BACTROBAN) 2 % OINTMENT    Apply topically 3 (three) times daily.    NITROGLYCERIN (NITROSTAT) 0.4 MG SL TABLET    Place 0.4 mg under the tongue as needed.    PANTOPRAZOLE (PROTONIX) 40 MG TABLET    Take 1 tablet (40 mg total) by mouth once daily.    SITAGLIPTIN PHOSPHATE (JANUVIA) 100 MG TAB    Take 100 mg by mouth.    SPIRIVA RESPIMAT 2.5 MCG/ACTUATION INHALER    INHALE TWO PUFFS DAILY   Modified Medications    No medications on file   Discontinued Medications    No medications on file             Educational assessment:   [x]            Good  []            Fair  []            Poor    Readiness to learn:   [x]            Good  []            Fair  []            Poor    Vision Status:   [x]            Good  []            Fair  []            Poor    Reading Ability:  [x]            Good  []            Fair  []            Poor    Knowledge of condition:   [x]            Good  []            Fair  []            Poor    Language Barriers:   []            Good  []            Fair  []            Poor  [x]            None    Cognitive/ Physical Barriers:   []            Good  []            Fair  []            Poor  [x]            None    Learning best by:                       [x]             Seeing  [x]            Hearing  [x]            Reading                         [x]            Doing    Describe any barrier /Limitation or financial implications of care choices identified     []            Financial  []            Emotional  []            Education  []            Vision/Hearing  []            Physical  [x]            None  []                TOPIC /CONTENT FOR TODAY:    [x]            MDI with or without spacer  [x]            Respimat inhaler  []            Acapella   []           Peak Flow meter  [x]            COPD action plan  []            Nebulizer use  []            Oxygen use safety  [x]            CPAP  [x]            Energy conservation  [x]            Infection prevention  [x]            Asthma trigger checklist        Learner:    [x]            Patient   []            Caregiver    Method:    [x]            Verbal explanation  []            Audio visual    [x]            Literature  [x]            Teach back      Evaluation:    [x]            Teach back  [x]            Demonstrate  [x]            Follow up phone call    []            2 weeks     []            4 weeks   [x]            PRN    Additional comments:   Patient was seen today to review respiratory medication purpose and proper technique for use of inhalers. Patient practiced proper technique using MDI with valved holding chamber (spacer) and respimat inhalers. Patient demonstrated understanding. Literature was given to patient.     Asthma trigger checklist was verbally reviewed and literature given to patient.     Infection prevention was discussed. Patient was reminded to get influenza vaccine. Hand hygiene and cleaning of respiratory equipment was also discussed. Patient verbalized understanding.      COPD action plan was reviewed and literature was given to patient. Patient verbalized understanding.     She does not receive CPAP supplies timely. Reached out to Tabatha DORMAN for solutions. She will contact patient to  place on auto refill to help expedite delivery of supplies.     Plan:     Reinforce education  Meds: Spiriva, albuterol  DME Needs: [facility]  Action Plan    Approximate time spent with patient: 35 MINUTES

## 2024-09-24 NOTE — PROGRESS NOTES
:                                               Pulmonary Outpatient  Visit     Subjective:       Patient ID: Crystal Acuna is a 69 y.o. female.    Social History     Tobacco Use   Smoking Status Former    Current packs/day: 0.75    Average packs/day: 0.8 packs/day for 39.0 years (29.3 ttl pk-yrs)    Types: Cigarettes   Smokeless Tobacco Never   Tobacco Comments    quit 2 yrs ago            Chief Complaint: COPD and Sleep Apnea      Crystal Acuna is 67 y.o.  Referred by Eh Sow MD  Diagnosis KENYETTA based on HSAt  Has CPAP : > 1 year, [facility], DME [facility]  Download reviewed  Heavy smoker in past:  Prescribed Inhaler  Recent acute MI: 3 stent  Inhaler was added by cardiology: no prior spirometry  Last 2 view CXR 2021  No occupational exposure  On Amio and elliquis  UTD immunisation  No cough, No wheezing or hemoptysis  Quit smoking 7 years  Last Echo Ejection Fraction = 30-35%  Currently on APAP 4-20  AHI 1.0  Using and benefits      11/30/2023  Followup  Here with daughter  Stable COPD  Reveiwed LDCT  Nodules 6.5 mm and 6 mm  Stable on Spiriva        03/20/2024  Followup  Doing well  Unable to get supplies  DME: [facility]  Bed time: 11:30 pm  Wake time: 7 am  Device used and benefits      05/31/2024   Follow-up visit   Son-in-law on the visit   Review of CT scan   The scan was ordered based on study November that showed small nodules   No ground-glass opacities were seen in that study  No cough no wheezing no shortness a breath   Chest CT scan reviewed   Areas of ground-glass opacity   Former smoker   Inflammatory labs have been requested   Patient has a history of atrial fibrillation she is on amiodarone 200 mg   We will check a meter and labeled   We will check lung volumes and DLCO   Steroid taper and antibiotics     09/24/2024  Followup  Daughter present  Last visit amio levels were normal  Recent covid infection Aug 2024  No cough, No wheezing, No SOB  Completed steriods  GGO resolved  mostly  Chest CT and spirometry reveiwed        COPD Questionnaire  How often do you cough?: A little of the time  How often do you have phlegm (mucus) in your chest?: A little of the time  How often does your chest feel tight?: Never  When you walk up a hill or one flight of stairs, how often are you breathless?: All of the time  How often are you limited doing any activities at home?: Never  How often are you confident leaving the house despite your lung condition?: All of the time  How often do you sleep soundly?: All of the time  How often do you have energy?: A little of the time  Total score: 12                 9/24/2024    11:33 AM   EPWORTH SLEEPINESS SCALE   Sitting and reading 1   Watching TV 1   Sitting, inactive in a public place (e.g. a theatre or a meeting) 0   As a passenger in a car for an hour without a break 0   Lying down to rest in the afternoon when circumstances permit 2   Sitting and talking to someone 0   Sitting quietly after a lunch without alcohol 0   In a car, while stopped for a few minutes in traffic 0   Total score 4      COPD Questionnaire  How often do you cough?: A little of the time  How often do you have phlegm (mucus) in your chest?: A little of the time  How often does your chest feel tight?: Never  When you walk up a hill or one flight of stairs, how often are you breathless?: All of the time  How often are you limited doing any activities at home?: Never  How often are you confident leaving the house despite your lung condition?: All of the time  How often do you sleep soundly?: All of the time  How often do you have energy?: A little of the time  Total score: 12              Review of Systems   Constitutional:  Negative for fatigue.   Respiratory:  Negative for apnea, snoring, cough, sputum production, choking, chest tightness, wheezing, dyspnea on extertion and use of rescue inhaler.    Psychiatric/Behavioral:  Negative for sleep disturbance.    All other systems reviewed  "and are negative.      Outpatient Encounter Medications as of 9/24/2024   Medication Sig Dispense Refill    albuterol-ipratropium (DUO-NEB) 2.5 mg-0.5 mg/3 mL nebulizer solution Take 3 mLs by nebulization every 6 (six) hours as needed for Wheezing. Rescue 75 mL 0    amiodarone (PACERONE) 200 MG Tab Take 200 mg by mouth. 100 mg      atorvastatin (LIPITOR) 80 MG tablet Take 80 mg by mouth every evening.      BD SOMMER 2ND GEN PEN NEEDLE 32 gauge x 5/32" Ndle use as directed      blood sugar diagnostic Strp To check BG 2 times daily, to use with insurance preferred meter 200 each 5    blood-glucose meter kit To check BG 2 times daily, to use with insurance preferred meter 1 each 0    chlorhexidine (HIBICLENS) 4 % external liquid Apply topically daily as needed. 473 mL 0    clopidogreL (PLAVIX) 75 mg tablet Take 75 mg by mouth.      ELIQUIS 5 mg Tab Take 5 mg by mouth 2 (two) times daily.      empagliflozin (JARDIANCE) 25 mg tablet Take 25 mg by mouth.      ENTRESTO 24-26 mg per tablet Take 1 tablet by mouth 2 (two) times daily.      ezetimibe (ZETIA) 10 mg tablet Take 10 mg by mouth.      famotidine (PEPCID) 40 MG tablet TAKE ONE TABLET BY MOUTH EVERY DAY 90 tablet 2    fluocinolone acetonide oiL (DERMOTIC OIL) 0.01 % Drop Place 3 drops in ear(s) 2 (two) times daily. 20 mL 5    fluorometholone 0.1% (FML) 0.1 % DrpS INSTILL 1 DROP INTO EACH EYE THREE TIMES DAILY AS DIRECTED      FREESTYLE MONIKA 3 SENSOR Meseret Use to check glucose 4x a day. Change sensor every 14 days.      furosemide (LASIX) 20 MG tablet Take 20 mg by mouth daily as needed.      hydrocortisone 1 % cream Apply to affected area 2 times daily 30 g 0    insulin lispro 100 unit/mL pen Inject 0-6 Units into the skin.      lancets Misc To check BG 2 times daily, to use with insurance preferred meter 200 each 99    levalbuterol (XOPENEX HFA) 45 mcg/actuation inhaler INHALE ONE PUFF INTO THE LUNGS EVERY 4 HOURS AS NEEDED FOR WHEEZING 45 g 1    levothyroxine " (SYNTHROID) 25 MCG tablet take 1 tablet by mouth EVERY MORNING 90 tablet 2    metoprolol succinate (TOPROL-XL) 25 MG 24 hr tablet Take 25 mg by mouth. Pt take 12.5mg      mupirocin (BACTROBAN) 2 % ointment Apply topically 3 (three) times daily. 22 g 2    nitroGLYCERIN (NITROSTAT) 0.4 MG SL tablet Place 0.4 mg under the tongue as needed.      pantoprazole (PROTONIX) 40 MG tablet Take 1 tablet (40 mg total) by mouth once daily. 90 tablet 3    SITagliptin phosphate (JANUVIA) 100 MG Tab Take 100 mg by mouth.      SPIRIVA RESPIMAT 2.5 mcg/actuation inhaler INHALE TWO PUFFS DAILY 4 g 11    [] ciprofloxacin HCl (CIPRO) 500 MG tablet Take 1 tablet (500 mg total) by mouth 2 (two) times daily. for 7 days 14 tablet 0    [DISCONTINUED] MOUNJARO 5 mg/0.5 mL PnIj INJECT FIVE MG into the skin EVERY 7 DAYS 12 Pen 1     No facility-administered encounter medications on file as of 2024.       The following portions of the patient's history were reviewed and updated as appropriate: She  has a past medical history of Allergy, Bronchitis, Family history of colonic polyps (2021), Hallux abductovalgus, Herniated lumbar intervertebral disc, Hyperlipidemia, Hypertension, Smoker, and Staph aureus infection.  She does not have any pertinent problems on file.  She  has a past surgical history that includes Cholecystectomy (2010);  section; Cyst Removal; Back surgery; Endometrial ablation (2009); Carpal tunnel release; Foot fracture surgery; Colonoscopy (N/A, 2021); Eye surgery; Spine surgery; Tonsillectomy; Joint replacement; and Breast biopsy.  Her family history includes Breast cancer in her maternal aunt; COPD in her mother; Heart disease in her father and mother.  She  reports that she has quit smoking. Her smoking use included cigarettes. She has a 29.3 pack-year smoking history. She has never used smokeless tobacco. She reports that she does not drink alcohol and does not use drugs.  She has a  "current medication list which includes the following prescription(s): albuterol-ipratropium, amiodarone, atorvastatin, bd carrington 2nd gen pen needle, blood sugar diagnostic, blood-glucose meter, chlorhexidine, clopidogrel, eliquis, empagliflozin, entresto, ezetimibe, famotidine, fluocinolone acetonide oil, fluorometholone 0.1%, freestyle monika 3 sensor, furosemide, hydrocortisone, insulin lispro, lancets, levalbuterol, levothyroxine, metoprolol succinate, mupirocin, nitroglycerin, pantoprazole, sitagliptin phosphate, and spiriva respimat.  Current Outpatient Medications on File Prior to Visit   Medication Sig Dispense Refill    albuterol-ipratropium (DUO-NEB) 2.5 mg-0.5 mg/3 mL nebulizer solution Take 3 mLs by nebulization every 6 (six) hours as needed for Wheezing. Rescue 75 mL 0    amiodarone (PACERONE) 200 MG Tab Take 200 mg by mouth. 100 mg      atorvastatin (LIPITOR) 80 MG tablet Take 80 mg by mouth every evening.      BD CARRINGTON 2ND GEN PEN NEEDLE 32 gauge x 5/32" Ndle use as directed      blood sugar diagnostic Strp To check BG 2 times daily, to use with insurance preferred meter 200 each 5    blood-glucose meter kit To check BG 2 times daily, to use with insurance preferred meter 1 each 0    chlorhexidine (HIBICLENS) 4 % external liquid Apply topically daily as needed. 473 mL 0    clopidogreL (PLAVIX) 75 mg tablet Take 75 mg by mouth.      ELIQUIS 5 mg Tab Take 5 mg by mouth 2 (two) times daily.      empagliflozin (JARDIANCE) 25 mg tablet Take 25 mg by mouth.      ENTRESTO 24-26 mg per tablet Take 1 tablet by mouth 2 (two) times daily.      ezetimibe (ZETIA) 10 mg tablet Take 10 mg by mouth.      famotidine (PEPCID) 40 MG tablet TAKE ONE TABLET BY MOUTH EVERY DAY 90 tablet 2    fluocinolone acetonide oiL (DERMOTIC OIL) 0.01 % Drop Place 3 drops in ear(s) 2 (two) times daily. 20 mL 5    fluorometholone 0.1% (FML) 0.1 % DrpS INSTILL 1 DROP INTO EACH EYE THREE TIMES DAILY AS DIRECTED      FREESTYLE MONIKA 3 SENSOR " Meseret Use to check glucose 4x a day. Change sensor every 14 days.      furosemide (LASIX) 20 MG tablet Take 20 mg by mouth daily as needed.      hydrocortisone 1 % cream Apply to affected area 2 times daily 30 g 0    insulin lispro 100 unit/mL pen Inject 0-6 Units into the skin.      lancets Misc To check BG 2 times daily, to use with insurance preferred meter 200 each 99    levalbuterol (XOPENEX HFA) 45 mcg/actuation inhaler INHALE ONE PUFF INTO THE LUNGS EVERY 4 HOURS AS NEEDED FOR WHEEZING 45 g 1    levothyroxine (SYNTHROID) 25 MCG tablet take 1 tablet by mouth EVERY MORNING 90 tablet 2    metoprolol succinate (TOPROL-XL) 25 MG 24 hr tablet Take 25 mg by mouth. Pt take 12.5mg      mupirocin (BACTROBAN) 2 % ointment Apply topically 3 (three) times daily. 22 g 2    nitroGLYCERIN (NITROSTAT) 0.4 MG SL tablet Place 0.4 mg under the tongue as needed.      pantoprazole (PROTONIX) 40 MG tablet Take 1 tablet (40 mg total) by mouth once daily. 90 tablet 3    SITagliptin phosphate (JANUVIA) 100 MG Tab Take 100 mg by mouth.      SPIRIVA RESPIMAT 2.5 mcg/actuation inhaler INHALE TWO PUFFS DAILY 4 g 11     No current facility-administered medications on file prior to visit.     She is allergic to metformin; perflutren lipid microspheres; farxiga [dapagliflozin]; latex, natural rubber; ozempic [semaglutide]; and bactrim [sulfamethoxazole-trimethoprim]..      BP Readings from Last 3 Encounters:   09/24/24 130/62   08/01/24 110/60   07/09/24 116/60     Snoring      MMRC Dyspnea Scale (4 is worst)     [] MMRC 0: Dyspneic on strenuous excercise (0 points)    [x] MMRC 1: Dyspneic on walking a slight hill (0 points)    [] MMRC 2: Dyspneic on walking level ground; must stop occasionally due to breathlessness (1 point)    [] MMRC 3: Must stop for breathlessness after walking 100 yards or after a few minutes (2 points)    [] MMRC 4: Cannot leave house; breathless on dressing/undressing (3 points)              COPD Questionnaire  How  "often do you cough?: A little of the time  How often do you have phlegm (mucus) in your chest?: A little of the time  How often does your chest feel tight?: Never  When you walk up a hill or one flight of stairs, how often are you breathless?: All of the time  How often are you limited doing any activities at home?: Never  How often are you confident leaving the house despite your lung condition?: All of the time  How often do you sleep soundly?: All of the time  How often do you have energy?: A little of the time  Total score: 12              Objective:     Vital Signs (Most Recent)  Vital Signs  Pulse: (!) 56  Resp: 20  SpO2: 96 %  BP: 130/62  Pain Score: 0-No pain  Height and Weight  Height: 5' 2" (157.5 cm)  Weight: 92.1 kg (203 lb)  BSA (Calculated - sq m): 2.01 sq meters  BMI (Calculated): 37.1  Weight in (lb) to have BMI = 25: 136.4]  Wt Readings from Last 2 Encounters:   09/24/24 92.1 kg (203 lb)   08/01/24 92.7 kg (204 lb 6.4 oz)       Physical Exam  Vitals and nursing note reviewed.   Constitutional:       Appearance: She is normal weight.   HENT:      Head: Normocephalic and atraumatic.      Nose: Nose normal.   Eyes:      Pupils: Pupils are equal, round, and reactive to light.   Cardiovascular:      Rate and Rhythm: Normal rate and regular rhythm.      Pulses: Normal pulses.      Heart sounds: Normal heart sounds.   Pulmonary:      Effort: Pulmonary effort is normal.      Breath sounds: Normal breath sounds.   Abdominal:      General: Bowel sounds are normal.      Palpations: Abdomen is soft.   Musculoskeletal:      Cervical back: Normal range of motion.   Skin:     General: Skin is warm.   Neurological:      General: No focal deficit present.      Mental Status: She is alert and oriented to person, place, and time.   Psychiatric:         Mood and Affect: Mood normal.        Laboratory  Lab Results   Component Value Date    WBC 6.16 09/12/2024    RBC 4.80 09/12/2024    HGB 14.3 09/12/2024    HCT 44.9 " "09/12/2024    MCV 94 09/12/2024    MCH 29.8 09/12/2024    MCHC 31.8 (L) 09/12/2024    RDW 15.1 (H) 09/12/2024     09/12/2024    MPV 11.9 09/12/2024    GRAN 3.5 09/12/2024    GRAN 57.4 09/12/2024    LYMPH 1.8 09/12/2024    LYMPH 29.2 09/12/2024    MONO 0.6 09/12/2024    MONO 9.7 09/12/2024    EOS 0.2 09/12/2024    BASO 0.03 09/12/2024    EOSINOPHIL 2.9 09/12/2024    BASOPHIL 0.5 09/12/2024       BMP  Lab Results   Component Value Date     09/12/2024    K 4.3 09/12/2024     09/12/2024    CO2 25 09/12/2024    BUN 22 09/12/2024    CREATININE 0.9 09/12/2024    CALCIUM 9.5 09/12/2024    ANIONGAP 9 09/12/2024    ESTGFRAFRICA >60.0 06/08/2022    EGFRNONAA >60.0 06/08/2022    AST 16 09/12/2024    ALT 21 09/12/2024    PROT 7.0 09/12/2024          Lab Results   Component Value Date     (H) 06/03/2024        Lab Results   Component Value Date    ASPERGILLUS Not Detected 06/03/2024    ASPERGILLUS <0.500 06/03/2024     No results found for: "AFUMIGATUSCL"     No results found for: "ACE"     Diagnostic Results:  I have personally reviewed today the following studies:      CT Chest Without Contrast  Narrative: EXAMINATION:  CT CHEST WITHOUT CONTRAST    CLINICAL HISTORY:  followup GGO; Other nonspecific abnormal finding of lung field    TECHNIQUE:  Low dose axial images, sagittal and coronal reformations were obtained from the thoracic inlet to the lung bases. Contrast was not administered.    COMPARISON:  CT dated 05/30/2024    FINDINGS:  Trachea and bronchial trees are patent with normal branching pattern.  No significant bronchiectasis or bronchial wall thickening.    Mild emphysema and reticulation noted in the apical upper lobes.  There is a small calcified granuloma in the posterior right upper lobe.  There is a 4 mm perifissural nodule in the right upper lobe (series 4, image 224).  There is a somewhat prominent ground-glass opacity in the right middle lobe measuring up to 2 cm.  Additional more " faint appearing ground-glass opacities noted elsewhere in the right middle lobe as well as within the bilateral lower lobes.  Appearance is less prominent compared to 05/30/2024.    No significant pleural effusion or pneumothorax.    No new concerning mediastinal or hilar lymphadenopathy.    Heart is normal in size.  No significant pericardial effusion.    Diffuse coronary and scattered aortic atherosclerosis noted.  Vascular assessment otherwise limited without intravenous contrast.    Soft tissues of the visualized lower neck, chest wall, and axilla are unremarkable.    Visualized upper abdominal contents are unremarkable.    No acute bony abnormality.  No aggressive lytic or blastic lesion.  Degenerative changes noted in the spine.  Impression: Slightly less prominent patchy ground-glass opacities in the right middle lobe and bilateral lower lobes compared to 05/30/2024.  Findings again likely represent sequela of inflammatory or infectious process.    No new acute intrathoracic abnormality.    Electronically signed by: Harris Garces  Date:    09/24/2024  Time:    11:29         PFT  FEV1: 1.78L( 83.7%) FVC 2.49:L( 91.6%)  FEV1/FVC 72  TLC 5.08L( 111.1%)  DLCO 14.34( 70.4%)        Assessment/Plan:     Problem List Items Addressed This Visit       Hypertension associated with diabetes (Chronic)    Hyperlipidemia associated with type 2 diabetes mellitus (Chronic)    Type 2 diabetes mellitus with hyperglycemia, without long-term current use of insulin (Chronic)    Hypothyroidism (Chronic)    HFrEF (heart failure with reduced ejection fraction)    Chronic systolic congestive heart failure    PAF (paroxysmal atrial fibrillation)    Pulmonary nodules    Relevant Orders    CT Chest Without Contrast    KENYETTA (obstructive sleep apnea)         9/24/2024    11:33 AM   EPWORTH SLEEPINESS SCALE   Sitting and reading 1   Watching TV 1   Sitting, inactive in a public place (e.g. a theatre or a meeting) 0   As a passenger in a  car for an hour without a break 0   Lying down to rest in the afternoon when circumstances permit 2   Sitting and talking to someone 0   Sitting quietly after a lunch without alcohol 0   In a car, while stopped for a few minutes in traffic 0   Total score 4        Data 08/25/2024 to 09/23/2024  Usage > 4 hrs was  97%  APAP 6-14 cmwp  AHI was 0.9    USING and benefits           Relevant Orders    CPAP/BIPAP SUPPLIES    CPAP/BIPAP SUPPLIES    COPD, group A, by GOLD 2017 classification - Primary     COPD score 12  FEV1: 1.78L( 83.7 %), FEV1/FVC 72  UTD immunization  Stable  SPIRIVA    Got RSV           Relevant Orders    Spirometry without Bronchodilator         GGO have improved, resolved  Interval surveillance   Amio levels were Normal     Follow up in about 1 year (around 9/24/2025), or chest CT, [place], CPAP supplies.    This note was prepared using voice recognition system and is likely to have sound alike errors that may have been overlooked even after proof reading.  Please call me with any questions    Discussed diagnosis, its evaluation, treatment and usual course. All questions answered.    Thank you for the courtesy of participating in the care of this patient    Oliver Mederos MD      Personal Diagnostic Review  []  CXR    []  ECHO    []  ONSAT    []  6MWD    []  LABS    []  CHEST CT    []  PET CT    []  Biopsy results

## 2024-09-24 NOTE — PATIENT INSTRUCTIONS
ACTION PLAN    GREEN ZONE  My sputum is clear/white/usual color and easily cleared.  My breathing is no harder than usual.  I can do my usual activities.  I can think clearly.   Take your usual medicines, including oxygen, as you are told to do so by your health care provider.   YELLOW ZONE  My sputum has change (color, thickness, amount).  I am more short of breath than usual.  I cough or wheeze more.  I weigh more and my legs/feet swell.  I cannot do my usual activities without resting.   Call your health care provider. You will probably be told to begin taking an antibiotic and prednisone. Have your pharmacy phone number available.   RED ZONE  I cannot cough out my sputum.  I am much more short of breath than normal.  I need to sit up to breathe  I cannot do my usual activities.  I am unable to speak more than one or two words at a time.  I am confused.   Call your health care provider. You may be asked to come in to be seen, told to go to the emergency room, or told to call 9-1-1.     Asthma Trigger Checklist  Allergens, irritants, and other things may trigger your asthma. Check the box next to each of your triggers. After each trigger is a list of ways to avoid it.   Dust mites. Dust mites live in mattresses, bedding, carpets, curtains, and indoor dust.  To kill dust mites, wash bedding in hot water (130°F) each week.  Cover mattress and pillows with special dust-mite-proof cases.  Don't use upholstered furniture like sofas or chairs in the bedroom.  Use allergy-proof filters for air conditioners and furnaces. Replace or clean them as instructed.  If you can, replace carpeting with wood or tile leo, especially in the bedroom.  Animals. Animals with fur or feathers shed dander (allergens).  It's best to choose a pet that doesn't have fur or feathers, such as a fish or a reptile.  If you have pets, keep them off your bed and out of your bedroom.  Wash your hands and clothes after handling pets.    Mold. Mold  grows in damp places, such as bathrooms, basements, and closets.  Ask someone to clean damp areas in your home every week. Or try wearing a face mask while you clean.  Run an exhaust fan while bathing. Or leave a window open in the bathroom.  Repair water leaks in or around your home.  Have someone else cut grass or rake leaves, if possible.  Don't use vaporizers or humidifiers. They encourage mold growth.    Pollen. Pollen from trees, grasses, and weeds is a common allergen. (Flower pollens are generally not a problem).  Try to learn what types of pollen affect you most. Pollen levels vary depending on the plant, the season, and the time of day.  If possible, use air conditioning instead of opening the windows in your home or car.  Have someone else do yard work, if possible.    Cockroaches. Roaches are found in many homes and produce allergens.  Keep your kitchen clean and dry. A leaky faucet or drain can attract roaches.  Remove garbage from your home daily.  Store food in tightly sealed containers. Wash dishes as soon as they are used.  Use bait stations or traps to control roaches. Avoid using chemical sprays.    Smoke. Smoke may be from cigarettes, cigars, pipes, incense or candles, barbecues or grills, and fireplaces.  Don't smoke. And don't let people smoke in your home or car.  When you travel, ask for nonsmoking rental cars and hotel rooms.  Avoid fireplaces and wood stoves. If you can't, sit away from them. Make sure the smoke is directed outside.  Don't burn incense or use candles.  Move away from smoky outdoor cooking grills.    Smog.  Smog is from car exhaust and other pollution.  Read or listen to local air-quality reports. These let you know when air quality is poor.  Stay indoors as much as you can on smoggy days. If possible, use air conditioning instead of opening the windows.  In your car, set air conditioning to recirculate air, so less pollution gets in.    Strong odors. These include air  fresheners, deodorizers, and cleaning products; perfume, deodorant, and other beauty products; incense and candles; and insect sprays and other sprays.  Use scent-free products like deodorant or body lotion.  Avoid using cleaning products with bleach and ammonia. Make your own cleaning solution with white vinegar, baking soda, or mild dish soap.  Use exhaust fans while cooking. Or open a window, if possible.   Avoid perfumes, air fresheners, potpourri, and other scented products.          Other irritants. These include dust, aerosol sprays, and powders.  Wear a face mask while doing tasks like sanding, dusting, sweeping, and yard work. Open doors and windows if working indoors.  Use pump spray bottles instead of aerosols.  Pour liquid  onto a rag or cloth instead of spraying them.    Weather. Weather conditions can trigger symptoms or make them worse.  Watch for very high or low temperatures, very humid conditions, or a lot of wind, as these conditions can make symptoms worse.  Limit outdoor activity during the type of weather that affects you.  Wear a scarf over your mouth and nose in cold weather.    Colds, flu, and sinus infections. Upper respiratory infections can trigger asthma.  Wash your hands often with soap and warm water or use a hand  containing alcohol.  Get a yearly flu shot. And ask if you should get a pneumonia vaccine.  Take care of your general health. Get plenty of sleep. And eat a variety of healthy foods.    Food additives. Food additives can trigger asthma flare-ups in some people.  Check food labels for sulfites or other similar ingredients. These are often found in foods such as wine, beer, and dried fruits.  Avoid foods that contain these additives.    Medicine. Aspirin, NSAIDS like ibuprofen and naproxen, and heart medicines like beta-blockers may be triggers.  Tell your health care provider if you think certain medicines trigger symptoms.   Be sure to read the labels on  over-the-counter medicines. They may have ingredients that cause symptoms for you.   , Emotions. Laughing, crying, or feeling excited are triggers for some people.   To help you stay calm: Try breathing in slowly through your nose for a count of 2 seconds. Close your lips and breathe out for 4 seconds. Repeat.  Try to focus on a soothing image in your mind. This will help relax you and calm your breathing.  Remember to take your daily controller medicines. When you're upset or under stress, it's easy to forget.    Exercise. For some people, exercise can trigger symptoms. Don't let this keep you from being active.   If you have not been exercising regularly, start slow and work up gradually.  Take all of your medicines as prescribed.  If you use quick-relief medicine, make sure you have it with you when you exercise.  Stop if you have any symptoms. Make sure you talk with your provider about these symptoms.  © 1050-0551 [employer] [address] [place] [address], [place] [address]. All rights reserved. This information is not intended as a substitute for professional medical care. Always follow your healthcare professional's instructions.

## 2024-10-03 ENCOUNTER — E-VISIT (OUTPATIENT)
Dept: FAMILY MEDICINE | Facility: CLINIC | Age: 69
End: 2024-10-03
Payer: MEDICARE

## 2024-10-03 DIAGNOSIS — R50.9 FEVER, UNSPECIFIED FEVER CAUSE: Primary | ICD-10-CM

## 2024-10-03 NOTE — PROGRESS NOTES
Patient ID: Crystal Acuna is a 69 y.o. female.    Chief Complaint: General Illness (Entered automatically based on patient selection in [employer].)    The patient initiated a request through [employer] on 10/3/2024 for evaluation and management with a chief complaint of General Illness (Entered automatically based on patient selection in [employer].)     I evaluated the questionnaire submission on 10/03/2024  .    [facility] Evisit [facility]-Cough And Cold    10/3/2024 12:31 PM CDT - Filed by Patient   What do you need help with? Other Concern   Do you agree to participate in an E-Visit? Yes   If you have any of the following symptoms, please present to your local emergency room or call 911:  I acknowledge   What is the main issue you would like addressed today? Possible flu or covid? Fever this morning 100.2. Chills and very tired. Judt feel like i have no energy. Body aches. I had a uti a few weeks ago but this feels different.   Please describe your symptoms Fever 100.2 chills very tired   Where is your problem located? Whole body   How severe are your symptoms? Moderate   Have you had these symptoms before? Yes   How long have you been having these symptoms? For a few days   Please list any medications or treatments you have used for your condition and indicate if it was effective or not. Tylenol   What makes this feel better? Tylenol helps with chills   What makes this feel worse? When i move around   Are these symptoms related to a condition that you currently have? I am not sure   What is the condition?    When were you last seen for this condition?    Please describe any probable cause for these symptoms Flu covid or uti?   Provide any additional information you feel is important.    Please attach any relevant images or files    Are you able to take your vital signs? Yes   Systolic Blood Pressure:    Diastolic Blood Pressure:    Weight:    Height:    Pulse:    Temperature: 100.2   Respiration rate:    Pulse  Oxygen:          Encounter Diagnosis   Name Primary?    Fever, unspecified fever cause Yes        Orders Placed This Encounter   Procedures    Urinalysis, Reflex to Urine Culture Urine, Clean Catch     Standing Status:   Future     Standing Expiration Date:   12/2/2025     Order Specific Question:   Preferred Collection Type     Answer:   Urine, Clean Catch     Order Specific Question:   Specimen Source     Answer:   Urine    POCT COVID-19 Rapid Screening     Standing Status:   Future     Standing Expiration Date:   12/2/2025    POCT Influenza A/B     Standing Status:   Future     Standing Expiration Date:   12/2/2025            Follow up if symptoms worsen or fail to improve.      E-Visit Time Tracking:    Day 1 Time (in minutes): 7    Total Time (in minutes): 7

## 2024-10-04 ENCOUNTER — TELEPHONE (OUTPATIENT)
Dept: FAMILY MEDICINE | Facility: CLINIC | Age: 69
End: 2024-10-04

## 2024-10-04 ENCOUNTER — CLINICAL SUPPORT (OUTPATIENT)
Dept: FAMILY MEDICINE | Facility: CLINIC | Age: 69
End: 2024-10-04
Payer: MEDICARE

## 2024-10-04 DIAGNOSIS — R50.9 FEVER, UNSPECIFIED FEVER CAUSE: ICD-10-CM

## 2024-10-04 LAB
CTP QC/QA: YES
CTP QC/QA: YES
FLUAV AG NPH QL: NEGATIVE
FLUBV AG NPH QL: NEGATIVE
SARS-COV-2 RDRP RESP QL NAA+PROBE: NEGATIVE

## 2024-10-08 ENCOUNTER — HOSPITAL ENCOUNTER (OUTPATIENT)
Dept: RADIOLOGY | Facility: HOSPITAL | Age: 69
Discharge: HOME | End: 2024-10-08
Attending: FAMILY MEDICINE
Payer: MEDICARE

## 2024-10-08 ENCOUNTER — OFFICE VISIT (OUTPATIENT)
Dept: FAMILY MEDICINE | Facility: CLINIC | Age: 69
End: 2024-10-08
Payer: MEDICARE

## 2024-10-08 VITALS
OXYGEN SATURATION: 95 % | DIASTOLIC BLOOD PRESSURE: 62 MMHG | HEIGHT: 62 IN | BODY MASS INDEX: 35.7 KG/M2 | HEART RATE: 78 BPM | SYSTOLIC BLOOD PRESSURE: 110 MMHG | WEIGHT: 194 LBS

## 2024-10-08 DIAGNOSIS — J18.9 PNEUMONIA DUE TO INFECTIOUS ORGANISM, UNSPECIFIED LATERALITY, UNSPECIFIED PART OF LUNG: Primary | ICD-10-CM

## 2024-10-08 DIAGNOSIS — J18.9 PNEUMONIA DUE TO INFECTIOUS ORGANISM, UNSPECIFIED LATERALITY, UNSPECIFIED PART OF LUNG: ICD-10-CM

## 2024-10-08 DIAGNOSIS — R50.9 FEVER, UNSPECIFIED FEVER CAUSE: ICD-10-CM

## 2024-10-08 DIAGNOSIS — B37.9 YEAST INFECTION: ICD-10-CM

## 2024-10-08 DIAGNOSIS — M54.9 LEFT-SIDED BACK PAIN, UNSPECIFIED BACK LOCATION, UNSPECIFIED CHRONICITY: ICD-10-CM

## 2024-10-08 DIAGNOSIS — E11.65 TYPE 2 DIABETES MELLITUS WITH HYPERGLYCEMIA, WITHOUT LONG-TERM CURRENT USE OF INSULIN: Chronic | ICD-10-CM

## 2024-10-08 PROCEDURE — 99215 OFFICE O/P EST HI 40 MIN: CPT | Mod: S$GLB,,, | Performed by: FAMILY MEDICINE

## 2024-10-08 PROCEDURE — 2023F DILAT RTA XM W/O RTNOPTHY: CPT | Mod: CPTII,S$GLB,, | Performed by: FAMILY MEDICINE

## 2024-10-08 PROCEDURE — 1160F RVW MEDS BY RX/DR IN RCRD: CPT | Mod: CPTII,S$GLB,, | Performed by: FAMILY MEDICINE

## 2024-10-08 PROCEDURE — 1126F AMNT PAIN NOTED NONE PRSNT: CPT | Mod: CPTII,S$GLB,, | Performed by: FAMILY MEDICINE

## 2024-10-08 PROCEDURE — 71046 X-RAY EXAM CHEST 2 VIEWS: CPT | Mod: 26,,, | Performed by: RADIOLOGY

## 2024-10-08 PROCEDURE — 3078F DIAST BP <80 MM HG: CPT | Mod: CPTII,S$GLB,, | Performed by: FAMILY MEDICINE

## 2024-10-08 PROCEDURE — 99999 PR PBB SHADOW E&M-EST. PATIENT-LVL V: CPT | Mod: PBBFAC,,, | Performed by: FAMILY MEDICINE

## 2024-10-08 PROCEDURE — G2211 COMPLEX E/M VISIT ADD ON: HCPCS | Mod: S$GLB,,, | Performed by: FAMILY MEDICINE

## 2024-10-08 PROCEDURE — 4010F ACE/ARB THERAPY RXD/TAKEN: CPT | Mod: CPTII,S$GLB,, | Performed by: FAMILY MEDICINE

## 2024-10-08 PROCEDURE — 1159F MED LIST DOCD IN RCRD: CPT | Mod: CPTII,S$GLB,, | Performed by: FAMILY MEDICINE

## 2024-10-08 PROCEDURE — 3008F BODY MASS INDEX DOCD: CPT | Mod: CPTII,S$GLB,, | Performed by: FAMILY MEDICINE

## 2024-10-08 PROCEDURE — 71046 X-RAY EXAM CHEST 2 VIEWS: CPT | Mod: TC,PO

## 2024-10-08 PROCEDURE — 3288F FALL RISK ASSESSMENT DOCD: CPT | Mod: CPTII,S$GLB,, | Performed by: FAMILY MEDICINE

## 2024-10-08 PROCEDURE — 3066F NEPHROPATHY DOC TX: CPT | Mod: CPTII,S$GLB,, | Performed by: FAMILY MEDICINE

## 2024-10-08 PROCEDURE — 3051F HG A1C>EQUAL 7.0%<8.0%: CPT | Mod: CPTII,S$GLB,, | Performed by: FAMILY MEDICINE

## 2024-10-08 PROCEDURE — 3061F NEG MICROALBUMINURIA REV: CPT | Mod: CPTII,S$GLB,, | Performed by: FAMILY MEDICINE

## 2024-10-08 PROCEDURE — 3074F SYST BP LT 130 MM HG: CPT | Mod: CPTII,S$GLB,, | Performed by: FAMILY MEDICINE

## 2024-10-08 PROCEDURE — 1101F PT FALLS ASSESS-DOCD LE1/YR: CPT | Mod: CPTII,S$GLB,, | Performed by: FAMILY MEDICINE

## 2024-10-08 RX ORDER — NYSTATIN 100000 U/G
OINTMENT TOPICAL 2 TIMES DAILY
Qty: 30 G | Refills: 0 | Status: SHIPPED | OUTPATIENT
Start: 2024-10-08

## 2024-10-08 RX ORDER — FLUCONAZOLE 200 MG/1
200 TABLET ORAL
COMMUNITY
Start: 2024-10-04

## 2024-10-08 RX ORDER — AMOXICILLIN 500 MG/1
500 CAPSULE ORAL 2 TIMES DAILY
COMMUNITY
Start: 2024-10-04 | End: 2024-10-11

## 2024-10-08 RX ORDER — BUTALBITAL, ACETAMINOPHEN AND CAFFEINE 50; 325; 40 MG/1; MG/1; MG/1
1 TABLET ORAL EVERY 6 HOURS PRN
Qty: 30 TABLET | Refills: 0 | Status: SHIPPED | OUTPATIENT
Start: 2024-10-08 | End: 2024-11-07

## 2024-10-08 RX ORDER — LEVOFLOXACIN 750 MG/1
750 TABLET ORAL DAILY
Qty: 7 TABLET | Refills: 0 | Status: SHIPPED | OUTPATIENT
Start: 2024-10-08

## 2024-10-08 NOTE — PATIENT INSTRUCTIONS
Follow up in about 3 months (around 1/8/2025).     Dear patient,   As a result of recent federal legislation (The Federal Cures Act), you may receive lab or pathology results from your visit in your [facility] account before your physician is able to contact you. Your physician or their representative will relay the results to you with their recommendations at their soonest availability.     If no improvement in symptoms or symptoms worsen, please be advised to call MD, follow-up at clinic and/or go to ER if becomes severe.    Eh Sow M.D.        We Offer TELEHEALTH & Same Day Appointments!   Book your Telehealth appointment with me through my nurse or   Clinic appointments on [employer]!    [address] [place] [address]    Office: [phone]   FAX: [phone]    Check out my Facebook Page and Follow Me at: [email or web address]    Check out my website at [employer] by clicking on: [email or web address]    To Schedule appointments online, go to scroll [email or web address]: [email or web address]

## 2024-10-08 NOTE — PROGRESS NOTES
PLAN:      Assessment & Plan  1. Pneumonia.- proceed with treatment as prescribed with Levaquin and amoxicillin.  Proceed with chest x-ray repeat in four weeks.  Follow-up with Pulmonary.  Given her persistent fever and CT scan results indicating patchy consolidation in the lungs, suggesting pneumonia, it is necessary to initiate a stronger antibiotic treatment. Despite negative COVID-19 and influenza tests, the possibility of another viral infection cannot be ruled out. However, her symptoms do not align with those of COVID-19. Levaquin will be prescribed once daily. A chest x-ray and blood work, including a CBC with differential and blood culture, will be ordered. A urine test will be conducted to rule out any urinary tract infection. She is advised to discontinue amoxicillin and start Levaquin. If the cause of the infection remains unclear, a referral to an infectious disease specialist will be considered. She is advised to consult with a pulmonologist within the next few days.Discussed condition course and signs and symptoms to expect.  Patient advised take Tylenol for pain or fever.  ER precautions.  Call MD or follow-up to clinic if not improving or worsening symptoms.  Low curb 65 score 1 points  Low risk group: 2.7% 30-day mortality.    Consider outpatient treatment.    2. Back pain.  Avoid muscle relaxers due to cardiac history. Fioricet will be prescribed for intermittent breakthrough pain, to be taken at night. She can take one Tylenol with Fioricet if her fever increases. Physical therapy is recommended, along with the use of a heating pad, Voltaren gel, BenGay, or lidocaine patches.    3. Medication Management.  She is currently taking Tylenol 1000 mg every 6 hours for fever management. She is also on blood thinners and cannot take ibuprofen. She is advised to continue using Tylenol for fever management but to replace it with Fioricet at night if needed.    4. Diabetes Management.  A referral to  diabetes Management, who specializes in diabetes, will be made for better diabetes management. She is currently on Jardiance, which has been effective in controlling her blood sugar levels.  We will plan to monitor hemoglobin A1c at designated intervals 3 to 6 months.  I recommend ongoing Education for diabetic diet and exercise protocol.  We will continue to monitor for side effects.    Please be advised of symptoms to monitor for and to notify me immediately if persistent or worsening.  Follow up with Ophthalmology/Optometry and Podiatry at least annually.        Problem List Items Addressed This Visit       Type 2 diabetes mellitus with hyperglycemia, without long-term current use of insulin (Chronic)    Relevant Orders    Ambulatory referral/consult to Diabetic Advanced Practice Providers (Medical Management)    Left-sided back pain    Relevant Medications    butalbital-acetaminophen-caffeine -40 mg (FIORICET, ESGIC) -40 mg per tablet    Fever    Relevant Orders    Urinalysis, Reflex to Urine Culture Urine, Clean Catch    CBC Auto Differential    Sedimentation rate (Completed)    C-Reactive Protein    Comprehensive Metabolic Panel    CULTURE, BLOOD    Pneumonia due to infectious organism - Primary    Relevant Medications    levoFLOXacin (LEVAQUIN) 750 MG tablet    Other Relevant Orders    X-Ray Chest PA And Lateral (Completed)    CBC Auto Differential    Sedimentation rate (Completed)    C-Reactive Protein    Comprehensive Metabolic Panel    CULTURE, BLOOD    X-Ray Chest PA And Lateral    Yeast infection    Relevant Medications    nystatin (MYCOSTATIN) ointment     Future Appointments       Date Provider Specialty Appt Notes    12/12/2024 Romana Shane, FNP Diabetes Type 2 diabetes mellitus with hyperglycemia, without long-term current use of in...    2/6/2025 Eh Sow MD Family Medicine Annual    9/23/2025  Radiology Follow up in about 1 year (around 9/24/2025), or chest CT,  "[place], CPAP supplies.    9/23/2025  Pulmonology Follow up in about 1 year (around 9/24/2025), or chest CT, [place], CPAP supplies.    9/23/2025 Oliver Mederos MD Pulmonology Follow up in about 1 year (around 9/24/2025), or chest CT, [place], CPAP supplies.           Medication Management for assessment above:   Medication List with Changes/Refills   New Medications    BUTALBITAL-ACETAMINOPHEN-CAFFEINE -40 MG (FIORICET, ESGIC) -40 MG PER TABLET    Take 1 tablet by mouth every 6 (six) hours as needed for Pain or Headaches.    LEVOFLOXACIN (LEVAQUIN) 750 MG TABLET    Take 1 tablet (750 mg total) by mouth once daily.    NYSTATIN (MYCOSTATIN) OINTMENT    Apply topically 2 (two) times daily.   Current Medications    ALBUTEROL-IPRATROPIUM (DUO-NEB) 2.5 MG-0.5 MG/3 ML NEBULIZER SOLUTION    Take 3 mLs by nebulization every 6 (six) hours as needed for Wheezing. Rescue    AMIODARONE (PACERONE) 200 MG TAB    Take 200 mg by mouth. 100 mg    AMOXICILLIN (AMOXIL) 500 MG CAPSULE    Take 500 mg by mouth 2 (two) times daily.    ATORVASTATIN (LIPITOR) 80 MG TABLET    Take 80 mg by mouth every evening.    BD SOMMER 2ND GEN PEN NEEDLE 32 GAUGE X 5/32" NDLE    use as directed    BLOOD SUGAR DIAGNOSTIC STRP    To check BG 2 times daily, to use with insurance preferred meter    BLOOD-GLUCOSE METER KIT    To check BG 2 times daily, to use with insurance preferred meter    CHLORHEXIDINE (HIBICLENS) 4 % EXTERNAL LIQUID    Apply topically daily as needed.    CLOPIDOGREL (PLAVIX) 75 MG TABLET    Take 75 mg by mouth.    ELIQUIS 5 MG TAB    Take 5 mg by mouth 2 (two) times daily.    EMPAGLIFLOZIN (JARDIANCE) 25 MG TABLET    Take 25 mg by mouth.    ENTRESTO 24-26 MG PER TABLET    Take 1 tablet by mouth 2 (two) times daily.    EZETIMIBE (ZETIA) 10 MG TABLET    Take 10 mg by mouth.    FAMOTIDINE (PEPCID) 40 MG TABLET    TAKE ONE TABLET BY MOUTH EVERY DAY    FLUCONAZOLE (DIFLUCAN) 200 MG TAB    Take 200 mg by mouth.    FLUOCINOLONE " ACETONIDE OIL (DERMOTIC OIL) 0.01 % DROP    Place 3 drops in ear(s) 2 (two) times daily.    FLUOROMETHOLONE 0.1% (FML) 0.1 % DRPS    INSTILL 1 DROP INTO EACH EYE THREE TIMES DAILY AS DIRECTED    FREESTYLE MONIKA 3 SENSOR ATILIO    Use to check glucose 4x a day. Change sensor every 14 days.    FUROSEMIDE (LASIX) 20 MG TABLET    Take 20 mg by mouth daily as needed.    HYDROCORTISONE 1 % CREAM    Apply to affected area 2 times daily    INSULIN LISPRO 100 UNIT/ML PEN    Inject 0-6 Units into the skin.    LANCETS MISC    To check BG 2 times daily, to use with insurance preferred meter    LEVALBUTEROL (XOPENEX HFA) 45 MCG/ACTUATION INHALER    INHALE ONE PUFF INTO THE LUNGS EVERY 4 HOURS AS NEEDED FOR WHEEZING    LEVOTHYROXINE (SYNTHROID) 25 MCG TABLET    take 1 tablet by mouth EVERY MORNING    METOPROLOL SUCCINATE (TOPROL-XL) 25 MG 24 HR TABLET    Take 25 mg by mouth. Pt take 12.5mg    MUPIROCIN (BACTROBAN) 2 % OINTMENT    Apply topically 3 (three) times daily.    NITROGLYCERIN (NITROSTAT) 0.4 MG SL TABLET    Place 0.4 mg under the tongue as needed.    PANTOPRAZOLE (PROTONIX) 40 MG TABLET    Take 1 tablet (40 mg total) by mouth once daily.    SITAGLIPTIN PHOSPHATE (JANUVIA) 100 MG TAB    Take 100 mg by mouth.    SPIRIVA RESPIMAT 2.5 MCG/ACTUATION INHALER    INHALE TWO PUFFS DAILY   Discontinued Medications    NYSTATIN (MYCOSTATIN) 100,000 UNIT/ML SUSPENSION    Take 4 mLs (400,000 Units total) by mouth 4 (four) times daily with meals and nightly.    NYSTATIN (MYCOSTATIN) 100,000 UNIT/ML SUSPENSION    Take 4 mLs (400,000 Units total) by mouth after meals as needed (thrush).       Eh Sow M.D.  ==========================================================================  Subjective:   Patient ID: Crystal Acuna is a 69 y.o. female.  has a past medical history of Allergy, Bronchitis, Family history of colonic polyps (04/27/2021), Hallux abductovalgus, Herniated lumbar intervertebral disc, Hyperlipidemia,  Hypertension, Smoker, and Staph aureus infection.   Chief Complaint: Follow-up      History of Present Illness  The patient is a 69-year-old female here for follow-up on persistent fever and back pain. She is accompanied by her daughter.    She began feeling unwell last Wednesday, with a fever developing the following day. Two nights ago, her temperature spiked to 104 degrees, but it has since been reduced. Her fever has been fluctuating between 101 and 102 degrees. She has been managing her fever with Tylenol 500 mg, two pills every six hours, which provides relief for about four hours. She experiences excessive sweating during these episodes. She does not have a cough. She reports no symptoms of diarrhea or vomiting. A recent CT scan of her chest revealed patchy consolidation in the lungs, suggestive of pneumonia. She is currently on amoxicillin, prescribed by her urologist as a precautionary measure.    She has been experiencing confusion and memory issues, often forgetting whether she has taken her medication. She has been on Jardiance for about a month, which has been effective in managing her blood sugar levels. She is also taking Pepcid and Protonix, and is scheduled to see Dr. Bullock in December 2024.    She has been experiencing severe muscle spasms, particularly on the left side of her back, which limit her mobility. She has been using a heating pad and TENS unit for relief. She has a history of low heart function and is concerned about the potential impact of muscle relaxers on her heart rate. She has previously taken Flexeril. She is unable to take steroids due to their side effects.    She started Diflucan last night because she is experiencing significant burning in her genital area. The urologist prescribed it as a precautionary measure. She will be on it for 3 days, with last night being the first dose. She had a clean catch at the urologist, and no culture was done. An ultrasound was performed to  check if she was emptying her bladder completely, and she was advised to return in 6 weeks.    She was checked by urology for a UTI, which was negative. At that time, she was also screened for COVID-19 and flu, both of which were negative.    Problem List Items Addressed This Visit       Type 2 diabetes mellitus with hyperglycemia, without long-term current use of insulin (Chronic)    Overview     February 2024:  Patient reports she has gained significant amount of weight over the last few months.  BMI Readings from Last 10 Encounters:   02/06/24 38.34 kg/m²   11/30/23 37.66 kg/m²   08/23/23 33.95 kg/m²   08/10/23 33.95 kg/m²   08/10/23 33.95 kg/m²   07/26/23 33.36 kg/m²   06/13/23 31.68 kg/m²   01/19/23 31.69 kg/m²   01/19/23 31.69 kg/m²   01/09/23 31.57 kg/m²         July 2023:  Patient reports she had issues with Ozempic causing stomach issues.  Diabetes Medications               SITagliptin phosphate (JANUVIA) 100 MG Tab Take 1 tablet (100 mg total) by mouth once daily.          April 2023:  Patient has well-controlled blood sugar on Januvia.  December 2022:  Patient doing well on Januvia.  Patient has side effects to GL P 1. She cannot tolerate metformin due to GI side effects.  Patient currently having increased use infections and recurrent infections on Farxiga.Diabetes Management StatusStatin: TakingACE/ARB: Not taking  Diabetes Management Status    Statin: Taking  ACE/ARB: Not taking    Screening or Prevention Patient's value Goal Complete/Controlled?   HgA1C Testing and Control   Lab Results   Component Value Date    HGBA1C 6.7 (H) 07/26/2023      Annually/Less than 8% Yes   Lipid profile : 12/07/2023 Annually Yes   LDL control Lab Results   Component Value Date    LDLCALC 81.6 12/07/2023    Annually/Less than 100 mg/dl  Yes   Nephropathy screening Lab Results   Component Value Date    LABMICR 9.0 07/26/2023     Lab Results   Component Value Date    PROTEINUA Negative 02/02/2024     No results found for:  ""UTPCR"   Annually Yes   Blood pressure BP Readings from Last 1 Encounters:   02/06/24 112/72    Less than 140/90 Yes   Dilated retinal exam : 11/16/2023 Annually Yes   Foot exam   : 12/01/2022 Annually No              Left-sided back pain    Fever    Pneumonia due to infectious organism - Primary    Yeast infection        Review of patient's allergies indicates:   Allergen Reactions    Metformin     Perflutren lipid microspheres Other (See Comments) and Shortness Of Breath     Back pain, neck pain, arm pain/cramping, and flush face.    Farxiga [dapagliflozin]      Yeast infection      Latex, natural rubber     Ozempic [semaglutide]      Stomach pain      Bactrim [sulfamethoxazole-trimethoprim] Rash     Current Outpatient Medications   Medication Instructions    albuterol-ipratropium (DUO-NEB) 2.5 mg-0.5 mg/3 mL nebulizer solution 3 mLs, Nebulization, Every 6 hours PRN, Rescue    amiodarone (PACERONE) 200 mg    amoxicillin (AMOXIL) 500 mg, 2 times daily    atorvastatin (LIPITOR) 80 mg, Nightly    BD SOMMER 2ND GEN PEN NEEDLE 32 gauge x 5/32" Ndle use as directed    blood sugar diagnostic Strp To check BG 2 times daily, to use with insurance preferred meter    blood-glucose meter kit To check BG 2 times daily, to use with insurance preferred meter    butalbital-acetaminophen-caffeine -40 mg (FIORICET, ESGIC) -40 mg per tablet 1 tablet, Oral, Every 6 hours PRN    chlorhexidine (HIBICLENS) 4 % external liquid Topical (Top), Daily PRN    clopidogreL (PLAVIX) 75 mg    ELIQUIS 5 mg, 2 times daily    empagliflozin (JARDIANCE) 25 mg    ENTRESTO 24-26 mg per tablet 1 tablet, 2 times daily    ezetimibe (ZETIA) 10 mg    famotidine (PEPCID) 40 mg, Oral    fluconazole (DIFLUCAN) 200 mg    fluocinolone acetonide oiL (DERMOTIC OIL) 0.01 % Drop 3 drops, Otic, 2 times daily    fluorometholone 0.1% (FML) 0.1 % DrpS INSTILL 1 DROP INTO EACH EYE THREE TIMES DAILY AS DIRECTED    [email or web address] MONIKA 3 SENSOR [place] Use to check " "glucose 4x a day. Change sensor every 14 days.    furosemide (LASIX) 20 mg, Daily PRN    hydrocortisone 1 % cream Apply to affected area 2 times daily    insulin lispro 0-6 Units    lancets Misc To check BG 2 times daily, to use with insurance preferred meter    levalbuterol (XOPENEX HFA) 45 mcg/actuation inhaler INHALE ONE PUFF INTO THE LUNGS EVERY 4 HOURS AS NEEDED FOR WHEEZING    levoFLOXacin (LEVAQUIN) 750 mg, Oral, Daily    levothyroxine (SYNTHROID) 25 mcg, Oral, Every morning    metoprolol succinate (TOPROL-XL) 25 mg    mupirocin (BACTROBAN) 2 % ointment Topical (Top), 3 times daily    nitroGLYCERIN (NITROSTAT) 0.4 mg, As needed (PRN)    nystatin (MYCOSTATIN) ointment Topical (Top), 2 times daily    pantoprazole (PROTONIX) 40 mg, Oral, Daily    SITagliptin phosphate (JANUVIA) 100 mg    SPIRIVA RESPIMAT 2.5 mcg/actuation inhaler INHALE TWO PUFFS DAILY      I have reviewed the PMH, social history, FamilyHx, surgical history, allergies and medications documented / confirmed by the patient at the time of this visit.  Review of Systems   Constitutional:  Positive for fatigue. Negative for activity change and unexpected weight change.   HENT:  Negative for hearing loss, rhinorrhea and trouble swallowing.    Eyes:  Negative for discharge and visual disturbance.   Respiratory:  Negative for chest tightness and wheezing.    Cardiovascular:  Negative for chest pain and palpitations.   Gastrointestinal:  Negative for blood in stool, constipation, diarrhea and vomiting.   Endocrine: Negative for polydipsia and polyuria.   Genitourinary:  Negative for difficulty urinating, dysuria, hematuria and menstrual problem.   Musculoskeletal:  Positive for arthralgias and back pain. Negative for joint swelling and neck pain.   Neurological:  Positive for weakness. Negative for headaches.   Psychiatric/Behavioral:  Negative for confusion and dysphoric mood.      Objective:   /62   Pulse 78   Ht 5' 2" (1.575 m)   Wt 88 kg " (194 lb)   SpO2 95%   BMI 35.48 kg/m²   Physical Exam  Vitals and nursing note reviewed.   Constitutional:       General: She is not in acute distress.     Appearance: She is well-developed. She is not ill-appearing, toxic-appearing or diaphoretic.   HENT:      Head: Normocephalic and atraumatic.      Right Ear: Hearing, tympanic membrane, ear canal and external ear normal. There is no impacted cerumen.      Left Ear: Hearing, tympanic membrane, ear canal and external ear normal. There is no impacted cerumen.      Nose: Nose normal. No rhinorrhea.   Eyes:      General: Lids are normal.      Extraocular Movements: Extraocular movements intact.      Conjunctiva/sclera: Conjunctivae normal.      Pupils: Pupils are equal, round, and reactive to light.   Neck:      Vascular: No carotid bruit.   Cardiovascular:      Rate and Rhythm: Normal rate.      Pulses: Normal pulses.   Pulmonary:      Effort: Pulmonary effort is normal. No respiratory distress.      Breath sounds: Normal breath sounds.   Chest:      Chest wall: Tenderness present.   Abdominal:      General: Bowel sounds are normal. There is no distension.      Palpations: Abdomen is soft. There is no mass.   Musculoskeletal:         General: Tenderness present. No deformity. Normal range of motion.      Cervical back: Normal range of motion and neck supple.   Lymphadenopathy:      Cervical: No cervical adenopathy.   Skin:     General: Skin is warm and dry.      Capillary Refill: Capillary refill takes less than 2 seconds.      Coloration: Skin is not pale.   Neurological:      General: No focal deficit present.      Mental Status: She is alert and oriented to person, place, and time. Mental status is at baseline. She is not disoriented.      Cranial Nerves: No cranial nerve deficit.      Motor: Weakness (generalized) present.      Gait: Gait normal.   Psychiatric:         Attention and Perception: She is attentive.         Mood and Affect: Mood normal. Mood is not  anxious or depressed.         Speech: Speech is not rapid and pressured or slurred.         Behavior: Behavior normal. Behavior is not agitated, aggressive or hyperactive. Behavior is cooperative.         Thought Content: Thought content normal. Thought content is not paranoid or delusional. Thought content does not include homicidal or suicidal ideation. Thought content does not include homicidal or suicidal plan.         Cognition and Memory: Memory is not impaired.         Judgment: Judgment normal.       Physical Exam  No murmur detected in the heart.    Results  Laboratory Studies  UTI test was negative. Covid and flu tests were negative.    Imaging  CT scan showed no stone, patchy consolidation in the lungs suggest some pneumonia.  X-Ray Chest PA And Lateral  Narrative: EXAMINATION:  XR CHEST PA AND LATERAL    CLINICAL HISTORY:  Pneumonia, unspecified organism    TECHNIQUE:  PA and lateral views of the chest were performed.    COMPARISON:  Prior radiographs    FINDINGS:  Cardiac silhouette and mediastinal contours are normal.  Lungs left upper lobe opacity.  No pleural effusion.  Osseous structures are intact.  Impression: Left upper lobe pneumonia.  Correlate with follow-up to resolution.  This report was flagged in Epic as abnormal.    Electronically signed by: Valdo Cameron MD  Date:    10/08/2024  Time:    14:47    Assessment:     1. Pneumonia due to infectious organism, unspecified laterality, unspecified part of lung    2. Fever, unspecified fever cause    3. Type 2 diabetes mellitus with hyperglycemia, without long-term current use of insulin    4. Left-sided back pain, unspecified back location, unspecified chronicity    5. Yeast infection      MDM:   Moderate to high medical complexity.  Moderate risk.  Total time: 45 minutes.  This includes total time spent on the encounter, which includes face to face time and non-face to face time preparing to see the patient (eg, review of previous medical  records, tests), Obtaining and/or reviewing separately obtained history, documenting clinical information in the electronic or other health record, independently interpreting results (not separately reported)/communicating results to the patient/family/caregiver, and/or care coordination (not separately reported).    I have Reviewed and summarized old records.  I have performed thorough medication reconciliation today and discussed risk and benefits of medications.  I have reviewed check x-ray, labs and discussed with patient.  All questions were answered.  I am requesting old records and will review them once they are available.  Urology  Visit today included increased complexity associated with the care of the episodic problem see above assessment addressed and managing the longitudinal care of the patient due to the serious and/or complex managed problem(s) see above.  I have signed for the following orders AND/OR meds.  Orders Placed This Encounter   Procedures    CULTURE, BLOOD     Standing Status:   Future     Number of Occurrences:   1     Standing Expiration Date:   1/6/2026     Order Specific Question:   Send normal result to authorizing provider's In Basket if patient is active on MyChart:     Answer:   Yes    X-Ray Chest PA And Lateral     Standing Status:   Future     Number of Occurrences:   1     Standing Expiration Date:   10/8/2025     Order Specific Question:   May the Radiologist modify the order per protocol to meet the clinical needs of the patient?     Answer:   Yes     Order Specific Question:   Release to patient     Answer:   Immediate    X-Ray Chest PA And Lateral     Standing Status:   Future     Standing Expiration Date:   10/8/2025     Order Specific Question:   May the Radiologist modify the order per protocol to meet the clinical needs of the patient?     Answer:   Yes     Order Specific Question:   Release to patient     Answer:   Immediate    Urinalysis, Reflex to Urine Culture Urine,  Clean Catch     Standing Status:   Future     Standing Expiration Date:   12/7/2025     Order Specific Question:   Preferred Collection Type     Answer:   Urine, Clean Catch     Order Specific Question:   Specimen Source     Answer:   Urine    CBC Auto Differential     Standing Status:   Future     Number of Occurrences:   1     Standing Expiration Date:   1/6/2026     Order Specific Question:   Send normal result to authorizing provider's In Basket if patient is active on MyChart:     Answer:   Yes    Sedimentation rate     Standing Status:   Future     Number of Occurrences:   1     Standing Expiration Date:   12/7/2025    C-Reactive Protein     Standing Status:   Future     Number of Occurrences:   1     Standing Expiration Date:   12/7/2025    Comprehensive Metabolic Panel     Standing Status:   Future     Number of Occurrences:   1     Standing Expiration Date:   12/7/2025     Order Specific Question:   Send normal result to authorizing provider's In Basket if patient is active on MyChart:     Answer:   Yes    Ambulatory referral/consult to Diabetic Advanced Practice Providers (Medical Management)     Standing Status:   Future     Standing Expiration Date:   11/8/2025     Referral Priority:   Routine     Referral Type:   Consultation     Referral Reason:   Specialty Services Required     Referred to Provider:   Romana Shane FNP     Number of Visits Requested:   1     Medications Ordered This Encounter   Medications    butalbital-acetaminophen-caffeine -40 mg (FIORICET, ESGIC) -40 mg per tablet     Sig: Take 1 tablet by mouth every 6 (six) hours as needed for Pain or Headaches.     Dispense:  30 tablet     Refill:  0    levoFLOXacin (LEVAQUIN) 750 MG tablet     Sig: Take 1 tablet (750 mg total) by mouth once daily.     Dispense:  7 tablet     Refill:  0    nystatin (MYCOSTATIN) ointment     Sig: Apply topically 2 (two) times daily.     Dispense:  30 g     Refill:  0        Follow up in  about 3 months (around 1/8/2025).  Future Appointments       Date Provider Specialty Appt Notes    12/12/2024 Romana Shane, DONTA Diabetes Type 2 diabetes mellitus with hyperglycemia, without long-term current use of in...    2/6/2025 Eh Sow MD Family Medicine Annual    9/23/2025  Radiology Follow up in about 1 year (around 9/24/2025), or chest CT, [place], CPAP supplies.    9/23/2025  Pulmonology Follow up in about 1 year (around 9/24/2025), or chest CT, [place], CPAP supplies.    9/23/2025 Oliver Mederos MD Pulmonology Follow up in about 1 year (around 9/24/2025), or chest CT, [place], CPAP supplies.          If no improvement in symptoms or symptoms worsen, advised to call/follow-up at clinic or go to ER. Patient voiced understanding and all questions/concerns were addressed.   DISCLAIMER: This note was compiled by using a speech recognition dictation system and therefore please be aware that typographical / speech recognition errors can and do occur.  Please contact me if you see any errors specifically.  Consent was obtained for ANA M recording system prior to the visit.    Eh Sow M.D.       Office: [phone]   [address] [place] [address]  FAX: [phone]

## 2024-10-08 NOTE — PROGRESS NOTES
+++++++++CALL patient with results and Document verification.  Schedule follow-up if needed.  [phone]      Chest x-ray is abnormal.  Patient has left upper lobe pneumonia.  Patient should take the Levaquin as prescribed.  She can continue the amoxicillin also.  Follow-up soon with Pulmonary within one week.  ER precautions for severe symptoms.

## 2024-10-08 NOTE — Clinical Note
+++++++++CALL patient with results and Document verification.  Schedule follow-up if needed. [phone]   Chest x-ray is abnormal.  Patient has left upper lobe pneumonia.  Patient should take the Levaquin as prescribed.  She can continue the amoxicillin also.  Follow-up soon with Pulmonary within one week.  ER precautions for severe symptoms.

## 2024-10-10 ENCOUNTER — PATIENT MESSAGE (OUTPATIENT)
Dept: PULMONOLOGY | Facility: CLINIC | Age: 69
End: 2024-10-10
Payer: MEDICARE

## 2024-10-10 DIAGNOSIS — J18.9 PNEUMONIA OF LEFT UPPER LOBE DUE TO INFECTIOUS ORGANISM: Primary | ICD-10-CM

## 2024-10-14 PROBLEM — Z09 HOSPITAL DISCHARGE FOLLOW-UP: Status: RESOLVED | Noted: 2023-04-21 | Resolved: 2024-10-14

## 2024-10-19 ENCOUNTER — PATIENT MESSAGE (OUTPATIENT)
Dept: ADMINISTRATIVE | Facility: OTHER | Age: 69
End: 2024-10-19
Payer: MEDICARE

## 2024-10-21 ENCOUNTER — HOSPITAL ENCOUNTER (OUTPATIENT)
Dept: RADIOLOGY | Facility: HOSPITAL | Age: 69
Discharge: HOME | End: 2024-10-21
Attending: INTERNAL MEDICINE
Payer: MEDICARE

## 2024-10-21 ENCOUNTER — TELEPHONE (OUTPATIENT)
Dept: FAMILY MEDICINE | Facility: CLINIC | Age: 69
End: 2024-10-21
Payer: MEDICARE

## 2024-10-21 ENCOUNTER — OFFICE VISIT (OUTPATIENT)
Dept: PULMONOLOGY | Facility: CLINIC | Age: 69
End: 2024-10-21
Attending: INTERNAL MEDICINE
Payer: MEDICARE

## 2024-10-21 ENCOUNTER — PATIENT MESSAGE (OUTPATIENT)
Dept: PULMONOLOGY | Facility: CLINIC | Age: 69
End: 2024-10-21

## 2024-10-21 VITALS
HEIGHT: 62 IN | SYSTOLIC BLOOD PRESSURE: 106 MMHG | HEART RATE: 50 BPM | WEIGHT: 197 LBS | OXYGEN SATURATION: 98 % | RESPIRATION RATE: 20 BRPM | DIASTOLIC BLOOD PRESSURE: 68 MMHG | BODY MASS INDEX: 36.25 KG/M2

## 2024-10-21 DIAGNOSIS — J44.9 COPD, GROUP A, BY GOLD 2017 CLASSIFICATION: Primary | ICD-10-CM

## 2024-10-21 DIAGNOSIS — E66.2 CLASS 2 OBESITY WITH ALVEOLAR HYPOVENTILATION, SERIOUS COMORBIDITY, AND BODY MASS INDEX (BMI) OF 38.0 TO 38.9 IN ADULT: ICD-10-CM

## 2024-10-21 DIAGNOSIS — I50.20 HFREF (HEART FAILURE WITH REDUCED EJECTION FRACTION): ICD-10-CM

## 2024-10-21 DIAGNOSIS — G47.33 OSA (OBSTRUCTIVE SLEEP APNEA): ICD-10-CM

## 2024-10-21 DIAGNOSIS — E03.9 HYPOTHYROIDISM, UNSPECIFIED TYPE: Chronic | ICD-10-CM

## 2024-10-21 DIAGNOSIS — E11.65 TYPE 2 DIABETES MELLITUS WITH HYPERGLYCEMIA, WITHOUT LONG-TERM CURRENT USE OF INSULIN: Chronic | ICD-10-CM

## 2024-10-21 DIAGNOSIS — J18.9 PNEUMONIA OF LEFT UPPER LOBE DUE TO INFECTIOUS ORGANISM: ICD-10-CM

## 2024-10-21 DIAGNOSIS — J18.9 PNEUMONIA DUE TO INFECTIOUS ORGANISM, UNSPECIFIED LATERALITY, UNSPECIFIED PART OF LUNG: ICD-10-CM

## 2024-10-21 DIAGNOSIS — I48.0 PAF (PAROXYSMAL ATRIAL FIBRILLATION): ICD-10-CM

## 2024-10-21 DIAGNOSIS — K21.00 GASTROESOPHAGEAL REFLUX DISEASE WITH ESOPHAGITIS WITHOUT HEMORRHAGE: ICD-10-CM

## 2024-10-21 DIAGNOSIS — R91.8 PULMONARY NODULES: ICD-10-CM

## 2024-10-21 DIAGNOSIS — E66.812 CLASS 2 OBESITY WITH ALVEOLAR HYPOVENTILATION, SERIOUS COMORBIDITY, AND BODY MASS INDEX (BMI) OF 38.0 TO 38.9 IN ADULT: ICD-10-CM

## 2024-10-21 PROCEDURE — 1160F RVW MEDS BY RX/DR IN RCRD: CPT | Mod: CPTII,S$GLB,, | Performed by: INTERNAL MEDICINE

## 2024-10-21 PROCEDURE — 1159F MED LIST DOCD IN RCRD: CPT | Mod: CPTII,S$GLB,, | Performed by: INTERNAL MEDICINE

## 2024-10-21 PROCEDURE — 1101F PT FALLS ASSESS-DOCD LE1/YR: CPT | Mod: CPTII,S$GLB,, | Performed by: INTERNAL MEDICINE

## 2024-10-21 PROCEDURE — 3288F FALL RISK ASSESSMENT DOCD: CPT | Mod: CPTII,S$GLB,, | Performed by: INTERNAL MEDICINE

## 2024-10-21 PROCEDURE — 3074F SYST BP LT 130 MM HG: CPT | Mod: CPTII,S$GLB,, | Performed by: INTERNAL MEDICINE

## 2024-10-21 PROCEDURE — 71046 X-RAY EXAM CHEST 2 VIEWS: CPT | Mod: 26,,, | Performed by: RADIOLOGY

## 2024-10-21 PROCEDURE — 3061F NEG MICROALBUMINURIA REV: CPT | Mod: CPTII,S$GLB,, | Performed by: INTERNAL MEDICINE

## 2024-10-21 PROCEDURE — 3066F NEPHROPATHY DOC TX: CPT | Mod: CPTII,S$GLB,, | Performed by: INTERNAL MEDICINE

## 2024-10-21 PROCEDURE — 1125F AMNT PAIN NOTED PAIN PRSNT: CPT | Mod: CPTII,S$GLB,, | Performed by: INTERNAL MEDICINE

## 2024-10-21 PROCEDURE — 71046 X-RAY EXAM CHEST 2 VIEWS: CPT | Mod: TC,PO

## 2024-10-21 PROCEDURE — 4010F ACE/ARB THERAPY RXD/TAKEN: CPT | Mod: CPTII,S$GLB,, | Performed by: INTERNAL MEDICINE

## 2024-10-21 PROCEDURE — 3078F DIAST BP <80 MM HG: CPT | Mod: CPTII,S$GLB,, | Performed by: INTERNAL MEDICINE

## 2024-10-21 PROCEDURE — 3008F BODY MASS INDEX DOCD: CPT | Mod: CPTII,S$GLB,, | Performed by: INTERNAL MEDICINE

## 2024-10-21 PROCEDURE — 99999 PR PBB SHADOW E&M-EST. PATIENT-LVL V: CPT | Mod: PBBFAC,,, | Performed by: INTERNAL MEDICINE

## 2024-10-21 PROCEDURE — 3051F HG A1C>EQUAL 7.0%<8.0%: CPT | Mod: CPTII,S$GLB,, | Performed by: INTERNAL MEDICINE

## 2024-10-21 PROCEDURE — 99214 OFFICE O/P EST MOD 30 MIN: CPT | Mod: S$GLB,,, | Performed by: INTERNAL MEDICINE

## 2024-10-21 NOTE — ASSESSMENT & PLAN NOTE
Data reviewed [id number]   Usage greater than 4 hours was 99%   Auto PAP 6-14 cm water pressure   Residual AHI is 0.8   Using an benefits from CPAP

## 2024-10-21 NOTE — ASSESSMENT & PLAN NOTE
Completed outpatient therapy amoxicillin and Levaquin   Chest x-ray shows improvement   Suspicion for aspiration was sent to GI and swallow evaluation

## 2024-10-21 NOTE — PROGRESS NOTES
:                                               Pulmonary Outpatient  Visit     Subjective:       Patient ID: Crystal Acuan is a 69 y.o. female.    Social History     Tobacco Use   Smoking Status Former    Current packs/day: 0.75    Average packs/day: 0.8 packs/day for 39.0 years (29.3 ttl pk-yrs)    Types: Cigarettes   Smokeless Tobacco Never   Tobacco Comments    quit 2 yrs ago            Chief Complaint: Follow-up and Results      Crystal Acuna is 67 y.o.  Referred by Eh oSw MD  Diagnosis KENYETTA based on HSAt  Has CPAP : > 1 year, [facility], DME [facility]  Download reviewed  Heavy smoker in past:  Prescribed Inhaler  Recent acute MI: 3 stent  Inhaler was added by cardiology: no prior spirometry  Last 2 view CXR 2021  No occupational exposure  On Amio and elliquis  UTD immunisation  No cough, No wheezing or hemoptysis  Quit smoking 7 years  Last Echo Ejection Fraction = 30-35%  Currently on APAP 4-20  AHI 1.0  Using and benefits      11/30/2023  Followup  Here with daughter  Stable COPD  Reveiwed LDCT  Nodules 6.5 mm and 6 mm  Stable on Spiriva        03/20/2024  Followup  Doing well  Unable to get supplies  DME: [facility]  Bed time: 11:30 pm  Wake time: 7 am  Device used and benefits      05/31/2024   Follow-up visit   Son-in-law on the visit   Review of CT scan   The scan was ordered based on study November that showed small nodules   No ground-glass opacities were seen in that study  No cough no wheezing no shortness a breath   Chest CT scan reviewed   Areas of ground-glass opacity   Former smoker   Inflammatory labs have been requested   Patient has a history of atrial fibrillation she is on amiodarone 200 mg   We will check a meter and labeled   We will check lung volumes and DLCO   Steroid taper and antibiotics     09/24/2024  Followup  Daughter present  Last visit amio levels were normal  Recent covid infection Aug 2024  No cough, No wheezing, No SOB  Completed steriods  GGO resolved  mostly  Chest CT and spirometry reveiwed      10/21/2024  Followup  Daughter her  Recent CXR showed VIJAY infiltrated  Fever 104F  Treated Abx: Amox and Levaquin  Feels better  CXR today reveiwed  Hx GERD using PPI and Behavoural HOB  Suspicious for aspiration  Never had EGI  Recurrent belching never had an EGD   Chest x-ray reviewed today with daughter shows improvement         COPD Questionnaire  How often do you cough?: A little of the time  How often do you have phlegm (mucus) in your chest?: Never  How often does your chest feel tight?: Never  When you walk up a hill or one flight of stairs, how often are you breathless?: Most of the time  How often are you limited doing any activities at home?: Most of the time  How often are you confident leaving the house despite your lung condition?: All of the time  How often do you sleep soundly?: Most of the time  How often do you have energy?: Most of the time  Total score: 12                  10/21/2024    10:30 AM   EPWORTH SLEEPINESS SCALE   Sitting and reading 1   Watching TV 1   Sitting, inactive in a public place (e.g. a theatre or a meeting) 0   As a passenger in a car for an hour without a break 3   Lying down to rest in the afternoon when circumstances permit 0   Sitting and talking to someone 0   Sitting quietly after a lunch without alcohol 2   In a car, while stopped for a few minutes in traffic 0   Total score 7         COPD Questionnaire  How often do you cough?: A little of the time  How often do you have phlegm (mucus) in your chest?: Never  How often does your chest feel tight?: Never  When you walk up a hill or one flight of stairs, how often are you breathless?: Most of the time  How often are you limited doing any activities at home?: Most of the time  How often are you confident leaving the house despite your lung condition?: All of the time  How often do you sleep soundly?: Most of the time  How often do you have energy?: Most of the time  Total  "score: 12              Review of Systems   Constitutional:  Negative for fatigue.   Respiratory:  Negative for apnea, snoring, cough, sputum production, choking, chest tightness, wheezing, dyspnea on extertion and use of rescue inhaler.    Psychiatric/Behavioral:  Negative for sleep disturbance.    All other systems reviewed and are negative.      Outpatient Encounter Medications as of 10/21/2024   Medication Sig Dispense Refill    albuterol-ipratropium (DUO-NEB) 2.5 mg-0.5 mg/3 mL nebulizer solution Take 3 mLs by nebulization every 6 (six) hours as needed for Wheezing. Rescue 75 mL 0    amiodarone (PACERONE) 200 MG Tab Take 200 mg by mouth. 100 mg      [] amoxicillin (AMOXIL) 500 MG capsule Take 500 mg by mouth 2 (two) times daily.      atorvastatin (LIPITOR) 80 MG tablet Take 80 mg by mouth every evening.      BD SOMMER 2ND GEN PEN NEEDLE 32 gauge x 5/32" Ndle use as directed      blood sugar diagnostic Strp To check BG 2 times daily, to use with insurance preferred meter 200 each 5    blood-glucose meter kit To check BG 2 times daily, to use with insurance preferred meter 1 each 0    butalbital-acetaminophen-caffeine -40 mg (FIORICET, ESGIC) -40 mg per tablet Take 1 tablet by mouth every 6 (six) hours as needed for Pain or Headaches. 30 tablet 0    chlorhexidine (HIBICLENS) 4 % external liquid Apply topically daily as needed. 473 mL 0    clopidogreL (PLAVIX) 75 mg tablet Take 75 mg by mouth.      ELIQUIS 5 mg Tab Take 5 mg by mouth 2 (two) times daily.      empagliflozin (JARDIANCE) 25 mg tablet Take 25 mg by mouth.      ENTRESTO 24-26 mg per tablet Take 1 tablet by mouth 2 (two) times daily.      ezetimibe (ZETIA) 10 mg tablet Take 10 mg by mouth.      famotidine (PEPCID) 40 MG tablet TAKE ONE TABLET BY MOUTH EVERY DAY 90 tablet 2    fluconazole (DIFLUCAN) 200 MG Tab Take 200 mg by mouth.      fluocinolone acetonide oiL (DERMOTIC OIL) 0.01 % Drop Place 3 drops in ear(s) 2 (two) times daily. 20 " mL 5    fluorometholone 0.1% (FML) 0.1 % DrpS INSTILL 1 DROP INTO EACH EYE THREE TIMES DAILY AS DIRECTED      FREESTYLE MONIKA 3 SENSOR Meseret Use to check glucose 4x a day. Change sensor every 14 days.      furosemide (LASIX) 20 MG tablet Take 20 mg by mouth daily as needed.      hydrocortisone 1 % cream Apply to affected area 2 times daily 30 g 0    insulin lispro 100 unit/mL pen Inject 0-6 Units into the skin.      lancets Misc To check BG 2 times daily, to use with insurance preferred meter 200 each 99    levalbuterol (XOPENEX HFA) 45 mcg/actuation inhaler INHALE ONE PUFF INTO THE LUNGS EVERY 4 HOURS AS NEEDED FOR WHEEZING 45 g 1    levoFLOXacin (LEVAQUIN) 750 MG tablet Take 1 tablet (750 mg total) by mouth once daily. 7 tablet 0    levothyroxine (SYNTHROID) 25 MCG tablet take 1 tablet by mouth EVERY MORNING 90 tablet 2    metoprolol succinate (TOPROL-XL) 25 MG 24 hr tablet Take 25 mg by mouth. Pt take 12.5mg      mupirocin (BACTROBAN) 2 % ointment Apply topically 3 (three) times daily. 22 g 2    nitroGLYCERIN (NITROSTAT) 0.4 MG SL tablet Place 0.4 mg under the tongue as needed.      nystatin (MYCOSTATIN) ointment Apply topically 2 (two) times daily. 30 g 0    pantoprazole (PROTONIX) 40 MG tablet Take 1 tablet (40 mg total) by mouth once daily. 90 tablet 3    SITagliptin phosphate (JANUVIA) 100 MG Tab Take 100 mg by mouth.      SPIRIVA RESPIMAT 2.5 mcg/actuation inhaler INHALE TWO PUFFS DAILY 4 g 11     No facility-administered encounter medications on file as of 10/21/2024.       The following portions of the patient's history were reviewed and updated as appropriate: She  has a past medical history of Allergy, Bronchitis, Family history of colonic polyps (04/27/2021), Hallux abductovalgus, Herniated lumbar intervertebral disc, Hyperlipidemia, Hypertension, Smoker, and Staph aureus infection.  She does not have any pertinent problems on file.  She  has a past surgical history that includes Cholecystectomy  "(2010);  section; Cyst Removal; Back surgery; Endometrial ablation (2009); Carpal tunnel release; Foot fracture surgery; Colonoscopy (N/A, 2021); Eye surgery; Spine surgery; Tonsillectomy; Joint replacement; and Breast biopsy.  Her family history includes Breast cancer in her maternal aunt; COPD in her mother; Heart disease in her father and mother.  She  reports that she has quit smoking. Her smoking use included cigarettes. She has a 29.3 pack-year smoking history. She has never used smokeless tobacco. She reports that she does not drink alcohol and does not use drugs.  She has a current medication list which includes the following prescription(s): albuterol-ipratropium, amiodarone, atorvastatin, bd carrington 2nd gen pen needle, blood sugar diagnostic, blood-glucose meter, butalbital-acetaminophen-caffeine -40 mg, chlorhexidine, clopidogrel, eliquis, empagliflozin, entresto, ezetimibe, famotidine, fluconazole, fluocinolone acetonide oil, fluorometholone 0.1%, freestyle ted 3 sensor, furosemide, hydrocortisone, insulin lispro, lancets, levalbuterol, levofloxacin, levothyroxine, metoprolol succinate, mupirocin, nitroglycerin, nystatin, pantoprazole, sitagliptin phosphate, and spiriva respimat.  Current Outpatient Medications on File Prior to Visit   Medication Sig Dispense Refill    albuterol-ipratropium (DUO-NEB) 2.5 mg-0.5 mg/3 mL nebulizer solution Take 3 mLs by nebulization every 6 (six) hours as needed for Wheezing. Rescue 75 mL 0    amiodarone (PACERONE) 200 MG Tab Take 200 mg by mouth. 100 mg      atorvastatin (LIPITOR) 80 MG tablet Take 80 mg by mouth every evening.      BD CARRINGTON 2ND GEN PEN NEEDLE 32 gauge x " Ndle use as directed      blood sugar diagnostic Strp To check BG 2 times daily, to use with insurance preferred meter 200 each 5    blood-glucose meter kit To check BG 2 times daily, to use with insurance preferred meter 1 each 0    butalbital-acetaminophen-caffeine " -40 mg (FIORICET, ESGIC) -40 mg per tablet Take 1 tablet by mouth every 6 (six) hours as needed for Pain or Headaches. 30 tablet 0    chlorhexidine (HIBICLENS) 4 % external liquid Apply topically daily as needed. 473 mL 0    clopidogreL (PLAVIX) 75 mg tablet Take 75 mg by mouth.      ELIQUIS 5 mg Tab Take 5 mg by mouth 2 (two) times daily.      empagliflozin (JARDIANCE) 25 mg tablet Take 25 mg by mouth.      ENTRESTO 24-26 mg per tablet Take 1 tablet by mouth 2 (two) times daily.      ezetimibe (ZETIA) 10 mg tablet Take 10 mg by mouth.      famotidine (PEPCID) 40 MG tablet TAKE ONE TABLET BY MOUTH EVERY DAY 90 tablet 2    fluconazole (DIFLUCAN) 200 MG Tab Take 200 mg by mouth.      fluocinolone acetonide oiL (DERMOTIC OIL) 0.01 % Drop Place 3 drops in ear(s) 2 (two) times daily. 20 mL 5    fluorometholone 0.1% (FML) 0.1 % DrpS INSTILL 1 DROP INTO EACH EYE THREE TIMES DAILY AS DIRECTED      FREESTYLE MONIKA 3 SENSOR Meseret Use to check glucose 4x a day. Change sensor every 14 days.      furosemide (LASIX) 20 MG tablet Take 20 mg by mouth daily as needed.      hydrocortisone 1 % cream Apply to affected area 2 times daily 30 g 0    insulin lispro 100 unit/mL pen Inject 0-6 Units into the skin.      lancets Misc To check BG 2 times daily, to use with insurance preferred meter 200 each 99    levalbuterol (XOPENEX HFA) 45 mcg/actuation inhaler INHALE ONE PUFF INTO THE LUNGS EVERY 4 HOURS AS NEEDED FOR WHEEZING 45 g 1    levoFLOXacin (LEVAQUIN) 750 MG tablet Take 1 tablet (750 mg total) by mouth once daily. 7 tablet 0    levothyroxine (SYNTHROID) 25 MCG tablet take 1 tablet by mouth EVERY MORNING 90 tablet 2    metoprolol succinate (TOPROL-XL) 25 MG 24 hr tablet Take 25 mg by mouth. Pt take 12.5mg      mupirocin (BACTROBAN) 2 % ointment Apply topically 3 (three) times daily. 22 g 2    nitroGLYCERIN (NITROSTAT) 0.4 MG SL tablet Place 0.4 mg under the tongue as needed.      nystatin (MYCOSTATIN) ointment Apply  "topically 2 (two) times daily. 30 g 0    pantoprazole (PROTONIX) 40 MG tablet Take 1 tablet (40 mg total) by mouth once daily. 90 tablet 3    SITagliptin phosphate (JANUVIA) 100 MG Tab Take 100 mg by mouth.      SPIRIVA RESPIMAT 2.5 mcg/actuation inhaler INHALE TWO PUFFS DAILY 4 g 11     No current facility-administered medications on file prior to visit.     She is allergic to metformin; perflutren lipid microspheres; farxiga [dapagliflozin]; latex, natural rubber; ozempic [semaglutide]; and bactrim [sulfamethoxazole-trimethoprim]..      BP Readings from Last 3 Encounters:   10/21/24 106/68   10/08/24 110/62   09/24/24 130/62     Snoring      MMRC Dyspnea Scale (4 is worst)     [] MMRC 0: Dyspneic on strenuous excercise (0 points)    [x] MMRC 1: Dyspneic on walking a slight hill (0 points)    [] MMRC 2: Dyspneic on walking level ground; must stop occasionally due to breathlessness (1 point)    [] MMRC 3: Must stop for breathlessness after walking 100 yards or after a few minutes (2 points)    [] MMRC 4: Cannot leave house; breathless on dressing/undressing (3 points)              COPD Questionnaire  How often do you cough?: A little of the time  How often do you have phlegm (mucus) in your chest?: Never  How often does your chest feel tight?: Never  When you walk up a hill or one flight of stairs, how often are you breathless?: Most of the time  How often are you limited doing any activities at home?: Most of the time  How often are you confident leaving the house despite your lung condition?: All of the time  How often do you sleep soundly?: Most of the time  How often do you have energy?: Most of the time  Total score: 12              Objective:     Vital Signs (Most Recent)  Vital Signs  Pulse: (!) 50  Resp: 20  SpO2: 98 %  BP: 106/68  Pain Score:   6  Pain Loc: Back  Height and Weight  Height: 5' 2" (157.5 cm)  Weight: 89.4 kg (196 lb 15.7 oz)  BSA (Calculated - sq m): 1.98 sq meters  BMI (Calculated): " 36  Weight in (lb) to have BMI = 25: 136.4]  Wt Readings from Last 2 Encounters:   10/21/24 89.4 kg (196 lb 15.7 oz)   10/08/24 88 kg (194 lb)       Physical Exam  Vitals and nursing note reviewed.   Constitutional:       Appearance: She is normal weight.   HENT:      Head: Normocephalic and atraumatic.      Nose: Nose normal.   Eyes:      Pupils: Pupils are equal, round, and reactive to light.   Cardiovascular:      Rate and Rhythm: Normal rate and regular rhythm.      Pulses: Normal pulses.      Heart sounds: Normal heart sounds.   Pulmonary:      Effort: Pulmonary effort is normal.      Breath sounds: Normal breath sounds.   Abdominal:      General: Bowel sounds are normal.      Palpations: Abdomen is soft.   Musculoskeletal:      Cervical back: Normal range of motion.   Skin:     General: Skin is warm.   Neurological:      General: No focal deficit present.      Mental Status: She is alert and oriented to person, place, and time.   Psychiatric:         Mood and Affect: Mood normal.          Laboratory  Lab Results   Component Value Date    WBC 7.00 10/08/2024    RBC 4.26 10/08/2024    HGB 12.8 10/08/2024    HCT 38.4 10/08/2024    MCV 90 10/08/2024    MCH 30.0 10/08/2024    MCHC 33.3 10/08/2024    RDW 15.9 (H) 10/08/2024     10/08/2024    MPV 11.6 10/08/2024    GRAN 5.6 10/08/2024    GRAN 80.2 (H) 10/08/2024    LYMPH 0.7 (L) 10/08/2024    LYMPH 10.1 (L) 10/08/2024    MONO 0.6 10/08/2024    MONO 7.9 10/08/2024    EOS 0.0 10/08/2024    BASO 0.02 10/08/2024    EOSINOPHIL 0.1 10/08/2024    BASOPHIL 0.3 10/08/2024       BMP  Lab Results   Component Value Date     (L) 10/08/2024    K 3.7 10/08/2024    CL 97 10/08/2024    CO2 21 (L) 10/08/2024    BUN 15 10/08/2024    CREATININE 0.8 10/08/2024    CALCIUM 9.1 10/08/2024    ANIONGAP 12 10/08/2024    ESTGFRAFRICA >60.0 06/08/2022    EGFRNONAA >60.0 06/08/2022    AST 64 (H) 10/08/2024    ALT 29 10/08/2024    PROT 7.3 10/08/2024          Lab Results   Component  "Value Date     (H) 2024        Lab Results   Component Value Date    ASPERGILLUS Not Detected 2024    ASPERGILLUS <0.500 2024     No results found for: "AFUMIGATUSCL"     No results found for: "ACE"     Diagnostic Results:  I have personally reviewed today the following studies:      X-Ray Chest PA And Lateral  Narrative: EXAMINATION:  XR CHEST PA AND LATERAL    CLINICAL HISTORY:  Pneumonia, unspecified organism    TECHNIQUE:  PA and lateral views of the chest were performed.    COMPARISON:  Prior radiographs    FINDINGS:  Cardiac silhouette and mediastinal contours are normal.  Lungs left upper lobe opacity.  No pleural effusion.  Osseous structures are intact.  Impression: Left upper lobe pneumonia.  Correlate with follow-up to resolution.  This report was flagged in Epic as abnormal.    Electronically signed by: Valdo Cameron MD  Date:    10/08/2024  Time:    14:47         PFT  FEV1: 1.78L( 83.7%) FVC 2.49:L( 91.6%)  FEV1/FVC 72  TLC 5.08L( 111.1%)  DLCO 14.34( 70.4%)          Crystal Acuna  2024 - 10/20/2024  Patient ID: [id number]  : 1955  Age: 69 years  Gender: Female  [facility]  [id number] [place] [address] Dr Danielle De Los Santos  [place], [address]  Compliance Report  Compliance  Payor Standard  Usage 2024 - 10/20/2024  Usage days 89/90 days (99%)  >= 4 hours 89 days (99%)  < 4 hours 0 days (0%)  Usage hours 695 hours 15 minutes  Average usage (total days) 7 hours 44 minutes  Average usage (days used) 7 hours 49 minutes  Median usage (days used) 7 hours 57 minutes  Total used hours (value since last reset - 10/20/2024) 6,655 hours  AirSense 11 AutoSet  Serial number [id number]  Mode AutoSet  Min Pressure 6 cmH2O  Max Pressure 14 cmH2O  EPR Fulltime  EPR level 3  Response Standard  Therapy  Pressure - cmH2O Median: 7.5 95th percentile: 9.8 Maximum: 10.8  Leaks - L/min Median: 17.3 95th percentile: 24.3 Maximum: 30.0  Events per hour AI: 0.7 HI: 0.1 AHI: 0.8  Apnea Index " Central: 0.0 Obstructive: 0.7 Unknown: 0.0  RERA Index 0.0  Cheyne-Fernandez respiration (average duration per night) 0 minutes (0%)  Usage - hours  Printed on  Assessment/Plan:     Problem List Items Addressed This Visit       Type 2 diabetes mellitus with hyperglycemia, without long-term current use of insulin (Chronic)    Hypothyroidism (Chronic)    HFrEF (heart failure with reduced ejection fraction)    PAF (paroxysmal atrial fibrillation)    Pulmonary nodules    Class 2 obesity with alveolar hypoventilation, serious comorbidity, and body mass index (BMI) of 38.0 to 38.9 in adult    Gastroesophageal reflux disease with esophagitis without hemorrhage    Relevant Orders    Ambulatory referral/consult to Gastroenterology    COPD, group A, by GOLD 2017 classification - Primary    KENYETTA (obstructive sleep apnea)     Data reviewed [id number]   Usage greater than 4 hours was 99%   Auto PAP 6-14 cm water pressure   Residual AHI is 0.8   Using an benefits from CPAP            Pneumonia due to infectious organism     Completed outpatient therapy amoxicillin and Levaquin   Chest x-ray shows improvement   Suspicion for aspiration was sent to GI and swallow evaluation            Relevant Orders    Fl Modified Barium Swallow Speech    SLP video swallow    Ambulatory referral/consult to Gastroenterology         CXR improved  Interval surveillance    We will make an appointment with GI in [place]     Follow up in about 8 weeks (around 12/16/2024), or swallow test, ref to GI.    This note was prepared using voice recognition system and is likely to have sound alike errors that may have been overlooked even after proof reading.  Please call me with any questions    Discussed diagnosis, its evaluation, treatment and usual course. All questions answered.    Thank you for the courtesy of participating in the care of this patient    Oliver Mederos MD      Personal Diagnostic Review  []  CXR    []  ECHO    []  ONSAT    []   6MWD    []  LABS    []  CHEST CT    []  PET CT    []  Biopsy results

## 2024-10-22 ENCOUNTER — PATIENT MESSAGE (OUTPATIENT)
Dept: PULMONOLOGY | Facility: CLINIC | Age: 69
End: 2024-10-22
Payer: MEDICARE

## 2024-10-22 RX ORDER — CEFDINIR 300 MG/1
300 CAPSULE ORAL 2 TIMES DAILY
Qty: 20 CAPSULE | Refills: 0 | Status: SHIPPED | OUTPATIENT
Start: 2024-10-22 | End: 2024-11-01

## 2024-10-22 RX ORDER — FLUCONAZOLE 200 MG/1
200 TABLET ORAL ONCE
Qty: 1 TABLET | Refills: 0 | Status: SHIPPED | OUTPATIENT
Start: 2024-10-22 | End: 2024-10-22

## 2024-10-22 NOTE — PROGRESS NOTES
Requested Prescriptions     Signed Prescriptions Disp Refills    cefdinir (OMNICEF) 300 MG capsule 20 capsule 0     Sig: Take 1 capsule (300 mg total) by mouth 2 (two) times daily. for 10 days         X-Ray Chest PA And Lateral  Narrative: EXAM: XR CHEST PA AND LATERAL    CLINICAL HISTORY:  Pneumonia, unspecified organism.    TECHNIQUE: 2 view chest x-ray.    FINDINGS: The heart size is normal.  Patchy interstitial infiltrate left upper lobe.  Impression:  Left upper lobe pneumonia.    Finalized on: 10/21/2024 5:34 PM By:  Yovani Jimenez MD  [facility]# [id number]      2024-10-21 17:36:[id number]    [facility]

## 2024-10-28 ENCOUNTER — TELEPHONE (OUTPATIENT)
Dept: PULMONOLOGY | Facility: CLINIC | Age: 69
End: 2024-10-28
Payer: MEDICARE

## 2024-11-05 ENCOUNTER — PATIENT MESSAGE (OUTPATIENT)
Dept: PULMONOLOGY | Facility: CLINIC | Age: 69
End: 2024-11-05
Payer: MEDICARE

## 2024-11-06 ENCOUNTER — TELEPHONE (OUTPATIENT)
Dept: FAMILY MEDICINE | Facility: CLINIC | Age: 69
End: 2024-11-06
Payer: MEDICARE

## 2024-11-06 DIAGNOSIS — M54.50 ACUTE LOW BACK PAIN, UNSPECIFIED BACK PAIN LATERALITY, UNSPECIFIED WHETHER SCIATICA PRESENT: Primary | ICD-10-CM

## 2024-11-06 NOTE — TELEPHONE ENCOUNTER
I have signed for the following orders AND/OR meds.  Please call the patient and ask the patient to schedule the testing AND/OR inform about any medications that were sent.      Orders Placed This Encounter   Procedures    X-Ray Lumbar Spine 2 Or 3 Views     Standing Status:   Future     Standing Expiration Date:   11/6/2025     Order Specific Question:   May the Radiologist modify the order per protocol to meet the clinical needs of the patient?     Answer:   Yes

## 2024-11-06 NOTE — TELEPHONE ENCOUNTER
I have signed for the following orders AND/OR meds.  Please call the patient and ask the patient to schedule the testing AND/OR inform about any medications that were sent.      Orders Placed This Encounter   Procedures    X-Ray Lumbar Spine 2 Or 3 Views     Standing Status:   Future     Standing Expiration Date:   11/6/2025     Order Specific Question:   May the Radiologist modify the order per protocol to meet the clinical needs of the patient?     Answer:   Yes    X-Ray Thoracic Spine AP Lateral     Standing Status:   Future     Standing Expiration Date:   11/6/2025     Order Specific Question:   May the Radiologist modify the order per protocol to meet the clinical needs of the patient?     Answer:   Yes     Order Specific Question:   Release to patient     Answer:   Immediate

## 2024-11-07 ENCOUNTER — HOSPITAL ENCOUNTER (OUTPATIENT)
Dept: RADIOLOGY | Facility: HOSPITAL | Age: 69
Discharge: HOME | End: 2024-11-07
Attending: FAMILY MEDICINE
Payer: MEDICARE

## 2024-11-07 DIAGNOSIS — M54.50 ACUTE LOW BACK PAIN, UNSPECIFIED BACK PAIN LATERALITY, UNSPECIFIED WHETHER SCIATICA PRESENT: ICD-10-CM

## 2024-11-07 PROCEDURE — 72070 X-RAY EXAM THORAC SPINE 2VWS: CPT | Mod: TC,PO

## 2024-11-07 PROCEDURE — 72100 X-RAY EXAM L-S SPINE 2/3 VWS: CPT | Mod: TC,PO

## 2024-11-07 PROCEDURE — 72100 X-RAY EXAM L-S SPINE 2/3 VWS: CPT | Mod: 26,,, | Performed by: RADIOLOGY

## 2024-11-07 PROCEDURE — 72070 X-RAY EXAM THORAC SPINE 2VWS: CPT | Mod: 26,,, | Performed by: RADIOLOGY

## 2024-11-07 NOTE — PROGRESS NOTES
1st check to see if patient has seen the results.  If not then  CALL patient with results and Document verification.  Schedule follow-up if needed.  [phone]      X-ray of the thoracic spine reviewed by radiology.  There are multiple degenerative changes throughout this area of the spine.  Follow-up with me sooner if no improvement in back pain.

## 2024-11-07 NOTE — PROGRESS NOTES
1st check to see if patient has seen the results.  If not then  CALL patient with results and Document verification.  Schedule follow-up if needed.  [phone]  X-ray of the lumbar spine reviewed by radiology.  There are multilevel degenerative changes seen also in this area of the spine.  Also postsurgical changes noted.  L5-S1 degenerative disc disease.    Incidental finding of athero sclerosis of the arteries.  Follow-up with vascular/Cardiology.    Follow-up with me sooner if no improvement in back pain.

## 2024-11-19 ENCOUNTER — PATIENT MESSAGE (OUTPATIENT)
Dept: ADMINISTRATIVE | Facility: OTHER | Age: 69
End: 2024-11-19
Payer: MEDICARE

## 2024-11-25 ENCOUNTER — TELEPHONE (OUTPATIENT)
Dept: DIABETES | Facility: CLINIC | Age: 69
End: 2024-11-25
Payer: MEDICARE

## 2024-11-25 NOTE — TELEPHONE ENCOUNTER
I called  to reschedule her appointment with Romana Shane NP from 12/12/24 to the next available appointment due to the provider being out of office on 12/12/24. I offered the ptient a virtual visit to possibly r/s her to something sooner, which brought us to 2/6/25 @12pm. The patient agreed to the virtual visit being on this day and time.

## 2024-12-18 ENCOUNTER — PATIENT MESSAGE (OUTPATIENT)
Dept: ADMINISTRATIVE | Facility: OTHER | Age: 69
End: 2024-12-18
Payer: MEDICARE

## 2025-01-17 ENCOUNTER — PATIENT MESSAGE (OUTPATIENT)
Dept: ADMINISTRATIVE | Facility: OTHER | Age: 70
End: 2025-01-17
Payer: MEDICARE

## 2025-02-06 ENCOUNTER — LAB VISIT (OUTPATIENT)
Dept: LAB | Facility: HOSPITAL | Age: 70
End: 2025-02-06
Attending: FAMILY MEDICINE
Payer: MEDICARE

## 2025-02-06 ENCOUNTER — OFFICE VISIT (OUTPATIENT)
Dept: FAMILY MEDICINE | Facility: CLINIC | Age: 70
End: 2025-02-06
Payer: MEDICARE

## 2025-02-06 VITALS
SYSTOLIC BLOOD PRESSURE: 110 MMHG | OXYGEN SATURATION: 98 % | HEIGHT: 62 IN | HEART RATE: 52 BPM | WEIGHT: 202.88 LBS | BODY MASS INDEX: 37.33 KG/M2 | DIASTOLIC BLOOD PRESSURE: 88 MMHG

## 2025-02-06 DIAGNOSIS — I48.0 PAF (PAROXYSMAL ATRIAL FIBRILLATION): ICD-10-CM

## 2025-02-06 DIAGNOSIS — K21.00 GASTROESOPHAGEAL REFLUX DISEASE WITH ESOPHAGITIS WITHOUT HEMORRHAGE: ICD-10-CM

## 2025-02-06 DIAGNOSIS — E78.5 HYPERLIPIDEMIA ASSOCIATED WITH TYPE 2 DIABETES MELLITUS: Chronic | ICD-10-CM

## 2025-02-06 DIAGNOSIS — Z23 NEED FOR VACCINATION: ICD-10-CM

## 2025-02-06 DIAGNOSIS — E11.69 HYPERLIPIDEMIA ASSOCIATED WITH TYPE 2 DIABETES MELLITUS: Chronic | ICD-10-CM

## 2025-02-06 DIAGNOSIS — E66.812 CLASS 2 OBESITY WITH ALVEOLAR HYPOVENTILATION, SERIOUS COMORBIDITY, AND BODY MASS INDEX (BMI) OF 38.0 TO 38.9 IN ADULT: ICD-10-CM

## 2025-02-06 DIAGNOSIS — E66.2 CLASS 2 OBESITY WITH ALVEOLAR HYPOVENTILATION, SERIOUS COMORBIDITY, AND BODY MASS INDEX (BMI) OF 38.0 TO 38.9 IN ADULT: ICD-10-CM

## 2025-02-06 DIAGNOSIS — Z79.899 ENCOUNTER FOR LONG-TERM (CURRENT) USE OF MEDICATIONS: ICD-10-CM

## 2025-02-06 DIAGNOSIS — J44.9 COPD, GROUP A, BY GOLD 2017 CLASSIFICATION: ICD-10-CM

## 2025-02-06 DIAGNOSIS — E03.9 HYPOTHYROIDISM, UNSPECIFIED TYPE: ICD-10-CM

## 2025-02-06 DIAGNOSIS — E11.65 TYPE 2 DIABETES MELLITUS WITH HYPERGLYCEMIA, WITHOUT LONG-TERM CURRENT USE OF INSULIN: Primary | ICD-10-CM

## 2025-02-06 DIAGNOSIS — E11.65 TYPE 2 DIABETES MELLITUS WITH HYPERGLYCEMIA, WITHOUT LONG-TERM CURRENT USE OF INSULIN: ICD-10-CM

## 2025-02-06 DIAGNOSIS — I50.20 HFREF (HEART FAILURE WITH REDUCED EJECTION FRACTION): ICD-10-CM

## 2025-02-06 DIAGNOSIS — I15.2 HYPERTENSION ASSOCIATED WITH DIABETES: ICD-10-CM

## 2025-02-06 DIAGNOSIS — E11.59 HYPERTENSION ASSOCIATED WITH DIABETES: ICD-10-CM

## 2025-02-06 LAB
ALBUMIN SERPL BCP-MCNC: 3.7 G/DL (ref 3.5–5.2)
ALP SERPL-CCNC: 56 U/L (ref 40–150)
ALT SERPL W/O P-5'-P-CCNC: 16 U/L (ref 10–44)
ANION GAP SERPL CALC-SCNC: 11 MMOL/L (ref 8–16)
AST SERPL-CCNC: 16 U/L (ref 10–40)
BASOPHILS # BLD AUTO: 0.03 K/UL (ref 0–0.2)
BASOPHILS NFR BLD: 0.4 % (ref 0–1.9)
BILIRUB SERPL-MCNC: 0.5 MG/DL (ref 0.1–1)
BUN SERPL-MCNC: 19 MG/DL (ref 8–23)
CALCIUM SERPL-MCNC: 9.2 MG/DL (ref 8.7–10.5)
CHLORIDE SERPL-SCNC: 105 MMOL/L (ref 95–110)
CHOLEST SERPL-MCNC: 136 MG/DL (ref 120–199)
CHOLEST/HDLC SERPL: 2.5 {RATIO} (ref 2–5)
CO2 SERPL-SCNC: 23 MMOL/L (ref 23–29)
CREAT SERPL-MCNC: 0.8 MG/DL (ref 0.5–1.4)
DIFFERENTIAL METHOD BLD: ABNORMAL
EOSINOPHIL # BLD AUTO: 0.2 K/UL (ref 0–0.5)
EOSINOPHIL NFR BLD: 2.2 % (ref 0–8)
ERYTHROCYTE [DISTWIDTH] IN BLOOD BY AUTOMATED COUNT: 15.9 % (ref 11.5–14.5)
EST. GFR  (NO RACE VARIABLE): >60 ML/MIN/1.73 M^2
ESTIMATED AVG GLUCOSE: 169 MG/DL (ref 68–131)
GLUCOSE SERPL-MCNC: 126 MG/DL (ref 70–110)
HBA1C MFR BLD: 7.5 % (ref 4–5.6)
HCT VFR BLD AUTO: 48.8 % (ref 37–48.5)
HDLC SERPL-MCNC: 54 MG/DL (ref 40–75)
HDLC SERPL: 39.7 % (ref 20–50)
HGB BLD-MCNC: 15.4 G/DL (ref 12–16)
IMM GRANULOCYTES # BLD AUTO: 0.02 K/UL (ref 0–0.04)
IMM GRANULOCYTES NFR BLD AUTO: 0.3 % (ref 0–0.5)
LDLC SERPL CALC-MCNC: 62.8 MG/DL (ref 63–159)
LYMPHOCYTES # BLD AUTO: 1.9 K/UL (ref 1–4.8)
LYMPHOCYTES NFR BLD: 26.6 % (ref 18–48)
MCH RBC QN AUTO: 29.6 PG (ref 27–31)
MCHC RBC AUTO-ENTMCNC: 31.6 G/DL (ref 32–36)
MCV RBC AUTO: 94 FL (ref 82–98)
MONOCYTES # BLD AUTO: 0.7 K/UL (ref 0.3–1)
MONOCYTES NFR BLD: 10.6 % (ref 4–15)
NEUTROPHILS # BLD AUTO: 4.2 K/UL (ref 1.8–7.7)
NEUTROPHILS NFR BLD: 59.9 % (ref 38–73)
NONHDLC SERPL-MCNC: 82 MG/DL
NRBC BLD-RTO: 0 /100 WBC
PLATELET # BLD AUTO: 161 K/UL (ref 150–450)
PMV BLD AUTO: 11.7 FL (ref 9.2–12.9)
POTASSIUM SERPL-SCNC: 4.1 MMOL/L (ref 3.5–5.1)
PROT SERPL-MCNC: 7.6 G/DL (ref 6–8.4)
RBC # BLD AUTO: 5.2 M/UL (ref 4–5.4)
SODIUM SERPL-SCNC: 139 MMOL/L (ref 136–145)
T3FREE SERPL-MCNC: 2.5 PG/ML (ref 2.3–4.2)
T4 FREE SERPL-MCNC: 1.2 NG/DL (ref 0.71–1.51)
TRIGL SERPL-MCNC: 96 MG/DL (ref 30–150)
TSH SERPL DL<=0.005 MIU/L-ACNC: 0.37 UIU/ML (ref 0.4–4)
WBC # BLD AUTO: 6.96 K/UL (ref 3.9–12.7)

## 2025-02-06 PROCEDURE — 36415 COLL VENOUS BLD VENIPUNCTURE: CPT | Mod: PO | Performed by: FAMILY MEDICINE

## 2025-02-06 PROCEDURE — 99214 OFFICE O/P EST MOD 30 MIN: CPT | Mod: S$GLB,,, | Performed by: FAMILY MEDICINE

## 2025-02-06 PROCEDURE — G2211 COMPLEX E/M VISIT ADD ON: HCPCS | Mod: S$GLB,,, | Performed by: FAMILY MEDICINE

## 2025-02-06 PROCEDURE — 3074F SYST BP LT 130 MM HG: CPT | Mod: CPTII,S$GLB,, | Performed by: FAMILY MEDICINE

## 2025-02-06 PROCEDURE — 1126F AMNT PAIN NOTED NONE PRSNT: CPT | Mod: CPTII,S$GLB,, | Performed by: FAMILY MEDICINE

## 2025-02-06 PROCEDURE — 3288F FALL RISK ASSESSMENT DOCD: CPT | Mod: CPTII,S$GLB,, | Performed by: FAMILY MEDICINE

## 2025-02-06 PROCEDURE — 80053 COMPREHEN METABOLIC PANEL: CPT | Performed by: FAMILY MEDICINE

## 2025-02-06 PROCEDURE — 99999 PR PBB SHADOW E&M-EST. PATIENT-LVL V: CPT | Mod: PBBFAC,,, | Performed by: FAMILY MEDICINE

## 2025-02-06 PROCEDURE — 4010F ACE/ARB THERAPY RXD/TAKEN: CPT | Mod: CPTII,S$GLB,, | Performed by: FAMILY MEDICINE

## 2025-02-06 PROCEDURE — 85025 COMPLETE CBC W/AUTO DIFF WBC: CPT | Performed by: FAMILY MEDICINE

## 2025-02-06 PROCEDURE — 80061 LIPID PANEL: CPT | Performed by: FAMILY MEDICINE

## 2025-02-06 PROCEDURE — 1160F RVW MEDS BY RX/DR IN RCRD: CPT | Mod: CPTII,S$GLB,, | Performed by: FAMILY MEDICINE

## 2025-02-06 PROCEDURE — 3079F DIAST BP 80-89 MM HG: CPT | Mod: CPTII,S$GLB,, | Performed by: FAMILY MEDICINE

## 2025-02-06 PROCEDURE — 1101F PT FALLS ASSESS-DOCD LE1/YR: CPT | Mod: CPTII,S$GLB,, | Performed by: FAMILY MEDICINE

## 2025-02-06 PROCEDURE — 1159F MED LIST DOCD IN RCRD: CPT | Mod: CPTII,S$GLB,, | Performed by: FAMILY MEDICINE

## 2025-02-06 PROCEDURE — 84443 ASSAY THYROID STIM HORMONE: CPT | Performed by: FAMILY MEDICINE

## 2025-02-06 PROCEDURE — 84481 FREE ASSAY (FT-3): CPT | Performed by: FAMILY MEDICINE

## 2025-02-06 PROCEDURE — 83036 HEMOGLOBIN GLYCOSYLATED A1C: CPT | Performed by: FAMILY MEDICINE

## 2025-02-06 PROCEDURE — 84439 ASSAY OF FREE THYROXINE: CPT | Performed by: FAMILY MEDICINE

## 2025-02-06 PROCEDURE — 3008F BODY MASS INDEX DOCD: CPT | Mod: CPTII,S$GLB,, | Performed by: FAMILY MEDICINE

## 2025-02-06 RX ORDER — LEVOTHYROXINE SODIUM 25 UG/1
25 TABLET ORAL EVERY MORNING
Qty: 90 TABLET | Refills: 4 | Status: SHIPPED | OUTPATIENT
Start: 2025-02-06

## 2025-02-06 RX ORDER — ESTRADIOL 0.1 MG/G
2 CREAM VAGINAL
COMMUNITY
Start: 2025-01-13

## 2025-02-06 RX ORDER — PANTOPRAZOLE SODIUM 40 MG/1
40 TABLET, DELAYED RELEASE ORAL DAILY
Qty: 90 TABLET | Refills: 4 | Status: SHIPPED | OUTPATIENT
Start: 2025-02-06 | End: 2026-02-06

## 2025-02-06 NOTE — ASSESSMENT & PLAN NOTE
Update thyroid labs.  Continue levothyroxine.  Please be advised of hypothyroidism disease course.  We will plan to monitor thyroid labs at routine intervals.  Please be advised of risks and benefits of medication use, see medication insert for complete details.  ER precautions.    Common complaints from hypothyroidism include weight gain, fatigue, cold intolerance, constipation, dry and flaky shin, coarse or loss of hair, and trouble with memory.     Complaints with hyperthyroidism are weight loss or decrease in appetite, weakness and fatigue, visual changes, fast heart rate, decreased heat tolerance, thinning scalp hair, change in bowel habits, and palpations.

## 2025-02-06 NOTE — ASSESSMENT & PLAN NOTE
Add Co Q10.  Continue statin.  Follow-up with Cardiology.  Counseled on hyperlipidemia disease course, healthy diet and increased need for exercise.  Please be advised of the risk of cardiovascular disease, increase stroke and heart attack risk with uncontrolled/untreated hyperlipidemia.     Patient voiced understanding and understood the treatment plan. All questions were answered.

## 2025-02-06 NOTE — ASSESSMENT & PLAN NOTE
Had issues with MOUNJARO.  Weight is increasing from previous.  On Jardiance and Januvia for diabetes.  Monitoring BMI.  Discussed lifestyle modification with diet and exercise.

## 2025-02-06 NOTE — PROGRESS NOTES
PLAN:      Assessment & Plan  1. Hypertension.  Her hypertension is chronic but stable. Blood pressure is well controlled. She is currently on Entresto and is under the care of a cardiologist. She will continue her current medication regimen.    2. Atrial fibrillation.  Her atrial fibrillation is chronic but stable. She is currently on amiodarone, Eliquis, and metoprolol. She reports compliance with these medications and has not experienced any side effects. Her symptoms are well managed. She will continue her current medication regimen and follow up with her cardiologist.    3. Chronic obstructive pulmonary disease (COPD).  Her COPD is chronic but stable. She is under the care of a pulmonologist and is using inhalers. She will continue her current inhaler regimen and follow up with her pulmonologist.    4. Type 2 diabetes mellitus with hyperglycemia.  Her weight has increased since the last visit. She is currently on Jardiance 25 mg daily and Januvia 100 mg daily for diabetes management. She is monitoring her BMI and has discussed lifestyle modifications, including diet and exercise. Her current medication regimen will be continued. Laboratory tests will be updated today.    5. Hypothyroidism.  Thyroid labs will be updated today. She will continue her current levothyroxine 25 mcg regimen.    6. Gastroesophageal reflux disease (GERD).  Her condition has improved with the use of Pepcid and pantoprazole. She will continue her current medication regimen and follow up with her gastroenterologist.    7. Hyperlipidemia.  Her LDL cholesterol level is 62, which is below the target of 70. She will continue her current Lipitor 80 mg regimen. CoQ10 enzyme supplement has been recommended to help manage muscle aches associated with statin use.    8. Health maintenance.  She is due for a tetanus shot and shingles vaccination. The tetanus shot will be administered today, and the shingles vaccination will be scheduled for a few  weeks later. A mammogram is scheduled for 03/2025. Laboratory tests will be updated today.    9. Back pain.  Chronic.  Follow-up with specialist.    Follow-up  The patient will follow up in 6 months.    Problem List Items Addressed This Visit       Type 2 diabetes mellitus with hyperglycemia, without long-term current use of insulin - Primary (Chronic)     Update labs.  Continue current regimen.  We will plan to monitor hemoglobin A1c at designated intervals 3 to 6 months.  I recommend ongoing Education for diabetic diet and exercise protocol.  We will continue to monitor for side effects.    Please be advised of symptoms to monitor for and to notify me immediately if persistent or worsening.  Follow up with Ophthalmology/Optometry and Podiatry at least annually.           Relevant Orders    Hemoglobin A1C    Hypertension associated with diabetes (Chronic)     Counseled on importance of hypertension disease course, I recommend ongoing Education for DASH-diet and exercise.  Counseled on medication regimen importance of treating high blood pressure.  Please be advised of risk of untreated blood pressure as discussed.  Please advised of ER precautions were given for symptoms of hypertensive urgency and emergency.           HFrEF (heart failure with reduced ejection fraction) (Chronic)     Heart failure precautions.  Continue current medications.         PAF (paroxysmal atrial fibrillation) (Chronic)     Atrial fibrillation precautions.  ER precautions.  Follow-up with Cardiology.         COPD, group A, by GOLD 2017 classification (Chronic)     Continue inhalers.  COPD precautions.  Follow-up with Pulmonary.         Hypothyroidism (Chronic)     Update thyroid labs.  Continue levothyroxine.  Please be advised of hypothyroidism disease course.  We will plan to monitor thyroid labs at routine intervals.  Please be advised of risks and benefits of medication use, see medication insert for complete details.  ER  "precautions.    Common complaints from hypothyroidism include weight gain, fatigue, cold intolerance, constipation, dry and flaky shin, coarse or loss of hair, and trouble with memory.     Complaints with hyperthyroidism are weight loss or decrease in appetite, weakness and fatigue, visual changes, fast heart rate, decreased heat tolerance, thinning scalp hair, change in bowel habits, and palpations.         Relevant Medications    levothyroxine (SYNTHROID) 25 MCG tablet    Other Relevant Orders    TSH    T4, Free    T3, Free    Class 2 obesity with alveolar hypoventilation, serious comorbidity, and body mass index (BMI) of 38.0 to 38.9 in adult (Chronic)     Had issues with MOUNJARO.  Weight is increasing from previous.  On Jardiance and Januvia for diabetes.  Monitoring BMI.  Discussed lifestyle modification with diet and exercise.         Gastroesophageal reflux disease with esophagitis without hemorrhage     Continue current regimen.  Follow-up with GI.         Relevant Medications    pantoprazole (PROTONIX) 40 MG tablet     Future Appointments       Date Provider Specialty Appt Notes    3/21/2025  Radiology     9/23/2025  Radiology Follow up in about 1 year (around 9/24/2025), or chest CT, [place], CPAP supplies.    9/23/2025  Pulmonology Follow up in about 1 year (around 9/24/2025), or chest CT, [place], CPAP supplies.    9/23/2025 Oliver Mederos MD Pulmonology Follow up in about 1 year (around 9/24/2025), or chest CT, [place], CPAP supplies.           Medication Management for assessment above:   Medication List with Changes/Refills   Current Medications    ALBUTEROL-IPRATROPIUM (DUO-NEB) 2.5 MG-0.5 MG/3 ML NEBULIZER SOLUTION    Take 3 mLs by nebulization every 6 (six) hours as needed for Wheezing. Rescue    AMIODARONE (PACERONE) 200 MG TAB    Take 200 mg by mouth. 100 mg    ATORVASTATIN (LIPITOR) 80 MG TABLET    Take 80 mg by mouth every evening.    BD SOMMER 2ND GEN PEN NEEDLE 32 GAUGE X 5/32" NDLE    use as " directed    BLOOD SUGAR DIAGNOSTIC STRP    To check BG 2 times daily, to use with insurance preferred meter    BLOOD-GLUCOSE METER KIT    To check BG 2 times daily, to use with insurance preferred meter    CHLORHEXIDINE (HIBICLENS) 4 % EXTERNAL LIQUID    Apply topically daily as needed.    CLOPIDOGREL (PLAVIX) 75 MG TABLET    Take 75 mg by mouth.    ELIQUIS 5 MG TAB    Take 5 mg by mouth 2 (two) times daily.    EMPAGLIFLOZIN (JARDIANCE) 25 MG TABLET    Take 25 mg by mouth.    ENTRESTO 24-26 MG PER TABLET    Take 1 tablet by mouth 2 (two) times daily.    ESTRADIOL (ESTRACE) 0.01 % (0.1 MG/GRAM) VAGINAL CREAM    Place 2 g vaginally twice a week.    EZETIMIBE (ZETIA) 10 MG TABLET    Take 10 mg by mouth.    FAMOTIDINE (PEPCID) 40 MG TABLET    TAKE ONE TABLET BY MOUTH EVERY DAY    FLUOROMETHOLONE 0.1% (FML) 0.1 % DRPS    INSTILL 1 DROP INTO EACH EYE THREE TIMES DAILY AS DIRECTED    FREESTYLE MONIKA 3 SENSOR ATILIO    Use to check glucose 4x a day. Change sensor every 14 days.    FUROSEMIDE (LASIX) 20 MG TABLET    Take 20 mg by mouth daily as needed.    HYDROCORTISONE 1 % CREAM    Apply to affected area 2 times daily    INSULIN LISPRO 100 UNIT/ML PEN    Inject 0-6 Units into the skin.    LANCETS MISC    To check BG 2 times daily, to use with insurance preferred meter    LEVALBUTEROL (XOPENEX HFA) 45 MCG/ACTUATION INHALER    INHALE ONE PUFF INTO THE LUNGS EVERY 4 HOURS AS NEEDED FOR WHEEZING    METOPROLOL SUCCINATE (TOPROL-XL) 25 MG 24 HR TABLET    Take 25 mg by mouth. Pt take 12.5mg    MUPIROCIN (BACTROBAN) 2 % OINTMENT    Apply topically 3 (three) times daily.    NITROGLYCERIN (NITROSTAT) 0.4 MG SL TABLET    Place 0.4 mg under the tongue as needed.    NYSTATIN (MYCOSTATIN) OINTMENT    Apply topically 2 (two) times daily.    SITAGLIPTIN PHOSPHATE (JANUVIA) 100 MG TAB    Take 100 mg by mouth.    SPIRIVA RESPIMAT 2.5 MCG/ACTUATION INHALER    INHALE TWO PUFFS DAILY   Changed and/or Refilled Medications    Modified Medication  Previous Medication    LEVOTHYROXINE (SYNTHROID) 25 MCG TABLET levothyroxine (SYNTHROID) 25 MCG tablet       Take 1 tablet (25 mcg total) by mouth every morning.    take 1 tablet by mouth EVERY MORNING    PANTOPRAZOLE (PROTONIX) 40 MG TABLET pantoprazole (PROTONIX) 40 MG tablet       Take 1 tablet (40 mg total) by mouth once daily.    Take 1 tablet (40 mg total) by mouth once daily.   Discontinued Medications    FLUOCINOLONE ACETONIDE OIL (DERMOTIC OIL) 0.01 % DROP    Place 3 drops in ear(s) 2 (two) times daily.    LEVOFLOXACIN (LEVAQUIN) 750 MG TABLET    Take 1 tablet (750 mg total) by mouth once daily.       Eh Sow M.D.  ==========================================================================  Subjective:   Patient ID: Crystal Acuna is a 69 y.o. female.  has a past medical history of Allergy, Bronchitis, Family history of colonic polyps (04/27/2021), Hallux abductovalgus, Herniated lumbar intervertebral disc, Hyperlipidemia, Hypertension, Smoker, and Staph aureus infection.   Chief Complaint: Follow-up      History of Present Illness  The patient is a pleasant 69-year-old female presenting for a 6-month follow-up for chronic medical conditions, lab results, and medication management.    She is due for an A1c test for her type 2 diabetes with hyperglycemia. She reports that her diabetes is well-managed with Jardiance 25 mg daily and Januvia 100 mg daily. She has been adhering to her medication regimen and has not experienced any side effects. She notes that her blood sugar levels have been stable since the Thanksgiving and [place] holidays. She is scheduled for a follow-up visit with her endocrinologist in 04/2025.    She is due for a foot exam today. She is also due for tetanus and shingles vaccinations. Her last blood work was performed in 11/2024, so she is due for labs, which will be set up today. She had her RSV and influenza vaccines at the pharmacy. She had pneumonia this past year. She  had the first pneumonia vaccine in 2020 and repeated the second dose in 2022. She is due for another pneumonia vaccine in 2027. She is scheduled for a mammogram in 03/2025.    She is on levothyroxine 25 mcg for hypothyroidism and is due for a refill. She is compliant with the medication and reports no side effects. Her symptoms are controlled.    She is on Pepcid for reflux and pantoprazole 40 mg daily, which she needs refilled. She reports compliance with these medications and no side effects. Her symptoms are controlled. She has been taking pantoprazole without any issues. She recalls an episode of chest pain that led to a hospital visit, where it was unclear whether the cause was cardiac or gastrointestinal. However, she notes that her condition has improved since then. She had a scope done by Dr. Skelton, who recommended another test, but she did not do it because she did not want to get put on another pill. She just elevated it. She could not take the shots as they were hurting her.    She is on Entresto for hypertension and follows up with cardiology. Her blood pressure is well controlled. She reports that her blood pressure readings at home have been consistently below 90. She notes an improvement in her overall well-being and skin color.    She is on amiodarone and Eliquis for atrial fibrillation, as well as metoprolol. She reports compliance with these medications and no side effects. Her symptoms are controlled.    She is on Lipitor 80 mg for cholesterol management. She has been experiencing severe muscle pain, which she attributes to her high-dose Lipitor medication. She has been on Lipitor since her heart attack and has never discontinued it. She is also on Plavix and Eliquis. She has not tried CoQ10 enzyme.    She has been experiencing significant back pain. She has a scheduled appointment with Dr. Bullock in 04/2025.    MEDICATIONS  Current: Pantoprazole 40 mg daily, levothyroxine 25 mcg, Pepcid, Jardiance  25 mg daily, Januvia 100 mg daily, Entresto, amiodarone, Eliquis, metoprolol, Plavix, Lipitor.    IMMUNIZATIONS  She had her RSV and influenza vaccines at the pharmacy. She had the first pneumonia vaccine in 2020 and repeated the second dose in 2022. She is due for tetanus and shingles vaccinations.    Problem List Items Addressed This Visit       Type 2 diabetes mellitus with hyperglycemia, without long-term current use of insulin - Primary (Chronic)    Overview     February 2025:   Diabetes Medications               empagliflozin (JARDIANCE) 25 mg tablet Take 25 mg by mouth.    insulin lispro 100 unit/mL pen Inject 0-6 Units into the skin.    SITagliptin phosphate (JANUVIA) 100 MG Tab Take 100 mg by mouth.        February 2024:  Patient reports she has gained significant amount of weight over the last few months.  BMI Readings from Last 10 Encounters:   02/06/25 37.11 kg/m²   10/21/24 36.03 kg/m²   10/08/24 35.48 kg/m²   09/24/24 37.13 kg/m²   08/01/24 37.39 kg/m²   07/09/24 36.78 kg/m²   05/31/24 36.40 kg/m²   03/20/24 36.40 kg/m²   02/20/24 38.23 kg/m²   02/06/24 38.34 kg/m²   July 2023:  Patient reports she had issues with Ozempic causing stomach issues.April 2023:  Patient has well-controlled blood sugar on Januvia.December 2022:  Patient doing well on Januvia.Patient has side effects to GL P 1. She cannot tolerate metformin due to GI side effects.  Patient currently having increased use infections and recurrent infections on Farxiga.Diabetes Management StatusStatin: TakingACE/ARB: Not taking  Diabetes Management Status    Statin: Taking  ACE/ARB: Not taking    Screening or Prevention Patient's value Goal Complete/Controlled?   HgA1C Testing and Control   Lab Results   Component Value Date    HGBA1C 7.7 (H) 06/03/2024      Annually/Less than 8% Yes   Lipid profile : 09/12/2024 Annually Yes   LDL control Lab Results   Component Value Date    LDLCALC 62.4 (L) 09/12/2024    Annually/Less than 100 mg/dl  Yes  "  Nephropathy screening Lab Results   Component Value Date    LABMICR <5.0 06/03/2024     Lab Results   Component Value Date    PROTEINUA 1+ (A) 10/08/2024     No results found for: "UTPCR"   Annually Yes   Blood pressure BP Readings from Last 1 Encounters:   02/06/25 (!) 110/90    Less than 140/90 Yes   Dilated retinal exam : 08/27/2024 Annually Yes   Foot exam   : 02/06/2024 Annually No              Current Assessment & Plan     Update labs.  Continue current regimen.  We will plan to monitor hemoglobin A1c at designated intervals 3 to 6 months.  I recommend ongoing Education for diabetic diet and exercise protocol.  We will continue to monitor for side effects.    Please be advised of symptoms to monitor for and to notify me immediately if persistent or worsening.  Follow up with Ophthalmology/Optometry and Podiatry at least annually.           Hypertension associated with diabetes (Chronic)    Overview     February 2025:   Hypertension Medications               ENTRESTO 24-26 mg per tablet Take 1 tablet by mouth 2 (two) times daily.    furosemide (LASIX) 20 MG tablet Take 20 mg by mouth daily as needed.    metoprolol succinate (TOPROL-XL) 25 MG 24 hr tablet Take 25 mg by mouth. Pt take 12.5mg    nitroGLYCERIN (NITROSTAT) 0.4 MG SL tablet Place 0.4 mg under the tongue as needed.          CHRONIC.  July 2023:  Hypertension Medications               ENTRESTO 24-26 mg per tablet Take 1 tablet by mouth 2 (two) times daily.    furosemide (LASIX) 20 MG tablet Take 20 mg by mouth daily as needed.    metoprolol succinate (TOPROL-XL) 25 MG 24 hr tablet Take 25 mg by mouth.    nitroGLYCERIN (NITROSTAT) 0.4 MG SL tablet Place 0.4 mg under the tongue as needed.          April 2023:  Patient has had some low blood pressure.    Patient recent medication changes due to MI with stents at [place].  Patient now following with Cardiology.  STABLE.  Patient on Lasix 20 milligrams daily and metoprolol 25 milligrams daily.   BP " Reviewed.  Compliant with BP medications. No SE reported.  December 2022: Well controlled on current regimen.  (-) CP, SOB, palpitations, dizziness, lightheadedness, HA, arm numbness, tingling or weakness, syncope.  Creatinine   Date Value Ref Range Status   11/07/2024 0.86 0.60 - 1.10 mg/dL Final   10/08/2024 0.8 0.5 - 1.4 mg/dL Final              Current Assessment & Plan     Counseled on importance of hypertension disease course, I recommend ongoing Education for DASH-diet and exercise.  Counseled on medication regimen importance of treating high blood pressure.  Please be advised of risk of untreated blood pressure as discussed.  Please advised of ER precautions were given for symptoms of hypertensive urgency and emergency.           HFrEF (heart failure with reduced ejection fraction) (Chronic)    Overview     Chronic.  Stable.  Patient on Entresto.  Following with Cardiology.         Current Assessment & Plan     Heart failure precautions.  Continue current medications.         PAF (paroxysmal atrial fibrillation) (Chronic)    Overview     Chronic.  Stable.  Patient on amiodarone and Eliquis.  Also on metoprolol.  Reports compliance.  No side effects reported.  Symptoms are controlled.         Current Assessment & Plan     Atrial fibrillation precautions.  ER precautions.  Follow-up with Cardiology.         COPD, group A, by GOLD 2017 classification (Chronic)    Overview     Chronic COPD.  Follows with Pulmonary.  On inhalers.         Current Assessment & Plan     Continue inhalers.  COPD precautions.  Follow-up with Pulmonary.         Hypothyroidism (Chronic)    Overview     Chronic.Patient reports weight gain.  No improvement.  She is on levothyroxine 25 micrograms daily.    Lab Results   Component Value Date    TSH 1.185 09/12/2024    FREET4 1.22 09/12/2024              Current Assessment & Plan     Update thyroid labs.  Continue levothyroxine.  Please be advised of hypothyroidism disease course.  We will  plan to monitor thyroid labs at routine intervals.  Please be advised of risks and benefits of medication use, see medication insert for complete details.  ER precautions.    Common complaints from hypothyroidism include weight gain, fatigue, cold intolerance, constipation, dry and flaky shin, coarse or loss of hair, and trouble with memory.     Complaints with hyperthyroidism are weight loss or decrease in appetite, weakness and fatigue, visual changes, fast heart rate, decreased heat tolerance, thinning scalp hair, change in bowel habits, and palpations.         Class 2 obesity with alveolar hypoventilation, serious comorbidity, and body mass index (BMI) of 38.0 to 38.9 in adult (Chronic)    Overview     BMI Readings from Last 10 Encounters:   02/06/25 37.11 kg/m²   10/21/24 36.03 kg/m²   10/08/24 35.48 kg/m²   09/24/24 37.13 kg/m²   08/01/24 37.39 kg/m²   07/09/24 36.78 kg/m²   05/31/24 36.40 kg/m²   03/20/24 36.40 kg/m²   02/20/24 38.23 kg/m²   02/06/24 38.34 kg/m²              Current Assessment & Plan     Had issues with MOUNJARO.  Weight is increasing from previous.  On Jardiance and Januvia for diabetes.  Monitoring BMI.  Discussed lifestyle modification with diet and exercise.         Gastroesophageal reflux disease with esophagitis without hemorrhage    Overview     February 2025:  Improved on Pepcid and pantoprazole.    Chronic.  Currently uncontrolled.  Patient on Pepcid only.  She is also taking steroids at this time along with Plavix.  Recently had hospital visit for chest pain.         Current Assessment & Plan     Continue current regimen.  Follow-up with GI.             Review of patient's allergies indicates:   Allergen Reactions    Metformin     Perflutren lipid microspheres Other (See Comments) and Shortness Of Breath     Back pain, neck pain, arm pain/cramping, and flush face.    Farxiga [dapagliflozin]      Yeast infection      Latex, natural rubber     Ozempic [semaglutide]      Stomach pain    "   Tirzepatide Other (See Comments)     Stomach pain    Bactrim [sulfamethoxazole-trimethoprim] Rash     Current Outpatient Medications   Medication Instructions    albuterol-ipratropium (DUO-NEB) 2.5 mg-0.5 mg/3 mL nebulizer solution 3 mLs, Nebulization, Every 6 hours PRN, Rescue    amiodarone (PACERONE) 200 mg    atorvastatin (LIPITOR) 80 mg, Nightly    BD SOMMER 2ND GEN PEN NEEDLE 32 gauge x 5/32" Ndle use as directed    blood sugar diagnostic Strp To check BG 2 times daily, to use with insurance preferred meter    blood-glucose meter kit To check BG 2 times daily, to use with insurance preferred meter    chlorhexidine (HIBICLENS) 4 % external liquid Topical (Top), Daily PRN    clopidogreL (PLAVIX) 75 mg    ELIQUIS 5 mg, 2 times daily    empagliflozin (JARDIANCE) 25 mg    ENTRESTO 24-26 mg per tablet 1 tablet, 2 times daily    estradioL (ESTRACE) 2 g, Twice weekly    ezetimibe (ZETIA) 10 mg    famotidine (PEPCID) 40 mg, Oral    fluorometholone 0.1% (FML) 0.1 % DrpS INSTILL 1 DROP INTO EACH EYE THREE TIMES DAILY AS DIRECTED    FREESTYLE MONIKA 3 SENSOR [place] Use to check glucose 4x a day. Change sensor every 14 days.    furosemide (LASIX) 20 mg, Daily PRN    hydrocortisone 1 % cream Apply to affected area 2 times daily    insulin lispro 0-6 Units    lancets Misc To check BG 2 times daily, to use with insurance preferred meter    levalbuterol (XOPENEX HFA) 45 mcg/actuation inhaler INHALE ONE PUFF INTO THE LUNGS EVERY 4 HOURS AS NEEDED FOR WHEEZING    levothyroxine (SYNTHROID) 25 mcg, Oral, Every morning    metoprolol succinate (TOPROL-XL) 25 mg    mupirocin (BACTROBAN) 2 % ointment Topical (Top), 3 times daily    nitroGLYCERIN (NITROSTAT) 0.4 mg, As needed (PRN)    nystatin (MYCOSTATIN) ointment Topical (Top), 2 times daily    pantoprazole (PROTONIX) 40 mg, Oral, Daily    SITagliptin phosphate (JANUVIA) 100 mg    SPIRIVA RESPIMAT 2.5 mcg/actuation inhaler INHALE TWO PUFFS DAILY      I have reviewed the PMH, social " "history, FamilyHx, surgical history, allergies and medications documented / confirmed by the patient at the time of this visit.  Review of Systems   Constitutional:  Negative for chills, fatigue, fever and unexpected weight change.   HENT:  Negative for ear pain and sore throat.    Eyes:  Negative for redness and visual disturbance.   Respiratory:  Negative for cough and shortness of breath.    Cardiovascular:  Negative for chest pain and palpitations.   Gastrointestinal:  Negative for nausea and vomiting.   Genitourinary:  Negative for difficulty urinating and hematuria.   Musculoskeletal:  Positive for arthralgias. Negative for myalgias.   Skin:  Negative for rash and wound.   Neurological:  Negative for weakness and headaches.   Psychiatric/Behavioral:  Negative for sleep disturbance. The patient is not nervous/anxious.      Objective:   /88   Pulse (!) 52   Ht 5' 2" (1.575 m)   Wt 92 kg (202 lb 14.4 oz)   SpO2 98%   BMI 37.11 kg/m²   Physical Exam  Vitals and nursing note reviewed.   Constitutional:       General: She is not in acute distress.     Appearance: She is well-developed. She is not ill-appearing, toxic-appearing or diaphoretic.   HENT:      Head: Normocephalic and atraumatic.      Right Ear: Hearing and external ear normal.      Left Ear: Hearing and external ear normal.      Nose: Nose normal. No rhinorrhea.   Eyes:      General: Lids are normal.      Extraocular Movements: Extraocular movements intact.      Conjunctiva/sclera: Conjunctivae normal.      Pupils: Pupils are equal, round, and reactive to light.   Neck:      Vascular: No carotid bruit.   Cardiovascular:      Rate and Rhythm: Normal rate.      Pulses: Normal pulses.   Pulmonary:      Effort: Pulmonary effort is normal. No respiratory distress.      Breath sounds: Normal breath sounds.   Abdominal:      General: Bowel sounds are normal.      Palpations: Abdomen is soft.   Musculoskeletal:         General: Normal range of motion. "      Cervical back: Normal range of motion and neck supple.   Lymphadenopathy:      Cervical: No cervical adenopathy.   Skin:     General: Skin is warm and dry.      Capillary Refill: Capillary refill takes less than 2 seconds.      Coloration: Skin is not pale.   Neurological:      General: No focal deficit present.      Mental Status: She is alert and oriented to person, place, and time. Mental status is at baseline. She is not disoriented.      Cranial Nerves: No cranial nerve deficit.      Motor: No weakness.      Gait: Gait normal.   Psychiatric:         Attention and Perception: She is attentive.         Mood and Affect: Mood normal. Mood is not anxious or depressed.         Speech: Speech is not rapid and pressured or slurred.         Behavior: Behavior normal. Behavior is not agitated, aggressive or hyperactive. Behavior is cooperative.         Thought Content: Thought content normal. Thought content is not paranoid or delusional. Thought content does not include homicidal or suicidal ideation. Thought content does not include homicidal or suicidal plan.         Cognition and Memory: Memory is not impaired.         Judgment: Judgment normal.       Physical Exam  Vital Signs  Blood pressure is normal.    Results  Laboratory Studies  LDL cholesterol is 62.    Assessment:     1. Type 2 diabetes mellitus with hyperglycemia, without long-term current use of insulin    2. Gastroesophageal reflux disease with esophagitis without hemorrhage    3. Hypothyroidism, unspecified type    4. Hypertension associated with diabetes    5. Class 2 obesity with alveolar hypoventilation, serious comorbidity, and body mass index (BMI) of 38.0 to 38.9 in adult    6. HFrEF (heart failure with reduced ejection fraction)    7. PAF (paroxysmal atrial fibrillation)    8. COPD, group A, by GOLD 2017 classification      MDM:   Moderate medical complexity.  Moderate risk.  Total time: 31 minutes.  This includes total time spent on the  encounter, which includes face to face time and non-face to face time preparing to see the patient (eg, review of previous medical records, tests), Obtaining and/or reviewing separately obtained history, documenting clinical information in the electronic or other health record, independently interpreting results (not separately reported)/communicating results to the patient/family/caregiver, and/or care coordination (not separately reported).    I have Reviewed and summarized old records.  I have performed thorough medication reconciliation today and discussed risk and benefits of medications.  I have reviewed labs and discussed with patient.  All questions were answered.  I am requesting old records and will review them once they are available.  Visit today included increased complexity associated with the care of the episodic problem see above assessment addressed and managing the longitudinal care of the patient due to the serious and/or complex managed problem(s) see above.    I have signed for the following orders AND/OR meds.  Orders Placed This Encounter   Procedures    Hemoglobin A1C     Standing Status:   Future     Standing Expiration Date:   5/7/2026     Order Specific Question:   Send normal result to authorizing provider's In Basket if patient is active on MyChart:     Answer:   Yes    TSH     Standing Status:   Standing     Number of Occurrences:   99     Standing Expiration Date:   2/6/2044     Order Specific Question:   Send normal result to authorizing provider's In Basket if patient is active on MyChart:     Answer:   Yes    T4, Free     Standing Status:   Standing     Number of Occurrences:   99     Standing Expiration Date:   2/6/2044     Order Specific Question:   Send normal result to authorizing provider's In Basket if patient is active on MyChart:     Answer:   Yes    T3, Free     Standing Status:   Standing     Number of Occurrences:   99     Standing Expiration Date:   2/6/2044      Medications Ordered This Encounter   Medications    levothyroxine (SYNTHROID) 25 MCG tablet     Sig: Take 1 tablet (25 mcg total) by mouth every morning.     Dispense:  90 tablet     Refill:  4    pantoprazole (PROTONIX) 40 MG tablet     Sig: Take 1 tablet (40 mg total) by mouth once daily.     Dispense:  90 tablet     Refill:  4        Follow up in about 6 months (around 8/6/2025), or if symptoms worsen or fail to improve.  Future Appointments       Date Provider Specialty Appt Notes    3/21/2025  Radiology     9/23/2025  Radiology Follow up in about 1 year (around 9/24/2025), or chest CT, [place], CPAP supplies.    9/23/2025  Pulmonology Follow up in about 1 year (around 9/24/2025), or chest CT, [place], CPAP supplies.    9/23/2025 Oliver Mederos MD Pulmonology Follow up in about 1 year (around 9/24/2025), or chest CT, [place], CPAP supplies.          If no improvement in symptoms or symptoms worsen, advised to call/follow-up at clinic or go to ER. Patient voiced understanding and all questions/concerns were addressed.   DISCLAIMER: This note was compiled by using a speech recognition dictation system and therefore please be aware that typographical / speech recognition errors can and do occur.  Please contact me if you see any errors specifically.  Consent was obtained for ANA M recording system prior to the visit.    This note was generated with the assistance of ambient listening technology. Verbal consent was obtained by the patient and accompanying visitor(s) for the recording of patient appointment to facilitate this note. I attest to having reviewed and edited the generated note for accuracy, though some syntax or spelling errors may persist. Please contact the author of this note for any clarification.    Eh Sow M.D.       Office: [phone]   [address] [place] [address]  FAX: [phone]

## 2025-02-06 NOTE — PATIENT INSTRUCTIONS
Follow up in about 6 months (around 8/6/2025), or if symptoms worsen or fail to improve.     Dear patient,   As a result of recent federal legislation (The Federal Cures Act), you may receive lab or pathology results from your visit in your [facility] account before your physician is able to contact you. Your physician or their representative will relay the results to you with their recommendations at their soonest availability.     If no improvement in symptoms or symptoms worsen, please be advised to call MD, follow-up at clinic and/or go to ER if becomes severe.    Eh Sow M.D.        We Offer TELEHEALTH & Same Day Appointments!   Book your Telehealth appointment with me through my nurse or   Clinic appointments on [employer]!    [address] [place] [address]    Office: [phone]   FAX: [phone]    Check out my Facebook Page and Follow Me at: [email or web address]    Check out my website at [employer] by clicking on: [email or web address]    To Schedule appointments online, go to [email or web address]: [email or web address]

## 2025-02-06 NOTE — ASSESSMENT & PLAN NOTE
Update labs.  Continue current regimen.  We will plan to monitor hemoglobin A1c at designated intervals 3 to 6 months.  I recommend ongoing Education for diabetic diet and exercise protocol.  We will continue to monitor for side effects.    Please be advised of symptoms to monitor for and to notify me immediately if persistent or worsening.  Follow up with Ophthalmology/Optometry and Podiatry at least annually.

## 2025-02-06 NOTE — PROGRESS NOTES
Physical Exam   Cardiovascular:   Pulses:       Posterior tibial pulses are 2+ on the right side and 2+ on the left side.   Feet:   Right Foot:   Protective Sensation: 10 sites tested.  10 sites sensed.   Skin Integrity: Negative for ulcer, skin breakdown, erythema or warmth.   Left Foot:   Protective Sensation: 10 sites tested.  10 sites sensed.   Skin Integrity: Negative for ulcer, skin breakdown, erythema or warmth.

## 2025-02-07 NOTE — PROGRESS NOTES
Make follow-up lab appointment per recommendation below.  Check to see if patient has seen the results through my chart.  If not then,  #CALL THE PATIENT# to discuss results/see if they have questions and document verification of contact. Make F/U appt if needed. [phone]    #My interpretation that was sent to them through [employer]:  Crystal, I have reviewed your recent blood work.     Your complete blood count is stable.  No anemia.  Your metabolic panel which shows your glucose, kidney function, electrolytes, and liver function is stable.  Previous liver enzyme AST elevation is normal.  Thyroid study is borderline.  Suppressed TSH however FT4 is within normal limits.    Your cholesterol is stable.    Your hemoglobin A1c is improved.  Keep up the great work!  This test is gold standard screening test for diabetes.  It is a measures 3 months of your average blood sugar.    =========================  Also please address any outstanding health maintenance that may be due: RSV Vaccine (Age 60+ and Pregnant patients)(1 - Risk 60-74 years 1-dose series) Never done  Mammogram due on 02/06/2025

## 2025-02-14 ENCOUNTER — TELEPHONE (OUTPATIENT)
Dept: DIABETES | Facility: CLINIC | Age: 70
End: 2025-02-14
Payer: MEDICARE

## 2025-02-14 NOTE — TELEPHONE ENCOUNTER
Spoke with patient to assist with scheduling a new patient appointment with diabetes management. Patient stated to please call her daughter     Spoke with patients daughter and she states the patient declined to see Romana due to the patient having an Endocrinologist that she sees.    Order cancelled

## 2025-02-16 ENCOUNTER — PATIENT MESSAGE (OUTPATIENT)
Dept: ADMINISTRATIVE | Facility: OTHER | Age: 70
End: 2025-02-16
Payer: MEDICARE

## 2025-02-17 DIAGNOSIS — Z79.899 ENCOUNTER FOR LONG-TERM (CURRENT) USE OF MEDICATIONS: ICD-10-CM

## 2025-02-17 RX ORDER — FAMOTIDINE 40 MG/1
40 TABLET, FILM COATED ORAL
Qty: 90 TABLET | Refills: 2 | Status: SHIPPED | OUTPATIENT
Start: 2025-02-17

## 2025-02-17 NOTE — TELEPHONE ENCOUNTER
No care due was identified.  [facility] Embedded Care Due Messages. Reference number: [id number].   2/17/2025 8:03:39 AM CST

## 2025-02-18 NOTE — TELEPHONE ENCOUNTER
Refill Routing Note   Medication(s) are not appropriate for processing by [facility] for the following reason(s):        Allergy or intolerance    [facility] action(s):  Defer      Medication Therapy Plan: PCP previously prescribed rx      Appointments  past 12m or future 3m with PCP    Date Provider   Last Visit   2/6/2025 Eh Sow MD   Next Visit   8/6/2025 Eh Sow MD   ED visits in past 90 days: 0        Note composed:7:24 PM 02/17/2025

## 2025-03-18 ENCOUNTER — PATIENT MESSAGE (OUTPATIENT)
Dept: ADMINISTRATIVE | Facility: OTHER | Age: 70
End: 2025-03-18
Payer: MEDICARE

## 2025-03-21 ENCOUNTER — HOSPITAL ENCOUNTER (OUTPATIENT)
Dept: RADIOLOGY | Facility: HOSPITAL | Age: 70
Discharge: HOME | End: 2025-03-21
Attending: FAMILY MEDICINE
Payer: MEDICARE

## 2025-03-21 DIAGNOSIS — Z12.31 ENCOUNTER FOR SCREENING MAMMOGRAM FOR BREAST CANCER: ICD-10-CM

## 2025-03-21 PROCEDURE — 77067 SCR MAMMO BI INCL CAD: CPT | Mod: 26,,, | Performed by: RADIOLOGY

## 2025-03-21 PROCEDURE — 77067 SCR MAMMO BI INCL CAD: CPT | Mod: TC,PO

## 2025-03-21 PROCEDURE — 77063 BREAST TOMOSYNTHESIS BI: CPT | Mod: 26,,, | Performed by: RADIOLOGY

## 2025-03-24 ENCOUNTER — RESULTS FOLLOW-UP (OUTPATIENT)
Dept: FAMILY MEDICINE | Facility: CLINIC | Age: 70
End: 2025-03-24

## 2025-03-24 NOTE — PROGRESS NOTES
Negative mammogram, repeat in 1 year, result released through [employer].  Please call the patient if not enrolled with my chart.   [phone] RSV Vaccine (Age 60+ and Pregnant patients)(1 - Risk 60-74 years 1-dose series) Never done

## 2025-04-18 ENCOUNTER — PATIENT MESSAGE (OUTPATIENT)
Dept: ADMINISTRATIVE | Facility: OTHER | Age: 70
End: 2025-04-18
Payer: MEDICARE

## 2025-04-23 ENCOUNTER — PATIENT MESSAGE (OUTPATIENT)
Dept: OTOLARYNGOLOGY | Facility: CLINIC | Age: 70
End: 2025-04-23
Payer: MEDICARE

## 2025-04-23 DIAGNOSIS — H60.90 OTITIS EXTERNA, UNSPECIFIED CHRONICITY, UNSPECIFIED LATERALITY, UNSPECIFIED TYPE: Primary | ICD-10-CM

## 2025-04-24 RX ORDER — FLUOCINOLONE ACETONIDE 0.11 MG/ML
4 OIL AURICULAR (OTIC) 2 TIMES DAILY
Qty: 20 ML | Refills: 0 | Status: SHIPPED | OUTPATIENT
Start: 2025-04-24

## 2025-04-30 ENCOUNTER — OFFICE VISIT (OUTPATIENT)
Dept: FAMILY MEDICINE | Facility: CLINIC | Age: 70
End: 2025-04-30
Payer: MEDICARE

## 2025-04-30 ENCOUNTER — HOSPITAL ENCOUNTER (OUTPATIENT)
Dept: RADIOLOGY | Facility: HOSPITAL | Age: 70
Discharge: HOME | End: 2025-04-30
Attending: FAMILY MEDICINE
Payer: MEDICARE

## 2025-04-30 ENCOUNTER — RESULTS FOLLOW-UP (OUTPATIENT)
Dept: FAMILY MEDICINE | Facility: CLINIC | Age: 70
End: 2025-04-30

## 2025-04-30 VITALS
HEIGHT: 62 IN | DIASTOLIC BLOOD PRESSURE: 76 MMHG | OXYGEN SATURATION: 94 % | HEART RATE: 94 BPM | BODY MASS INDEX: 37.79 KG/M2 | SYSTOLIC BLOOD PRESSURE: 118 MMHG | WEIGHT: 205.38 LBS

## 2025-04-30 DIAGNOSIS — J40 BRONCHITIS: ICD-10-CM

## 2025-04-30 DIAGNOSIS — Z79.899 ENCOUNTER FOR LONG-TERM (CURRENT) USE OF MEDICATIONS: Chronic | ICD-10-CM

## 2025-04-30 DIAGNOSIS — E03.9 HYPOTHYROIDISM, UNSPECIFIED TYPE: Chronic | ICD-10-CM

## 2025-04-30 DIAGNOSIS — J40 BRONCHITIS: Primary | ICD-10-CM

## 2025-04-30 DIAGNOSIS — E11.65 TYPE 2 DIABETES MELLITUS WITH HYPERGLYCEMIA, WITHOUT LONG-TERM CURRENT USE OF INSULIN: Chronic | ICD-10-CM

## 2025-04-30 DIAGNOSIS — R05.9 COUGH, UNSPECIFIED TYPE: ICD-10-CM

## 2025-04-30 PROCEDURE — 99999 PR PBB SHADOW E&M-EST. PATIENT-LVL V: CPT | Mod: PBBFAC,,, | Performed by: FAMILY MEDICINE

## 2025-04-30 PROCEDURE — 3008F BODY MASS INDEX DOCD: CPT | Mod: CPTII,S$GLB,, | Performed by: FAMILY MEDICINE

## 2025-04-30 PROCEDURE — 1101F PT FALLS ASSESS-DOCD LE1/YR: CPT | Mod: CPTII,S$GLB,, | Performed by: FAMILY MEDICINE

## 2025-04-30 PROCEDURE — 3288F FALL RISK ASSESSMENT DOCD: CPT | Mod: CPTII,S$GLB,, | Performed by: FAMILY MEDICINE

## 2025-04-30 PROCEDURE — 3051F HG A1C>EQUAL 7.0%<8.0%: CPT | Mod: CPTII,S$GLB,, | Performed by: FAMILY MEDICINE

## 2025-04-30 PROCEDURE — 1159F MED LIST DOCD IN RCRD: CPT | Mod: CPTII,S$GLB,, | Performed by: FAMILY MEDICINE

## 2025-04-30 PROCEDURE — 4010F ACE/ARB THERAPY RXD/TAKEN: CPT | Mod: CPTII,S$GLB,, | Performed by: FAMILY MEDICINE

## 2025-04-30 PROCEDURE — 71046 X-RAY EXAM CHEST 2 VIEWS: CPT | Mod: 26,,, | Performed by: RADIOLOGY

## 2025-04-30 PROCEDURE — G2211 COMPLEX E/M VISIT ADD ON: HCPCS | Mod: S$GLB,,, | Performed by: FAMILY MEDICINE

## 2025-04-30 PROCEDURE — 99214 OFFICE O/P EST MOD 30 MIN: CPT | Mod: S$GLB,,, | Performed by: FAMILY MEDICINE

## 2025-04-30 PROCEDURE — 3078F DIAST BP <80 MM HG: CPT | Mod: CPTII,S$GLB,, | Performed by: FAMILY MEDICINE

## 2025-04-30 PROCEDURE — 1126F AMNT PAIN NOTED NONE PRSNT: CPT | Mod: CPTII,S$GLB,, | Performed by: FAMILY MEDICINE

## 2025-04-30 PROCEDURE — 1160F RVW MEDS BY RX/DR IN RCRD: CPT | Mod: CPTII,S$GLB,, | Performed by: FAMILY MEDICINE

## 2025-04-30 PROCEDURE — 71046 X-RAY EXAM CHEST 2 VIEWS: CPT | Mod: TC,PO

## 2025-04-30 PROCEDURE — 3074F SYST BP LT 130 MM HG: CPT | Mod: CPTII,S$GLB,, | Performed by: FAMILY MEDICINE

## 2025-04-30 RX ORDER — UBIDECARENONE 30 MG
30 CAPSULE ORAL 3 TIMES DAILY
COMMUNITY

## 2025-04-30 RX ORDER — ALBUTEROL SULFATE 1.25 MG/3ML
1.25 SOLUTION RESPIRATORY (INHALATION) EVERY 6 HOURS PRN
Qty: 75 ML | Refills: 0 | Status: SHIPPED | OUTPATIENT
Start: 2025-04-30 | End: 2026-04-30

## 2025-04-30 RX ORDER — DOXYCYCLINE 100 MG/1
100 CAPSULE ORAL EVERY 12 HOURS
Qty: 20 CAPSULE | Refills: 0 | Status: SHIPPED | OUTPATIENT
Start: 2025-04-30

## 2025-04-30 RX ORDER — PROMETHAZINE HYDROCHLORIDE AND DEXTROMETHORPHAN HYDROBROMIDE 6.25; 15 MG/5ML; MG/5ML
5 SYRUP ORAL EVERY 4 HOURS PRN
Qty: 120 ML | Refills: 0 | Status: SHIPPED | OUTPATIENT
Start: 2025-04-30 | End: 2025-05-10

## 2025-04-30 NOTE — PROGRESS NOTES
PLAN:      Assessment & Plan  1. Bronchitis.  - Symptoms include chest congestion, dry cough, burning sensation in the chest, and sore lungs.  - Physical exam findings indicate inflammation of the lungs; swabs for COVID-19 and influenza are negative.  - A chest x-ray will be conducted to rule out pneumonia; discussion includes the nature of bronchitis and its typical duration.  - Doxycycline and promethazine with dextromethorphan cough syrup have been prescribed; albuterol nebulized treatments will be provided for use every 4-6 hours; advised to take Tylenol for headaches and to stay hydrated.  Discussed condition course and signs and symptoms to expect.  Patient advised take anti-inflammatories and or Tylenol for pain or fever.  ER precautions.  Call MD or follow-up to clinic if not improving or worsening symptoms.      2. Thyroid management.  - TSH levels are borderline suppressed but within normal limits; free T4 levels are within the normal range.  - Current thyroid medication regimen is effective; no adverse symptoms reported.  - Discussion includes disagreement with the recommendation to stop thyroid medication; plan to monitor thyroid function closely.  - Thyroid levels will be checked again in a few months; advised to continue current medication regimen.    Problem List Items Addressed This Visit       Type 2 diabetes mellitus with hyperglycemia, without long-term current use of insulin (Chronic)    Update labs as scheduled.  Continue current regimen.  We will plan to monitor hemoglobin A1c at designated intervals 3 to 6 months.  I recommend ongoing Education for diabetic diet and exercise protocol.  We will continue to monitor for side effects.    Please be advised of symptoms to monitor for and to notify me immediately if persistent or worsening.  Follow up with Ophthalmology/Optometry and Podiatry at least annually.           Encounter for long-term (current) use of medications (Chronic)    Complete  history and physical was completed today.  Complete and thorough medication reconciliation was performed.  Discussed risks and benefits of medications.  Advised patient on orders and health maintenance.  We discussed old records and old labs if available.  Will request any records not available through epic.  Continue current medications listed on your summary sheet.             Hypothyroidism (Chronic)    FT4 is within normal range.  Monitoring thyroid labs closely.  Continue levothyroxine.  Please be advised of hypothyroidism disease course.  We will plan to monitor thyroid labs at routine intervals.  Please be advised of risks and benefits of medication use, see medication insert for complete details.  ER precautions.    Common complaints from hypothyroidism include weight gain, fatigue, cold intolerance, constipation, dry and flaky shin, coarse or loss of hair, and trouble with memory.     Complaints with hyperthyroidism are weight loss or decrease in appetite, weakness and fatigue, visual changes, fast heart rate, decreased heat tolerance, thinning scalp hair, change in bowel habits, and palpations.         Relevant Orders    TSH    T3, Free    T4, Free     Other Visit Diagnoses         Bronchitis    -  Primary    Relevant Medications    doxycycline (VIBRAMYCIN) 100 MG Cap    promethazine-dextromethorphan (PROMETHAZINE-DM) 6.25-15 mg/5 mL Syrp    Other Relevant Orders    POCT Influenza A/B    POCT COVID-19 Rapid Screening    X-Ray Chest PA And Lateral (Completed)      Cough, unspecified type              Future Appointments       Date Provider Specialty Appt Notes    8/6/2025 Eh Sow MD Family Medicine 6month follow up    9/23/2025  Radiology Follow up in about 1 year (around 9/24/2025), or chest CT, [place], CPAP supplies.    9/23/2025  Pulmonology Follow up in about 1 year (around 9/24/2025), or chest CT, [place], CPAP supplies.    9/23/2025 Oliver Mederos MD Pulmonology Follow up in about 1  "year (around 9/24/2025), or chest CT, [place], CPAP supplies.           Medication Management for assessment above:   Medication List with Changes/Refills   New Medications    DOXYCYCLINE (VIBRAMYCIN) 100 MG CAP    Take 1 capsule (100 mg total) by mouth every 12 (twelve) hours.    PROMETHAZINE-DEXTROMETHORPHAN (PROMETHAZINE-DM) 6.25-15 MG/5 ML SYRP    Take 5 mLs by mouth every 4 (four) hours as needed (cough).   Current Medications    ALBUTEROL-IPRATROPIUM (DUO-NEB) 2.5 MG-0.5 MG/3 ML NEBULIZER SOLUTION    Take 3 mLs by nebulization every 6 (six) hours as needed for Wheezing. Rescue    AMIODARONE (PACERONE) 200 MG TAB    Take 200 mg by mouth. 100 mg    ATORVASTATIN (LIPITOR) 80 MG TABLET    Take 80 mg by mouth every evening.    BD SOMMER 2ND GEN PEN NEEDLE 32 GAUGE X 5/32" NDLE    use as directed    BLOOD SUGAR DIAGNOSTIC STRP    To check BG 2 times daily, to use with insurance preferred meter    BLOOD-GLUCOSE METER KIT    To check BG 2 times daily, to use with insurance preferred meter    CHLORHEXIDINE (HIBICLENS) 4 % EXTERNAL LIQUID    Apply topically daily as needed.    CLOPIDOGREL (PLAVIX) 75 MG TABLET    Take 75 mg by mouth.    CO-ENZYME Q-10 30 MG CAPSULE    Take 30 mg by mouth 3 (three) times daily.    ELIQUIS 5 MG TAB    Take 5 mg by mouth 2 (two) times daily.    EMPAGLIFLOZIN (JARDIANCE) 25 MG TABLET    Take 25 mg by mouth.    ENTRESTO 24-26 MG PER TABLET    Take 1 tablet by mouth 2 (two) times daily.    ESTRADIOL (ESTRACE) 0.01 % (0.1 MG/GRAM) VAGINAL CREAM    Place 2 g vaginally twice a week.    EZETIMIBE (ZETIA) 10 MG TABLET    Take 10 mg by mouth.    FAMOTIDINE (PEPCID) 40 MG TABLET    TAKE ONE TABLET BY MOUTH EVERY DAY    FLUOCINOLONE ACETONIDE OIL 0.01 % DROP    Place 4 drops in ear(s) 2 (two) times a day. 4 drops to both ears twice a day for 7 days then once weekly for maintenance.    FLUOROMETHOLONE 0.1% (FML) 0.1 % DRPS    INSTILL 1 DROP INTO EACH EYE THREE TIMES DAILY AS DIRECTED    FREESTYLE MONIKA 3 " SENSOR ATILIO    Use to check glucose 4x a day. Change sensor every 14 days.    FUROSEMIDE (LASIX) 20 MG TABLET    Take 20 mg by mouth daily as needed.    HYDROCORTISONE 1 % CREAM    Apply to affected area 2 times daily    INSULIN LISPRO 100 UNIT/ML PEN    Inject 0-6 Units into the skin.    LANCETS MISC    To check BG 2 times daily, to use with insurance preferred meter    LEVALBUTEROL (XOPENEX HFA) 45 MCG/ACTUATION INHALER    INHALE ONE PUFF INTO THE LUNGS EVERY 4 HOURS AS NEEDED FOR WHEEZING    LEVOTHYROXINE (SYNTHROID) 25 MCG TABLET    Take 1 tablet (25 mcg total) by mouth every morning.    METOPROLOL SUCCINATE (TOPROL-XL) 25 MG 24 HR TABLET    Take 25 mg by mouth. Pt take 12.5mg    MUPIROCIN (BACTROBAN) 2 % OINTMENT    Apply topically 3 (three) times daily.    NITROGLYCERIN (NITROSTAT) 0.4 MG SL TABLET    Place 0.4 mg under the tongue as needed.    NYSTATIN (MYCOSTATIN) OINTMENT    Apply topically 2 (two) times daily.    PANTOPRAZOLE (PROTONIX) 40 MG TABLET    Take 1 tablet (40 mg total) by mouth once daily.    SITAGLIPTIN PHOSPHATE (JANUVIA) 100 MG TAB    Take 100 mg by mouth.    SPIRIVA RESPIMAT 2.5 MCG/ACTUATION INHALER    INHALE TWO PUFFS DAILY       Eh Sow M.D.  ==========================================================================  Subjective:   Patient ID: Crystal Acuna is a 69 y.o. female.  has a past medical history of Allergy, Bronchitis, Family history of colonic polyps (04/27/2021), Hallux abductovalgus, Herniated lumbar intervertebral disc, Hyperlipidemia, Hypertension, Smoker, and Staph aureus infection.   Chief Complaint: No chief complaint on file.      History of Present Illness  The patient is a 69-year-old female presenting with chest congestion and a history of bronchitis. She is accompanied by her daughter, who is concerned about her health.    An initial onset of symptoms began with a mild dry cough, which has progressively worsened since Monday. By Tuesday, a burning  sensation in the chest and lung discomfort were experienced, accompanied by persistent coughing. Headaches are reported, likely due to the severity of the coughing. No febrile episodes have been noted. Inhalers have been used as part of the treatment regimen, and doxycycline has been prescribed previously. Some leftover cough syrup from a previous prescription is available.    Blood pressure and blood glucose levels have been stable. Another physician advised discontinuation of thyroid medication due to low TSH levels, but this has not been done yet.    Problem List Items Addressed This Visit       Type 2 diabetes mellitus with hyperglycemia, without long-term current use of insulin (Chronic)    Overview   April 2025:  Tolerating current medications well.  Following with Endocrinology.  Diabetes Medications              empagliflozin (JARDIANCE) 25 mg tablet Take 25 mg by mouth.    insulin lispro 100 unit/mL pen Inject 0-6 Units into the skin.    SITagliptin phosphate (JANUVIA) 100 MG Tab Take 100 mg by mouth.          February 2025:   Diabetes Medications               empagliflozin (JARDIANCE) 25 mg tablet Take 25 mg by mouth.    insulin lispro 100 unit/mL pen Inject 0-6 Units into the skin.    SITagliptin phosphate (JANUVIA) 100 MG Tab Take 100 mg by mouth.        February 2024:  Patient reports she has gained significant amount of weight over the last few months.  BMI Readings from Last 10 Encounters:   02/06/25 37.11 kg/m²   10/21/24 36.03 kg/m²   10/08/24 35.48 kg/m²   09/24/24 37.13 kg/m²   08/01/24 37.39 kg/m²   07/09/24 36.78 kg/m²   05/31/24 36.40 kg/m²   03/20/24 36.40 kg/m²   02/20/24 38.23 kg/m²   02/06/24 38.34 kg/m²   July 2023:  Patient reports she had issues with Ozempic causing stomach issues.April 2023:  Patient has well-controlled blood sugar on Januvia.December 2022:  Patient doing well on Januvia.Patient has side effects to GL P 1. She cannot tolerate metformin due to GI side effects.  Patient  "currently having increased use infections and recurrent infections on Farxiga.Diabetes Management StatusStatin: TakingACE/ARB: Not taking  Diabetes Management Status    Statin: Taking  ACE/ARB: Not taking    Screening or Prevention Patient's value Goal Complete/Controlled?   HgA1C Testing and Control   Lab Results   Component Value Date    HGBA1C 7.7 (H) 06/03/2024      Annually/Less than 8% Yes   Lipid profile : 09/12/2024 Annually Yes   LDL control Lab Results   Component Value Date    LDLCALC 62.4 (L) 09/12/2024    Annually/Less than 100 mg/dl  Yes   Nephropathy screening Lab Results   Component Value Date    LABMICR <5.0 06/03/2024     Lab Results   Component Value Date    PROTEINUA 1+ (A) 10/08/2024     No results found for: "UTPCR"   Annually Yes   Blood pressure BP Readings from Last 1 Encounters:   02/06/25 (!) 110/90    Less than 140/90 Yes   Dilated retinal exam : 08/27/2024 Annually Yes   Foot exam   : 02/06/2024 Annually No              Current Assessment & Plan   Update labs as scheduled.  Continue current regimen.  We will plan to monitor hemoglobin A1c at designated intervals 3 to 6 months.  I recommend ongoing Education for diabetic diet and exercise protocol.  We will continue to monitor for side effects.    Please be advised of symptoms to monitor for and to notify me immediately if persistent or worsening.  Follow up with Ophthalmology/Optometry and Podiatry at least annually.           Encounter for long-term (current) use of medications (Chronic)    Overview   April 2025:  Reviewed labs.  February 2024: Reviewed labs.  July 2023: Reviewed labs.  April 2023: Reviewed labs.  December 2022: Reviewed labs.  CHRONIC. Stable. Compliant with medications for managed conditions. See medication list. No SE reported.   Routine lab analysis is being monitored. Refills were addressed.  Lab Results   Component Value Date    WBC 6.96 02/06/2025    HGB 15.4 02/06/2025    HCT 48.8 (H) 02/06/2025    MCV 94 " 02/06/2025     02/06/2025         Chemistry        Component Value Date/Time     02/06/2025 0916    K 4.1 02/06/2025 0916     02/06/2025 0916    CO2 23 02/06/2025 0916    BUN 19 02/06/2025 0916    CREATININE 0.8 02/06/2025 0916     (H) 02/06/2025 0916        Component Value Date/Time    CALCIUM 9.2 02/06/2025 0916    ALKPHOS 56 02/06/2025 0916    AST 16 02/06/2025 0916    AST 20 03/28/2016 1457    ALT 16 02/06/2025 0916    BILITOT 0.5 02/06/2025 0916    ESTGFRAFRICA >60.0 06/08/2022 0747    EGFRNONAA >60.0 06/08/2022 0747          Lab Results   Component Value Date    TSH 0.374 (L) 02/06/2025    FREET4 1.20 02/06/2025    T3FREE 2.5 02/06/2025              Current Assessment & Plan   Complete history and physical was completed today.  Complete and thorough medication reconciliation was performed.  Discussed risks and benefits of medications.  Advised patient on orders and health maintenance.  We discussed old records and old labs if available.  Will request any records not available through epic.  Continue current medications listed on your summary sheet.             Hypothyroidism (Chronic)    Overview   April 2025:  Patient reports she is tolerating low-dose levothyroxine well.  She feels well.  Chronic.Patient reports weight gain.  No improvement.  She is on levothyroxine 25 micrograms daily.    Lab Results   Component Value Date    TSH 0.374 (L) 02/06/2025    FREET4 1.20 02/06/2025              Current Assessment & Plan   FT4 is within normal range.  Monitoring thyroid labs closely.  Continue levothyroxine.  Please be advised of hypothyroidism disease course.  We will plan to monitor thyroid labs at routine intervals.  Please be advised of risks and benefits of medication use, see medication insert for complete details.  ER precautions.    Common complaints from hypothyroidism include weight gain, fatigue, cold intolerance, constipation, dry and flaky shin, coarse or loss of hair, and  "trouble with memory.     Complaints with hyperthyroidism are weight loss or decrease in appetite, weakness and fatigue, visual changes, fast heart rate, decreased heat tolerance, thinning scalp hair, change in bowel habits, and palpations.          Other Visit Diagnoses         Bronchitis    -  Primary      Cough, unspecified type                 Review of patient's allergies indicates:   Allergen Reactions    Metformin     Perflutren lipid microspheres Other (See Comments) and Shortness Of Breath     Back pain, neck pain, arm pain/cramping, and flush face.    Farxiga [dapagliflozin]      Yeast infection      Latex, natural rubber     Ozempic [semaglutide]      Stomach pain      Tirzepatide Other (See Comments)     Stomach pain    Bactrim [sulfamethoxazole-trimethoprim] Rash     Current Outpatient Medications   Medication Instructions    albuterol-ipratropium (DUO-NEB) 2.5 mg-0.5 mg/3 mL nebulizer solution 3 mLs, Nebulization, Every 6 hours PRN, Rescue    amiodarone (PACERONE) 200 mg    atorvastatin (LIPITOR) 80 mg, Nightly    BD SOMMER 2ND GEN PEN NEEDLE 32 gauge x 5/32" Ndle use as directed    blood sugar diagnostic Strp To check BG 2 times daily, to use with insurance preferred meter    blood-glucose meter kit To check BG 2 times daily, to use with insurance preferred meter    chlorhexidine (HIBICLENS) 4 % external liquid Topical (Top), Daily PRN    clopidogreL (PLAVIX) 75 mg    co-enzyme Q-10 30 mg, 3 times daily    doxycycline (VIBRAMYCIN) 100 mg, Oral, Every 12 hours    ELIQUIS 5 mg, 2 times daily    empagliflozin (JARDIANCE) 25 mg    ENTRESTO 24-26 mg per tablet 1 tablet, 2 times daily    estradioL (ESTRACE) 2 g, Twice weekly    ezetimibe (ZETIA) 10 mg    famotidine (PEPCID) 40 mg, Oral    fluocinolone acetonide oiL 0.01 % Drop 4 drops, Otic, 2 times daily, 4 drops to both ears twice a day for 7 days then once weekly for maintenance.    fluorometholone 0.1% (FML) 0.1 % DrpS INSTILL 1 DROP INTO EACH EYE THREE " "TIMES DAILY AS DIRECTED    FREESTYLE MONIKA 3 SENSOR [place] Use to check glucose 4x a day. Change sensor every 14 days.    furosemide (LASIX) 20 mg, Daily PRN    hydrocortisone 1 % cream Apply to affected area 2 times daily    insulin lispro 0-6 Units    lancets Misc To check BG 2 times daily, to use with insurance preferred meter    levalbuterol (XOPENEX HFA) 45 mcg/actuation inhaler INHALE ONE PUFF INTO THE LUNGS EVERY 4 HOURS AS NEEDED FOR WHEEZING    levothyroxine (SYNTHROID) 25 mcg, Oral, Every morning    metoprolol succinate (TOPROL-XL) 25 mg    mupirocin (BACTROBAN) 2 % ointment Topical (Top), 3 times daily    nitroGLYCERIN (NITROSTAT) 0.4 mg, As needed (PRN)    nystatin (MYCOSTATIN) ointment Topical (Top), 2 times daily    pantoprazole (PROTONIX) 40 mg, Oral, Daily    promethazine-dextromethorphan (PROMETHAZINE-DM) 6.25-15 mg/5 mL Syrp 5 mLs, Oral, Every 4 hours PRN    SITagliptin phosphate (JANUVIA) 100 mg    SPIRIVA RESPIMAT 2.5 mcg/actuation inhaler INHALE TWO PUFFS DAILY      I have reviewed the PMH, social history, FamilyHx, surgical history, allergies and medications documented / confirmed by the patient at the time of this visit.  Review of Systems  Objective:   /76   Pulse 94   Ht 5' 2" (1.575 m)   Wt 93.2 kg (205 lb 6.4 oz)   SpO2 (!) 94%   BMI 37.57 kg/m²   Physical Exam  Physical Exam  Respiratory: Inflamed lungs, coughing upon deep breaths    Results  Labs   - TSH: 02/2025, Borderline suppressed but within normal limits   - Free T4: 02/2025, Within normal range    Diagnostic Testing   - Swabs for COVID-19: Negative   - Swabs for influenza: Negative    Assessment:     1. Bronchitis    2. Cough, unspecified type    3. Hypothyroidism, unspecified type    4. Type 2 diabetes mellitus with hyperglycemia, without long-term current use of insulin    5. Encounter for long-term (current) use of medications      MDM:   Moderate medical complexity.  Moderate risk.  Total time: 33 minutes.  This " includes total time spent on the encounter, which includes face to face time and non-face to face time preparing to see the patient (eg, review of previous medical records, tests), Obtaining and/or reviewing separately obtained history, documenting clinical information in the electronic or other health record, independently interpreting results (not separately reported)/communicating results to the patient/family/caregiver, and/or care coordination (not separately reported).    I have Reviewed and summarized old records.  I have performed thorough medication reconciliation today and discussed risk and benefits of medications.  I have reviewed chest x-ray, labs and discussed with patient.  All questions were answered.  I am requesting old records and will review them once they are available.  Endocrinology  Visit today included increased complexity associated with the care of the episodic problem see above assessment addressed and managing the longitudinal care of the patient due to the serious and/or complex managed problem(s) see above.    I have signed for the following orders AND/OR meds.  Orders Placed This Encounter   Procedures    X-Ray Chest PA And Lateral     Standing Status:   Future     Number of Occurrences:   1     Expected Date:   4/30/2025     Expiration Date:   4/30/2026     May the Radiologist modify the order per protocol to meet the clinical needs of the patient?:   Yes     Release to patient:   Immediate    TSH     Standing Status:   Future     Expected Date:   4/30/2025     Expiration Date:   6/29/2026     Send normal result to authorizing provider's In Basket if patient is active on MyChart::   Yes    T3, Free     Standing Status:   Future     Expected Date:   4/30/2025     Expiration Date:   6/29/2026     Send normal result to authorizing provider's In Basket if patient is active on MyChart::   Yes    T4, Free     Standing Status:   Future     Expected Date:   4/30/2025     Expiration Date:    6/29/2026     Send normal result to authorizing provider's In Basket if patient is active on MyChart::   Yes    POCT Influenza A/B     Standing Status:   Future     Expected Date:   4/30/2025     Expiration Date:   6/29/2026    POCT COVID-19 Rapid Screening     Medications Ordered This Encounter   Medications    doxycycline (VIBRAMYCIN) 100 MG Cap     Sig: Take 1 capsule (100 mg total) by mouth every 12 (twelve) hours.     Dispense:  20 capsule     Refill:  0    promethazine-dextromethorphan (PROMETHAZINE-DM) 6.25-15 mg/5 mL Syrp     Sig: Take 5 mLs by mouth every 4 (four) hours as needed (cough).     Dispense:  120 mL     Refill:  0        Follow up in about 6 months (around 10/30/2025), or if symptoms worsen or fail to improve.  Future Appointments       Date Provider Specialty Appt Notes    8/6/2025 Eh Sow MD Family Medicine 6month follow up    9/23/2025  Radiology Follow up in about 1 year (around 9/24/2025), or chest CT, [place], CPAP supplies.    9/23/2025  Pulmonology Follow up in about 1 year (around 9/24/2025), or chest CT, [place], CPAP supplies.    9/23/2025 Oliver Mederos MD Pulmonology Follow up in about 1 year (around 9/24/2025), or chest CT, [place], CPAP supplies.          If no improvement in symptoms or symptoms worsen, advised to call/follow-up at clinic or go to ER. Patient voiced understanding and all questions/concerns were addressed.   DISCLAIMER: This note was compiled by using a speech recognition dictation system and therefore please be aware that typographical / speech recognition errors can and do occur.  Please contact me if you see any errors specifically.  Consent was obtained for ANA M recording system prior to the visit.    This note was generated with the assistance of ambient listening technology. Verbal consent was obtained by the patient and accompanying visitor(s) for the recording of patient appointment to facilitate this note. I attest to having reviewed and edited  the generated note for accuracy, though some syntax or spelling errors may persist. Please contact the author of this note for any clarification.    Eh Sow M.D.       Office: [phone]   [address] [place] [address]  FAX: [phone]

## 2025-04-30 NOTE — PATIENT INSTRUCTIONS
Jimy Rojas,     If you are due for any health screening(s) below please notify me so we can arrange them to be ordered and scheduled. Most healthy patients at your age complete them, but you are free to accept or refuse.     If you can't do it, I'll definitely understand. If you can, I'd certainly appreciate it!    All of your core healthy metrics are met.

## 2025-04-30 NOTE — PROGRESS NOTES
The patient has a normal CXR.  No lesions were found and no fluid was noted.  The heart appears normal.    Please call patient with these normal results if they are not enrolled with my chart.

## 2025-04-30 NOTE — ASSESSMENT & PLAN NOTE
Update labs as scheduled.  Continue current regimen.  We will plan to monitor hemoglobin A1c at designated intervals 3 to 6 months.  I recommend ongoing Education for diabetic diet and exercise protocol.  We will continue to monitor for side effects.    Please be advised of symptoms to monitor for and to notify me immediately if persistent or worsening.  Follow up with Ophthalmology/Optometry and Podiatry at least annually.

## 2025-04-30 NOTE — ASSESSMENT & PLAN NOTE
FT4 is within normal range.  Monitoring thyroid labs closely.  Continue levothyroxine.  Please be advised of hypothyroidism disease course.  We will plan to monitor thyroid labs at routine intervals.  Please be advised of risks and benefits of medication use, see medication insert for complete details.  ER precautions.    Common complaints from hypothyroidism include weight gain, fatigue, cold intolerance, constipation, dry and flaky shin, coarse or loss of hair, and trouble with memory.     Complaints with hyperthyroidism are weight loss or decrease in appetite, weakness and fatigue, visual changes, fast heart rate, decreased heat tolerance, thinning scalp hair, change in bowel habits, and palpations.

## 2025-05-17 ENCOUNTER — PATIENT MESSAGE (OUTPATIENT)
Dept: ADMINISTRATIVE | Facility: OTHER | Age: 70
End: 2025-05-17
Payer: MEDICARE

## 2025-06-11 DIAGNOSIS — E11.9 TYPE 2 DIABETES MELLITUS WITHOUT COMPLICATION: ICD-10-CM

## 2025-06-18 ENCOUNTER — PATIENT MESSAGE (OUTPATIENT)
Dept: ADMINISTRATIVE | Facility: OTHER | Age: 70
End: 2025-06-18
Payer: MEDICARE

## 2025-07-16 ENCOUNTER — PATIENT MESSAGE (OUTPATIENT)
Dept: ADMINISTRATIVE | Facility: OTHER | Age: 70
End: 2025-07-16
Payer: MEDICARE

## 2025-07-16 ENCOUNTER — E-VISIT (OUTPATIENT)
Dept: FAMILY MEDICINE | Facility: CLINIC | Age: 70
End: 2025-07-16
Payer: MEDICARE

## 2025-07-16 DIAGNOSIS — L01.00 IMPETIGO: Primary | ICD-10-CM

## 2025-07-17 RX ORDER — CEPHALEXIN 500 MG/1
500 CAPSULE ORAL 4 TIMES DAILY
Qty: 28 CAPSULE | Refills: 0 | Status: SHIPPED | OUTPATIENT
Start: 2025-07-17 | End: 2025-07-24

## 2025-07-17 RX ORDER — MUPIROCIN 20 MG/G
OINTMENT TOPICAL 3 TIMES DAILY
Qty: 22 G | Refills: 0 | Status: SHIPPED | OUTPATIENT
Start: 2025-07-17

## 2025-07-17 NOTE — PATIENT INSTRUCTIONS
Follow up in 1 week (on 7/23/2025), or if symptoms worsen or fail to improve, for Follow-up on condition.     Dear patient,   As a result of recent federal legislation (The Federal Cures Act), you may receive lab or pathology results from your visit in your [facility] account before your physician is able to contact you. Your physician or their representative will relay the results to you with their recommendations at their soonest availability.     If no improvement in symptoms or symptoms worsen, please be advised to call MD, follow-up at clinic and/or go to ER if becomes severe.    Eh Sow M.D.        We Offer TELEHEALTH & Same Day Appointments!   Book your Telehealth appointment with me through my nurse or   Clinic appointments on [facility]!    [address] [place] [address]    Office: [phone]   FAX: [phone]    Check out my Facebook Page and Follow Me at: [email or web address]    Check out my website at [email or web address] by clicking on: [email or web address]    To Schedule appointments online, go to [facility]: [email or web address]

## 2025-07-17 NOTE — PROGRESS NOTES
Patient ID: Crystal Acuna is a 69 y.o. female.        E-Visit Time Tracking:   Day 1 Time (in minutes): 12  Total Time (in minutes): 12      Chief Complaint: General Illness (Entered automatically based on patient selection in [employer].)      The patient initiated a request through [employer] on 7/16/2025 for evaluation and management with a chief complaint of General Illness (Entered automatically based on patient selection in [employer].)     I evaluated the questionnaire submission on 07/17/2025.    [facility] Peq Evisit [facility]-Skin Hair Nails    7/16/2025  6:12 PM CDT - Filed by Patient   What do you need help with? Skin   What concern do you have about your skin? Skin Infection   Do you agree to participate in an E-Visit? Yes   If you have any of the following symptoms, please present to your local emergency room or call 911:  I acknowledge   What is the main issue you would like addressed today? I get staph boils on my skin from time to time and i think i have one on my lip.   How would you describe your skin concern? Lump or bump   When did your concern begin? 7/12/2025   Where is your skin concern located? (Scalp, Face, Neck, Chest, Back, Arm(s), Hand(s), Groin, Genitals, Buttock/anus, Leg(s), Foot/Feet, Sun exposed areas, Clothes covered areas, Other) Face   Does the affected area itch? No   Does the affected area hurt? Yes   Where is the pain located? (On the affected area, Around the affected area, Other areas) On the affected area   When did the pain start? (Suddenly, Gradually, After trauma, Not sure) Gradually   What best describes your pain? (Aching, Burning, Dull, Itching, Sensitive, Sharp, Sore, Stinging, Tender, Throbbing) Sore   How often do you feel pain in the affected area? (New problem, Always, Comes and goes, Weekly, Monthly, Yearly, Seasonally) New problem   Please select the face that most closely captures your pain level: 6   Does the affected area have discharge or drainage? Yes   Describe the  discharge or drainage. (Clear, Cloudy or Milky, Thick or Mucous, Bloody, Pus-like or Yellow/Green) Pus-like or Yellow/Green   Have you noticed any bleeding in the affected area? Yes   How would you describe your skin concern? (Spots, Streaks, Raised, Flat, Scaly, Blistered, Solid or firm, Open, Closed, Clear fluid filled, Pus filled, Draining, Scabs) Raised;  Blistered;  Pus filled;  Draining   How would you describe the color of the affected area(s)? (No change, Black, Blue, Brown, Green, Orange, Pink, Purple, Red, Yellow, White, Darker than skin, Lighter than skin) Pink;  Red   How has the affected area changed over time? (No change, Increased in size, Decreased in size, Size changes randomly, Spread to other areas, Began in multiple areas, now in one) Increased in size   How often do you have this skin concern? (New problem, Always, Comes and goes, Weekly, Monthly, Yearly, Seasonally) New problem   How long does your skin problem last? (Always, Minutes, Hours, Days, Weeks, Months, Years) Always   Have you been Exposed to any of the following? (Animals, Chemicals, Cosmetics, Creams/lotions, Detergent, Excessive sun, Foods, Insect, Laser, shaving, waxing, Meds, Perfumes, Poison ivy/oak, Sick contact, Soap, Sexual contact, Other, Not sure, None) Animals;  Cosmetics;  Creams or lotions;  Not sure   Have you used any of the following to treat your skin concern? (Over-the-counter cream or ointment, Prescription medication, Home remedies, Cold compress, Heat compress, Steroid cream or ointment, Antibiotics, Moisturizer or lotion, Other, None) Prescription medication;  Heat compress   If you have used anything for treatment, has it helped the symptoms? Maybe   Do you have any of the following additional symptoms with your skin concern? Headache   Provide any information you feel is important to your history not asked above Hoping to get antibiotics before it gets worse. Im using mupirocin ointment.   At least one photo  is required for treatment to be provided. You can upload a maximum of three photos of the affected area.     Are you able to take your vitals? Yes   Systolic Blood Pressure 115   Diastolic Blood Pressure 65   Weight: 203   Height: 62   Pluse: 63   Temp 97.4   Resp: 62   SpO2 95         Encounter Diagnosis   Name Primary?    Impetigo Yes        No orders of the defined types were placed in this encounter.     Medications Ordered This Encounter   Medications    cephALEXin (KEFLEX) 500 MG capsule     Sig: Take 1 capsule (500 mg total) by mouth 4 (four) times daily. for 7 days     Dispense:  28 capsule     Refill:  0    mupirocin (BACTROBAN) 2 % ointment     Sig: Apply topically 3 (three) times daily.     Dispense:  22 g     Refill:  0        Follow up in 1 week (on 7/23/2025), or if symptoms worsen or fail to improve, for Follow-up on condition.

## 2025-08-06 ENCOUNTER — OFFICE VISIT (OUTPATIENT)
Dept: FAMILY MEDICINE | Facility: CLINIC | Age: 70
End: 2025-08-06
Payer: MEDICARE

## 2025-08-06 ENCOUNTER — RESULTS FOLLOW-UP (OUTPATIENT)
Dept: FAMILY MEDICINE | Facility: CLINIC | Age: 70
End: 2025-08-06

## 2025-08-06 ENCOUNTER — HOSPITAL ENCOUNTER (OUTPATIENT)
Dept: RADIOLOGY | Facility: HOSPITAL | Age: 70
Discharge: HOME | End: 2025-08-06
Attending: FAMILY MEDICINE
Payer: MEDICARE

## 2025-08-06 VITALS
DIASTOLIC BLOOD PRESSURE: 84 MMHG | HEART RATE: 72 BPM | OXYGEN SATURATION: 96 % | WEIGHT: 203.63 LBS | HEIGHT: 62 IN | BODY MASS INDEX: 37.47 KG/M2 | SYSTOLIC BLOOD PRESSURE: 136 MMHG

## 2025-08-06 DIAGNOSIS — E11.69 HYPERLIPIDEMIA ASSOCIATED WITH TYPE 2 DIABETES MELLITUS: Chronic | ICD-10-CM

## 2025-08-06 DIAGNOSIS — E03.9 HYPOTHYROIDISM, UNSPECIFIED TYPE: ICD-10-CM

## 2025-08-06 DIAGNOSIS — I65.23 BILATERAL CAROTID ARTERY STENOSIS: Primary | ICD-10-CM

## 2025-08-06 DIAGNOSIS — J44.9 COPD, GROUP A, BY GOLD 2017 CLASSIFICATION: Chronic | ICD-10-CM

## 2025-08-06 DIAGNOSIS — E11.65 TYPE 2 DIABETES MELLITUS WITH HYPERGLYCEMIA, WITHOUT LONG-TERM CURRENT USE OF INSULIN: Chronic | ICD-10-CM

## 2025-08-06 DIAGNOSIS — E11.59 HYPERTENSION ASSOCIATED WITH DIABETES: Chronic | ICD-10-CM

## 2025-08-06 DIAGNOSIS — R50.9 FEVER, UNSPECIFIED FEVER CAUSE: ICD-10-CM

## 2025-08-06 DIAGNOSIS — E78.5 HYPERLIPIDEMIA ASSOCIATED WITH TYPE 2 DIABETES MELLITUS: Chronic | ICD-10-CM

## 2025-08-06 DIAGNOSIS — Z79.899 ENCOUNTER FOR LONG-TERM (CURRENT) USE OF MEDICATIONS: ICD-10-CM

## 2025-08-06 DIAGNOSIS — R05.9 COUGH, UNSPECIFIED TYPE: Primary | ICD-10-CM

## 2025-08-06 DIAGNOSIS — I15.2 HYPERTENSION ASSOCIATED WITH DIABETES: Chronic | ICD-10-CM

## 2025-08-06 DIAGNOSIS — R05.9 COUGH, UNSPECIFIED TYPE: ICD-10-CM

## 2025-08-06 LAB
CTP QC/QA: YES
CTP QC/QA: YES
POC MOLECULAR INFLUENZA A AGN: NEGATIVE
POC MOLECULAR INFLUENZA B AGN: NEGATIVE
SARS-COV-2 RDRP RESP QL NAA+PROBE: NEGATIVE

## 2025-08-06 PROCEDURE — 3288F FALL RISK ASSESSMENT DOCD: CPT | Mod: CPTII,S$GLB,, | Performed by: FAMILY MEDICINE

## 2025-08-06 PROCEDURE — 3079F DIAST BP 80-89 MM HG: CPT | Mod: CPTII,S$GLB,, | Performed by: FAMILY MEDICINE

## 2025-08-06 PROCEDURE — G2211 COMPLEX E/M VISIT ADD ON: HCPCS | Mod: S$GLB,,, | Performed by: FAMILY MEDICINE

## 2025-08-06 PROCEDURE — 1126F AMNT PAIN NOTED NONE PRSNT: CPT | Mod: CPTII,S$GLB,, | Performed by: FAMILY MEDICINE

## 2025-08-06 PROCEDURE — 99214 OFFICE O/P EST MOD 30 MIN: CPT | Mod: S$GLB,,, | Performed by: FAMILY MEDICINE

## 2025-08-06 PROCEDURE — 3075F SYST BP GE 130 - 139MM HG: CPT | Mod: CPTII,S$GLB,, | Performed by: FAMILY MEDICINE

## 2025-08-06 PROCEDURE — 87502 INFLUENZA DNA AMP PROBE: CPT | Mod: QW,S$GLB,, | Performed by: FAMILY MEDICINE

## 2025-08-06 PROCEDURE — 4010F ACE/ARB THERAPY RXD/TAKEN: CPT | Mod: CPTII,S$GLB,, | Performed by: FAMILY MEDICINE

## 2025-08-06 PROCEDURE — 1160F RVW MEDS BY RX/DR IN RCRD: CPT | Mod: CPTII,S$GLB,, | Performed by: FAMILY MEDICINE

## 2025-08-06 PROCEDURE — 87635 SARS-COV-2 COVID-19 AMP PRB: CPT | Mod: QW,S$GLB,, | Performed by: FAMILY MEDICINE

## 2025-08-06 PROCEDURE — 71046 X-RAY EXAM CHEST 2 VIEWS: CPT | Mod: TC,PO

## 2025-08-06 PROCEDURE — 3008F BODY MASS INDEX DOCD: CPT | Mod: CPTII,S$GLB,, | Performed by: FAMILY MEDICINE

## 2025-08-06 PROCEDURE — 1101F PT FALLS ASSESS-DOCD LE1/YR: CPT | Mod: CPTII,S$GLB,, | Performed by: FAMILY MEDICINE

## 2025-08-06 PROCEDURE — 1159F MED LIST DOCD IN RCRD: CPT | Mod: CPTII,S$GLB,, | Performed by: FAMILY MEDICINE

## 2025-08-06 PROCEDURE — 3051F HG A1C>EQUAL 7.0%<8.0%: CPT | Mod: CPTII,S$GLB,, | Performed by: FAMILY MEDICINE

## 2025-08-06 PROCEDURE — 99999 PR PBB SHADOW E&M-EST. PATIENT-LVL V: CPT | Mod: PBBFAC,,, | Performed by: FAMILY MEDICINE

## 2025-08-06 PROCEDURE — 71046 X-RAY EXAM CHEST 2 VIEWS: CPT | Mod: 26,,, | Performed by: RADIOLOGY

## 2025-08-06 RX ORDER — DULAGLUTIDE 0.75 MG/.5ML
0.75 INJECTION, SOLUTION SUBCUTANEOUS
COMMUNITY
Start: 2025-07-24

## 2025-08-06 NOTE — PROGRESS NOTES
PLAN:    Assessment & Plan    E11.65 Type 2 diabetes mellitus with hyperglycemia, without long-term current use of insulin  J44.9 COPD, group A, by GOLD 2017 classification  R05.9 Cough, unspecified type  R50.9 Fever, unspecified fever cause  Z79.899 Encounter for long-term (current) use of medications  E11.69, E78.5 Hyperlipidemia associated with type 2 diabetes mellitus  E11.59, I15.2 Hypertension associated with diabetes  E03.9 Hypothyroidism, unspecified type    IMPRESSION:   Assessed respiratory symptoms as likely COPD exacerbation.   Deferred antibiotic therapy (doxycycline) due to recent antibiotic course.   Decided against steroid treatment to avoid affecting glucose levels.   Reviewed recent negative flu and COVID test results.    PLAN SUMMARY:   Continue Tylenol for fever   Ordered chest XR   Ordered labs and urine test   Resume inhaler use and maintain nebulizer treatments   Continue Mucinex for mucus management   Potential for viral infection diagnosis if symptoms persist    COPD, GROUP A, BY GOLD 2017 CLASSIFICATION:   Explained COPD exacerbation as inflammation in the lungs.   Patient to resume use of inhaler and maintain nebulizer treatments.   Ordered chest XR to evaluate current lung status.  Patient declines steroids and antibiotics at this time.    COUGH, UNSPECIFIED TYPE:   Continue Mucinex for mucus management.    FEVER, UNSPECIFIED FEVER CAUSE:   Continue Tylenol for fever management.   Discussed potential for viral infection if symptoms persist.  Discussed condition course and signs and symptoms to expect.  Patient advised take anti-inflammatories and or Tylenol for pain or fever.  ER precautions.  Call MD or follow-up to clinic if not improving or worsening symptoms.      ENCOUNTER FOR LONG-TERM (CURRENT) USE OF MEDICATIONS:   Advised on the risk of yeast infection with antibiotic use.    LIFESTYLE CHANGES:   Ordered labs and urine test.         Problem List Items Addressed This Visit        Hyperlipidemia associated with type 2 diabetes mellitus (Chronic)    Continue current medications..  Follow-up with Cardiology.  Counseled on hyperlipidemia disease course, healthy diet and increased need for exercise.  Please be advised of the risk of cardiovascular disease, increase stroke and heart attack risk with uncontrolled/untreated hyperlipidemia.     Patient voiced understanding and understood the treatment plan. All questions were answered.            Relevant Medications    TRULICITY 0.75 mg/0.5 mL pen injector    Type 2 diabetes mellitus with hyperglycemia, without long-term current use of insulin (Chronic)    Update labs as scheduled.  Continue current regimen.  We will plan to monitor hemoglobin A1c at designated intervals 3 to 6 months.  I recommend ongoing Education for diabetic diet and exercise protocol.  We will continue to monitor for side effects.    Please be advised of symptoms to monitor for and to notify me immediately if persistent or worsening.  Follow up with Ophthalmology/Optometry and Podiatry at least annually.           Relevant Medications    TRULICITY 0.75 mg/0.5 mL pen injector    Other Relevant Orders    TSH    Microalbumin/Creatinine Ratio, Urine    Encounter for long-term (current) use of medications (Chronic)    Complete history and physical was completed today.  Complete and thorough medication reconciliation was performed.  Discussed risks and benefits of medications.  Advised patient on orders and health maintenance.  We discussed old records and old labs if available.  Will request any records not available through epic.  Continue current medications listed on your summary sheet.             Relevant Orders    TSH    Microalbumin/Creatinine Ratio, Urine    Hypertension associated with diabetes (Chronic)    Counseled on importance of hypertension disease course, I recommend ongoing Education for DASH-diet and exercise.  Counseled on medication regimen importance of treating  high blood pressure.  Please be advised of risk of untreated blood pressure as discussed.  Please advised of ER precautions were given for symptoms of hypertensive urgency and emergency.           Relevant Medications    TRULICITY 0.75 mg/0.5 mL pen injector    COPD, group A, by GOLD 2017 classification (Chronic)    Continue inhalers.  COPD precautions.  Follow-up with Pulmonary.         Hypothyroidism (Chronic)    Update labs.  Please be advised of hypothyroidism disease course.  We will plan to monitor thyroid labs at routine intervals.  Please be advised of risks and benefits of medication use, see medication insert for complete details.  ER precautions.    Common complaints from hypothyroidism include weight gain, fatigue, cold intolerance, constipation, dry and flaky shin, coarse or loss of hair, and trouble with memory.     Complaints with hyperthyroidism are weight loss or decrease in appetite, weakness and fatigue, visual changes, fast heart rate, decreased heat tolerance, thinning scalp hair, change in bowel habits, and palpations.         Relevant Orders    TSH    Fever    Relevant Orders    POCT Influenza A/B Molecular    POCT Influenza A/B Molecular    X-Ray Chest PA And Lateral    POCT COVID-19 Rapid Screening    Cough - Primary    Relevant Orders    POCT Influenza A/B Molecular    POCT Influenza A/B Molecular    X-Ray Chest PA And Lateral    POCT COVID-19 Rapid Screening     Future Appointments       Date Provider Specialty Appt Notes    8/6/2025  Radiology .    8/6/2025  Lab labs    9/23/2025  Radiology Follow up in about 1 year (around 9/24/2025), or chest CT, [place], CPAP supplies.    9/23/2025  Pulmonology Follow up in about 1 year (around 9/24/2025), or chest CT, [place], CPAP supplies.    9/23/2025 Oliver Mederos MD Pulmonology Follow up in about 1 year (around 9/24/2025), or chest CT, [place], CPAP supplies.           Medication Management for assessment above:   Medication List with  "Changes/Refills   Current Medications    ALBUTEROL (ACCUNEB) 1.25 MG/3 ML NEBU    Take 3 mLs (1.25 mg total) by nebulization every 6 (six) hours as needed (cough wheezing). Rescue    ALBUTEROL-IPRATROPIUM (DUO-NEB) 2.5 MG-0.5 MG/3 ML NEBULIZER SOLUTION    Take 3 mLs by nebulization every 6 (six) hours as needed for Wheezing. Rescue    AMIODARONE (PACERONE) 200 MG TAB    Take 200 mg by mouth. 100 mg    ATORVASTATIN (LIPITOR) 80 MG TABLET    Take 80 mg by mouth every evening.    BD SOMMER 2ND GEN PEN NEEDLE 32 GAUGE X 5/32" NDLE    use as directed    BLOOD SUGAR DIAGNOSTIC STRP    To check BG 2 times daily, to use with insurance preferred meter    BLOOD-GLUCOSE METER KIT    To check BG 2 times daily, to use with insurance preferred meter    CHLORHEXIDINE (HIBICLENS) 4 % EXTERNAL LIQUID    Apply topically daily as needed.    CLOPIDOGREL (PLAVIX) 75 MG TABLET    Take 75 mg by mouth.    CO-ENZYME Q-10 30 MG CAPSULE    Take 30 mg by mouth 3 (three) times daily.    ELIQUIS 5 MG TAB    Take 5 mg by mouth 2 (two) times daily.    EMPAGLIFLOZIN (JARDIANCE) 25 MG TABLET    Take 25 mg by mouth.    ENTRESTO 24-26 MG PER TABLET    Take 1 tablet by mouth 2 (two) times daily.    ESTRADIOL (ESTRACE) 0.01 % (0.1 MG/GRAM) VAGINAL CREAM    Place 2 g vaginally twice a week.    EZETIMIBE (ZETIA) 10 MG TABLET    Take 10 mg by mouth.    FAMOTIDINE (PEPCID) 40 MG TABLET    TAKE ONE TABLET BY MOUTH EVERY DAY    FLUOCINOLONE ACETONIDE OIL 0.01 % DROP    Place 4 drops in ear(s) 2 (two) times a day. 4 drops to both ears twice a day for 7 days then once weekly for maintenance.    FLUOROMETHOLONE 0.1% (FML) 0.1 % DRPS    INSTILL 1 DROP INTO EACH EYE THREE TIMES DAILY AS DIRECTED    FREESTYLE MONIKA 3 SENSOR ATILIO    Use to check glucose 4x a day. Change sensor every 14 days.    FUROSEMIDE (LASIX) 20 MG TABLET    Take 20 mg by mouth daily as needed.    HYDROCORTISONE 1 % CREAM    Apply to affected area 2 times daily    INSULIN LISPRO 100 UNIT/ML PEN    " Inject 0-6 Units into the skin.    LANCETS MISC    To check BG 2 times daily, to use with insurance preferred meter    LEVALBUTEROL (XOPENEX HFA) 45 MCG/ACTUATION INHALER    INHALE ONE PUFF INTO THE LUNGS EVERY 4 HOURS AS NEEDED FOR WHEEZING    LEVOTHYROXINE (SYNTHROID) 25 MCG TABLET    Take 1 tablet (25 mcg total) by mouth every morning.    METOPROLOL SUCCINATE (TOPROL-XL) 25 MG 24 HR TABLET    Take 25 mg by mouth. Pt take 12.5mg    MUPIROCIN (BACTROBAN) 2 % OINTMENT    Apply topically 3 (three) times daily.    MUPIROCIN (BACTROBAN) 2 % OINTMENT    Apply topically 3 (three) times daily.    NITROGLYCERIN (NITROSTAT) 0.4 MG SL TABLET    Place 0.4 mg under the tongue as needed.    NYSTATIN (MYCOSTATIN) OINTMENT    Apply topically 2 (two) times daily.    PANTOPRAZOLE (PROTONIX) 40 MG TABLET    Take 1 tablet (40 mg total) by mouth once daily.    SPIRIVA RESPIMAT 2.5 MCG/ACTUATION INHALER    INHALE TWO PUFFS DAILY    TRULICITY 0.75 MG/0.5 ML PEN INJECTOR    Inject 0.75 mg into the skin every 7 days.   Discontinued Medications    CEPHALEXIN (KEFLEX) 500 MG CAPSULE    Take 1 capsule (500 mg total) by mouth 4 (four) times daily. for 7 days       Eh Sow M.D.  ==========================================================================  Subjective:   Patient ID: Crystal Acuna is a 69 y.o. female.  has a past medical history of Allergy, Bronchitis, Family history of colonic polyps (04/27/2021), Hallux abductovalgus, Herniated lumbar intervertebral disc, Hyperlipidemia, Hypertension, Smoker, and Staph aureus infection.   Chief Complaint: Cough      History of Present Illness    CHIEF COMPLAINT:  Patient, a 69-year-old female, presents for a six-month follow-up of chronic medical conditions and has developed acute respiratory symptoms.    HPI:  Patient reports illness onset on Sunday night with coughing, wheezing, and lung pain. She has throat soreness and sinus congestion. Her symptoms are improving gradually,  with today being better than yesterday. She had a fever the night before last, which she treated with Tylenol, but had no chills or fever last night. She denies being more short of breath than usual.    The onset of symptoms appears to be linked to a social gathering on Saturday night, where she played bingo at someone's house. Two of her friends who attended the same event also became ill. She typically stays home most of the time to avoid catching illnesses.    To manage her symptoms, she has been using a nebulizer, taking cough syrup, and using Mucinex. She had previously stopped using her inhaler due to coughing, but now plans to resume its use. She is evaluated by Dr. Mcrae as her pulmonologist and has an upcoming appointment scheduled. She has a history of COPD, which was diagnosed by her pulmonologist.    She reports having recently finished a course of antibiotics about a week ago for an unrelated issue that she had previously communicated to the doctor via email. This condition has since resolved.    MEDICATIONS:  Patient is on nebulizer treatment, cough syrup, Mucinex, Levothyroxine, and Atorvastatin for cholesterol. She is also taking Jardiance, Lispro (sliding scale), and Trulicity for diabetes management. She is on Eliquis. Patient had discontinued her inhaler for some time due to coughing but is now restarting it due to current symptoms.    MEDICAL HISTORY:  Patient has a history of COPD, which was diagnosed by a pulmonologist.      ROS:  ROS as indicated in HPI.         Problem List Items Addressed This Visit       Hyperlipidemia associated with type 2 diabetes mellitus (Chronic)    Overview   August 2025:  She is back on her cholesterol medication.  Hyperlipidemia Medications              atorvastatin (LIPITOR) 80 MG tablet Take 80 mg by mouth every evening.    ezetimibe (ZETIA) 10 mg tablet Take 10 mg by mouth.        February 2025:  Patient reports having some significant back pain and muscle  aches on atorvastatin.  She is not taking Co Q10  February 2024: Patient reports she is having significant back pain on atorvastatin 80 milligrams daily.  She is following up with her cardiologist today.    April 2023: Patient with recent MI with stents.  Patient currently on atorvastatin 80 milligrams daily.  CHRONIC. STABLE. Lab analysis reviewed.   (-) CP, SOB, abdominal pain, N/V/D, constipation, jaundice, skin changes.  (-) Myalgias  Lab Results   Component Value Date    CHOL 136 02/06/2025    CHOL 133 09/12/2024    CHOL 150 12/07/2023     Lab Results   Component Value Date    HDL 54 02/06/2025    HDL 51 09/12/2024    HDL 51 12/07/2023     Lab Results   Component Value Date    LDLCALC 62.8 (L) 02/06/2025    LDLCALC 62.4 (L) 09/12/2024    LDLCALC 81.6 12/07/2023     Lab Results   Component Value Date    TRIG 96 02/06/2025    TRIG 98 09/12/2024    TRIG 87 12/07/2023     Lab Results   Component Value Date    CHOLHDL 39.7 02/06/2025    CHOLHDL 38.3 09/12/2024    CHOLHDL 34.0 12/07/2023     Lab Results   Component Value Date    TOTALCHOLEST 2.5 02/06/2025    TOTALCHOLEST 2.6 09/12/2024    TOTALCHOLEST 2.9 12/07/2023     Lab Results   Component Value Date    ALT 16 02/06/2025    AST 16 02/06/2025    ALKPHOS 56 02/06/2025    BILITOT 0.5 02/06/2025     ======================================================  The ASCVD Risk score (Mary Lou ECHAVARRIA, et al., 2019) failed to calculate for the following reasons:    Risk score cannot be calculated because patient has a medical history suggesting prior/existing ASCVD           Current Assessment & Plan   Continue current medications..  Follow-up with Cardiology.  Counseled on hyperlipidemia disease course, healthy diet and increased need for exercise.  Please be advised of the risk of cardiovascular disease, increase stroke and heart attack risk with uncontrolled/untreated hyperlipidemia.     Patient voiced understanding and understood the treatment plan. All questions were answered.  "           Type 2 diabetes mellitus with hyperglycemia, without long-term current use of insulin (Chronic)    Overview   August 2025:  Doing well with blood sugar.  Diabetes Medications              empagliflozin (JARDIANCE) 25 mg tablet Take 25 mg by mouth.    insulin lispro 100 unit/mL pen Inject 0-6 Units into the skin.    TRULICITY 0.75 mg/0.5 mL pen injector Inject 0.75 mg into the skin every 7 days.        April 2025:  Tolerating current medications well.  Following with Endocrinology. February 2025: February 2024:  Patient reports she has gained significant amount of weight over the last few months.  BMI Readings from Last 10 Encounters:   02/06/25 37.11 kg/m²   10/21/24 36.03 kg/m²   10/08/24 35.48 kg/m²   09/24/24 37.13 kg/m²   08/01/24 37.39 kg/m²   07/09/24 36.78 kg/m²   05/31/24 36.40 kg/m²   03/20/24 36.40 kg/m²   02/20/24 38.23 kg/m²   02/06/24 38.34 kg/m²   July 2023:  Patient reports she had issues with Ozempic causing stomach issues.April 2023:  Patient has well-controlled blood sugar on Januvia.December 2022:  Patient doing well on Januvia.Patient has side effects to GL P 1. She cannot tolerate metformin due to GI side effects.  Patient currently having increased use infections and recurrent infections on Farxiga.  Diabetes Management Status    Statin: Taking  ACE/ARB: Not taking    Screening or Prevention Patient's value Goal Complete/Controlled?   HgA1C Testing and Control   Lab Results   Component Value Date    HGBA1C 7.5 (H) 02/06/2025      Annually/Less than 8% Yes   Lipid profile : 02/06/2025 Annually Yes   LDL control Lab Results   Component Value Date    LDLCALC 62.8 (L) 02/06/2025    Annually/Less than 100 mg/dl  Yes   Nephropathy screening Lab Results   Component Value Date    LABMICR <5.0 06/03/2024     Lab Results   Component Value Date    PROTEINUA 1+ (A) 10/08/2024     No results found for: "UTPCR"   Annually Yes   Blood pressure BP Readings from Last 1 Encounters:   08/06/25 136/84 "    Less than 140/90 Yes   Dilated retinal exam : 08/27/2024 Annually Yes   Foot exam   : 02/06/2025 Annually Yes              Current Assessment & Plan   Update labs as scheduled.  Continue current regimen.  We will plan to monitor hemoglobin A1c at designated intervals 3 to 6 months.  I recommend ongoing Education for diabetic diet and exercise protocol.  We will continue to monitor for side effects.    Please be advised of symptoms to monitor for and to notify me immediately if persistent or worsening.  Follow up with Ophthalmology/Optometry and Podiatry at least annually.           Encounter for long-term (current) use of medications (Chronic)    Overview   August 2025:  Reviewed labs.  April 2025:  Reviewed labs.  February 2024: Reviewed labs.  July 2023: Reviewed labs.  April 2023: Reviewed labs.  December 2022: Reviewed labs.  CHRONIC. Stable. Compliant with medications for managed conditions. See medication list. No SE reported.   Routine lab analysis is being monitored. Refills were addressed.  Lab Results   Component Value Date    WBC 6.96 02/06/2025    HGB 15.4 02/06/2025    HCT 48.8 (H) 02/06/2025    MCV 94 02/06/2025     02/06/2025         Chemistry        Component Value Date/Time     02/06/2025 0916    K 4.1 02/06/2025 0916     02/06/2025 0916    CO2 23 02/06/2025 0916    BUN 19 02/06/2025 0916    CREATININE 0.8 02/06/2025 0916     (H) 02/06/2025 0916        Component Value Date/Time    CALCIUM 9.2 02/06/2025 0916    ALKPHOS 56 02/06/2025 0916    AST 16 02/06/2025 0916    AST 20 03/28/2016 1457    ALT 16 02/06/2025 0916    BILITOT 0.5 02/06/2025 0916    ESTGFRAFRICA >60.0 06/08/2022 0747    EGFRNONAA >60.0 06/08/2022 0747          Lab Results   Component Value Date    TSH 0.374 (L) 02/06/2025    FREET4 1.20 02/06/2025    T3FREE 2.5 02/06/2025              Current Assessment & Plan   Complete history and physical was completed today.  Complete and thorough medication  reconciliation was performed.  Discussed risks and benefits of medications.  Advised patient on orders and health maintenance.  We discussed old records and old labs if available.  Will request any records not available through epic.  Continue current medications listed on your summary sheet.             Hypertension associated with diabetes (Chronic)    Overview   August 2025:  Blood pressure well controlled on current regimen.  Reviewed medications and diabetes score card.  Hypertension Medications              ENTRESTO 24-26 mg per tablet Take 1 tablet by mouth 2 (two) times daily.    furosemide (LASIX) 20 MG tablet Take 20 mg by mouth daily as needed.    metoprolol succinate (TOPROL-XL) 25 MG 24 hr tablet Take 25 mg by mouth. Pt take 12.5mg    nitroGLYCERIN (NITROSTAT) 0.4 MG SL tablet Place 0.4 mg under the tongue as needed.        February 2025:  Reviewed medications.CHRONIC.  July 2023:  Reviewed medications and labs.April 2023:  Patient has had some low blood pressure.    Patient recent medication changes due to MI with stents at [place].  Patient now following with Cardiology.  STABLE.  Patient on Lasix 20 milligrams daily and metoprolol 25 milligrams daily.   BP Reviewed.  Compliant with BP medications. No SE reported.  December 2022: Well controlled on current regimen.  (-) CP, SOB, palpitations, dizziness, lightheadedness, HA, arm numbness, tingling or weakness, syncope.  Creatinine   Date Value Ref Range Status   11/07/2024 0.86 0.60 - 1.10 mg/dL Final   10/08/2024 0.8 0.5 - 1.4 mg/dL Final              Current Assessment & Plan   Counseled on importance of hypertension disease course, I recommend ongoing Education for DASH-diet and exercise.  Counseled on medication regimen importance of treating high blood pressure.  Please be advised of risk of untreated blood pressure as discussed.  Please advised of ER precautions were given for symptoms of hypertensive urgency and emergency.           COPD,  group A, by GOLD 2017 classification (Chronic)    Overview   Chronic COPD.  Follows with Pulmonary.  On inhalers.         Current Assessment & Plan   Continue inhalers.  COPD precautions.  Follow-up with Pulmonary.         Hypothyroidism (Chronic)    Overview   August 2025:  Remains compliant with levothyroxine.  April 2025:  Patient reports she is tolerating low-dose levothyroxine well.  She feels well.  Chronic.Patient reports weight gain.  No improvement.  She is on levothyroxine 25 micrograms daily.    Lab Results   Component Value Date    TSH 0.374 (L) 02/06/2025    FREET4 1.20 02/06/2025              Current Assessment & Plan   Update labs.  Please be advised of hypothyroidism disease course.  We will plan to monitor thyroid labs at routine intervals.  Please be advised of risks and benefits of medication use, see medication insert for complete details.  ER precautions.    Common complaints from hypothyroidism include weight gain, fatigue, cold intolerance, constipation, dry and flaky shin, coarse or loss of hair, and trouble with memory.     Complaints with hyperthyroidism are weight loss or decrease in appetite, weakness and fatigue, visual changes, fast heart rate, decreased heat tolerance, thinning scalp hair, change in bowel habits, and palpations.         Fever    Cough - Primary        Review of patient's allergies indicates:   Allergen Reactions    Metformin     Perflutren lipid microspheres Other (See Comments) and Shortness Of Breath     Back pain, neck pain, arm pain/cramping, and flush face.    Farxiga [dapagliflozin]      Yeast infection      Latex, natural rubber     Ozempic [semaglutide]      Stomach pain      Tirzepatide Other (See Comments)     Stomach pain    Bactrim [sulfamethoxazole-trimethoprim] Rash     Current Outpatient Medications   Medication Instructions    albuterol (ACCUNEB) 1.25 mg, Nebulization, Every 6 hours PRN, Rescue    albuterol-ipratropium (DUO-NEB) 2.5 mg-0.5 mg/3 mL  "nebulizer solution 3 mLs, Nebulization, Every 6 hours PRN, Rescue    amiodarone (PACERONE) 200 mg    atorvastatin (LIPITOR) 80 mg, Nightly    BD SOMMER 2ND GEN PEN NEEDLE 32 gauge x 5/32" Ndle use as directed    blood sugar diagnostic Strp To check BG 2 times daily, to use with insurance preferred meter    blood-glucose meter kit To check BG 2 times daily, to use with insurance preferred meter    chlorhexidine (HIBICLENS) 4 % external liquid Topical (Top), Daily PRN    clopidogreL (PLAVIX) 75 mg    co-enzyme Q-10 30 mg, 3 times daily    ELIQUIS 5 mg, 2 times daily    empagliflozin (JARDIANCE) 25 mg    ENTRESTO 24-26 mg per tablet 1 tablet, 2 times daily    estradioL (ESTRACE) 2 g, Twice weekly    ezetimibe (ZETIA) 10 mg    famotidine (PEPCID) 40 mg, Oral    fluocinolone acetonide oiL 0.01 % Drop 4 drops, Otic, 2 times daily, 4 drops to both ears twice a day for 7 days then once weekly for maintenance.    fluorometholone 0.1% (FML) 0.1 % DrpS INSTILL 1 DROP INTO EACH EYE THREE TIMES DAILY AS DIRECTED    FREESTYLE MONIKA 3 SENSOR [place] Use to check glucose 4x a day. Change sensor every 14 days.    furosemide (LASIX) 20 mg, Daily PRN    hydrocortisone 1 % cream Apply to affected area 2 times daily    insulin lispro 0-6 Units    lancets Misc To check BG 2 times daily, to use with insurance preferred meter    levalbuterol (XOPENEX HFA) 45 mcg/actuation inhaler INHALE ONE PUFF INTO THE LUNGS EVERY 4 HOURS AS NEEDED FOR WHEEZING    levothyroxine (SYNTHROID) 25 mcg, Oral, Every morning    metoprolol succinate (TOPROL-XL) 25 mg    mupirocin (BACTROBAN) 2 % ointment Topical (Top), 3 times daily    mupirocin (BACTROBAN) 2 % ointment Topical (Top), 3 times daily    nitroGLYCERIN (NITROSTAT) 0.4 mg, As needed (PRN)    nystatin (MYCOSTATIN) ointment Topical (Top), 2 times daily    pantoprazole (PROTONIX) 40 mg, Oral, Daily    SPIRIVA RESPIMAT 2.5 mcg/actuation inhaler INHALE TWO PUFFS DAILY    TRULICITY 0.75 mg, Every 7 days      I " "have reviewed the PMH, social history, FamilyHx, surgical history, allergies and medications documented / confirmed by the patient at the time of this visit.  Review of Systems   Constitutional:  Positive for fatigue and fever. Negative for activity change and unexpected weight change.   HENT:  Negative for hearing loss, rhinorrhea and trouble swallowing.    Eyes:  Negative for discharge and visual disturbance.   Respiratory:  Positive for cough and wheezing. Negative for chest tightness.    Cardiovascular:  Negative for chest pain and palpitations.   Gastrointestinal:  Negative for blood in stool, constipation, diarrhea and vomiting.   Endocrine: Negative for polydipsia and polyuria.   Genitourinary:  Negative for difficulty urinating, dysuria, hematuria and menstrual problem.   Musculoskeletal:  Positive for arthralgias and back pain. Negative for joint swelling and neck pain.   Neurological:  Negative for weakness and headaches.   Psychiatric/Behavioral:  Negative for confusion and dysphoric mood.      Objective:   /84   Pulse 72   Ht 5' 2" (1.575 m)   Wt 92.4 kg (203 lb 9.6 oz)   SpO2 96%   BMI 37.24 kg/m²   Physical Exam  Vitals and nursing note reviewed.   Constitutional:       General: She is not in acute distress.     Appearance: She is well-developed. She is not ill-appearing, toxic-appearing or diaphoretic.   HENT:      Head: Normocephalic and atraumatic.      Right Ear: Hearing, tympanic membrane, ear canal and external ear normal. There is no impacted cerumen.      Left Ear: Hearing, tympanic membrane, ear canal and external ear normal. There is no impacted cerumen.      Nose: Nose normal. No rhinorrhea.   Eyes:      General: Lids are normal.      Extraocular Movements: Extraocular movements intact.      Conjunctiva/sclera: Conjunctivae normal.      Pupils: Pupils are equal, round, and reactive to light.   Neck:      Vascular: No carotid bruit.   Cardiovascular:      Rate and Rhythm: Normal " rate.      Pulses: Normal pulses.   Pulmonary:      Effort: Pulmonary effort is normal. No respiratory distress.      Breath sounds: Normal breath sounds.      Comments: Cough with deep inspiration  Chest:      Chest wall: No tenderness.   Abdominal:      General: Bowel sounds are normal. There is no distension.      Palpations: Abdomen is soft. There is no mass.   Musculoskeletal:         General: Tenderness present. No deformity. Normal range of motion.      Cervical back: Normal range of motion and neck supple.   Lymphadenopathy:      Cervical: No cervical adenopathy.   Skin:     General: Skin is warm and dry.      Capillary Refill: Capillary refill takes less than 2 seconds.      Coloration: Skin is not pale.   Neurological:      General: No focal deficit present.      Mental Status: She is alert and oriented to person, place, and time. Mental status is at baseline. She is not disoriented.      Cranial Nerves: No cranial nerve deficit.      Motor: No weakness.      Gait: Gait normal.   Psychiatric:         Attention and Perception: She is attentive.         Mood and Affect: Mood normal. Mood is not anxious or depressed.         Speech: Speech is not rapid and pressured or slurred.         Behavior: Behavior normal. Behavior is not agitated, aggressive or hyperactive. Behavior is cooperative.         Thought Content: Thought content normal. Thought content is not paranoid or delusional. Thought content does not include homicidal or suicidal ideation. Thought content does not include homicidal or suicidal plan.         Cognition and Memory: Memory is not impaired.         Judgment: Judgment normal.         Assessment:     1. Cough, unspecified type    2. Fever, unspecified fever cause    3. Encounter for long-term (current) use of medications    4. Hyperlipidemia associated with type 2 diabetes mellitus    5. Hypertension associated with diabetes    6. COPD, group A, by GOLD 2017 classification    7. Type 2  diabetes mellitus with hyperglycemia, without long-term current use of insulin    8. Hypothyroidism, unspecified type      MDM:   Moderate medical complexity.  Moderate risk.  Total time: 21 minutes.  This includes total time spent on the encounter, which includes face to face time and non-face to face time preparing to see the patient (eg, review of previous medical records, tests), Obtaining and/or reviewing separately obtained history, documenting clinical information in the electronic or other health record, independently interpreting results (not separately reported)/communicating results to the patient/family/caregiver, and/or care coordination (not separately reported).    I have Reviewed and summarized old records.  I have performed thorough medication reconciliation today and discussed risk and benefits of medications.  I have reviewed labs and discussed with patient.  All questions were answered.  I am requesting old records and will review them once they are available.  Visit today included increased complexity associated with the care of the episodic problem see above assessment addressed and managing the longitudinal care of the patient due to the serious and/or complex managed problem(s) see above.  Rapid flu and COVID screening test were performed today and are negative.    I have signed for the following orders AND/OR meds.  Orders Placed This Encounter   Procedures    X-Ray Chest PA And Lateral     Standing Status:   Future     Expected Date:   8/6/2025     Expiration Date:   8/6/2026     May the Radiologist modify the order per protocol to meet the clinical needs of the patient?:   Yes     Release to patient:   Immediate    TSH     Standing Status:   Future     Expected Date:   8/6/2025     Expiration Date:   8/6/2026     Send normal result to authorizing provider's In Basket if patient is active on MyChart::   Yes    Microalbumin/Creatinine Ratio, Urine     Standing Status:   Future     Expected  Date:   8/6/2025     Expiration Date:   8/6/2026     Specimen Source:   Urine     Send normal result to authorizing provider's In Basket if patient is active on MyChart::   Yes    POCT Influenza A/B Molecular    POCT Influenza A/B Molecular    POCT COVID-19 Rapid Screening           Follow up in about 6 months (around 2/6/2026), or if symptoms worsen or fail to improve.  Future Appointments       Date Provider Specialty Appt Notes    8/6/2025  Radiology .    8/6/2025  Lab labs    9/23/2025  Radiology Follow up in about 1 year (around 9/24/2025), or chest CT, [place], CPAP supplies.    9/23/2025  Pulmonology Follow up in about 1 year (around 9/24/2025), or chest CT, [place], CPAP supplies.    9/23/2025 Oliver Mederos MD Pulmonology Follow up in about 1 year (around 9/24/2025), or chest CT, [place], CPAP supplies.          If no improvement in symptoms or symptoms worsen, advised to call/follow-up at clinic or go to ER. Patient voiced understanding and all questions/concerns were addressed.   DISCLAIMER: This note was compiled by using a speech recognition dictation system and therefore please be aware that typographical / speech recognition errors can and do occur.  Please contact me if you see any errors specifically.  Consent was obtained for recording system prior to the visit.    This note was generated with the assistance of ambient listening technology. Verbal consent was obtained by the patient and accompanying visitor(s) for the recording of patient appointment to facilitate this note. I attest to having reviewed and edited the generated note for accuracy, though some syntax or spelling errors may persist. Please contact the author of this note for any clarification.          Eh Sow M.D.       Office: [phone]   [address] [place] [address]  FAX: [phone]

## 2025-08-06 NOTE — ASSESSMENT & PLAN NOTE
Update labs.  Please be advised of hypothyroidism disease course.  We will plan to monitor thyroid labs at routine intervals.  Please be advised of risks and benefits of medication use, see medication insert for complete details.  ER precautions.    Common complaints from hypothyroidism include weight gain, fatigue, cold intolerance, constipation, dry and flaky shin, coarse or loss of hair, and trouble with memory.     Complaints with hyperthyroidism are weight loss or decrease in appetite, weakness and fatigue, visual changes, fast heart rate, decreased heat tolerance, thinning scalp hair, change in bowel habits, and palpations.

## 2025-08-06 NOTE — PROGRESS NOTES
Crystal Acuna, chest x-ray was reviewed by Cardiology.  Your chest x-ray was stable.  No pneumonia.  Chronic incidental findings as noted.  Follow-up with specialists concerning calcifications.  Update carotid ultrasound.  No lesions were found and no fluid was noted.  The heart appears normal.  Please do not hesitate to contact us if you have any further questions.  We will see you at your next visit.    Please call patient with these normal results if they are not enrolled with my chart.

## 2025-08-06 NOTE — ASSESSMENT & PLAN NOTE
Continue current medications..  Follow-up with Cardiology.  Counseled on hyperlipidemia disease course, healthy diet and increased need for exercise.  Please be advised of the risk of cardiovascular disease, increase stroke and heart attack risk with uncontrolled/untreated hyperlipidemia.     Patient voiced understanding and understood the treatment plan. All questions were answered.

## 2025-08-07 NOTE — PROGRESS NOTES
Make follow-up lab appointment per recommendation below.  Check to see if patient has seen the results through my chart.  If not then,  #CALL THE PATIENT# to discuss results/see if they have questions and document verification of contact. Make F/U appt if needed. [phone]      #My interpretation that was sent to them through [employer]:  Crystal, I have reviewed your recent blood work.     Your complete blood count is stable.    Your metabolic panel which shows your glucose, kidney function, electrolytes, and liver function is stable.   Thyroid study is normal.  Continue levothyroxine.  Your hemoglobin A1c is stable.  Better control of diabetes as needed.  Follow-up with diabetes management.  This test is gold standard screening test for diabetes. It is a measures 3 months of your average blood sugar.  The urine microalbumin creatinine ratio is normal.  Diabetes Medications              empagliflozin (JARDIANCE) 25 mg tablet Take 25 mg by mouth.    insulin lispro 100 unit/mL pen Inject 0-6 Units into the skin.    TRULICITY 0.75 mg/0.5 mL pen injector Inject 0.75 mg into the skin every 7 days.            =========================  Also please address any outstanding health maintenance that may be due:   Health Maintenance Due   Topic Date Due    RSV Vaccine (Age 60+ and Pregnant patients) (1 - Risk 60-74 years 1-dose series) Never done    Diabetic Eye Exam  08/27/2025

## 2025-08-07 NOTE — TELEPHONE ENCOUNTER
I have signed for the following orders AND/OR meds.  Please call the patient and ask the patient to schedule the testing AND/OR inform about any medications that were sent.      Orders Placed This Encounter   Procedures    US Carotid Bilateral     Standing Status:   Future     Expected Date:   8/6/2025     Expiration Date:   8/6/2026    US Carotid Bilateral     Standing Status:   Future     Number of Occurrences:   1     Expected Date:   8/7/2025     Expiration Date:   8/6/2026

## 2025-09-03 ENCOUNTER — PATIENT MESSAGE (OUTPATIENT)
Dept: FAMILY MEDICINE | Facility: CLINIC | Age: 70
End: 2025-09-03
Payer: MEDICARE

## 2025-09-05 ENCOUNTER — TELEPHONE (OUTPATIENT)
Dept: FAMILY MEDICINE | Facility: CLINIC | Age: 70
End: 2025-09-05
Payer: MEDICARE